# Patient Record
Sex: FEMALE | Race: WHITE | NOT HISPANIC OR LATINO | Employment: OTHER | URBAN - METROPOLITAN AREA
[De-identification: names, ages, dates, MRNs, and addresses within clinical notes are randomized per-mention and may not be internally consistent; named-entity substitution may affect disease eponyms.]

---

## 2017-07-24 ENCOUNTER — TELEPHONE (OUTPATIENT)
Dept: ORTHOPEDIC SURGERY | Facility: CLINIC | Age: 82
End: 2017-07-24

## 2018-04-03 ENCOUNTER — TELEPHONE (OUTPATIENT)
Dept: ORTHOPEDIC SURGERY | Facility: CLINIC | Age: 83
End: 2018-04-03

## 2018-04-13 ENCOUNTER — APPOINTMENT (OUTPATIENT)
Dept: CT IMAGING | Facility: HOSPITAL | Age: 83
End: 2018-04-13

## 2018-04-13 ENCOUNTER — HOSPITAL ENCOUNTER (EMERGENCY)
Facility: HOSPITAL | Age: 83
Discharge: HOME | End: 2018-04-13
Attending: EMERGENCY MEDICINE | Admitting: EMERGENCY MEDICINE

## 2018-04-13 ENCOUNTER — APPOINTMENT (OUTPATIENT)
Dept: GENERAL RADIOLOGY | Facility: HOSPITAL | Age: 83
End: 2018-04-13

## 2018-04-13 VITALS
OXYGEN SATURATION: 97 % | HEIGHT: 65 IN | BODY MASS INDEX: 31.65 KG/M2 | TEMPERATURE: 97.9 F | RESPIRATION RATE: 16 BRPM | DIASTOLIC BLOOD PRESSURE: 79 MMHG | SYSTOLIC BLOOD PRESSURE: 152 MMHG | HEART RATE: 73 BPM | WEIGHT: 190 LBS

## 2018-04-13 DIAGNOSIS — R11.0 NAUSEA: Primary | ICD-10-CM

## 2018-04-13 DIAGNOSIS — R42 LIGHTHEADEDNESS: ICD-10-CM

## 2018-04-13 LAB
ALBUMIN SERPL-MCNC: 3.9 G/DL (ref 3.5–5.2)
ALBUMIN/GLOB SERPL: 1.4 G/DL
ALP SERPL-CCNC: 31 U/L (ref 39–117)
ALT SERPL W P-5'-P-CCNC: 21 U/L (ref 1–33)
ANION GAP SERPL CALCULATED.3IONS-SCNC: 11 MMOL/L
AST SERPL-CCNC: 26 U/L (ref 1–32)
BACTERIA UR QL AUTO: ABNORMAL /HPF
BASOPHILS # BLD AUTO: 0.02 10*3/MM3 (ref 0–0.2)
BASOPHILS NFR BLD AUTO: 0.5 % (ref 0–1.5)
BILIRUB SERPL-MCNC: 0.4 MG/DL (ref 0.1–1.2)
BILIRUB UR QL STRIP: NEGATIVE
BUN BLD-MCNC: 14 MG/DL (ref 8–23)
BUN/CREAT SERPL: 17.7 (ref 7–25)
CALCIUM SPEC-SCNC: 8.9 MG/DL (ref 8.6–10.5)
CHLORIDE SERPL-SCNC: 102 MMOL/L (ref 98–107)
CLARITY UR: CLEAR
CO2 SERPL-SCNC: 28 MMOL/L (ref 22–29)
COLOR UR: YELLOW
CREAT BLD-MCNC: 0.79 MG/DL (ref 0.57–1)
DEPRECATED RDW RBC AUTO: 47.5 FL (ref 37–54)
EOSINOPHIL # BLD AUTO: 0.13 10*3/MM3 (ref 0–0.7)
EOSINOPHIL NFR BLD AUTO: 3.1 % (ref 0.3–6.2)
ERYTHROCYTE [DISTWIDTH] IN BLOOD BY AUTOMATED COUNT: 13.6 % (ref 11.7–13)
GFR SERPL CREATININE-BSD FRML MDRD: 70 ML/MIN/1.73
GLOBULIN UR ELPH-MCNC: 2.7 GM/DL
GLUCOSE BLD-MCNC: 103 MG/DL (ref 65–99)
GLUCOSE UR STRIP-MCNC: NEGATIVE MG/DL
HCT VFR BLD AUTO: 37.3 % (ref 35.6–45.5)
HGB BLD-MCNC: 11.9 G/DL (ref 11.9–15.5)
HGB UR QL STRIP.AUTO: NEGATIVE
HYALINE CASTS UR QL AUTO: ABNORMAL /LPF
IMM GRANULOCYTES # BLD: 0 10*3/MM3 (ref 0–0.03)
IMM GRANULOCYTES NFR BLD: 0 % (ref 0–0.5)
KETONES UR QL STRIP: NEGATIVE
LEUKOCYTE ESTERASE UR QL STRIP.AUTO: ABNORMAL
LYMPHOCYTES # BLD AUTO: 1.27 10*3/MM3 (ref 0.9–4.8)
LYMPHOCYTES NFR BLD AUTO: 30 % (ref 19.6–45.3)
MCH RBC QN AUTO: 30.2 PG (ref 26.9–32)
MCHC RBC AUTO-ENTMCNC: 31.9 G/DL (ref 32.4–36.3)
MCV RBC AUTO: 94.7 FL (ref 80.5–98.2)
MONOCYTES # BLD AUTO: 0.36 10*3/MM3 (ref 0.2–1.2)
MONOCYTES NFR BLD AUTO: 8.5 % (ref 5–12)
NEUTROPHILS # BLD AUTO: 2.46 10*3/MM3 (ref 1.9–8.1)
NEUTROPHILS NFR BLD AUTO: 57.9 % (ref 42.7–76)
NITRITE UR QL STRIP: NEGATIVE
NT-PROBNP SERPL-MCNC: 122 PG/ML (ref 0–1800)
PH UR STRIP.AUTO: 6.5 [PH] (ref 5–8)
PLATELET # BLD AUTO: 193 10*3/MM3 (ref 140–500)
PMV BLD AUTO: 10.2 FL (ref 6–12)
POTASSIUM BLD-SCNC: 4.2 MMOL/L (ref 3.5–5.2)
PROT SERPL-MCNC: 6.6 G/DL (ref 6–8.5)
PROT UR QL STRIP: NEGATIVE
RBC # BLD AUTO: 3.94 10*6/MM3 (ref 3.9–5.2)
RBC # UR: ABNORMAL /HPF
REF LAB TEST METHOD: ABNORMAL
SODIUM BLD-SCNC: 141 MMOL/L (ref 136–145)
SP GR UR STRIP: 1.01 (ref 1–1.03)
SQUAMOUS #/AREA URNS HPF: ABNORMAL /HPF
TROPONIN T SERPL-MCNC: <0.01 NG/ML (ref 0–0.03)
UROBILINOGEN UR QL STRIP: ABNORMAL
WBC NRBC COR # BLD: 4.24 10*3/MM3 (ref 4.5–10.7)
WBC UR QL AUTO: ABNORMAL /HPF

## 2018-04-13 PROCEDURE — 93005 ELECTROCARDIOGRAM TRACING: CPT | Performed by: EMERGENCY MEDICINE

## 2018-04-13 PROCEDURE — 85025 COMPLETE CBC W/AUTO DIFF WBC: CPT | Performed by: EMERGENCY MEDICINE

## 2018-04-13 PROCEDURE — 81001 URINALYSIS AUTO W/SCOPE: CPT | Performed by: EMERGENCY MEDICINE

## 2018-04-13 PROCEDURE — 73502 X-RAY EXAM HIP UNI 2-3 VIEWS: CPT

## 2018-04-13 PROCEDURE — 83880 ASSAY OF NATRIURETIC PEPTIDE: CPT | Performed by: EMERGENCY MEDICINE

## 2018-04-13 PROCEDURE — 70450 CT HEAD/BRAIN W/O DYE: CPT

## 2018-04-13 PROCEDURE — 99285 EMERGENCY DEPT VISIT HI MDM: CPT

## 2018-04-13 PROCEDURE — 84484 ASSAY OF TROPONIN QUANT: CPT | Performed by: EMERGENCY MEDICINE

## 2018-04-13 PROCEDURE — 93010 ELECTROCARDIOGRAM REPORT: CPT | Performed by: INTERNAL MEDICINE

## 2018-04-13 PROCEDURE — 80053 COMPREHEN METABOLIC PANEL: CPT | Performed by: EMERGENCY MEDICINE

## 2018-04-13 RX ORDER — SODIUM CHLORIDE 0.9 % (FLUSH) 0.9 %
10 SYRINGE (ML) INJECTION AS NEEDED
Status: DISCONTINUED | OUTPATIENT
Start: 2018-04-13 | End: 2018-04-13 | Stop reason: HOSPADM

## 2018-04-13 RX ORDER — CALCIUM CARBONATE 1250 MG/5ML
SUSPENSION ORAL
COMMUNITY
End: 2019-02-22 | Stop reason: DRUGHIGH

## 2018-04-13 RX ORDER — NICOTINE POLACRILEX 2 MG
GUM BUCCAL
COMMUNITY
End: 2021-10-19 | Stop reason: HOSPADM

## 2018-04-13 NOTE — ED NOTES
"PT felt  \"disconnected\". Laid down on the couch cause she felt this sensation. Pt denies being dizzy. Pt kept on feeling disconnected from body. Denies light headedness and nausea or the room spinning. Pt only has taken alendronate today of all meds.      Yimi Conway RN  04/13/18 3880       Yimi Conway RN  04/13/18 0455    "

## 2018-04-13 NOTE — DISCHARGE INSTRUCTIONS
You are advised to follow closely with Dr Qiu in 2-3 days for recheck, final results of lab work and imaging testing, and further testing/treatment as needed.    Drink plenty of fluids, eat regular meals with frequent snacks.      Please return to the emergency department immediately with chest pain different than usual for you, shortness of air, abdominal pain, persistent vomiting/fever, blood in emesis or stool, lightheadedness/fainting, problems with speech, one sided weakness/numbness, new incontinence, problems with vision, altered mental status or for worsening of symptoms or other concerns.

## 2018-04-13 NOTE — ED TRIAGE NOTES
"Pt states around 1230 felt a \"disconnect\" in her head sat down thought she was going to pass out per daughter was clammy/diaphoretic called 911, on arrival patient had improved mildly so decided to come private vehicle. Pt still feels \"disconnectted\" but has improved.   "

## 2018-04-13 NOTE — ED PROVIDER NOTES
" EMERGENCY DEPARTMENT ENCOUNTER    CHIEF COMPLAINT  Chief Complaint: AMS  History given by: Patient and family  History limited by: Nothing  Room Number: 03/03  PMD: SEAN Tarango      HPI:  Pt is a 82 y.o. female who presents complaining of abnormal sensation that she described as \"disconnected\" that has occurred intermittently since 1230 today. The pt states she woke up this morning and was feeling at her baseline. The pt states she began to notice she did not feel herself at 1230. The pt states she felt \"disconnected\" and was not having her normal thought process. The pt describes it as her body being in one area and her mind being in another. The pt states she laid down and improved. The pt states she then felt \"disconnected\" again. The pt states her symptoms have improved, and her episode lasted about 45 minutes. Pt reports right hip pain that has worsened over the past month and nausea. Pt denies dizziness, vomiting, diarrhea, fever, focal weakness, extremity swelling, visual disturbance, aphasia, and palpitations. The pt was diagnosed with influenza several weeks ago and took antibiotics for a secondary infection. The pt's family states the pt had repeatitive questioning, pallor, and \"fuzziness\". The pt has a hx of vertigo, and she states her symptoms today are not consistent with her previous episode of vertigo. The pt states her most recent falls were 6 and 4 weeks ago. The pt states she has been tripping due to trouble with balance for more than 1 year.    Duration: Since 1230 today  Onset: Gradual  Timing: Intermittent  Quality: The pt describes it as her body being in one area and her mind being in another.  Intensity/Severity: Moderate  Progression: Unchanged  Associated Symptoms: Pt reports right hip pain that has worsened over the past month, balance issues for over 1 year, and nausea. The pt's family states the pt had repetitive questioning, pallor, and \"fuzziness\".  Aggravating Factors: None " "stated  Alleviating Factors: None stated  Previous Episodes: The pt has a hx of vertigo, and she states her symptoms today are not consistent with her previous episode of vertigo.  Treatment before arrival: Nothing    PAST MEDICAL HISTORY  Active Ambulatory Problems     Diagnosis Date Noted   • No Active Ambulatory Problems     Resolved Ambulatory Problems     Diagnosis Date Noted   • No Resolved Ambulatory Problems     Past Medical History:   Diagnosis Date   • Fracture of hip    • Hypertension    • Osteopenia        PAST SURGICAL HISTORY  Past Surgical History:   Procedure Laterality Date   • APPENDECTOMY     • DEQUERVAIN RELEASE Bilateral    • HYSTERECTOMY     • KNEE SURGERY     • TONSILLECTOMY AND ADENOIDECTOMY     • WRIST SURGERY         FAMILY HISTORY  Family History   Problem Relation Age of Onset   • Diabetes Mother    • Hypertension Mother    • Stroke Mother    • Hypertension Father    • Stroke Father        SOCIAL HISTORY  Social History     Social History   • Marital status:      Spouse name: N/A   • Number of children: N/A   • Years of education: N/A     Occupational History   • Not on file.     Social History Main Topics   • Smoking status: Never Smoker   • Smokeless tobacco: Never Used   • Alcohol use No   • Drug use: No   • Sexual activity: Defer     Other Topics Concern   • Not on file     Social History Narrative   • No narrative on file       ALLERGIES  Percocet  [oxycodone-acetaminophen] and Iodinated diagnostic agents    REVIEW OF SYSTEMS  Review of Systems   Constitutional: Negative for chills and fever.        Pt reports a feeling of \"fuzziness\"   HENT: Negative for rhinorrhea and sore throat.    Eyes: Negative for visual disturbance.   Respiratory: Negative for cough and shortness of breath.    Cardiovascular: Negative for chest pain, palpitations and leg swelling.   Gastrointestinal: Positive for nausea. Negative for abdominal pain, diarrhea and vomiting.   Endocrine: Negative.  "   Genitourinary: Negative for decreased urine volume, dysuria and frequency.   Musculoskeletal: Negative for neck pain.        Right hip pain that has worsened over the past month   Skin: Positive for pallor. Negative for rash.   Neurological: Negative for syncope and headaches.        Balance issues   Psychiatric/Behavioral: Positive for confusion.        Repetitive questioning   All other systems reviewed and are negative.      PHYSICAL EXAM  ED Triage Vitals   Temp Heart Rate Resp BP SpO2   04/13/18 1325 04/13/18 1325 04/13/18 1325 04/13/18 1333 04/13/18 1325   98.7 °F (37.1 °C) 81 16 (!) 184/80 96 %      Temp src Heart Rate Source Patient Position BP Location FiO2 (%)   04/13/18 1325 04/13/18 1325 -- -- --   Tympanic Monitor          Physical Exam   Constitutional: She is oriented to person, place, and time.  Non-toxic appearance. She appears distressed (mild).   HENT:   Head: Normocephalic and atraumatic.   Eyes: EOM are normal. Right eye exhibits no nystagmus. Left eye exhibits no nystagmus.   Neck: Normal range of motion. Neck supple.   Cardiovascular: Normal rate, regular rhythm, normal heart sounds and intact distal pulses.    No murmur heard.  Pulses:       Posterior tibial pulses are 2+ on the right side, and 2+ on the left side.   Pulmonary/Chest: Effort normal and breath sounds normal. No respiratory distress.   Abdominal: Soft. Bowel sounds are normal. There is no tenderness. There is no rebound and no guarding.   Musculoskeletal: Normal range of motion. She exhibits no edema.   Minor discomfort wit ROM right hip   Neurological: She is alert and oriented to person, place, and time. She displays facial symmetry and normal speech.   Skin: Skin is warm and dry.   Psychiatric: Affect normal.   Nursing note and vitals reviewed.      LAB RESULTS  Lab Results (last 24 hours)     Procedure Component Value Units Date/Time    Comprehensive Metabolic Panel [[id number]]  (Abnormal) Collected:  04/13/18 1415     Specimen:  Blood Updated:  04/13/18 1451     Glucose 103 (H) mg/dL      BUN 14 mg/dL      Creatinine 0.79 mg/dL      Sodium 141 mmol/L      Potassium 4.2 mmol/L      Chloride 102 mmol/L      CO2 28.0 mmol/L      Calcium 8.9 mg/dL      Total Protein 6.6 g/dL      Albumin 3.90 g/dL      ALT (SGPT) 21 U/L      AST (SGOT) 26 U/L      Alkaline Phosphatase 31 (L) U/L      Total Bilirubin 0.4 mg/dL      eGFR Non African Amer 70 mL/min/1.73      Globulin 2.7 gm/dL      A/G Ratio 1.4 g/dL      BUN/Creatinine Ratio 17.7     Anion Gap 11.0 mmol/L     Narrative:       The MDRD GFR formula is only valid for adults with stable renal function between ages 18 and 70.    CBC & Differential [[id number]] Collected:  04/13/18 1415    Specimen:  Blood Updated:  04/13/18 1429    Narrative:       The following orders were created for panel order CBC & Differential.  Procedure                               Abnormality         Status                     ---------                               -----------         ------                     CBC Auto Differential[[id number]]         Abnormal            Final result                 Please view results for these tests on the individual orders.    Troponin [[id number]]  (Normal) Collected:  04/13/18 1415    Specimen:  Blood Updated:  04/13/18 1451     Troponin T <0.010 ng/mL     Narrative:       Troponin T Reference Ranges:  Less than 0.03 ng/mL:    Negative for AMI  0.03 to 0.09 ng/mL:      Indeterminant for AMI  Greater than 0.09 ng/mL: Positive for AMI    BNP [[id number]]  (Normal) Collected:  04/13/18 1415    Specimen:  Blood Updated:  04/13/18 1449     proBNP 122.0 pg/mL     Narrative:       Among patients with dyspnea, NT-proBNP is highly sensitive for the detection of acute congestive heart failure. In addition NT-proBNP of <300 pg/ml effectively rules out acute congestive heart failure with 99% negative predictive value.    CBC Auto Differential [[id number]]  (Abnormal) Collected:  04/13/18 1415     Specimen:  Blood Updated:  04/13/18 1429     WBC 4.24 (L) 10*3/mm3      RBC 3.94 10*6/mm3      Hemoglobin 11.9 g/dL      Hematocrit 37.3 %      MCV 94.7 fL      MCH 30.2 pg      MCHC 31.9 (L) g/dL      RDW 13.6 (H) %      RDW-SD 47.5 fl      MPV 10.2 fL      Platelets 193 10*3/mm3      Neutrophil % 57.9 %      Lymphocyte % 30.0 %      Monocyte % 8.5 %      Eosinophil % 3.1 %      Basophil % 0.5 %      Immature Grans % 0.0 %      Neutrophils, Absolute 2.46 10*3/mm3      Lymphocytes, Absolute 1.27 10*3/mm3      Monocytes, Absolute 0.36 10*3/mm3      Eosinophils, Absolute 0.13 10*3/mm3      Basophils, Absolute 0.02 10*3/mm3      Immature Grans, Absolute 0.00 10*3/mm3     Urinalysis With / Microscopic If Indicated - Urine, Clean Catch [[id number]]  (Abnormal) Collected:  04/13/18 1439    Specimen:  Urine from Urine, Clean Catch Updated:  04/13/18 1505     Color, UA Yellow     Appearance, UA Clear     pH, UA 6.5     Specific Gravity, UA 1.014     Glucose, UA Negative     Ketones, UA Negative     Bilirubin, UA Negative     Blood, UA Negative     Protein, UA Negative     Leuk Esterase, UA Trace (A)     Nitrite, UA Negative     Urobilinogen, UA 0.2 E.U./dL    Urinalysis, Microscopic Only - Urine, Clean Catch [[id number]]  (Abnormal) Collected:  04/13/18 1439    Specimen:  Urine from Urine, Clean Catch Updated:  04/13/18 1505     RBC, UA 3-5 (A) /HPF      WBC, UA 6-12 (A) /HPF      Bacteria, UA 1+ (A) /HPF      Squamous Epithelial Cells, UA 0-2 /HPF      Hyaline Casts, UA 3-6 /LPF      Methodology Automated Microscopy          I ordered the above labs and reviewed the results    RADIOLOGY  CT Head Without Contrast   Final Result           No acute intracranial hemorrhage or hydrocephalus. With persistent   clinical indication, MRI could be considered for further evaluation.       This report was finalized on 4/13/2018 2:52 PM by Dr. Edu Geller MD.          XR Hip With or Without Pelvis 2 - 3 View Right   Final Result        No acute fracture is identified. Follow-up as indications persist.               This report was finalized on 4/13/2018 2:41 PM by Dr. Edu Geller MD.               I ordered the above noted radiological studies. Interpreted by radiologist. Reviewed by me in PACS.       PROCEDURES  Procedures    Interval: baseline  1a. Level of Consciousness: 0-->Alert, keenly responsive  1b. LOC Questions: 0-->Answers both questions correctly  1c. LOC Commands: 0-->Performs both tasks correctly  2. Best Gaze: 0-->Normal  3. Visual: 0-->No visual loss  4. Facial Palsy: 0-->Normal symmetrical movements  5a. Motor Arm, Left: 0-->No drift, limb holds 90 (or 45) degrees for full 10 secs  5b. Motor Arm, Right: 0-->No drift, limb holds 90 (or 45) degrees for full 10 secs  6a. Motor Leg, Left: 0-->No drift, leg holds 30 degree position for full 5 secs  6b. Motor Leg, Right: 0-->No drift, leg holds 30 degree position for full 5 secs  7. Limb Ataxia: 0-->Absent  8. Sensory: 0-->Normal, no sensory loss  9. Best Language: 0-->No aphasia, normal  10. Dysarthria: 0-->Normal  11. Extinction and Inattention (formerly Neglect): 0-->No abnormality    Total (NIH Stroke Scale): 0    EKG           EKG time: 1434  Rhythm/Rate: 60's Normal Sinus  P waves and WA: Normal  QRS, axis: Normal   ST and T waves: Nonspecific ST and T waves inferiorly     Interpreted Contemporaneously by me, independently viewed  Minimal change compared to prior 11-26-14        PROGRESS AND CONSULTS  ED Course     1358  The pt is not a tPA candidate due to stroke scale of 0.    1408  EKG, CT Head, XR Right Hip, and labs ordered for further evaluation.    MEDICAL DECISION MAKING  Results were reviewed/discussed with the patient and they were also made aware of online access. Pt also made aware that some labs, such as cultures, will not be resulted during ER visit and follow up with PMD is necessary.     MDM  Number of Diagnoses or Management Options     Amount  and/or Complexity of Data Reviewed  Clinical lab tests: ordered and reviewed (Troponin - Negative  BNP - Negative)  Tests in the radiology section of CPT®: reviewed and ordered (XR Right Hip - Negative for fx  CT Head - Negative acute)  Tests in the medicine section of CPT®: ordered and reviewed (Refer to the procedure section of the note for EKG results  )    Patient Progress  Patient progress: stable         DIAGNOSIS  Final diagnoses:   Nausea   Lightheadedness       DISPOSITION  DISCHARGE    Patient discharged in stable condition.    Reviewed implications of results, diagnosis, meds, responsibility to follow up, warning signs and symptoms of possible worsening, potential complications and reasons to return to ER.    Patient/Family voiced understanding of above instructions.    Discussed plan for discharge, as there is no emergent indication for admission. Patient referred to primary care provider for BP management due to today's BP. Pt/family is agreeable and understands need for follow up and repeat testing.  Pt is aware that discharge does not mean that nothing is wrong but it indicates no emergency is present that requires admission and they must continue care with follow-up as given below or physician of their choice.     FOLLOW-UP  SEAN Kendrick  [address] [address]  [phone]    Schedule an appointment as soon as possible for a visit in 3 days  EVEN IF WELL         Medication List      Stop    hydrochlorothiazide 25 MG tablet  Commonly known as:  HYDRODIURIL              Latest Documented Vital Signs:  As of 3:11 PM  BP- 152/79 HR- 70 Temp- 98.7 °F (37.1 °C) (Tympanic) O2 sat- 95%    --  Documentation assistance provided by julia Porras for Dr. Myles.  Information recorded by the scrnoemi was done at my direction and has been verified and validated by me.       Taran Porras  04/13/18 1511       Yahaira Myles MD  04/14/18 124

## 2018-04-13 NOTE — ED TRIAGE NOTES
"Pt states \"i am not feeling as connected.\" daughter reports that pt is confused and that started around 1230.    Pt laid down due to near syncope.     EMS called but pt refused transport.     Pt also c/o increase hip pain x 1 month.   "

## 2018-04-17 ENCOUNTER — OFFICE VISIT (OUTPATIENT)
Dept: ORTHOPEDIC SURGERY | Facility: CLINIC | Age: 83
End: 2018-04-17

## 2018-04-17 VITALS — HEIGHT: 64 IN | WEIGHT: 192 LBS | BODY MASS INDEX: 32.78 KG/M2 | TEMPERATURE: 98.5 F

## 2018-04-17 DIAGNOSIS — M25.551 HIP PAIN, RIGHT: Primary | ICD-10-CM

## 2018-04-17 PROCEDURE — 73502 X-RAY EXAM HIP UNI 2-3 VIEWS: CPT | Performed by: ORTHOPAEDIC SURGERY

## 2018-04-17 PROCEDURE — 99213 OFFICE O/P EST LOW 20 MIN: CPT | Performed by: ORTHOPAEDIC SURGERY

## 2018-04-17 NOTE — PROGRESS NOTES
Patient: Haydee Dickey  YOB: 1935 82 y.o. female  Medical Record Number: [id number]    Chief Complaint:   Chief Complaint   Patient presents with   • Right Hip - Pain, Establish Care       History of Present Illness:Haydee Dickey is a 82 y.o. female who presents for follow-up of  Right hip.  She had a previous hip fracture treated with a trochanteric nail and cerclage cable.  She has done okay however she has recently had 2 falls 1 to the outside of the right hip.  She is complaining of an ache and Easily feelings of instability in the legs.  It has worsened over the last few months.    Allergies:   Allergies   Allergen Reactions   • Percocet  [Oxycodone-Acetaminophen] Itching     causes ithching   • Iodinated Diagnostic Agents Rash     Patient reports rash occurred 30 yrs ago with contrast       Medications:   Current Outpatient Prescriptions   Medication Sig Dispense Refill   • alendronate-cholecalciferol (FOSAMAX PLUS D)  MG-UNIT per tablet Take 1 tablet by mouth Every 7 (Seven) Days. Take in the morning with a full glass of water, on an empty stomach, and do not take anything else by mouth or lie down for the next 30 min.     • aspirin 81 MG EC tablet Take 81 mg by mouth.     • benazepril (LOTENSIN) 10 MG tablet Take 10 mg by mouth.     • Biotin 1 MG capsule Take  by mouth.     • Calcium Carbonate Antacid 1250 MG/5ML Take  by mouth.     • cholecalciferol (VITAMIN D3) 1000 UNITS tablet Take 4,000 Units by mouth.     • Cyanocobalamin (B-12) 1000 MCG capsule Take 1,000 mcg by mouth.     • FOLIC ACID Take 400 mcg by mouth.     • Lutein 6 MG tablet Take 20 mg by mouth.     • pyridoxine (VITAMIN B-6) 100 MG tablet tablet Take 100 mg by mouth.     • Turmeric 500 MG capsule Take 500 mg by mouth.     • vitamin C (ASCORBIC ACID) 500 MG tablet Take 1,000 mg by mouth daily.       No current facility-administered medications for this visit.          The following portions of the patient's history were  "reviewed and updated as appropriate: allergies, current medications, past family history, past medical history, past social history, past surgical history and problem list.    Review of Systems:   A 14 point review of systems was performed. All systems negative except pertinent positives/negative listed in HPI above    Physical Exam:   Vitals:    04/17/18 1131   Temp: 98.5 °F (36.9 °C)   Weight: 87.1 kg (192 lb)   Height: 162.6 cm (64\")       General: A and O x 3, ASA, NAD    SCLERA:    Normal    DENTITION:   Normal  Hip:  right    LEG ALIGNMENT:     Neutral   ,    equal leg lengths    GAIT:     Nonantalgic    SKIN:     No abnormality    RANGE OF MOTION:      Full without joint irritability    STRENGTH:     5 / 5    hip flexion and abduction    DISTAL PULSES:    Paplable    DISTAL SENSATION :   Intact    LYMPHATICS:     No   lymphadenopathy    OTHER:          - Negative Stinchfeld test      - Negative log roll      +Tenderness to palpation trochanteric bursa     Radiology:    Xrays 2views r hip (AP bilateral hips and lateral hip) were ordered and reviewed for evaluation of hip pain demonstrating a well positioned troched nail and cerclage cable, no complicating factors  Comparison views: todays xrays were compared to previous xrays and demonstrate no change    Assessment/Plan:  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  I'm going to set her up with physical therapy.  I'll see her back appeared basis.  She still having pain after therapy we can have her come back and see Conchita for a trochanteric bursa cortisone injection.      Edmund Zavaleta MD  4/17/2018  "

## 2018-04-18 ENCOUNTER — TELEPHONE (OUTPATIENT)
Dept: ORTHOPEDIC SURGERY | Facility: CLINIC | Age: 83
End: 2018-04-18

## 2018-04-18 NOTE — TELEPHONE ENCOUNTER
[facility] patient scheduled tomorrow for physical therapy. Patient could not find insurance card. Asking for us to fax a copy. Ok per NAZARIO. Faxed to [phone]  Confirmation receipt.

## 2019-01-21 ENCOUNTER — OFFICE VISIT (OUTPATIENT)
Dept: FAMILY MEDICINE CLINIC | Facility: CLINIC | Age: 84
End: 2019-01-21

## 2019-01-21 VITALS
HEIGHT: 64 IN | SYSTOLIC BLOOD PRESSURE: 190 MMHG | OXYGEN SATURATION: 97 % | DIASTOLIC BLOOD PRESSURE: 90 MMHG | HEART RATE: 78 BPM | RESPIRATION RATE: 16 BRPM | WEIGHT: 199.6 LBS | BODY MASS INDEX: 34.08 KG/M2

## 2019-01-21 DIAGNOSIS — I73.9 SMALL VESSEL DISEASE (HCC): ICD-10-CM

## 2019-01-21 DIAGNOSIS — R41.3 MEMORY CHANGES: Primary | ICD-10-CM

## 2019-01-21 DIAGNOSIS — Z13.1 SCREENING FOR DIABETES MELLITUS: ICD-10-CM

## 2019-01-21 DIAGNOSIS — E53.8 B12 DEFICIENCY: ICD-10-CM

## 2019-01-21 DIAGNOSIS — I10 ESSENTIAL HYPERTENSION: ICD-10-CM

## 2019-01-21 DIAGNOSIS — R60.0 EDEMA OF LOWER EXTREMITY: ICD-10-CM

## 2019-01-21 PROBLEM — M75.100 TORN ROTATOR CUFF: Status: ACTIVE | Noted: 2019-01-21

## 2019-01-21 PROBLEM — H81.10 BENIGN PAROXYSMAL POSITIONAL VERTIGO: Status: ACTIVE | Noted: 2019-01-21

## 2019-01-21 PROBLEM — R10.9 ACUTE LEFT FLANK PAIN: Status: ACTIVE | Noted: 2018-11-14

## 2019-01-21 PROBLEM — Z96.659 H/O TOTAL KNEE REPLACEMENT: Status: ACTIVE | Noted: 2019-01-21

## 2019-01-21 PROBLEM — Z90.710 H/O ABDOMINAL HYSTERECTOMY: Status: ACTIVE | Noted: 2019-01-21

## 2019-01-21 PROBLEM — M85.80 OSTEOPENIA: Status: ACTIVE | Noted: 2017-04-25

## 2019-01-21 LAB
ALBUMIN SERPL-MCNC: 4.3 G/DL (ref 3.5–5.2)
ALBUMIN/GLOB SERPL: 1.7 G/DL
ALP SERPL-CCNC: 25 U/L (ref 39–117)
ALT SERPL-CCNC: 21 U/L (ref 1–33)
AST SERPL-CCNC: 27 U/L (ref 1–32)
BILIRUB SERPL-MCNC: 0.3 MG/DL (ref 0.1–1.2)
BUN SERPL-MCNC: 19 MG/DL (ref 8–23)
BUN/CREAT SERPL: 20.4 (ref 7–25)
CALCIUM SERPL-MCNC: 10 MG/DL (ref 8.6–10.5)
CHLORIDE SERPL-SCNC: 104 MMOL/L (ref 98–107)
CO2 SERPL-SCNC: 28.9 MMOL/L (ref 22–29)
CREAT SERPL-MCNC: 0.93 MG/DL (ref 0.57–1)
ERYTHROCYTE [DISTWIDTH] IN BLOOD BY AUTOMATED COUNT: 13.1 % (ref 11.7–13)
GLOBULIN SER CALC-MCNC: 2.5 GM/DL
GLUCOSE SERPL-MCNC: 99 MG/DL (ref 65–99)
HCT VFR BLD AUTO: 38.1 % (ref 35.6–45.5)
HGB BLD-MCNC: 11.7 G/DL (ref 11.9–15.5)
MCH RBC QN AUTO: 30.2 PG (ref 26.9–32)
MCHC RBC AUTO-ENTMCNC: 30.7 G/DL (ref 32.4–36.3)
MCV RBC AUTO: 98.2 FL (ref 80.5–98.2)
PLATELET # BLD AUTO: 214 10*3/MM3 (ref 140–500)
POTASSIUM SERPL-SCNC: 5.3 MMOL/L (ref 3.5–5.2)
PROT SERPL-MCNC: 6.8 G/DL (ref 6–8.5)
RBC # BLD AUTO: 3.88 10*6/MM3 (ref 3.9–5.2)
SODIUM SERPL-SCNC: 144 MMOL/L (ref 136–145)
TSH SERPL DL<=0.005 MIU/L-ACNC: 1.33 MIU/ML (ref 0.27–4.2)
VIT B12 SERPL-MCNC: 795 PG/ML (ref 211–946)
WBC # BLD AUTO: 5.18 10*3/MM3 (ref 4.5–10.7)

## 2019-01-21 PROCEDURE — 99204 OFFICE O/P NEW MOD 45 MIN: CPT | Performed by: FAMILY MEDICINE

## 2019-01-21 NOTE — PROGRESS NOTES
Subjective   Haydee Dickey is a 83 y.o. female     Chief Complaint   Patient presents with   • Establish Care   • Memory Loss   • Hypertension       HPI     Transferring care from Dr. Keeley Qiu    Memory loss:  -her daughter first noticed mild memory changes 2 yr ago  -gradually progressive  -pt requests referral to [facility] for formal Neuropsychiatric testing   -hx of B12 deficiency, for which she is taking 2000 mcg of cyanocobalamin PO daily  -CT head dated 4/13/18 reviewed: & consistent with chronic small vessel ischemic changes  -STI testing pan-negative a few years ago after a needle stick exposure   -she last completed a course of PT for her balance in summer of 2018  -no anxiety or depression  -she lives at home with her adult daughter's family and provides  for a young grandchild     HTN:  -BP runs 110s-140s/60s-80s at home most of the time  -she forgot to take her AM medications this morning because she was distracted caring for her grand-daughter   -she takes benazepril 10 mg daily  -she last fell about 1 week ago, no head trauma, just a bruised knee  -she worries about falling  -balance could be better  -not lifting feet adequately    Review of Systems   Constitutional: Negative for fatigue and fever.   HENT: Negative for congestion and sore throat.    Respiratory: Negative for cough and shortness of breath.    Cardiovascular: Positive for leg swelling (chronic mild bilateral LE edema). Negative for chest pain and palpitations.   Gastrointestinal: Negative for abdominal pain and nausea.   Genitourinary: Negative for dysuria and urgency.   Musculoskeletal: Positive for arthralgias and gait problem.   Skin: Negative for rash and wound.   Neurological: Negative for dizziness, syncope, light-headedness and headaches.   Psychiatric/Behavioral: Positive for confusion (memory changes) and decreased concentration. Negative for agitation, behavioral problems, dysphoric mood, hallucinations, self-injury,  sleep disturbance and suicidal ideas. The patient is not nervous/anxious.        The following portions of the patient's history were reviewed and updated as appropriate: allergies, current medications, past family history, past medical history, past social history, past surgical history and problem list.    Past Medical History:   Diagnosis Date   • Anemia    • B12 deficiency    • Benign paroxysmal vertigo 2013   • Essential tremor    • Folliculitis 11/01/2017   • Fracture of hip (CMS/HCC)     right sided, repaired with metal pins   • Hyperkalemia    • Hypertension    • Lower extremity neuropathy     bilateral   • Osteopenia    • Osteoporosis    • UTI (urinary tract infection) 2018     Past Surgical History:   Procedure Laterality Date   • APPENDECTOMY     • CATARACT EXTRACTION, BILATERAL  07/01/2018    R 7/18 and L 9/18   • DEQUERVAIN RELEASE Bilateral    • HYSTERECTOMY     • HYSTERECTOMY  1975    Partial   • TONSILLECTOMY AND ADENOIDECTOMY     • TONSILLECTOMY AND ADENOIDECTOMY     • TOTAL KNEE ARTHROPLASTY Left 2001   • TOTAL KNEE ARTHROPLASTY Right 2014   • WRIST SURGERY       Family History   Problem Relation Age of Onset   • Diabetes Mother    • Hypertension Mother    • Stroke Mother    • Hypertension Father    • Stroke Father      Social History     Tobacco Use   • Smoking status: Never Smoker   • Smokeless tobacco: Never Used   Substance and Sexual Activity   • Alcohol use: No   • Drug use: No   • Sexual activity: Defer   Other Topics Concern   • Not on file   Social History Narrative    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  Lives at home with her daughter, son in law, grand-daughter, and 1 dog.          Allergies   Allergen Reactions   • Oxycodone-Acetaminophen Itching     causes ithching   • Percocet  [Oxycodone-Acetaminophen] Itching     causes ithching   • Iodinated Diagnostic Agents Rash     Patient reports rash occurred 30 yrs ago with contrast        Outpatient Medications  Prior to Visit   Medication Sig Dispense Refill   • alendronate-cholecalciferol (FOSAMAX PLUS D)  MG-UNIT per tablet Take 1 tablet by mouth Every 7 (Seven) Days. Take in the morning with a full glass of water, on an empty stomach, and do not take anything else by mouth or lie down for the next 30 min.     • aspirin 81 MG EC tablet Take 81 mg by mouth.     • benazepril (LOTENSIN) 10 MG tablet Take 10 mg by mouth.     • Biotin 1 MG capsule Take  by mouth.     • Calcium Carbonate Antacid 1250 MG/5ML Take  by mouth.     • cholecalciferol (VITAMIN D3) 1000 UNITS tablet Take 4,000 Units by mouth.     • Cyanocobalamin (B-12) 1000 MCG capsule Take 1,000 mcg by mouth.     • FOLIC ACID Take 400 mcg by mouth.     • Lutein 6 MG tablet Take 20 mg by mouth.     • pyridoxine (VITAMIN B-6) 100 MG tablet tablet Take 100 mg by mouth.     • Turmeric 500 MG capsule Take 500 mg by mouth.     • vitamin C (ASCORBIC ACID) 500 MG tablet Take 1,000 mg by mouth daily.       No facility-administered medications prior to visit.        Objective     Vitals:    01/21/19 1045   BP: (!) 190/90   Pulse: 78   Resp: 16   SpO2: 97%       Physical Exam   Constitutional: She is oriented to person, place, and time. She appears well-developed and well-nourished. No distress.   HENT:   Head: Normocephalic and atraumatic.   Eyes: Conjunctivae and EOM are normal. Pupils are equal, round, and reactive to light.   Neck: No thyromegaly present.   Cardiovascular: Normal rate, regular rhythm and normal heart sounds. Exam reveals no gallop and no friction rub.   No murmur heard.  Pulmonary/Chest: Effort normal and breath sounds normal. No respiratory distress. She has no wheezes. She has no rhonchi. She has no rales.   Abdominal: Soft. Bowel sounds are normal. She exhibits no distension. There is no tenderness.   Musculoskeletal: She exhibits edema (trace bilateral edema to ankles).   Lymphadenopathy:     She has no cervical adenopathy.   Neurological: She  "is alert and oriented to person, place, and time. She has normal strength. She displays tremor (bilateral hand). No cranial nerve deficit. She displays a negative Romberg sign. Gait normal.   Skin: Skin is warm and dry.       ASSESSMENT/PLAN       Problem List Items Addressed This Visit        Cardiovascular and Mediastinum    Small vessel disease    Relevant Orders    Comprehensive metabolic panel (Completed)    TSH (Completed)    CBC No Differential (Completed)    Vitamin B12 (Completed)    MRI Brain With & Without Contrast    Ambulatory Referral to Neuropsychology       Digestive    B12 deficiency    Relevant Orders    CBC No Differential (Completed)    Vitamin B12 (Completed)      Other Visit Diagnoses     Memory changes    -  Primary    Relevant Orders    Comprehensive metabolic panel (Completed)    TSH (Completed)    CBC No Differential (Completed)    Vitamin B12 (Completed)    MRI Brain With & Without Contrast    Ambulatory Referral to Neuropsychology    Screening for diabetes mellitus        Relevant Orders    Comprehensive metabolic panel (Completed)    Edema of lower extremities bilaterally  Pt given paper Rx for compression stockings    CMP    HTN  Pt advised to go home and take her benazepril immediately  Continue to monitor BP at home and bring BP log to next appt              Patient Instructions   Ask your pharmacist about the Shingrix, the new shingles vaccine.      DASH Eating Plan  DASH stands for \"Dietary Approaches to Stop Hypertension.\" The DASH eating plan is a healthy eating plan that has been shown to reduce high blood pressure (hypertension). It may also reduce your risk for type 2 diabetes, heart disease, and stroke. The DASH eating plan may also help with weight loss.  What are tips for following this plan?  General guidelines  · Avoid eating more than 2,300 mg (milligrams) of salt (sodium) a day. If you have hypertension, you may need to reduce your sodium intake to 1,500 mg a " "day.  · Limit alcohol intake to no more than 1 drink a day for nonpregnant women and 2 drinks a day for men. One drink equals 12 oz of beer, 5 oz of wine, or 1½ oz of hard liquor.  · Work with your health care provider to maintain a healthy body weight or to lose weight. Ask what an ideal weight is for you.  · Get at least 30 minutes of exercise that causes your heart to beat faster (aerobic exercise) most days of the week. Activities may include walking, swimming, or biking.  · Work with your health care provider or diet and nutrition specialist (dietitian) to adjust your eating plan to your individual calorie needs.  Reading food labels  · Check food labels for the amount of sodium per serving. Choose foods with less than 5 percent of the Daily Value of sodium. Generally, foods with less than 300 mg of sodium per serving fit into this eating plan.  · To find whole grains, look for the word \"whole\" as the first word in the ingredient list.  Shopping  · Buy products labeled as \"low-sodium\" or \"no salt added.\"  · Buy fresh foods. Avoid canned foods and premade or frozen meals.  Cooking  · Avoid adding salt when cooking. Use salt-free seasonings or herbs instead of table salt or sea salt. Check with your health care provider or pharmacist before using salt substitutes.  · Do not olvera foods. Cook foods using healthy methods such as baking, boiling, grilling, and broiling instead.  · Cook with heart-healthy oils, such as olive, canola, soybean, or sunflower oil.  Meal planning    · Eat a balanced diet that includes:  ? 5 or more servings of fruits and vegetables each day. At each meal, try to fill half of your plate with fruits and vegetables.  ? Up to 6-8 servings of whole grains each day.  ? Less than 6 oz of lean meat, poultry, or fish each day. A 3-oz serving of meat is about the same size as a deck of cards. One egg equals 1 oz.  ? 2 servings of low-fat dairy each day.  ? A serving of nuts, seeds, or beans 5 times " each week.  ? Heart-healthy fats. Healthy fats called Omega-3 fatty acids are found in foods such as flaxseeds and coldwater fish, like sardines, salmon, and mackerel.  · Limit how much you eat of the following:  ? Canned or prepackaged foods.  ? Food that is high in trans fat, such as fried foods.  ? Food that is high in saturated fat, such as fatty meat.  ? Sweets, desserts, sugary drinks, and other foods with added sugar.  ? Full-fat dairy products.  · Do not salt foods before eating.  · Try to eat at least 2 vegetarian meals each week.  · Eat more home-cooked food and less restaurant, buffet, and fast food.  · When eating at a restaurant, ask that your food be prepared with less salt or no salt, if possible.  What foods are recommended?  The items listed may not be a complete list. Talk with your dietitian about what dietary choices are best for you.  Grains  Whole-grain or whole-wheat bread. Whole-grain or whole-wheat pasta. Brown rice. Oatmeal. Quinoa. Bulgur. Whole-grain and low-sodium cereals. Donita bread. Low-fat, low-sodium crackers. Whole-wheat flour tortillas.  Vegetables  Fresh or frozen vegetables (raw, steamed, roasted, or grilled). Low-sodium or reduced-sodium tomato and vegetable juice. Low-sodium or reduced-sodium tomato sauce and tomato paste. Low-sodium or reduced-sodium canned vegetables.  Fruits  All fresh, dried, or frozen fruit. Canned fruit in natural juice (without added sugar).  Meat and other protein foods  Skinless chicken or turkey. Ground chicken or turkey. Pork with fat trimmed off. Fish and seafood. Egg whites. Dried beans, peas, or lentils. Unsalted nuts, nut butters, and seeds. Unsalted canned beans. Lean cuts of beef with fat trimmed off. Low-sodium, lean deli meat.  Dairy  Low-fat (1%) or fat-free (skim) milk. Fat-free, low-fat, or reduced-fat cheeses. Nonfat, low-sodium ricotta or cottage cheese. Low-fat or nonfat yogurt. Low-fat, low-sodium cheese.  Fats and oils  Soft margarine  without trans fats. Vegetable oil. Low-fat, reduced-fat, or light mayonnaise and salad dressings (reduced-sodium). Canola, safflower, olive, soybean, and sunflower oils. Avocado.  Seasoning and other foods  Herbs. Spices. Seasoning mixes without salt. Unsalted popcorn and pretzels. Fat-free sweets.  What foods are not recommended?  The items listed may not be a complete list. Talk with your dietitian about what dietary choices are best for you.  Grains  Baked goods made with fat, such as croissants, muffins, or some breads. Dry pasta or rice meal packs.  Vegetables  Creamed or fried vegetables. Vegetables in a cheese sauce. Regular canned vegetables (not low-sodium or reduced-sodium). Regular canned tomato sauce and paste (not low-sodium or reduced-sodium). Regular tomato and vegetable juice (not low-sodium or reduced-sodium). Pickles. Olives.  Fruits  Canned fruit in a light or heavy syrup. Fried fruit. Fruit in cream or butter sauce.  Meat and other protein foods  Fatty cuts of meat. Ribs. Fried meat. Trevino. Sausage. Bologna and other processed lunch meats. Salami. Fatback. Hotdogs. Bratwurst. Salted nuts and seeds. Canned beans with added salt. Canned or smoked fish. Whole eggs or egg yolks. Chicken or turkey with skin.  Dairy  Whole or 2% milk, cream, and half-and-half. Whole or full-fat cream cheese. Whole-fat or sweetened yogurt. Full-fat cheese. Nondairy creamers. Whipped toppings. Processed cheese and cheese spreads.  Fats and oils  Butter. Stick margarine. Lard. Shortening. Ghee. Trevino fat. Tropical oils, such as coconut, palm kernel, or palm oil.  Seasoning and other foods  Salted popcorn and pretzels. Onion salt, garlic salt, seasoned salt, table salt, and sea salt. [facility] sauce. Tartar sauce. Barbecue sauce. Teriyaki sauce. Soy sauce, including reduced-sodium. Steak sauce. Canned and packaged gravies. Fish sauce. Oyster sauce. Cocktail sauce. Horseradish that you find on the shelf. Ketchup.  Mustard. Meat flavorings and tenderizers. Bouillon cubes. Hot sauce and Tabasco sauce. Premade or packaged marinades. Premade or packaged taco seasonings. Relishes. Regular salad dressings.  Where to find more information:  · National Heart, Lung, and Blood [place]: www.nhlbi.nih.gov  · American Heart Association: www.heart.org  Summary  · The DASH eating plan is a healthy eating plan that has been shown to reduce high blood pressure (hypertension). It may also reduce your risk for type 2 diabetes, heart disease, and stroke.  · With the DASH eating plan, you should limit salt (sodium) intake to 2,300 mg a day. If you have hypertension, you may need to reduce your sodium intake to 1,500 mg a day.  · When on the DASH eating plan, aim to eat more fresh fruits and vegetables, whole grains, lean proteins, low-fat dairy, and heart-healthy fats.  · Work with your health care provider or diet and nutrition specialist (dietitian) to adjust your eating plan to your individual calorie needs.  This information is not intended to replace advice given to you by your health care provider. Make sure you discuss any questions you have with your health care provider.  Document Released: 12/06/2012 Document Revised: 12/11/2017 Document Reviewed: 12/11/2017  [employer] Interactive Patient Education © 2018 [employer]      Return in about 1 month (around 2/21/2019) for Recheck BP and tremor.      Frieda Saez MD  01/27/19

## 2019-01-21 NOTE — PATIENT INSTRUCTIONS
"Ask your pharmacist about the Shingrix, the new shingles vaccine.      DASH Eating Plan  DASH stands for \"Dietary Approaches to Stop Hypertension.\" The DASH eating plan is a healthy eating plan that has been shown to reduce high blood pressure (hypertension). It may also reduce your risk for type 2 diabetes, heart disease, and stroke. The DASH eating plan may also help with weight loss.  What are tips for following this plan?  General guidelines  · Avoid eating more than 2,300 mg (milligrams) of salt (sodium) a day. If you have hypertension, you may need to reduce your sodium intake to 1,500 mg a day.  · Limit alcohol intake to no more than 1 drink a day for nonpregnant women and 2 drinks a day for men. One drink equals 12 oz of beer, 5 oz of wine, or 1½ oz of hard liquor.  · Work with your health care provider to maintain a healthy body weight or to lose weight. Ask what an ideal weight is for you.  · Get at least 30 minutes of exercise that causes your heart to beat faster (aerobic exercise) most days of the week. Activities may include walking, swimming, or biking.  · Work with your health care provider or diet and nutrition specialist (dietitian) to adjust your eating plan to your individual calorie needs.  Reading food labels  · Check food labels for the amount of sodium per serving. Choose foods with less than 5 percent of the Daily Value of sodium. Generally, foods with less than 300 mg of sodium per serving fit into this eating plan.  · To find whole grains, look for the word \"whole\" as the first word in the ingredient list.  Shopping  · Buy products labeled as \"low-sodium\" or \"no salt added.\"  · Buy fresh foods. Avoid canned foods and premade or frozen meals.  Cooking  · Avoid adding salt when cooking. Use salt-free seasonings or herbs instead of table salt or sea salt. Check with your health care provider or pharmacist before using salt substitutes.  · Do not olvera foods. Cook foods using healthy methods " such as baking, boiling, grilling, and broiling instead.  · Cook with heart-healthy oils, such as olive, canola, soybean, or sunflower oil.  Meal planning    · Eat a balanced diet that includes:  ? 5 or more servings of fruits and vegetables each day. At each meal, try to fill half of your plate with fruits and vegetables.  ? Up to 6-8 servings of whole grains each day.  ? Less than 6 oz of lean meat, poultry, or fish each day. A 3-oz serving of meat is about the same size as a deck of cards. One egg equals 1 oz.  ? 2 servings of low-fat dairy each day.  ? A serving of nuts, seeds, or beans 5 times each week.  ? Heart-healthy fats. Healthy fats called Omega-3 fatty acids are found in foods such as flaxseeds and coldwater fish, like sardines, salmon, and mackerel.  · Limit how much you eat of the following:  ? Canned or prepackaged foods.  ? Food that is high in trans fat, such as fried foods.  ? Food that is high in saturated fat, such as fatty meat.  ? Sweets, desserts, sugary drinks, and other foods with added sugar.  ? Full-fat dairy products.  · Do not salt foods before eating.  · Try to eat at least 2 vegetarian meals each week.  · Eat more home-cooked food and less restaurant, buffet, and fast food.  · When eating at a restaurant, ask that your food be prepared with less salt or no salt, if possible.  What foods are recommended?  The items listed may not be a complete list. Talk with your dietitian about what dietary choices are best for you.  Grains  Whole-grain or whole-wheat bread. Whole-grain or whole-wheat pasta. Brown rice. Oatmeal. Quinoa. Bulgur. Whole-grain and low-sodium cereals. Donita bread. Low-fat, low-sodium crackers. Whole-wheat flour tortillas.  Vegetables  Fresh or frozen vegetables (raw, steamed, roasted, or grilled). Low-sodium or reduced-sodium tomato and vegetable juice. Low-sodium or reduced-sodium tomato sauce and tomato paste. Low-sodium or reduced-sodium canned vegetables.  Fruits  All  fresh, dried, or frozen fruit. Canned fruit in natural juice (without added sugar).  Meat and other protein foods  Skinless chicken or turkey. Ground chicken or turkey. Pork with fat trimmed off. Fish and seafood. Egg whites. Dried beans, peas, or lentils. Unsalted nuts, nut butters, and seeds. Unsalted canned beans. Lean cuts of beef with fat trimmed off. Low-sodium, lean deli meat.  Dairy  Low-fat (1%) or fat-free (skim) milk. Fat-free, low-fat, or reduced-fat cheeses. Nonfat, low-sodium ricotta or cottage cheese. Low-fat or nonfat yogurt. Low-fat, low-sodium cheese.  Fats and oils  Soft margarine without trans fats. Vegetable oil. Low-fat, reduced-fat, or light mayonnaise and salad dressings (reduced-sodium). Canola, safflower, olive, soybean, and sunflower oils. Avocado.  Seasoning and other foods  Herbs. Spices. Seasoning mixes without salt. Unsalted popcorn and pretzels. Fat-free sweets.  What foods are not recommended?  The items listed may not be a complete list. Talk with your dietitian about what dietary choices are best for you.  Grains  Baked goods made with fat, such as croissants, muffins, or some breads. Dry pasta or rice meal packs.  Vegetables  Creamed or fried vegetables. Vegetables in a cheese sauce. Regular canned vegetables (not low-sodium or reduced-sodium). Regular canned tomato sauce and paste (not low-sodium or reduced-sodium). Regular tomato and vegetable juice (not low-sodium or reduced-sodium). Pickles. Olives.  Fruits  Canned fruit in a light or heavy syrup. Fried fruit. Fruit in cream or butter sauce.  Meat and other protein foods  Fatty cuts of meat. Ribs. Fried meat. Trevino. Sausage. Bologna and other processed lunch meats. Salami. Fatback. Hotdogs. Bratwurst. Salted nuts and seeds. Canned beans with added salt. Canned or smoked fish. Whole eggs or egg yolks. Chicken or turkey with skin.  Dairy  Whole or 2% milk, cream, and half-and-half. Whole or full-fat cream cheese. Whole-fat or  sweetened yogurt. Full-fat cheese. Nondairy creamers. Whipped toppings. Processed cheese and cheese spreads.  Fats and oils  Butter. Stick margarine. Lard. Shortening. Ghee. Trevino fat. Tropical oils, such as coconut, palm kernel, or palm oil.  Seasoning and other foods  Salted popcorn and pretzels. Onion salt, garlic salt, seasoned salt, table salt, and sea salt. Worcestershire sauce. Tartar sauce. Barbecue sauce. Teriyaki sauce. Soy sauce, including reduced-sodium. Steak sauce. Canned and packaged gravies. Fish sauce. Oyster sauce. Cocktail sauce. Horseradish that you find on the shelf. Ketchup. Mustard. Meat flavorings and tenderizers. Bouillon cubes. Hot sauce and Tabasco sauce. Premade or packaged marinades. Premade or packaged taco seasonings. Relishes. Regular salad dressings.  Where to find more information:  · National Heart, Lung, and Blood [place]: www.nhlbi.nih.gov  · American Heart Association: www.heart.org  Summary  · The DASH eating plan is a healthy eating plan that has been shown to reduce high blood pressure (hypertension). It may also reduce your risk for type 2 diabetes, heart disease, and stroke.  · With the DASH eating plan, you should limit salt (sodium) intake to 2,300 mg a day. If you have hypertension, you may need to reduce your sodium intake to 1,500 mg a day.  · When on the DASH eating plan, aim to eat more fresh fruits and vegetables, whole grains, lean proteins, low-fat dairy, and heart-healthy fats.  · Work with your health care provider or diet and nutrition specialist (dietitian) to adjust your eating plan to your individual calorie needs.  This information is not intended to replace advice given to you by your health care provider. Make sure you discuss any questions you have with your health care provider.  Document Released: 12/06/2012 Document Revised: 12/11/2017 Document Reviewed: 12/11/2017  [employer] Interactive Patient Education © 2018 [employer]

## 2019-01-23 ENCOUNTER — TELEPHONE (OUTPATIENT)
Dept: FAMILY MEDICINE CLINIC | Facility: CLINIC | Age: 84
End: 2019-01-23

## 2019-01-23 NOTE — TELEPHONE ENCOUNTER
Left VM to let patient know lab results and ask that she return for repeat labs in 1-2 weeks.     ----- Message from Frieda Saez MD sent at 1/21/2019  8:58 PM EST -----  Please let pt know that she is mildly anemic and ask her to return for follow up (non-fasting) labs (CBC w/dif, folate, iron/TIBC/Ferritin).    Her potassium level was also slightly elevated so ask her to hold any potassium supplements or multivitamins, drink more water, and we'll plan to repeat a BMP with her follow up labs.

## 2019-01-27 PROBLEM — R60.0 EDEMA OF LOWER EXTREMITY: Status: ACTIVE | Noted: 2019-01-27

## 2019-01-27 PROBLEM — R41.3 MEMORY CHANGES: Status: ACTIVE | Noted: 2019-01-27

## 2019-01-31 ENCOUNTER — HOSPITAL ENCOUNTER (OUTPATIENT)
Dept: MRI IMAGING | Facility: HOSPITAL | Age: 84
Discharge: HOME | End: 2019-01-31
Admitting: FAMILY MEDICINE

## 2019-01-31 DIAGNOSIS — R41.3 MEMORY CHANGES: ICD-10-CM

## 2019-01-31 DIAGNOSIS — I73.9 SMALL VESSEL DISEASE (HCC): ICD-10-CM

## 2019-01-31 PROCEDURE — A9577 INJ MULTIHANCE: HCPCS | Performed by: FAMILY MEDICINE

## 2019-01-31 PROCEDURE — 70553 MRI BRAIN STEM W/O & W/DYE: CPT

## 2019-01-31 PROCEDURE — 0 GADOBENATE DIMEGLUMINE 529 MG/ML SOLUTION: Performed by: FAMILY MEDICINE

## 2019-01-31 RX ADMIN — GADOBENATE DIMEGLUMINE 18 ML: 529 INJECTION, SOLUTION INTRAVENOUS at 07:23

## 2019-02-01 ENCOUNTER — PATIENT MESSAGE (OUTPATIENT)
Dept: FAMILY MEDICINE CLINIC | Facility: CLINIC | Age: 84
End: 2019-02-01

## 2019-02-22 ENCOUNTER — OFFICE VISIT (OUTPATIENT)
Dept: FAMILY MEDICINE CLINIC | Facility: CLINIC | Age: 84
End: 2019-02-22

## 2019-02-22 VITALS
BODY MASS INDEX: 33.45 KG/M2 | HEIGHT: 64 IN | OXYGEN SATURATION: 98 % | HEART RATE: 84 BPM | DIASTOLIC BLOOD PRESSURE: 84 MMHG | SYSTOLIC BLOOD PRESSURE: 118 MMHG

## 2019-02-22 DIAGNOSIS — R73.01 IMPAIRED FASTING GLUCOSE: Primary | ICD-10-CM

## 2019-02-22 DIAGNOSIS — Z78.0 POSTMENOPAUSAL: ICD-10-CM

## 2019-02-22 DIAGNOSIS — R20.9 BILATERAL COLD FEET: ICD-10-CM

## 2019-02-22 DIAGNOSIS — D64.9 ANEMIA, UNSPECIFIED TYPE: ICD-10-CM

## 2019-02-22 DIAGNOSIS — R09.89 OTHER SPECIFIED SYMPTOMS AND SIGNS INVOLVING THE CIRCULATORY AND RESPIRATORY SYSTEMS: ICD-10-CM

## 2019-02-22 DIAGNOSIS — Z13.820 SCREENING FOR OSTEOPOROSIS: ICD-10-CM

## 2019-02-22 DIAGNOSIS — G62.9 NEUROPATHY: ICD-10-CM

## 2019-02-22 DIAGNOSIS — I10 ESSENTIAL HYPERTENSION: Primary | ICD-10-CM

## 2019-02-22 DIAGNOSIS — R73.01 IMPAIRED FASTING GLUCOSE: ICD-10-CM

## 2019-02-22 DIAGNOSIS — R41.3 MEMORY CHANGES: ICD-10-CM

## 2019-02-22 PROCEDURE — 99214 OFFICE O/P EST MOD 30 MIN: CPT | Performed by: FAMILY MEDICINE

## 2019-02-22 RX ORDER — BENAZEPRIL HYDROCHLORIDE 5 MG/1
10 TABLET, FILM COATED ORAL DAILY
Qty: 180 TABLET | Refills: 1 | Status: SHIPPED | OUTPATIENT
Start: 2019-02-22 | End: 2019-12-19 | Stop reason: SDUPTHER

## 2019-02-22 RX ORDER — PHENOL 1.4 %
600 AEROSOL, SPRAY (ML) MUCOUS MEMBRANE 2 TIMES DAILY WITH MEALS
COMMUNITY
End: 2020-11-04

## 2019-02-23 LAB
BASOPHILS # BLD AUTO: 0 X10E3/UL (ref 0–0.2)
BASOPHILS NFR BLD AUTO: 1 %
BUN SERPL-MCNC: 22 MG/DL (ref 8–27)
BUN/CREAT SERPL: 22 (ref 12–28)
CALCIUM SERPL-MCNC: 9.7 MG/DL (ref 8.7–10.3)
CHLORIDE SERPL-SCNC: 106 MMOL/L (ref 96–106)
CO2 SERPL-SCNC: 23 MMOL/L (ref 20–29)
CREAT SERPL-MCNC: 0.99 MG/DL (ref 0.57–1)
EOSINOPHIL # BLD AUTO: 0.1 X10E3/UL (ref 0–0.4)
EOSINOPHIL NFR BLD AUTO: 2 %
ERYTHROCYTE [DISTWIDTH] IN BLOOD BY AUTOMATED COUNT: 13.1 % (ref 12.3–15.4)
FERRITIN SERPL-MCNC: 62 NG/ML (ref 15–150)
FOLATE SERPL-MCNC: >20 NG/ML
GLUCOSE SERPL-MCNC: 94 MG/DL (ref 65–99)
HBA1C MFR BLD: 5.6 % (ref 4.8–5.6)
HCT VFR BLD AUTO: 33 % (ref 34–46.6)
HGB BLD-MCNC: 11.3 G/DL (ref 11.1–15.9)
IMM GRANULOCYTES # BLD AUTO: 0 X10E3/UL (ref 0–0.1)
IMM GRANULOCYTES NFR BLD AUTO: 0 %
IRON SATN MFR SERPL: 33 % (ref 15–55)
IRON SERPL-MCNC: 98 UG/DL (ref 27–139)
LYMPHOCYTES # BLD AUTO: 1.1 X10E3/UL (ref 0.7–3.1)
LYMPHOCYTES NFR BLD AUTO: 26 %
MCH RBC QN AUTO: 30.1 PG (ref 26.6–33)
MCHC RBC AUTO-ENTMCNC: 34.2 G/DL (ref 31.5–35.7)
MCV RBC AUTO: 88 FL (ref 79–97)
MONOCYTES # BLD AUTO: 0.5 X10E3/UL (ref 0.1–0.9)
MONOCYTES NFR BLD AUTO: 10 %
NEUTROPHILS # BLD AUTO: 2.7 X10E3/UL (ref 1.4–7)
NEUTROPHILS NFR BLD AUTO: 61 %
PLATELET # BLD AUTO: 200 X10E3/UL (ref 150–379)
POTASSIUM SERPL-SCNC: 4.4 MMOL/L (ref 3.5–5.2)
RBC # BLD AUTO: 3.75 X10E6/UL (ref 3.77–5.28)
SODIUM SERPL-SCNC: 143 MMOL/L (ref 134–144)
TIBC SERPL-MCNC: 294 UG/DL (ref 250–450)
UIBC SERPL-MCNC: 196 UG/DL (ref 118–369)
WBC # BLD AUTO: 4.4 X10E3/UL (ref 3.4–10.8)

## 2019-03-14 DIAGNOSIS — R09.89 OTHER SPECIFIED SYMPTOMS AND SIGNS INVOLVING THE CIRCULATORY AND RESPIRATORY SYSTEMS: ICD-10-CM

## 2019-03-14 DIAGNOSIS — R20.9 COLD FEET: ICD-10-CM

## 2019-03-14 DIAGNOSIS — Z78.0 POSTMENOPAUSAL: Primary | ICD-10-CM

## 2019-03-14 DIAGNOSIS — G62.9 NEUROPATHY: ICD-10-CM

## 2019-04-04 ENCOUNTER — HOSPITAL ENCOUNTER (OUTPATIENT)
Dept: CARDIOLOGY | Facility: HOSPITAL | Age: 84
Discharge: HOME | End: 2019-04-04
Admitting: FAMILY MEDICINE

## 2019-04-04 ENCOUNTER — HOSPITAL ENCOUNTER (OUTPATIENT)
Dept: BONE DENSITY | Facility: HOSPITAL | Age: 84
Discharge: HOME | End: 2019-04-04

## 2019-04-04 DIAGNOSIS — G62.9 NEUROPATHY: ICD-10-CM

## 2019-04-04 DIAGNOSIS — R09.89 OTHER SPECIFIED SYMPTOMS AND SIGNS INVOLVING THE CIRCULATORY AND RESPIRATORY SYSTEMS: ICD-10-CM

## 2019-04-04 DIAGNOSIS — R20.9 COLD FEET: ICD-10-CM

## 2019-04-04 DIAGNOSIS — Z78.0 POSTMENOPAUSAL: ICD-10-CM

## 2019-04-04 LAB
BH CV LOWER ARTERIAL LEFT ABI RATIO: 1.1
BH CV LOWER ARTERIAL LEFT DORSALIS PEDIS SYS MAX: 172 MMHG
BH CV LOWER ARTERIAL LEFT GREAT TOE SYS MAX: 140 MMHG
BH CV LOWER ARTERIAL LEFT POST TIBIAL SYS MAX: 168 MMHG
BH CV LOWER ARTERIAL LEFT TBI RATIO: 0.93
BH CV LOWER ARTERIAL RIGHT ABI RATIO: 1.2
BH CV LOWER ARTERIAL RIGHT DORSALIS PEDIS SYS MAX: 171 MMHG
BH CV LOWER ARTERIAL RIGHT GREAT TOE SYS MAX: 120 MMHG
BH CV LOWER ARTERIAL RIGHT POST TIBIAL SYS MAX: 174 MMHG
BH CV LOWER ARTERIAL RIGHT TBI RATIO: 0.8
UPPER ARTERIAL LEFT ARM BRACHIAL SYS MAX: 150 MMHG
UPPER ARTERIAL RIGHT ARM BRACHIAL SYS MAX: 140 MMHG

## 2019-04-04 PROCEDURE — 93922 UPR/L XTREMITY ART 2 LEVELS: CPT

## 2019-04-04 PROCEDURE — 77080 DXA BONE DENSITY AXIAL: CPT

## 2019-06-04 ENCOUNTER — OFFICE VISIT (OUTPATIENT)
Dept: FAMILY MEDICINE CLINIC | Facility: CLINIC | Age: 84
End: 2019-06-04

## 2019-06-04 VITALS
BODY MASS INDEX: 34.15 KG/M2 | DIASTOLIC BLOOD PRESSURE: 82 MMHG | HEART RATE: 82 BPM | WEIGHT: 200 LBS | HEIGHT: 64 IN | SYSTOLIC BLOOD PRESSURE: 128 MMHG | OXYGEN SATURATION: 99 %

## 2019-06-04 DIAGNOSIS — M81.0 OSTEOPOROSIS WITHOUT CURRENT PATHOLOGICAL FRACTURE, UNSPECIFIED OSTEOPOROSIS TYPE: Primary | ICD-10-CM

## 2019-06-04 DIAGNOSIS — R60.0 EDEMA OF LOWER EXTREMITY: ICD-10-CM

## 2019-06-04 DIAGNOSIS — I10 BENIGN ESSENTIAL HYPERTENSION: ICD-10-CM

## 2019-06-04 DIAGNOSIS — E53.8 B12 DEFICIENCY: ICD-10-CM

## 2019-06-04 PROBLEM — M85.80 OSTEOPENIA: Status: RESOLVED | Noted: 2017-04-25 | Resolved: 2019-06-04

## 2019-06-04 PROCEDURE — 99214 OFFICE O/P EST MOD 30 MIN: CPT | Performed by: FAMILY MEDICINE

## 2019-06-04 RX ORDER — ALENDRONATE SODIUM 70 MG/1
TABLET ORAL
COMMUNITY
Start: 2019-03-14 | End: 2020-03-11 | Stop reason: SDUPTHER

## 2019-06-04 NOTE — PROGRESS NOTES
Subjective   Haydee Dickey is a 83 y.o. female.     History of Present Illness   Haydee is a new patient to me today.  Haydee is here today to discuss the results of a neuropsych evaluation. She tested very well and no concerns about confusion, memory loss or dementia were noted.   Her daughter is with her today and is still convinced that her mother's memory is lapsing. After further discussion, I suspect that these concerns arrive out of interpersonal issues that are long standing but exacerbated by living together.  Haydee has a history of chronic hypertension and has been well controlled on current medications.She is tolerating medications without side effect. She reports no vision changes, headaches or lightheadedness. She is requesting refills of medications.  She has chronic numbness and tingling in her feet and is now on B12 2000 mg a day. She has been dxd with chronic  B12 deficiency.  She has a hx of osteoporosis and is on Fosamax. She is taking calcium and vitamin D. She is working on walking up steps to work on her balance. She is a member if [facility] but has not yet taken advantage of the facilities there.     The following portions of the patient's history were reviewed and updated as appropriate: allergies, current medications, past family history, past medical history, past social history, past surgical history and problem list.    Review of Systems   Constitutional: Negative.    Eyes: Negative.    Respiratory: Negative.    Cardiovascular: Negative.    Genitourinary: Negative.    Psychiatric/Behavioral: Negative.        Objective   Physical Exam      Assessment/Plan   Haydee was seen today for memory changes.    Diagnoses and all orders for this visit:    Osteoporosis without current pathological fracture, unspecified osteoporosis type  She is tolerating Fosamax and taking calcium and vit D. Will f/u with bone density in a year.    B12 deficiency  Doing well on current replacement.    Benign essential  hypertension  Well controlled on current medication, will refill medication today and as needed. She will RTO for repeat B/P check in 6 months.    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

## 2019-09-04 DIAGNOSIS — Z79.899 HIGH RISK MEDICATION USE: ICD-10-CM

## 2019-09-04 DIAGNOSIS — E53.8 VITAMIN B12 DEFICIENCY: Primary | ICD-10-CM

## 2019-09-05 LAB
ALBUMIN SERPL-MCNC: 4.2 G/DL (ref 3.5–5.2)
ALBUMIN/GLOB SERPL: 2 G/DL
ALP SERPL-CCNC: 28 U/L (ref 39–117)
ALT SERPL-CCNC: 17 U/L (ref 1–33)
AST SERPL-CCNC: 31 U/L (ref 1–32)
BILIRUB SERPL-MCNC: 0.4 MG/DL (ref 0.2–1.2)
BUN SERPL-MCNC: 13 MG/DL (ref 8–23)
BUN/CREAT SERPL: 14.8 (ref 7–25)
CALCIUM SERPL-MCNC: 9.1 MG/DL (ref 8.6–10.5)
CHLORIDE SERPL-SCNC: 107 MMOL/L (ref 98–107)
CO2 SERPL-SCNC: 27.5 MMOL/L (ref 22–29)
CREAT SERPL-MCNC: 0.88 MG/DL (ref 0.57–1)
ERYTHROCYTE [DISTWIDTH] IN BLOOD BY AUTOMATED COUNT: 12.9 % (ref 12.3–15.4)
FOLATE SERPL-MCNC: >20 NG/ML (ref 4.78–24.2)
GLOBULIN SER CALC-MCNC: 2.1 GM/DL
GLUCOSE SERPL-MCNC: 103 MG/DL (ref 65–99)
HCT VFR BLD AUTO: 37.3 % (ref 34–46.6)
HGB BLD-MCNC: 11.5 G/DL (ref 12–15.9)
MCH RBC QN AUTO: 29.9 PG (ref 26.6–33)
MCHC RBC AUTO-ENTMCNC: 30.8 G/DL (ref 31.5–35.7)
MCV RBC AUTO: 96.9 FL (ref 79–97)
PLATELET # BLD AUTO: 189 10*3/MM3 (ref 140–450)
POTASSIUM SERPL-SCNC: 4.7 MMOL/L (ref 3.5–5.2)
PROT SERPL-MCNC: 6.3 G/DL (ref 6–8.5)
RBC # BLD AUTO: 3.85 10*6/MM3 (ref 3.77–5.28)
SODIUM SERPL-SCNC: 145 MMOL/L (ref 136–145)
VIT B12 SERPL-MCNC: 900 PG/ML (ref 211–946)
WBC # BLD AUTO: 3.82 10*3/MM3 (ref 3.4–10.8)

## 2019-09-11 ENCOUNTER — OFFICE VISIT (OUTPATIENT)
Dept: FAMILY MEDICINE CLINIC | Facility: CLINIC | Age: 84
End: 2019-09-11

## 2019-09-11 VITALS
OXYGEN SATURATION: 97 % | BODY MASS INDEX: 34.49 KG/M2 | HEIGHT: 64 IN | DIASTOLIC BLOOD PRESSURE: 66 MMHG | RESPIRATION RATE: 16 BRPM | SYSTOLIC BLOOD PRESSURE: 128 MMHG | WEIGHT: 202 LBS | HEART RATE: 80 BPM

## 2019-09-11 DIAGNOSIS — Z23 NEED FOR IMMUNIZATION AGAINST INFLUENZA: ICD-10-CM

## 2019-09-11 DIAGNOSIS — I10 BENIGN ESSENTIAL HYPERTENSION: ICD-10-CM

## 2019-09-11 DIAGNOSIS — M81.0 OSTEOPOROSIS WITHOUT CURRENT PATHOLOGICAL FRACTURE, UNSPECIFIED OSTEOPOROSIS TYPE: ICD-10-CM

## 2019-09-11 DIAGNOSIS — E53.8 B12 DEFICIENCY: ICD-10-CM

## 2019-09-11 DIAGNOSIS — Z00.00 MEDICARE ANNUAL WELLNESS VISIT, SUBSEQUENT: Primary | ICD-10-CM

## 2019-09-11 DIAGNOSIS — G25.0 ESSENTIAL TREMOR: ICD-10-CM

## 2019-09-11 PROCEDURE — 90653 IIV ADJUVANT VACCINE IM: CPT | Performed by: FAMILY MEDICINE

## 2019-09-11 PROCEDURE — G0008 ADMIN INFLUENZA VIRUS VAC: HCPCS | Performed by: FAMILY MEDICINE

## 2019-09-11 PROCEDURE — G0439 PPPS, SUBSEQ VISIT: HCPCS | Performed by: FAMILY MEDICINE

## 2019-09-11 NOTE — PROGRESS NOTES
Medicare Subsequent Wellness Visit  Subjective   History of Present Illness    Haydee Dickey is a 83 y.o. female who presents for an Medicare Wellness Visit. In addition, we addressed the following health issues:  Haydee has chronic hypertension and has been well controlled on current medications.She is tolerating medications without side effect. She reports no vision changes, headaches or lightheadedness. She is requesting refills of medications.    B12 deficiency - this is a chronic problem that has been well controlled on currnent dose of oral B12 Labs are on the chart.    Essential tremor - this I a chronic problem that is worse in her hands. She is not taking medication and does not wish to at this time.    Osteoporisis - this is a chronic problem and DEXA is current. She is taking Fosamax as prescribed.       PMH, PSH, SocHx, FamHx, Allergies, and Medications: Reviewed and updated in the history section of chart.  Family History   Problem Relation Age of Onset   • Diabetes Mother    • Hypertension Mother    • Stroke Mother    • Hearing loss Mother         AIDES   • Heart disease Mother         CHF, PACER   • Thyroid disease Mother         THYROIDECTOMY   • Hypertension Father    • Stroke Father    • Alcohol abuse Father         DAYS OFF    • Heart disease Father    • Hyperlipidemia Father    • Kidney disease Father         KIDNEY STONE REMOVED   • Asthma Daughter         ADULT ONSET   • Cancer Sister         LUNG   • COPD Sister    • Cancer Sister         KIDNEY   • Cancer Brother         LUNG   • Diabetes Maternal Aunt         NIDDM   • Thyroid disease Maternal Aunt         THYROIDECTOMY   • Diabetes Maternal Aunt         NIDDM   • Other Maternal Aunt         ESSENTIAL TREMOR   • Diabetes Sister         INSULIN   • Diabetes Maternal Grandmother         NIDDM   • Early death Brother         4 MO, ? 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  Lives at home with her daughter, son in law, grand-daughter, and 1 dog.         Allergies   Allergen Reactions   • Oxycodone-Acetaminophen Itching     causes ithching   • Percocet  [Oxycodone-Acetaminophen] Itching     causes ithching   • Iodinated Diagnostic Agents Rash     Patient reports rash occurred 30 yrs ago with contrast       Outpatient Medications Prior to Visit   Medication Sig Dispense Refill   • alendronate (FOSAMAX) 70 MG tablet      • aspirin 81 MG EC tablet Take 81 mg by mouth.     • benazepril (LOTENSIN) 5 MG tablet Take 2 tablets by mouth Daily. 180 tablet 1   • Biotin 1 MG capsule Take  by mouth.     • calcium carbonate (OS-FRED) 600 MG tablet Take 600 mg by mouth 2 (Two) Times a Day With Meals.     • cholecalciferol (VITAMIN D3) 1000 UNITS tablet Take 4,000 Units by mouth.     • Cyanocobalamin (B-12) 1000 MCG capsule Take 2,000 mcg by mouth.     • FOLIC ACID Take 400 mcg by mouth.     • pyridoxine (VITAMIN B-6) 100 MG tablet tablet Take 100 mg by mouth.     • Turmeric 500 MG capsule Take 500 mg by mouth.     • Lutein 6 MG tablet Take 20 mg by mouth.       No facility-administered medications prior to visit.         Patient Active Problem List   Diagnosis   • Acute left flank pain   • B12 deficiency   • Benign essential hypertension   • Benign paroxysmal positional vertigo   • H/O abdominal hysterectomy   • H/O total knee replacement   • Hip fracture (CMS/HCC)   • Torn rotator cuff   • Osteoporosis   • Essential tremor   • Small vessel disease   • Gait instability   • Edema of lower extremity         Patient Care Team:  Tawanda Maya MD as PCP - General (Family Medicine)  Edmund Zavaleta MD as Surgeon (Orthopedic Surgery)  Debbie Batista MD as Surgeon (Hand  Surgery)  Lemuel Mosqueda MD (Ophthalmology)  Health Habits:  Current Diet: Well balanced diet  Dental Exam. UTD  Eye Exam. UTD  Exercise:advised  Current exercise activities include: she plans to start TaiChi    Recent Hospitalizations:  none    Age-appropriate Screening Schedule:  Refer to the list below for future screening recommendations based on patient's age. Orders for these recommended tests are listed in the plan section. The patient has been provided with a written plan.    Health Maintenance   Topic Date Due   • ZOSTER VACCINE (2 of 3) 10/27/2015   • INFLUENZA VACCINE  08/01/2019   • MEDICARE ANNUAL WELLNESS  09/11/2020   • DXA SCAN  04/08/2021   • TDAP/TD VACCINES (2 - Td) 10/15/2024   • PNEUMOCOCCAL VACCINES (65+ LOW/MEDIUM RISK)  Completed       Depression Screen:   PHQ-2/PHQ-9 Depression Screening 9/11/2019   Little interest or pleasure in doing things 0   Feeling down, depressed, or hopeless 0   Total Score 0       Functional and Cognitive Screening:  Functional & Cognitive Status 9/11/2019   Do you have difficulty preparing food and eating? No   Do you have difficulty bathing yourself, getting dressed or grooming yourself? No   Do you have difficulty using the toilet? No   Do you have difficulty moving around from place to place? No   Do you have trouble with steps or getting out of a bed or a chair? No   Current Diet Well Balanced Diet   Dental Exam Up to date   Eye Exam Up to date   Exercise (times per week) 0 times per week   Current Exercise Activities Include None   Do you need help using the phone?  No   Are you deaf or do you have serious difficulty hearing?  No   Do you need help with transportation? No   Do you need help shopping? No   Do you need help preparing meals?  No   Do you need help with housework?  No   Do you need help with laundry? No   Do you need help taking your medications? No   Do you need help managing money? No   Do you ever drive or ride in a car without wearing a  "seat belt? No   Have you felt unusual stress, anger or loneliness in the last month? No   Who do you live with? Other   If you need help, do you have trouble finding someone available to you? No   Have you been bothered in the last four weeks by sexual problems? No   Do you have difficulty concentrating, remembering or making decisions? No     Does the patient have evidence of cognitive impairment? none      Review of Systems   Constitutional: Negative.    HENT: Negative.    Eyes: Negative.    Respiratory: Negative.    Cardiovascular: Negative.    Gastrointestinal: Negative.    Endocrine: Negative.    Genitourinary: Negative.    Musculoskeletal: Positive for arthralgias and gait problem.   Skin: Negative.    Allergic/Immunologic: Negative.    Neurological: Positive for tremors.   Hematological: Negative.    Psychiatric/Behavioral: Negative.        Objective     Vitals:    09/11/19 1356   BP: 128/66   Pulse: 80   Resp: 16   SpO2: 97%   Weight: 91.6 kg (202 lb)   Height: 162.6 cm (64\")   PainSc:   6   PainLoc: Hip       Body mass index is 34.67 kg/m².    PHYSICAL EXAM  Vitals reviewed and on chart.  HEENT: PERRLA, EOMI. Oral mucosa moist,   No LAD.  CV: RRR, no murmurs, rubs, clicks or gallops  LUNGS: CTA bilaterally  EXT: No edema, FROM in bilateral upper and lower ext  NEURO: CN II - XII grossly intact  PSYCH: good mood, positive affect, alert and engaged. No thoughts of self harm expressed.    ASSESSMENT AND PLAN      Problem List Items Addressed This Visit        Cardiovascular and Mediastinum    Benign essential hypertension  Well controlled on current medication, will refill medication today and as needed. She will RTO for repeat B/P check in 6 months.       Digestive    B12 deficiency  This is a chronic problem that has been well managed on current medications. Will refill as needed.   Labs reviewed and on chart       Nervous and Auditory    Essential tremor  This is a chronic problem and she will monitor for " now.       Musculoskeletal and Integument    Osteoporosis  She is tolerating Fosamax well.      Other Visit Diagnoses     Medicare annual wellness visit, subsequent    -  Primary    Need for immunization against influenza          Labs reviewed and on chart.    Orders:  Orders Placed This Encounter   Procedures   • Fluad Quad >65 years       Follow Up:  Return in about 6 months (around 3/11/2020).     ADVANCED DIRECTIVES: advised    An After Visit Summary and PPPS with all of these plans were given to the patient.    Patient Instructions       Medicare Wellness  Personal Prevention Plan of Service     Date of Office Visit:  2019  Encounter Provider:  Tawanda Maya MD  Place of Service:  [facility]  Patient Name: Haydee Dickey  :  1935    As part of the Medicare Wellness portion of your visit today, we are providing you with this personalized preventive plan of services (PPPS). This plan is based upon recommendations of the [place] Preventive Services Task Force (USPSTF) and the Advisory Committee on Immunization Practices (ACIP).    This lists the preventive care services that should be considered, and provides dates of when you are due. Items listed as completed are up-to-date and do not require any further intervention.    Health Maintenance   Topic Date Due   • ZOSTER VACCINE (2 of 3) 10/27/2015   • INFLUENZA VACCINE  2019   • MEDICARE ANNUAL WELLNESS  2020   • DXA SCAN  2021   • TDAP/TD VACCINES (2 - Td) 10/15/2024   • PNEUMOCOCCAL VACCINES (65+ LOW/MEDIUM RISK)  Completed       Orders Placed This Encounter   Procedures   • Fluad Quad >65 years       Return in about 6 months (around 3/11/2020).

## 2019-09-11 NOTE — PATIENT INSTRUCTIONS
Medicare Wellness  Personal Prevention Plan of Service     Date of Office Visit:  2019  Encounter Provider:  Tawanda Maya MD  Place of Service:  [facility]  Patient Name: Haydee Dickey  :  1935    As part of the Medicare Wellness portion of your visit today, we are providing you with this personalized preventive plan of services (PPPS). This plan is based upon recommendations of the [place] Preventive Services Task Force (USPSTF) and the Advisory Committee on Immunization Practices (ACIP).    This lists the preventive care services that should be considered, and provides dates of when you are due. Items listed as completed are up-to-date and do not require any further intervention.    Health Maintenance   Topic Date Due   • ZOSTER VACCINE (2 of 3) 10/27/2015   • INFLUENZA VACCINE  2019   • MEDICARE ANNUAL WELLNESS  2020   • DXA SCAN  2021   • TDAP/TD VACCINES (2 - Td) 10/15/2024   • PNEUMOCOCCAL VACCINES (65+ LOW/MEDIUM RISK)  Completed       Orders Placed This Encounter   Procedures   • Fluad Quad >65 years       Return in about 6 months (around 3/11/2020).

## 2019-12-19 DIAGNOSIS — I10 ESSENTIAL HYPERTENSION: ICD-10-CM

## 2019-12-19 RX ORDER — BENAZEPRIL HYDROCHLORIDE 5 MG/1
10 TABLET, FILM COATED ORAL DAILY
Qty: 180 TABLET | Refills: 1 | Status: SHIPPED | OUTPATIENT
Start: 2019-12-19 | End: 2020-07-16 | Stop reason: SDUPTHER

## 2020-03-02 ENCOUNTER — APPOINTMENT (OUTPATIENT)
Dept: GENERAL RADIOLOGY | Facility: HOSPITAL | Age: 85
End: 2020-03-02

## 2020-03-02 ENCOUNTER — EPISODE CHANGES (OUTPATIENT)
Dept: CASE MANAGEMENT | Facility: OTHER | Age: 85
End: 2020-03-02

## 2020-03-02 ENCOUNTER — TELEPHONE (OUTPATIENT)
Dept: ORTHOPEDIC SURGERY | Facility: CLINIC | Age: 85
End: 2020-03-02

## 2020-03-02 ENCOUNTER — HOSPITAL ENCOUNTER (EMERGENCY)
Facility: HOSPITAL | Age: 85
Discharge: HOME | End: 2020-03-02
Attending: EMERGENCY MEDICINE | Admitting: EMERGENCY MEDICINE

## 2020-03-02 VITALS
OXYGEN SATURATION: 96 % | SYSTOLIC BLOOD PRESSURE: 104 MMHG | DIASTOLIC BLOOD PRESSURE: 77 MMHG | WEIGHT: 199 LBS | RESPIRATION RATE: 16 BRPM | TEMPERATURE: 97.5 F | HEART RATE: 90 BPM | HEIGHT: 64 IN | BODY MASS INDEX: 33.97 KG/M2

## 2020-03-02 DIAGNOSIS — M23.92 ACUTE INTERNAL DERANGEMENT OF LEFT KNEE: Primary | ICD-10-CM

## 2020-03-02 PROCEDURE — 99283 EMERGENCY DEPT VISIT LOW MDM: CPT

## 2020-03-02 PROCEDURE — 73560 X-RAY EXAM OF KNEE 1 OR 2: CPT

## 2020-03-02 NOTE — ED NOTES
"Pt reports tripping on pajama bottoms last night, falling with contact to L knee; currently reports pain behind popliteal fossa when weight bearing, none at rest.  States: \"If I stand up, I'm afraid I'll just buckle.\"  Pt denies hx blood thinner use; denies contact to head, LOC with fall.  On assessment, pt aox4, NAD; no swelling, redness or deformity noted LLE; DP pulses equal, 2+.  No crepitus noted on PROM.  Pt positioned for comfort, call light within reach.  Ice pack applied behind knee.     Geoff Esquivel RN  03/02/20 1017       Geoff Esquivel RN  03/02/20 1021    "

## 2020-03-02 NOTE — DISCHARGE INSTRUCTIONS
You are advised to follow closely with Dr Zavaleta in 1days for recheck, final results of  imaging testing, and further testing/treatment as needed.    Ice/elevate 3 times daily  Knee immobilizer while upright - please remove when sitting or at rest  Do ankle and foot exercises to improved circulation and decrease chance of blood clots  Use walker while ambulating at all times until cleared    Please return to the emergency department immediately with chest pain different than usual for you, shortness of air, abdominal pain, persistent vomiting/fever, blood in emesis or stool, lightheadedness/fainting, problems with speech, one sided weakness/numbness, new incontinence, problems with vision, increased swelling/redness/pain, inability to ambulate with walker or for worsening of symptoms or other concerns.

## 2020-03-02 NOTE — TELEPHONE ENCOUNTER
Patient fell yesterday (3/1) and injured her left knee. She was thinking about going to [facility] today. I reserved an appt [facility] for [facility], but checking with [facility] also. Patient even said she may go to UC today. Please advise.

## 2020-03-02 NOTE — ED PROVIDER NOTES
EMERGENCY DEPARTMENT ENCOUNTER    CHIEF COMPLAINT  Chief Complaint: L knee pain  History given by: patient  History limited by: none  Room Number: 36/36  PMD: Tawanda Maya MD      HPI:  Pt is a 84 y.o. female who presents complaining of L knee pain after a mechanical fall yesterday. Pt states that she tripped over her PJ pants while carrying laundry and landed on the R side. Pt states that when she fell she twisted her L knee. Pt states that she feels as if her L knee occasionally gives out when she tries to ambulate. Pt states that she contacted her orthopedist, Dr. Waqar LORENZ who scheduled her for an appointment tomorrow. Hx of bilateral knee replacements. Pt is on 81mg ASA. Pt lives with daughter.  Pt states she will be able to ambulate with a walker at home pending outpatient ortho evaluation.      Duration:  One day  Onset: sudden  Timing: constant  Location: L knee  Radiation: none  Quality: pain  Intensity/Severity: moderate  Progression: unchanged  Associated Symptoms: none  Previous Episodes: none  Treatment before arrival: none    PAST MEDICAL HISTORY  Active Ambulatory Problems     Diagnosis Date Noted   • Acute left flank pain 11/14/2018   • B12 deficiency 07/28/2014   • Benign essential hypertension 07/28/2014   • Benign paroxysmal positional vertigo 01/21/2019   • H/O abdominal hysterectomy 01/21/2019   • H/O total knee replacement 01/21/2019   • Hip fracture (CMS/HCC) 07/28/2014   • Torn rotator cuff 01/21/2019   • Osteoporosis    • Essential tremor    • Small vessel disease (CMS/[facility])    • Gait instability    • Edema of lower extremity 01/27/2019     Resolved Ambulatory Problems     Diagnosis Date Noted   • Osteopenia 04/25/2017     Past Medical History:   Diagnosis Date   • Allergic Percocet, iv iodine   • Anemia    • Cataract 2018   • Colon polyp 2009   • Deep vein thrombosis (CMS/HCC) 1970   • Folliculitis 11/01/2017   • GERD (gastroesophageal reflux disease) 1990   • HL (hearing loss) 2015    • Hypertension 2001   • Lower extremity neuropathy    • Tremor 2005   • UTI (urinary tract infection) 2018       PAST SURGICAL HISTORY  Past Surgical History:   Procedure Laterality Date   • ADENOIDECTOMY  1953   • APPENDECTOMY     • CATARACT EXTRACTION, BILATERAL  07/01/2018    R 7/18 and L 9/18   • DEQUERVAIN RELEASE Bilateral    • EYE SURGERY  2018    Bilateral repairs   • FRACTURE SURGERY  2014    R hip, R  wrist   • HYSTERECTOMY     • HYSTERECTOMY  1975    Partial   • JOINT REPLACEMENT  2001, 2014    knees   • TONSILLECTOMY AND ADENOIDECTOMY     • TONSILLECTOMY AND ADENOIDECTOMY     • TOTAL KNEE ARTHROPLASTY Left 2001   • TOTAL KNEE ARTHROPLASTY Right 2014   • WRIST SURGERY         FAMILY HISTORY  Family History   Problem Relation Age of Onset   • Diabetes Mother    • Hypertension Mother    • Stroke Mother    • Hearing loss Mother         AIDES   • Heart disease Mother         CHF, PACER   • Thyroid disease Mother         THYROIDECTOMY   • Hypertension Father    • Stroke Father    • Alcohol abuse Father         DAYS OFF    • Heart disease Father    • Hyperlipidemia Father    • Kidney disease Father         KIDNEY STONE REMOVED   • Asthma Daughter         ADULT ONSET   • Cancer Sister         LUNG   • COPD Sister    • Cancer Sister         KIDNEY   • Cancer Brother         LUNG   • Diabetes Maternal Aunt         NIDDM   • Thyroid disease Maternal Aunt         THYROIDECTOMY   • Diabetes Maternal Aunt         NIDDM   • Other Maternal Aunt         ESSENTIAL TREMOR   • Diabetes Sister         INSULIN   • Diabetes Maternal Grandmother         NIDDM   • Early death Brother         4 MO, ? WHOOPING COUGH   • Heart disease Sister    • Mental illness Sister         HOSPITALIZED   • Hyperlipidemia Sister    • Mental illness Brother         Schizophrenia/hosp   • Other Maternal Uncle         PARKINSON       SOCIAL HISTORY  Social History     Socioeconomic History   • Marital status:      Spouse name: Not  on file   • Number of children: Not on file   • Years of education: Not on file   • Highest education level: Not on file   Tobacco Use   • Smoking status: Never Smoker   • Smokeless tobacco: Never Used   Substance and Sexual Activity   • Alcohol use: No   • Drug use: No   • Sexual activity: Defer   Social History Narrative    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  Lives at home with her daughter, son in law, grand-daughter, and 1 dog.         ALLERGIES  Oxycodone-acetaminophen; Percocet  [oxycodone-acetaminophen]; Iodinated diagnostic agents; and Iodine    REVIEW OF SYSTEMS  Review of Systems   Constitutional: Negative for chills and fever.   Respiratory: Negative for shortness of breath.    Cardiovascular: Negative for chest pain.   Endocrine: Negative.    Genitourinary: Negative.    Musculoskeletal: Positive for arthralgias (L knee pain). Negative for neck pain.   Skin: Negative for rash.   Neurological: Negative.  Negative for syncope and headaches.   Psychiatric/Behavioral: Negative.    All other systems reviewed and are negative.      PHYSICAL EXAM  ED Triage Vitals [03/02/20 0957]   Temp Heart Rate Resp BP SpO2   97.5 °F (36.4 °C) 74 16 -- 96 %      Temp src Heart Rate Source Patient Position BP Location FiO2 (%)   -- -- -- -- --       Physical Exam   Constitutional: She is oriented to person, place, and time.  Non-toxic appearance. She appears distressed (mild).   HENT:   Head: Normocephalic and atraumatic.   Eyes: EOM are normal.   Neck: Normal range of motion. Neck supple.   Cardiovascular: Normal rate, regular rhythm and intact distal pulses.   Pulses:       Posterior tibial pulses are 2+ on the right side, and 2+ on the left side.   Pulmonary/Chest: Effort normal and breath sounds normal. No respiratory distress.   Abdominal: Soft. Bowel sounds are normal. There is no tenderness.   Musculoskeletal:   L knee:  Mild tenderness to palpation inferior joint line with mild effusion, no  appreciate laxity, uncomfortable with anterior drawer, varus valgus unremarkable, no warmth or erythema  Distal ROM, pulse, sens intact LLExt   Neurological: She is alert and oriented to person, place, and time.   Strength and sensation intact to BLE   Skin: Skin is warm and dry.   Psychiatric: Affect normal.   Nursing note and vitals reviewed.        RADIOLOGY  XR Knee 1 or 2 View Left   Final Result   FINDINGS AND IMPRESSION:   Post surgical changes from left total knee arthroplasty are present.   Hardware is intact. No periprosthetic fracture is visualized. There is a   small joint effusion.       This report was finalized on 3/2/2020 1:02 PM by Dr. Serafin Correa M.D.               I ordered the above noted radiological studies. Interpreted by radiologist. Reviewed by me in PACS.       PROCEDURES  Procedures      PROGRESS AND CONSULTS     1214-Discussed with pt the plan to review XR L knee but that her hx and exam is concerning for ligamentous injury. Pt declined pain medication at this time. Ice is on pt knee at this time. The patient indicates understanding of these issues and agrees with the plan.    1319-XR L knee is unremarkable except for a small effusion. Will order knee immobilizer and walker to have pt ambulated for evaluation. The patient indicates understanding of these issues and agrees with the plan.    Pt is able to ambulate safely with walker in the ED. Will discharge pt home with walker and knee immobilizer. Pt instructed to f/u with ortho tomorrow as scheduled. The patient indicates understanding of these issues and agrees with the plan.  Pt reports she does not want medications today.        MEDICAL DECISION MAKING  Results were reviewed/discussed with the patient and they were also made aware of online access. Pt also made aware that some labs, such as cultures, will not be resulted during ER visit and follow up with PMD is necessary.     MDM  Number of Diagnoses or Management Options      Amount and/or Complexity of Data Reviewed  Tests in the radiology section of CPT®: reviewed and ordered (XR left knee-small effusion)  Decide to obtain previous medical records or to obtain history from someone other than the patient: yes  Independent visualization of images, tracings, or specimens: yes           DIAGNOSIS  Final diagnoses:   Acute internal derangement of left knee       DISPOSITION  DISCHARGE    Patient discharged in stable condition.    Reviewed implications of results, diagnosis, meds, responsibility to follow up, warning signs and symptoms of possible worsening, potential complications and reasons to return to ER.    Patient/Family voiced understanding of above instructions.    Discussed plan for discharge, as there is no emergent indication for admission. Patient referred to primary care provider for BP management due to today's BP. Pt/family is agreeable and understands need for follow up and repeat testing.  Pt is aware that discharge does not mean that nothing is wrong but it indicates no emergency is present that requires admission and they must continue care with follow-up as given below or physician of their choice.     FOLLOW-UP  Edmund Zavaleta MD  [address] [address]  [phone]    Schedule an appointment as soon as possible for a visit in 3 days  EVEN IF WELL         Medication List      No changes were made to your prescriptions during this visit.           Latest Documented Vital Signs:  As of 1:48 PM  BP- 155/87 HR- 74 Temp- 97.5 °F (36.4 °C) O2 sat- 96%    --  Documentation assistance provided by julia Gamboa for MD David.  Information recorded by the scribe was done at my direction and has been verified and validated by me.          Bhumi Gamboa  03/02/20 [id number]       Yahaira Myles MD  03/06/20 [id number]

## 2020-03-03 ENCOUNTER — OFFICE VISIT (OUTPATIENT)
Dept: ORTHOPEDIC SURGERY | Facility: CLINIC | Age: 85
End: 2020-03-03

## 2020-03-03 VITALS — TEMPERATURE: 97.1 F | WEIGHT: 197 LBS | HEIGHT: 64 IN | BODY MASS INDEX: 33.63 KG/M2

## 2020-03-03 DIAGNOSIS — M25.362 INSTABILITY OF LEFT KNEE JOINT: Primary | ICD-10-CM

## 2020-03-03 DIAGNOSIS — Z96.653 HISTORY OF TOTAL BILATERAL KNEE REPLACEMENT: ICD-10-CM

## 2020-03-03 PROCEDURE — 99214 OFFICE O/P EST MOD 30 MIN: CPT | Performed by: FAMILY MEDICINE

## 2020-03-03 NOTE — PROGRESS NOTES
"Haydee is a 84 y.o. year old female evaluation of a problem that is new to this examiner.    Chief Complaint   Patient presents with   • Left Knee - Establish Care, Pain       History of Present Illness  HPI   Tripped and fell 2 days ago caught end of pants, landed on R hip and twisted L knee. Immediate onset moderately severe sharp pain there and mild soreness R wrist as well (prior fx with plate).  This led her to the emergency room yesterday where she was evaluated and had negative x-rays of the left knee.  She is here today primarily secondary to concern about some sensation of instability in the left knee.  Her pain has essentially resolved in the knee.  States her hip pain is improving and seems benign.  Wrist pain seems to be improving as well, simply aggravated by using a walker for stability.    I have reviewed the patient's medical, family, and social history in detail and updated the computerized patient record.    Review of Systems   Constitutional: Negative for fever.   Endocrine: Positive for cold intolerance.   Musculoskeletal:        Per HPI   Skin: Negative for wound.   Neurological: Negative for numbness.   All other systems reviewed and are negative.      Temp 97.1 °F (36.2 °C) (Temporal)   Ht 162.6 cm (64\")   Wt 89.4 kg (197 lb)   BMI 33.81 kg/m²      Physical Exam    Vital signs reviewed.   General: No acute distress.  Eyes: conjunctiva clear; pupils equally round and reactive  ENT: external ears and nose atraumatic; oropharynx clear  CV: no peripheral edema, 2+ distal pulses  Resp: normal respiratory effort, no use of accessory muscles  Skin: no rashes or wounds; normal turgor  Psych: mood and affect appropriate; recent and remote memory intact  Neuro: sensation to light touch intact    MSK Exam:  Ortho Exam  Left knee: Normal appearance with well-healed surgical scar.  No effusion.  There is no tenderness to palpation.  Normal range of motion.  There is appropriate mobility of the joint " with stress testing.    I reviewed images of AP and lateral left knee x-rays performed in the emergency room yesterday.  They are normal.  Hardware appears appropriate without periprosthetic fracture.    Diagnoses and all orders for this visit:    Instability of left knee joint  -     Ambulatory Referral to Physical Therapy Evaluate and treat    History of total bilateral knee replacement  -     Ambulatory Referral to Physical Therapy Evaluate and treat      This appears to be relatively benign.  We will work on stability strengthening with physical therapy as well as a hinged knee brace.  Okay to advance weightbearing as tolerated.  Follow-up to recheck in 4 to 6 weeks, sooner if not improving as expected.    EMR Dragon/Transcription disclaimer:    Much of this encounter note is an electronic transcription/translation of spoken language to printed text.  The electronic translation of spoken language may permit erroneous, or at times, nonsensical words or phrases to be inadvertently transcribed.  Although I have reviewed the note for such errors some may still exist.

## 2020-03-04 ENCOUNTER — EPISODE CHANGES (OUTPATIENT)
Dept: CASE MANAGEMENT | Facility: OTHER | Age: 85
End: 2020-03-04

## 2020-03-04 ENCOUNTER — HOSPITAL ENCOUNTER (OUTPATIENT)
Dept: PHYSICAL THERAPY | Facility: HOSPITAL | Age: 85
Setting detail: THERAPIES SERIES
Discharge: HOME | End: 2020-03-04

## 2020-03-04 DIAGNOSIS — R29.898 BILATERAL LEG WEAKNESS: Primary | ICD-10-CM

## 2020-03-04 DIAGNOSIS — M25.562 ACUTE PAIN OF LEFT KNEE: ICD-10-CM

## 2020-03-04 DIAGNOSIS — Z74.09 IMPAIRED FUNCTIONAL MOBILITY, BALANCE, GAIT, AND ENDURANCE: ICD-10-CM

## 2020-03-04 PROCEDURE — 97110 THERAPEUTIC EXERCISES: CPT

## 2020-03-04 PROCEDURE — 97162 PT EVAL MOD COMPLEX 30 MIN: CPT

## 2020-03-04 NOTE — THERAPY EVALUATION
Outpatient Physical Therapy Ortho Initial Evaluation  [facility]     Patient Name: Haydee Dickey  : 1935  MRN: [id number]  Today's Date: 3/4/2020      Visit Date: 2020    Patient Active Problem List   Diagnosis   • Acute left flank pain   • B12 deficiency   • Benign essential hypertension   • Benign paroxysmal positional vertigo   • H/O abdominal hysterectomy   • H/O total knee replacement   • Hip fracture (CMS/HCC)   • Torn rotator cuff   • Osteoporosis   • Essential tremor   • Small vessel disease (CMS/HCC)   • Gait instability   • Edema of lower extremity        Past Medical History:   Diagnosis Date   • Allergic Percocet, iv iodine   • Anemia    • B12 deficiency    • Cataract    • Colon polyp    • Deep vein thrombosis (CMS/HCC) 1970   • Essential tremor    • Folliculitis 2017   • Gait instability    • GERD (gastroesophageal reflux disease)    • HL (hearing loss)    • Hypertension 2001    on Rx   • Lower extremity neuropathy     bilateral   • Osteopenia    • Osteoporosis    • Small vessel disease (CMS/HCC)    • Tremor     BET   • UTI (urinary tract infection)         Past Surgical History:   Procedure Laterality Date   • ADENOIDECTOMY     • APPENDECTOMY     • CATARACT EXTRACTION, BILATERAL  2018    R  and L    • DEQUERVAIN RELEASE Bilateral    • EYE SURGERY  2018    Bilateral repairs   • FRACTURE SURGERY      R hip, R  wrist   • HYSTERECTOMY     • HYSTERECTOMY  1975    Partial   • JOINT REPLACEMENT  ,     knees   • TONSILLECTOMY AND ADENOIDECTOMY     • TONSILLECTOMY AND ADENOIDECTOMY     • TOTAL KNEE ARTHROPLASTY Left    • TOTAL KNEE ARTHROPLASTY Right    • WRIST SURGERY         Visit Dx:     ICD-10-CM ICD-9-CM   1. Bilateral leg weakness R29.898 729.89   2. Impaired functional mobility, balance, gait, and endurance Z74.09 V49.89   3. Acute pain of left knee M25.562 719.46         Patient History     Row Name 20  1200             History    Chief Complaint  Balance Problems;Difficulty Walking;Falls/history of falls;Fatigue/poor endurance;Muscle weakness;Difficulty with daily activities;Muscle tenderness  -      Date Current Problem(s) Began  03/01/20  -ANGIE (r) KEHINDE (t) CJ (c)      Brief Description of Current Complaint  Pt. presents with complaints of L knee instability that started after a fall. She states she got a new pair of pajamas and they are longer than normal and she was carrying something and tripped over the pant leg. She landed on her Rt. hip and twisted her L knee. She iced her knee that night and went to the ER the next day. The X-Rays were negative and she was given a walker and long leg knee brace. The next day she saw an orthopedic who felt it was just instability.   -ANGIE (r) KEHINDE (t) CJ (c)      Patient/Caregiver Goals  Return to prior level of function;Improve mobility;Improve strength  -ANGIE (r) KEHINDE (t) CJ (c)      Current Tobacco Use  none  -CJ      Patient's Rating of General Health  Fair  -      Occupation/sports/leisure activities  Has 5 year old grandchild she helps care for; volunteer work  -ANGIE (r) MH (t) CJ (c)      How has patient tried to help current problem?  Ice; knee brace; walker  -ANGIE (r) MH (t) CJ (c)      What clinical tests have you had for this problem?  X-ray  -ANGIE (r) KEHINDE (t) CJ (c)      Results of Clinical Tests  negative for fracture  -ANGIE (r) MH (t) CJ (c)         Pain     Pain Location  Knee  -CJ (r) MH (t) CJ (c)      Pain at Present  0  -CJ (r) MH (t) CJ (c)      Pain at Best  0  -CJ (r) MH (t) CJ (c)      Pain at Worst  1  -CJ (r) MH (t) CJ (c)      Pain Description  -- No pain - feels like it is going to give way  -CJ (r) MH (t) CJ (c)      What Performance Factors Make the Current Problem(s) WORSE?  walking  -ANGIE (r) MH (t) CJ (c)      Is your sleep disturbed?  No  -CJ (r) MH (t) CJ (c)      What position do you sleep in?  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  y es  -CJ (r) MH (t) CJ (c)      Difficulties with recreational activities?  yes  -CJ (r) MH (t) CJ (c)         Fall Risk Assessment    Any falls in the past year:  Yes  -CJ (r) MH (t) CJ (c)      Number of falls reported in the last 12 months  3  -CJ (r) MH (t) CJ (c)      Factors that contributed to the fall:  Tripped  -CJ (r) MH (t) CJ (c)         Services    Prior Rehab/Home Health Experiences  Yes  -CJ (r) MH (t) CJ (c)      When was the prior experience with Rehab/Home Health  2014  -CJ (r) MH (t) CJ (c)      Are you currently receiving Home Health services  No  -CJ (r) MH (t) CJ (c)      Do you plan to receive Home Health services in the near future  No  -CJ (r) MH (t) CJ (c)         Daily Activities    Primary Language  English  -CJ (r) MH (t) CJ (c)      Are you able to read  Yes  -CJ (r) MH (t) CJ (c)      Are you able to write  Yes  -CJ (r) MH (t) CJ (c)      How does patient learn best?  Listening;Demonstration  -CJ (r) MH (t) CJ (c)      Patient is concerned about/has problems with  Performing home management (household chores, shopping, care of dependents);Performing sports, recreation, and play activities;Walking  -CJ (r) MH (t) CJ (c)      Does patient have problems with the following?  None  -CJ (r) MH (t) CJ (c)      Barriers to learning  Hearing  -CJ (r) MH (t) CJ (c)      Pt Participated in POC and Goals  Yes  -CJ (r) MH (t) CJ (c)         Safety    Are you being hurt, hit, or frightened by anyone at home or in your life?  No  -CJ (r) MH (t) CJ (c)      Are you being neglected by a caregiver  No  -CJ (r) MH (t) CJ (c)        User Key  (r) = Recorded By, (t) = Taken By, (c) = Cosigned By    Initials Name Provider Type    CJ Nguyen, Yohan S, PT Physical Therapist    Conchita Castrejon, PT Student PT Student          PT Ortho     Row Name 03/04/20 1200       Subjective Pain    Able to rate subjective pain?  yes  -CJ (r)  MH (t) CJ (c)    Pre-Treatment Pain Level  0  -CJ (r) MH (t) CJ (c)       Posture/Observations    Posture/Observations Comments  shifts to L hip when sitting  -CJ (r) MH (t) CJ (c)       Quarter Clearing    Quarter Clearing  Lower Quarter Clearing  -CJ (r) MH (t) CJ (c)       Myotomal Screen- Lower Quarter Clearing    Hip flexion (L2)  Bilateral:;3+ (Fair +)  -CJ (r) MH (t) CJ (c)    Knee extension (L3)  Bilateral:;4- (Good -)  -CJ (r) MH (t) CJ (c)    Ankle DF (L4)  Bilateral:;4- (Good -)  -CJ (r) MH (t) CJ (c)    Ankle PF (S1)  Bilateral:;4- (Good -)  -CJ (r) MH (t) CJ (c)    Knee flexion (S2)  Bilateral:;3+ (Fair +)  -CJ (r) MH (t) CJ (c)       Special Tests/Palpation    Special Tests/Palpation  Knee  -CJ (r) MH (t) CJ (c)       Knee Palpation    Knee Palpation?  Yes  -CJ (r) MH (t) CJ (c)    Patella  Bilateral:;Guarded/taut  -CJ (r) MH (t) CJ (c)    Medial Joint Line  Bilateral: no tenderness  -CJ (r) MH (t) CJ (c)    Lateral Joint Line  Bilateral: no tenderness  -CJ (r) MH (t) CJ (c)    Posterior Joint Line  Bilateral: no tenderness  -CJ (r) MH (t) CJ (c)    Lateral Condyle  Bilateral: no tenderness  -CJ (r) MH (t) CJ (c)    Medial Condyle  Bilateral: no tenderness  -CJ (r) MH (t) CJ (c)    Biceps Femoris  Left:;Guarded/taut  -CJ (r) MH (t) CJ (c)    Semimembranosis  Left:;Guarded/taut  -CJ (r) MH (t) CJ (c)    Semitendinosis  Left:;Guarded/taut  -CJ (r) MH (t) CJ (c)    Medial Gastroc Head  Bilateral: no tenderness  -CJ (r) MH (t) CJ (c)    Lateral Gastroc Head  Bilateral: no tenderness  -CJ (r) MH (t) CJ (c)       Right Lower Ext    Rt Knee Extension/Flexion AROM  0-110  -CJ (r) MH (t) CJ (c)       Left Lower Ext    Lt Knee Extension/Flexion AROM  0-105  -CJ (r)  (t) CJ (c)       MMT Right Lower Ext    Rt Hip ABduction MMT, Gross Movement  (3+/5) fair plus seated  -CJ (r)  (t) CJ (c)    Rt Hip ADduction MMT, Gross Movement  (4-/5) good minus seated  -CJ (r)  (t) CJ (c)       MMT Left Lower Ext    Lt Hip  ABduction MMT, Gross Movement  (3+/5) fair plus seated  -CJ (r) MH (t) CJ (c)    Lt Hip ADduction MMT, Gross Movement  (4-/5) good minus seated  -CJ (r) MH (t) CJ (c)       Sensation    Additional Comments  Reports peripheral neuropathy in B feet  -CJ (r) MH (t) CJ (c)       Lower Extremity Flexibility    Hamstrings  Left:;Moderately limited;Right:;Mildly limited  -CJ (r) MH (t) CJ (c)       Balance Skills Training    Gait Balance-Level of Assistance  Contact guard  -CJ (r) MH (t) CJ (c)    SLS  1-2 seconds; requires assistance  -CJ (r) MH (t) CJ (c)    Rhomberg  1-2 seconds requires assistance  -CJ (r) MH (t) CJ (c)    Sharpened Rhomberg  3-5 sec with assist required  -CJ       Gait/Stairs Assessment/Training    [place] Level (Gait)  contact guard without walker  -CJ (r) MH (t) CJ (c)    Assistive Device (Gait)  walker, front-wheeled  -CJ (r) MH (t) CJ (c)    Pattern (Gait)  swing-through  -CJ (r) MH (t) CJ (c)    Deviations/Abnormal Patterns (Gait)  bilateral deviations;antalgic;gait speed decreased;stride length decreased  -CJ (r) MH (t) CJ (c)    Bilateral Gait Deviations  foot drop/toe drag;Trendelenburg sign  -CJ (r) MH (t) CJ (c)    Ascending Technique (Stairs)  step-to-step  -CJ    Descending Technique (Stairs)  step-to-step  -CJ      User Key  (r) = Recorded By, (t) = Taken By, (c) = Cosigned By    Initials Name Provider Type    CJ Yohan Nguyen, PT Physical Therapist     Conchita Hall, PT Student PT Student                      Therapy Education  Education Details: Discussed patient condition and POC. Pt. educated to continue wearing leg brace and use walker for now until stability improves.  Given: HEP  Program: New  How Provided: Verbal, Demonstration, Written  Provided to: Patient  Level of Understanding: Teach back education performed, Verbalized, Demonstrated     PT OP Goals     Row Name 03/04/20 1300          PT Short Term Goals    STG Date to Achieve  03/18/20  -CJ (r) MH (t) CJ (c)      STG 1  Pt. will be independent with initial HEP to improve strength and management of condition.  -CJ (r) MH (t) CJ (c)     STG 2  Pt. will be able to ambulate without knee brace with no episodes of knee giving way.  -CJ (r) MH (t) CJ (c)        Long Term Goals    LTG Date to Achieve  04/01/20  -CJ (r) MH (t) CJ (c)     LTG 1  Pt. will be independent with advanced HEP to improve long-term management of condition.  -CJ (r) MH (t) CJ (c)     LTG 2  Pt. will report improvement in KOS score from 62.5% to 72.5% (where 100% is no disability) to improve patient perception on ease of ADLs.  -CJ (r) MH (t) CJ (c)     LTG 3  Pt. will improve B LE strength to 4+/5 to improve stability with standing and walking.  -CJ (r) MH (t) CJ (c)     LTG 4  Pt. will be able to ambulate without brace or walker with no instances of kne giving way to return to PLOF.  -     LTG 5  Patient will report no falls during course of therapy.  -     LTG 6  Patient will demonstrate improved balance by ability to perform rhomberg stance for 10+ seconds without outside assist  -       User Key  (r) = Recorded By, (t) = Taken By, (c) = Cosigned By    Initials Name Provider Type    Yohan West, PT Physical Therapist    Conchita Castrejon, PT Student PT Student          PT Assessment/Plan     Row Name 03/04/20 5370          PT Assessment    Functional Limitations  Limitations in functional capacity and performance;Impaired gait;Performance in leisure activities;Performance in self-care ADL;Limitations in community activities;Limitation in home management  -CJ (r) MH (t) CJ (c)     Impairments  Balance;Gait;Impaired flexibility;Muscle strength;Sensation;Endurance;Impaired muscle power;Motor function;Impaired muscle length;Locomotion;Posture;Poor body mechanics;Peripheral nerve integrity;Impaired postural alignment  -CJ (r) MH (t) CJ (c)     Assessment Comments  Haydee Dickey is a 84 y.o. year-old female referred to physical therapy for instability  of L knee that began after fall. She presents with a evolving clinical presentation.  She has comorbidities and personal factors of peripheral neuropathy in bilateral feet, osteoporosis, history of falls, and bilateral TKA that may affect her progress in the plan of care. Self scored disability measure of KOS was a 62.5% (where 100% is no disability). She demonstrated decreased strength of bilateral LE (especially proximal hips), impaired balance requiring assistance during SLS, rhomberg, and tandem, impaired gait with slight trendelenburg and foot drop bilaterally. Pt. currently ambulating with knee brace and front wheeled walker since fall but typically does not use an assistive device.  Signs and symptoms are consistent with physical therapy diagnosis of LE weakness and balance impairments. She is appropriate for skilled therapy services at this time to address deficits and improve ease with functional activities and improve stability in standing and walking.  -ANGIE     Please refer to paper survey for additional self-reported information  Yes  -ANGIE (r) KEHINDE (marco) ANGIE (c)     Rehab Potential  Good  -ANGIE (r) KEHINDE (marco) ANGIE (c)     Patient/caregiver participated in establishment of treatment plan and goals  Yes  -ANGIE (r) KEHINDE (marco) ANGIE (c)     Patient would benefit from skilled therapy intervention  Yes  -ANGIE (r) KEHINDE (marco) ANGIE (c)        PT Plan    PT Frequency  2x/week  -ANGIE (r) KEHINDE (t) ANGIE (c)     Predicted Duration of Therapy Intervention (Therapy Eval)  12 weeks  -ANGIE     Planned CPT's?  PT EVAL MOD COMPLELITY: 95756;PT THER PROC EA 15 MIN: 01171;PT NEUROMUSC RE-EDUCATION EA 15 MIN: 87963;PT ELECTRICAL STIM UNATTEND: ;PT MANUAL THERAPY EA 15 MIN: 10846;PT HOT OR COLD PACK TREAT MCARE;PT ULTRASOUND EA 15 MIN: 35261;PT THER ACT EA 15 MIN: 65361;PT GAIT TRAINING EA 15 MIN: 82466;PT SELF CARE/MGMT/TRAIN 15 MIN: 17529  -ANGIE (r) KEHINDE (t) ANGIE (c)     PT Plan Comments  Assess response to HEP; progress strengthening exercises (consider bridges,  TKE, SAQ/LAQ); nustep warm up  -CJ       User Key  (r) = Recorded By, (t) = Taken By, (c) = Cosigned By    Initials Name Provider Type    CJ Yohan Nguyen, PT Physical Therapist    MH Conchita Hall, PT Student PT Student            OP Exercises     Row Name 03/04/20 1200             Subjective Pain    Able to rate subjective pain?  yes  -CJ (r) MH (t) CJ (c)      Pre-Treatment Pain Level  0  -CJ (r) MH (t) CJ (c)         Total Minutes    34513 - PT Therapeutic Exercise Minutes  15  -CJ (r) MH (t) CJ (c)         Exercise 1    Exercise Name 1  quad set  -CJ (r) MH (t) CJ (c)      Cueing 1  Verbal;Tactile  -CJ (r) MH (t) CJ (c)      Reps 1  10e  -CJ (r) MH (t) CJ (c)         Exercise 2    Exercise Name 2  glute set  -CJ (r) MH (t) CJ (c)      Cueing 2  Verbal;Tactile  -CJ (r) MH (t) CJ (c)      Reps 2  10e  -CJ (r) MH (t) CJ (c)         Exercise 3    Exercise Name 3  hip adduction  -CJ (r) MH (t) CJ (c)      Cueing 3  Verbal;Tactile  -CJ (r) MH (t) CJ (c)      Reps 3  10  -CJ (r) MH (t) CJ (c)      Time 3  5  -CJ (r) MH (t) CJ (c)      Additional Comments  H/L  -CJ (r) MH (t) CJ (c)         Exercise 4    Exercise Name 4  hip abduction  -CJ (r) MH (t) CJ (c)      Cueing 4  Verbal;Tactile  -CJ (r) MH (t) CJ (c)      Reps 4  10  -CJ (r) MH (t) CJ (c)      Additional Comments  RTB  -CJ (r) MH (t) CJ (c)         Exercise 5    Exercise Name 5  PPT  -CJ (r) MH (t) CJ (c)      Cueing 5  Verbal;Tactile  -CJ (r) MH (t) CJ (c)      Reps 5  10  -CJ (r) MH (t) CJ (c)      Time 5  5  -CJ (r) MH (t) CJ (c)         Exercise 6    Exercise Name 6  Seated HS curl  -CJ (r) MH (t) CJ (c)      Cueing 6  Verbal;Tactile  -CJ (r)  (t) CJ (c)      Reps 6  10  -CJ (r)  (t) ANGIE (c)      Additional Comments  RTB  -CJ (r)  (t) ANGIE (c)        User Key  (r) = Recorded By, (t) = Taken By, (c) = Cosigned By    Initials Name Provider Type    Yohan West, PT Physical Therapist    Conchita Castrejon, PT Student PT Student                         Outcome Measure Options: Knee Outcome Score- ADL  Knee Outcome Score  Knee Outcome Score Comments: 62.5%      Time Calculation:     Start Time: 1122  Stop Time: 1202  Time Calculation (min): 40 min  Total Timed Code Minutes- PT: 15 minute(s)     Therapy Charges for Today     Code Description Service Date Service Provider Modifiers Qty    69397294806  PT THER PROC EA 15 MIN 3/4/2020 Conchita Hall, PT Student GP 1    [id number]  PT EVAL MOD COMPLEXITY 2 3/4/2020 Conchita Hall, PT Student GP 1          PT G-Codes  Outcome Measure Options: Knee Outcome Score- ADL         Conchita Hall, PT Student  3/4/2020

## 2020-03-06 ENCOUNTER — HOSPITAL ENCOUNTER (OUTPATIENT)
Dept: PHYSICAL THERAPY | Facility: HOSPITAL | Age: 85
Setting detail: THERAPIES SERIES
Discharge: HOME | End: 2020-03-06

## 2020-03-06 DIAGNOSIS — M25.562 ACUTE PAIN OF LEFT KNEE: ICD-10-CM

## 2020-03-06 DIAGNOSIS — Z74.09 IMPAIRED FUNCTIONAL MOBILITY, BALANCE, GAIT, AND ENDURANCE: ICD-10-CM

## 2020-03-06 DIAGNOSIS — R29.898 BILATERAL LEG WEAKNESS: Primary | ICD-10-CM

## 2020-03-06 PROCEDURE — 97116 GAIT TRAINING THERAPY: CPT

## 2020-03-06 PROCEDURE — 97110 THERAPEUTIC EXERCISES: CPT

## 2020-03-06 NOTE — THERAPY TREATMENT NOTE
Outpatient Physical Therapy Ortho Treatment Note  [facility]     Patient Name: Haydee Dickey  : 1935  MRN: [id number]  Today's Date: 3/6/2020      Visit Date: 2020    Visit Dx:    ICD-10-CM ICD-9-CM   1. Bilateral leg weakness R29.898 729.89   2. Impaired functional mobility, balance, gait, and endurance Z74.09 V49.89   3. Acute pain of left knee M25.562 719.46       Patient Active Problem List   Diagnosis   • Acute left flank pain   • B12 deficiency   • Benign essential hypertension   • Benign paroxysmal positional vertigo   • H/O abdominal hysterectomy   • H/O total knee replacement   • Hip fracture (CMS/HCC)   • Torn rotator cuff   • Osteoporosis   • Essential tremor   • Small vessel disease (CMS/HCC)   • Gait instability   • Edema of lower extremity        Past Medical History:   Diagnosis Date   • Allergic Percocet, iv iodine   • Anemia    • B12 deficiency    • Cataract    • Colon polyp    • Deep vein thrombosis (CMS/HCC) 1970   • Essential tremor    • Folliculitis 2017   • Gait instability    • GERD (gastroesophageal reflux disease)    • HL (hearing loss)    • Hypertension 2001    on Rx   • Lower extremity neuropathy     bilateral   • Osteopenia    • Osteoporosis    • Small vessel disease (CMS/HCC)    • Tremor     BET   • UTI (urinary tract infection)         Past Surgical History:   Procedure Laterality Date   • ADENOIDECTOMY     • APPENDECTOMY     • CATARACT EXTRACTION, BILATERAL  2018    R  and L    • DEQUERVAIN RELEASE Bilateral    • EYE SURGERY  2018    Bilateral repairs   • FRACTURE SURGERY      R hip, R  wrist   • HYSTERECTOMY     • HYSTERECTOMY  1975    Partial   • JOINT REPLACEMENT  ,     knees   • TONSILLECTOMY AND ADENOIDECTOMY     • TONSILLECTOMY AND ADENOIDECTOMY     • TOTAL KNEE ARTHROPLASTY Left    • TOTAL KNEE ARTHROPLASTY Right    • WRIST SURGERY         PT Ortho     Row Name 20 1500        Subjective Comments    Subjective Comments  intermittent chronic right lateral hip  pain since the hip fx years ago.  Pt states [place] and [place].  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  -SI       Subjective Pain    Able to rate subjective pain?  yes  -SI    Pre-Treatment Pain Level  0  -SI    Subjective Pain Comment  No left knee pain  -SI       Posture/Observations    Observations  Edema pitted bilaat lower legs and ankles  -SI    Posture/Observations Comments  -- altered  sensation both feet and mild.  -SI       Gait/Stairs Assessment/Training    50309 - Gait Training Minutes   10  -SI    Gait/Stairs Assessment/Training  gait/ambulation assistive device  -SI    [place] Level (Gait)  supervision;verbal cues  -SI    Assistive Device (Gait)  cane, straight  -SI    Pattern (Gait)  step-through  -SI    Comment (Gait/Stairs)  Pt wants to Dc RWX but considering transition too cane  -SI      User Key  (r) = Recorded By, (t) = Taken By, (c) = Cosigned By    Initials Name Provider Type    Mary Ann Katz PTA Physical Therapy Assistant                      PT Assessment/Plan     Row Name 03/06/20 1622          PT Assessment    Assessment Comments  Pt getting oob ind with RWX and driving.  No knee pain.  Wants to rid self of RWX and knee brace.  Advised her to wean gradually.  So ok to walk without RWX some at home and some at home without brace.  RWX and brace when oob in community.  -SI       User Key  (r) = Recorded By, (t) = Taken By, (c) = Cosigned By    Initials Name Provider Type    Mary Ann Katz PTA Physical Therapy Assistant            OP Exercises     Row Name 03/06/20 1500             Subjective Comments    Subjective Comments  intermittent chronic right lateral hip  pain since the hip fx years ago.  Pt states she has been [place] [employer] today [place] and [place].  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  -SI         Subjective Pain    Able to rate subjective pain?   yes  -SI      Pre-Treatment Pain Level  0  -SI      Subjective Pain Comment  No left knee pain  -SI         Total Minutes    35431 - Gait Training Minutes   10  -SI      78666 - PT Therapeutic Exercise Minutes  35  -SI         Exercise 1    Exercise Name 1  quad set  -SI      Cueing 1  Verbal;Tactile  -SI      Reps 1  10e  -SI         Exercise 2    Exercise Name 2  glute set  -SI      Cueing 2  Verbal;Tactile  -SI      Reps 2  10e  -SI         Exercise 3    Exercise Name 3  hip adduction  -SI      Cueing 3  Verbal;Tactile  -SI      Reps 3  10  -SI      Time 3  5  -SI         Exercise 4    Exercise Name 4  hip abduction  -SI      Cueing 4  Verbal;Tactile  -SI      Reps 4  10  -SI      Additional Comments  RTB  -SI         Exercise 5    Exercise Name 5  PPT  -SI      Cueing 5  Verbal;Tactile  -SI      Reps 5  10  -SI      Time 5  5  -SI         Exercise 6    Exercise Name 6  Seated HS curl  -SI      Cueing 6  Verbal;Tactile  -SI      Reps 6  10  -SI      Additional Comments  RTB  -SI         Exercise 7    Exercise Name 7  SLR left  -SI      Sets 7  1  -SI      Reps 7  10  -SI         Exercise 8    Exercise Name 8  LAQ and SAQ 2 lbs  -SI      Sets 8  2  -SI      Reps 8  10  -SI         Exercise 9    Exercise Name 9  Nu-step 5 min  -SI        User Key  (r) = Recorded By, (t) = Taken By, (c) = Cosigned By    Initials Name Provider Type    Mary Ann Katz PTA Physical Therapy Assistant                                          Time Calculation:   Start Time: 1530  Stop Time: 1623  Time Calculation (min): 53 min  Total Timed Code Minutes- PT: 45 minute(s)  Therapy Charges for Today     Code Description Service Date Service Provider Modifiers Qty    21058221382 HC PT THER PROC EA 15 MIN 3/6/2020 Mary Ann Jarquin PTA GP 2    [id number]  GAIT TRAINING EA 15 MIN 3/6/2020 Mary Ann Jarquin PTA GP 1                    Mary Ann Jarquin PTA  3/6/2020

## 2020-03-11 RX ORDER — ALENDRONATE SODIUM 70 MG/1
70 TABLET ORAL
Qty: 12 TABLET | Refills: 1 | Status: SHIPPED | OUTPATIENT
Start: 2020-03-11 | End: 2020-05-05 | Stop reason: SDUPTHER

## 2020-03-19 ENCOUNTER — APPOINTMENT (OUTPATIENT)
Dept: PHYSICAL THERAPY | Facility: HOSPITAL | Age: 85
End: 2020-03-19

## 2020-03-24 ENCOUNTER — APPOINTMENT (OUTPATIENT)
Dept: PHYSICAL THERAPY | Facility: HOSPITAL | Age: 85
End: 2020-03-24

## 2020-03-26 ENCOUNTER — APPOINTMENT (OUTPATIENT)
Dept: PHYSICAL THERAPY | Facility: HOSPITAL | Age: 85
End: 2020-03-26

## 2020-03-31 ENCOUNTER — APPOINTMENT (OUTPATIENT)
Dept: PHYSICAL THERAPY | Facility: HOSPITAL | Age: 85
End: 2020-03-31

## 2020-04-02 ENCOUNTER — APPOINTMENT (OUTPATIENT)
Dept: PHYSICAL THERAPY | Facility: HOSPITAL | Age: 85
End: 2020-04-02

## 2020-04-03 ENCOUNTER — TELEMEDICINE (OUTPATIENT)
Dept: FAMILY MEDICINE CLINIC | Facility: CLINIC | Age: 85
End: 2020-04-03

## 2020-04-03 VITALS — SYSTOLIC BLOOD PRESSURE: 134 MMHG | DIASTOLIC BLOOD PRESSURE: 70 MMHG

## 2020-04-03 DIAGNOSIS — M79.89 SWOLLEN FINGER: ICD-10-CM

## 2020-04-03 DIAGNOSIS — I10 ESSENTIAL HYPERTENSION: Primary | ICD-10-CM

## 2020-04-03 PROCEDURE — 99214 OFFICE O/P EST MOD 30 MIN: CPT | Performed by: FAMILY MEDICINE

## 2020-04-03 NOTE — PROGRESS NOTES
Subjective   Haydee Dickey is a 84 y.o. female.     History of Present Illness   Haydee is doing a video visit today for bp follow up  Haydee has chronic hypertension and has been well controlled on current medications.She  is monitored by me every 6 months and is on video today for follow up. She is tolerating medications without side effect. She reports no vision changes, headaches or lightheadedness. She is requesting refills of medications.  She is taking her bp at home and is wnl.  Patient is also reporting a new problem c/o numbness/tingling and discoloration to her fingers sporadically.She notes that about 2 weeks ago, her fifth finger on her right hand sporadically turned white and was numb. This lasted for an hour or so and then resolved. She was in a cool room when this happened. No pain. She noted that yesterday she saw that her right index finger on the marrufo side was bruised looking but had no pain. She notes that she had been gripping a metal hand rail to walk down stairs before this happened. She is taking aspirin daily.    The following portions of the patient's history were reviewed and updated as appropriate: allergies, current medications, past family history, past medical history, past social history, past surgical history and problem list.    Review of Systems   Constitutional: Negative.  Negative for fever.   HENT: Negative.    Eyes: Negative.  Negative for visual disturbance.   Respiratory: Negative.  Negative for cough and shortness of breath.    Cardiovascular: Negative.    Genitourinary: Negative.    Musculoskeletal:        Bruised ist finger right hand   Skin: Negative.    Neurological: Negative for dizziness and headache.   Hematological: Bruises/bleeds easily.   Psychiatric/Behavioral: Negative.        Objective   Physical Exam   Constitutional: She is oriented to person, place, and time. She appears well-developed and well-nourished. No distress.   HENT:   Head: Normocephalic and  atraumatic.   Eyes: Pupils are equal, round, and reactive to light. Conjunctivae and EOM are normal.   Neck: Normal range of motion.   Pulmonary/Chest: Effort normal.   Musculoskeletal: Normal range of motion.   Right first finger has mild bruising at DIP   Neurological: She is alert and oriented to person, place, and time.   Psychiatric: She has a normal mood and affect. Her behavior is normal. Judgment and thought content normal.   Vitals reviewed.        Assessment/Plan   Problem List Items Addressed This Visit     None      Visit Diagnoses     Essential hypertension    -  Primary  Well controlled on current medication, will refill medication today and as needed. She will RTO for repeat B/P check in 6 months.    Swollen finger      I have advised that aspirin use is most likely the cause of the spontaneous bruising and advised her to alert me if she becomes more prone to these events.                 Return in about 5 months (around 9/3/2020) for Annual physical.

## 2020-04-07 ENCOUNTER — APPOINTMENT (OUTPATIENT)
Dept: PHYSICAL THERAPY | Facility: HOSPITAL | Age: 85
End: 2020-04-07

## 2020-04-09 ENCOUNTER — APPOINTMENT (OUTPATIENT)
Dept: PHYSICAL THERAPY | Facility: HOSPITAL | Age: 85
End: 2020-04-09

## 2020-04-10 ENCOUNTER — APPOINTMENT (OUTPATIENT)
Dept: PHYSICAL THERAPY | Facility: HOSPITAL | Age: 85
End: 2020-04-10

## 2020-04-14 ENCOUNTER — APPOINTMENT (OUTPATIENT)
Dept: PHYSICAL THERAPY | Facility: HOSPITAL | Age: 85
End: 2020-04-14

## 2020-04-17 ENCOUNTER — APPOINTMENT (OUTPATIENT)
Dept: PHYSICAL THERAPY | Facility: HOSPITAL | Age: 85
End: 2020-04-17

## 2020-05-05 RX ORDER — ALENDRONATE SODIUM 70 MG/1
70 TABLET ORAL
Qty: 12 TABLET | Refills: 3 | Status: SHIPPED | OUTPATIENT
Start: 2020-05-05 | End: 2020-07-24

## 2020-07-16 DIAGNOSIS — I10 ESSENTIAL HYPERTENSION: ICD-10-CM

## 2020-07-16 RX ORDER — BENAZEPRIL HYDROCHLORIDE 5 MG/1
10 TABLET, FILM COATED ORAL DAILY
Qty: 180 TABLET | Refills: 0 | Status: SHIPPED | OUTPATIENT
Start: 2020-07-16 | End: 2020-07-17 | Stop reason: SDUPTHER

## 2020-07-17 DIAGNOSIS — I10 ESSENTIAL HYPERTENSION: ICD-10-CM

## 2020-07-17 RX ORDER — BENAZEPRIL HYDROCHLORIDE 5 MG/1
10 TABLET, FILM COATED ORAL DAILY
Qty: 180 TABLET | Refills: 1 | Status: SHIPPED | OUTPATIENT
Start: 2020-07-17 | End: 2020-12-31

## 2020-07-24 RX ORDER — ALENDRONATE SODIUM 70 MG/1
TABLET ORAL
Qty: 12 TABLET | Refills: 0 | Status: SHIPPED | OUTPATIENT
Start: 2020-07-24 | End: 2020-08-05 | Stop reason: SDUPTHER

## 2020-08-05 RX ORDER — ALENDRONATE SODIUM 70 MG/1
70 TABLET ORAL
Qty: 12 TABLET | Refills: 0 | Status: SHIPPED | OUTPATIENT
Start: 2020-08-05 | End: 2020-10-12 | Stop reason: SDUPTHER

## 2020-10-12 RX ORDER — ALENDRONATE SODIUM 70 MG/1
TABLET ORAL
Qty: 12 TABLET | Refills: 0 | Status: SHIPPED | OUTPATIENT
Start: 2020-10-12 | End: 2020-12-28

## 2020-11-04 ENCOUNTER — OFFICE VISIT (OUTPATIENT)
Dept: FAMILY MEDICINE CLINIC | Facility: CLINIC | Age: 85
End: 2020-11-04

## 2020-11-04 VITALS
SYSTOLIC BLOOD PRESSURE: 126 MMHG | DIASTOLIC BLOOD PRESSURE: 78 MMHG | OXYGEN SATURATION: 95 % | WEIGHT: 193 LBS | HEART RATE: 68 BPM | RESPIRATION RATE: 15 BRPM | BODY MASS INDEX: 32.95 KG/M2 | TEMPERATURE: 97.3 F | HEIGHT: 64 IN

## 2020-11-04 DIAGNOSIS — Z00.00 MEDICARE ANNUAL WELLNESS VISIT, SUBSEQUENT: ICD-10-CM

## 2020-11-04 DIAGNOSIS — M81.0 OSTEOPOROSIS WITHOUT CURRENT PATHOLOGICAL FRACTURE, UNSPECIFIED OSTEOPOROSIS TYPE: ICD-10-CM

## 2020-11-04 DIAGNOSIS — E53.8 B12 DEFICIENCY: ICD-10-CM

## 2020-11-04 DIAGNOSIS — Z23 NEED FOR IMMUNIZATION AGAINST INFLUENZA: Primary | ICD-10-CM

## 2020-11-04 DIAGNOSIS — G25.0 ESSENTIAL TREMOR: ICD-10-CM

## 2020-11-04 DIAGNOSIS — I10 BENIGN ESSENTIAL HYPERTENSION: ICD-10-CM

## 2020-11-04 DIAGNOSIS — Z12.31 ENCOUNTER FOR SCREENING MAMMOGRAM FOR MALIGNANT NEOPLASM OF BREAST: ICD-10-CM

## 2020-11-04 DIAGNOSIS — Z91.81 AT MODERATE RISK FOR FALL: ICD-10-CM

## 2020-11-04 PROCEDURE — G0439 PPPS, SUBSEQ VISIT: HCPCS | Performed by: FAMILY MEDICINE

## 2020-11-04 PROCEDURE — 90694 VACC AIIV4 NO PRSRV 0.5ML IM: CPT | Performed by: FAMILY MEDICINE

## 2020-11-04 PROCEDURE — 99214 OFFICE O/P EST MOD 30 MIN: CPT | Performed by: FAMILY MEDICINE

## 2020-11-04 PROCEDURE — G0008 ADMIN INFLUENZA VIRUS VAC: HCPCS | Performed by: FAMILY MEDICINE

## 2020-11-04 RX ORDER — ACETAMINOPHEN 500 MG
TABLET ORAL
COMMUNITY
End: 2021-10-19 | Stop reason: HOSPADM

## 2020-11-04 NOTE — PATIENT INSTRUCTIONS
Medicare Wellness  Personal Prevention Plan of Service     Date of Office Visit:  2020  Encounter Provider:  Tawanda Maya MD  Place of Service:  [facility]  Patient Name: Haydee Dickey  :  1935    As part of the Medicare Wellness portion of your visit today, we are providing you with this personalized preventive plan of services (PPPS). This plan is based upon recommendations of the [place] Preventive Services Task Force (USPSTF) and the Advisory Committee on Immunization Practices (ACIP).    This lists the preventive care services that should be considered, and provides dates of when you are due. Items listed as completed are up-to-date and do not require any further intervention.    Health Maintenance   Topic Date Due   • INFLUENZA VACCINE  2020   • DXA SCAN  2021   • ANNUAL WELLNESS VISIT  2021   • TDAP/TD VACCINES (2 - Td) 10/15/2024   • Pneumococcal Vaccine 65+  Completed   • ZOSTER VACCINE  Completed       Orders Placed This Encounter   Procedures   • Mammo Screening Bilateral With CAD     Standing Status:   Future   • Fluad Quad 65+ yrs (8930-3668)       Return in about 6 months (around 2021).          Fall Prevention in the Home, Adult  Falls can cause injuries and can affect people from all age groups. There are many simple things that you can do to make your home safe and to help prevent falls. Ask for help when making these changes, if needed.  What actions can I take to prevent falls?  General instructions  · Use good lighting in all rooms. Replace any light bulbs that burn out.  · Turn on lights if it is dark. Use night-lights.  · Place frequently used items in easy-to-reach places. Lower the shelves around your home if necessary.  · Set up furniture so that there are clear paths around it. Avoid moving your furniture around.  · Remove throw rugs and other tripping hazards from the floor.  · Avoid walking on wet  floors.  · Fix any uneven floor surfaces.  · Add color or contrast paint or tape to grab bars and handrails in your home. Place contrasting color strips on the first and last steps of stairways.  · When you use a stepladder, make sure that it is completely opened and that the sides are firmly locked. Have someone hold the ladder while you are using it. Do not climb a closed stepladder.  · Be aware of any and all pets.  What can I do in the bathroom?         · Keep the floor dry. Immediately clean up any water that spills onto the floor.  · Remove soap buildup in the tub or shower on a regular basis.  · Use non-skid mats or decals on the floor of the tub or shower.  · Attach bath mats securely with double-sided, non-slip rug tape.  · If you need to sit down while you are in the shower, use a plastic, non-slip stool.  · Install grab bars by the toilet and in the tub and shower. Do not use towel bars as grab bars.  What can I do in the bedroom?  · Make sure that a bedside light is easy to reach.  · Do not use oversized bedding that drapes onto the floor.  · Have a firm chair that has side arms to use for getting dressed.  What can I do in the kitchen?  · Clean up any spills right away.  · If you need to reach for something above you, use a sturdy step stool that has a grab bar.  · Keep electrical cables out of the way.  · Do not use floor polish or wax that makes floors slippery. If you must use wax, make sure that it is non-skid floor wax.  What can I do in the stairways?  · Do not leave any items on the stairs.  · Make sure that you have a light switch at the top of the stairs and the bottom of the stairs. Have them installed if you do not have them.  · Make sure that there are handrails on both sides of the stairs. Fix handrails that are broken or loose. Make sure that handrails are as long as the stairways.  · Install non-slip stair treads on all stairs in your home.  · Avoid having throw rugs at the top or bottom  of stairways, or secure the rugs with carpet tape to prevent them from moving.  · Choose a carpet design that does not hide the edge of steps on the stairway.  · Check any carpeting to make sure that it is firmly attached to the stairs. Fix any carpet that is loose or worn.  What can I do on the outside of my home?  · Use bright outdoor lighting.  · Regularly repair the edges of walkways and driveways and fix any cracks.  · Remove high doorway thresholds.  · Trim any shrubbery on the main path into your home.  · Regularly check that handrails are securely fastened and in good repair. Both sides of any steps should have handrails.  · Install guardrails along the edges of any raised decks or porches.  · Clear walkways of debris and clutter, including tools and rocks.  · Have leaves, snow, and ice cleared regularly.  · Use sand or salt on walkways during winter months.  · In the garage, clean up any spills right away, including grease or oil spills.  What other actions can I take?  · Wear closed-toe shoes that fit well and support your feet. Wear shoes that have rubber soles or low heels.  · Use mobility aids as needed, such as canes, walkers, scooters, and crutches.  · Review your medicines with your health care provider. Some medicines can cause dizziness or changes in blood pressure, which increase your risk of falling.  Talk with your health care provider about other ways that you can decrease your risk of falls. This may include working with a physical therapist or  to improve your strength, balance, and endurance.  Where to find more information  · Centers for Disease Control and Prevention, [place]: https://www.cdc.gov  · National [place] on Aging: https://ul1khmb.maria [place]ugenia.nih.gov  Contact a health care provider if:  · You are afraid of falling at home.  · You feel weak, drowsy, or dizzy at home.  · You fall at home.  Summary  · There are many simple things that you can do to make your home safe and to help  prevent falls.  · Ways to make your home safe include removing tripping hazards and installing grab bars in the bathroom.  · Ask for help when making these changes in your home.  This information is not intended to replace advice given to you by your health care provider. Make sure you discuss any questions you have with your health care provider.  Document Released: 12/08/2003 Document Revised: 11/30/2018 Document Reviewed: 08/02/2018  [employer] Patient Education © 2020 [employer]      Sit-to-Stand Exercise    The sit-to-stand exercise (also known as the chair stand or chair rise exercise) strengthens your lower body and helps you maintain or improve your mobility and independence. The goal is to do the sit-to-stand exercise without using your hands. This will be easier as you become stronger. You should always talk with your health care provider before starting any exercise program, especially if you have had recent surgery.  Do the exercise exactly as told by your health care provider and adjust it as directed. It is normal to feel mild stretching, pulling, tightness, or discomfort as you do this exercise, but you should stop right away if you feel sudden pain or your pain gets worse. Do not begin doing this exercise until told by your health care provider.  What the sit-to-stand exercise does  The sit-to-stand exercise helps to strengthen the muscles in your thighs and the muscles in the center of your body that give you stability (core muscles). This exercise is especially helpful if:  · You have had knee or hip surgery.  · You have trouble getting up from a chair, out of a car, or off the toilet.  How to do the sit-to-stand exercise  1. Sit toward the front edge of a sturdy chair without armrests. Your knees should be bent and your feet should be flat on the floor and shoulder-width apart.  2. Place your hands lightly on each side of the seat. Keep your back and neck as straight as possible, with your chest  slightly forward.  3. Breathe in slowly. Lean forward and slightly shift your weight to the front of your feet.  4. Breathe out as you slowly stand up. Use your hands as little as possible.  5. Stand and pause for a full breath in and out.  6. Breathe in as you sit down slowly. Tighten your core and abdominal muscles to control your lowering as much as possible.  7. Breathe out slowly.  8. Do this exercise 10-15 times. If needed, do it fewer times until you build up strength.  9. Rest for 1 minute, then do another set of 10-15 repetitions.  To change the difficulty of the sit-to-stand exercise  · If the exercise is too difficult, use a chair with sturdy armrests, and push off the armrests to help you come to the standing position. You can also use the armrests to help slowly lower yourself back to sitting. As this gets easier, try to use your arms less. You can also place a firm cushion or pillow on the chair to make the surface higher.  · If this exercise is too easy, do not use your arms to help raise or lower yourself. You can also wear a weighted vest, use hand weights, increase your repetitions, or try a lower chair.  General tips  · You may feel tired when starting an exercise routine. This is normal.  · You may have muscle soreness that lasts a few days. This is normal. As you get stronger, you may not feel muscle soreness.  · Use smooth, steady movements.  · Do not  hold your breath during strength exercises. This can cause unsafe changes in your blood pressure.  · Breathe in slowly through your nose, and breathe out slowly through your mouth.  Summary  · Strengthening your lower body is an important step to help you move safely and independently.  · The sit-to-stand exercise helps strengthen the muscles in your thighs and core.  · You should always talk with your health care provider before starting any exercise program, especially if you have had recent surgery.  This information is not intended to replace  advice given to you by your health care provider. Make sure you discuss any questions you have with your health care provider.  Document Released: 02/08/2018 Document Revised: 10/16/2019 Document Reviewed: 02/08/2018  [employer] Patient Education © 2020 [employer]

## 2020-11-04 NOTE — PROGRESS NOTES
Medicare Subsequent Wellness Visit  Subjective   History of Present Illness    Haydee Dickey is a 85 y.o. female who presents for an Medicare Wellness Visit. In addition, we addressed the following health issues:  Haydee has chronic hypertension and has been well controlled on current medications.She  is monitored by me every 6 months and is here today for follow up. She is tolerating medications without side effect. She reports no vision changes, headaches or lightheadedness. She is requesting refills of medications.  She has chronic benign tremor and she feels like it is getting worse.   She has chronic B12 def and has been taking B12  2000 a day. She has been dong well on current medication.       PMH, PSH, SocHx, FamHx, Allergies, and Medications: Reviewed and updated in the history section of chart.  Family History   Problem Relation Age of Onset   • Diabetes Mother    • Hypertension Mother    • Stroke Mother    • Hearing loss Mother         AIDES   • Heart disease Mother         CHF, PACER   • Thyroid disease Mother         THYROIDECTOMY   • Hypertension Father    • Stroke Father    • Alcohol abuse Father         DAYS OFF    • Heart disease Father    • Hyperlipidemia Father    • Kidney disease Father         KIDNEY STONE REMOVED   • Asthma Daughter         ADULT ONSET   • Cancer Sister         LUNG   • COPD Sister    • Cancer Sister         KIDNEY   • Cancer Brother         LUNG   • Diabetes Maternal Aunt         NIDDM   • Thyroid disease Maternal Aunt         THYROIDECTOMY   • Diabetes Maternal Aunt         NIDDM   • Other Maternal Aunt         ESSENTIAL TREMOR   • Diabetes Sister         INSULIN   • Diabetes Maternal Grandmother         NIDDM   • Early death Brother         4 MO, ? 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  Lives at home with her daughter, son in law, grand-daughter, and 1 dog.         Allergies   Allergen Reactions   • Iodine Itching   • Oxycodone-Acetaminophen Itching     causes ithching   • Percocet  [Oxycodone-Acetaminophen] Itching     causes ithching   • Iodinated Diagnostic Agents Rash     Patient reports rash occurred 30 yrs ago with contrast       Outpatient Medications Prior to Visit   Medication Sig Dispense Refill   • alendronate (FOSAMAX) 70 MG tablet TAKE 1 TABLET BY MOUTH EVERY 7 DAYS  12 tablet 0   • aspirin 81 MG EC tablet Take 81 mg by mouth.     • benazepril (LOTENSIN) 5 MG tablet Take 2 tablets by mouth Daily. 180 tablet 1   • Biotin 1 MG capsule Take  by mouth.     • Calcium Carbonate-Vit D-Min (Calcium 1200) 3727-5663 MG-UNIT chewable tablet Chew.     • cholecalciferol (VITAMIN D3) 1000 UNITS tablet Take 1,000 Units by mouth.     • Cyanocobalamin (B-12) 1000 MCG capsule Take 2,000 mcg by mouth.     • FOLIC ACID Take 400 mcg by mouth.     • pyridoxine (VITAMIN B-6) 100 MG tablet tablet Take 100 mg by mouth.     • Turmeric 500 MG capsule Take 500 mg by mouth.     • calcium carbonate (OS-FRED) 600 MG tablet Take 600 mg by mouth 2 (Two) Times a Day With Meals.       No facility-administered medications prior to visit.         Patient Active Problem List   Diagnosis   • Acute left flank pain   • B12 deficiency   • Benign essential hypertension   • Benign paroxysmal positional vertigo   • H/O abdominal hysterectomy   • H/O total knee replacement   • Hip fracture (CMS/HCC)   • Torn rotator cuff   • Osteoporosis   • Essential tremor   • Small vessel disease (CMS/HCC)   • Gait instability   • Edema of lower extremity         Patient Care Team:  Tawanda Maya MD as PCP - General (Family Medicine)  Edmund Zavaleta MD as Surgeon (Orthopedic Surgery)  Debbie Batista MD as Surgeon (Hand  Surgery)  Lemuel Mosqueda MD (Ophthalmology)  Health Habits:  Current Diet: Well balanced diet  Tobacco use.non smoker  Dental Exam. advised  Eye Exam. advised  Exercise:advised      Recent Hospitalizations:  none    Age-appropriate Screening Schedule:  Refer to the list below for future screening recommendations based on patient's age. Orders for these recommended tests are listed in the plan section. The patient has been provided with a written plan.      Health Maintenance   Topic Date Due   • INFLUENZA VACCINE  08/01/2020   • DXA SCAN  04/08/2021   • ANNUAL WELLNESS VISIT  11/04/2021   • TDAP/TD VACCINES (2 - Td) 10/15/2024   • Pneumococcal Vaccine 65+  Completed   • ZOSTER VACCINE  Completed       Depression Screen:   PHQ-2/PHQ-9 Depression Screening 11/4/2020   Little interest or pleasure in doing things 0   Feeling down, depressed, or hopeless 0   Total Score 0       Functional and Cognitive Screening:  Functional & Cognitive Status 11/4/2020   Do you have difficulty preparing food and eating? No   Do you have difficulty bathing yourself, getting dressed or grooming yourself? No   Do you have difficulty using the toilet? No   Do you have difficulty moving around from place to place? No   Do you have trouble with steps or getting out of a bed or a chair? No   Current Diet Well Balanced Diet   Dental Exam Not up to date   Eye Exam Not up to date   Exercise (times per week) 0 times per week   Current Exercise Activities Include (No Data)   Do you need help using the phone?  No   Are you deaf or do you have serious difficulty hearing?  Yes   Do you need help with transportation? No   Do you need help shopping? No   Do you need help preparing meals?  Yes   Do you need help with housework?  Yes   Do you need help with laundry? Yes   Do you need help taking your medications? No   Do you need help managing money? No   Do you ever drive or ride in a car without wearing a seat belt? No   Have you felt unusual  "stress, anger or loneliness in the last month? No   Who do you live with? Child   If you need help, do you have trouble finding someone available to you? No   Have you been bothered in the last four weeks by sexual problems? No   Do you have difficulty concentrating, remembering or making decisions? No     Does the patient have evidence of cognitive impairment? none    Compared to one year ago, the patient feels their physical health and mental health are the same.       Review of Systems   Constitutional: Negative.    HENT: Negative.    Eyes: Negative.    Respiratory: Negative.    Cardiovascular: Negative.    Gastrointestinal: Negative.    Endocrine: Negative.    Genitourinary: Negative.    Musculoskeletal: Negative.    Skin: Negative.    Allergic/Immunologic: Negative.    Neurological: Positive for tremors.   Hematological: Negative.    Psychiatric/Behavioral: Negative.        Objective     Vitals:    11/04/20 0836   BP: 126/78   Pulse: 68   Resp: 15   Temp: 97.3 °F (36.3 °C)   SpO2: 95%   Weight: 87.5 kg (193 lb)   Height: 162.6 cm (64\")   PainSc: 0-No pain       Body mass index is 33.13 kg/m².    PHYSICAL EXAM  Vitals reviewed and on chart.  HEENT: PERRLA, EOMI. Oral mucosa moist,   No LAD.  CV: RRR, no murmurs, rubs, clicks or gallops  LUNGS: CTA bilaterally  EXT: No edema, FROM in bilateral upper and lower ext  NEURO: CN II - XII grossly intact  PSYCH: good mood, positive affect, alert and engaged. No thoughts of self harm  expressed.    ASSESSMENT AND PLAN  Mammogram:ordered  Colon cancer screening :2003 per patient  Lung cancer screening :nonsmoker      Problem List Items Addressed This Visit        Cardiovascular and Mediastinum    Benign essential hypertension  Well controlled on current medication, will refill medication today and as needed. She will RTO for repeat B/P check in 6 months.       Digestive    B12 deficiency  Will check labs today.       Nervous and Auditory    Essential tremor  Stable at " this time.       Musculoskeletal and Integument    Osteoporosis  Bone density UTD she is tolerating Fosamax well.       Other Visit Diagnoses     Need for immunization against influenza    -  Primary    Relevant Orders    Fluad Quad 65+ yrs (1110-7024) (Completed)    Medicare annual wellness visit, subsequent        Relevant Orders    CBC (No Diff)    Comprehensive Metabolic Panel    Lipid Panel With / Chol / HDL Ratio    At moderate risk for fall      Handout given to her with advice and exercises to help.     Encounter for screening mammogram for malignant neoplasm of breast        Relevant Orders    Mammo Screening Bilateral With CAD          Orders:  Orders Placed This Encounter   Procedures   • Mammo Screening Bilateral With CAD   • Fluad Quad 65+ yrs (7507-5577)   • CBC (No Diff)   • Comprehensive Metabolic Panel   • Lipid Panel With / Chol / HDL Ratio       Follow Up:  Return in about 6 months (around 5/4/2021).     ADVANCED DIRECTIVES:   ACP discussion was held with the patient during this visit. Patient has an advance directive (not in EMR), copy requested.    An After Visit Summary and PPPS with all of these plans were given to the patient.

## 2020-11-05 LAB
ALBUMIN SERPL-MCNC: 4.1 G/DL (ref 3.5–5.2)
ALBUMIN/GLOB SERPL: 2.2 G/DL
ALP SERPL-CCNC: 28 U/L (ref 39–117)
ALT SERPL-CCNC: 19 U/L (ref 1–33)
AST SERPL-CCNC: 25 U/L (ref 1–32)
BILIRUB SERPL-MCNC: 0.3 MG/DL (ref 0–1.2)
BUN SERPL-MCNC: 19 MG/DL (ref 8–23)
BUN/CREAT SERPL: 19.4 (ref 7–25)
CALCIUM SERPL-MCNC: 9.5 MG/DL (ref 8.6–10.5)
CHLORIDE SERPL-SCNC: 104 MMOL/L (ref 98–107)
CHOLEST SERPL-MCNC: 166 MG/DL (ref 0–200)
CHOLEST/HDLC SERPL: 3.13 {RATIO}
CO2 SERPL-SCNC: 28.3 MMOL/L (ref 22–29)
CREAT SERPL-MCNC: 0.98 MG/DL (ref 0.57–1)
ERYTHROCYTE [DISTWIDTH] IN BLOOD BY AUTOMATED COUNT: 12.3 % (ref 12.3–15.4)
GLOBULIN SER CALC-MCNC: 1.9 GM/DL
GLUCOSE SERPL-MCNC: 98 MG/DL (ref 65–99)
HCT VFR BLD AUTO: 32.9 % (ref 34–46.6)
HDLC SERPL-MCNC: 53 MG/DL (ref 40–60)
HGB BLD-MCNC: 11.1 G/DL (ref 12–15.9)
LDLC SERPL CALC-MCNC: 101 MG/DL (ref 0–100)
MCH RBC QN AUTO: 30.7 PG (ref 26.6–33)
MCHC RBC AUTO-ENTMCNC: 33.7 G/DL (ref 31.5–35.7)
MCV RBC AUTO: 91.1 FL (ref 79–97)
PLATELET # BLD AUTO: 197 10*3/MM3 (ref 140–450)
POTASSIUM SERPL-SCNC: 5 MMOL/L (ref 3.5–5.2)
PROT SERPL-MCNC: 6 G/DL (ref 6–8.5)
RBC # BLD AUTO: 3.61 10*6/MM3 (ref 3.77–5.28)
SODIUM SERPL-SCNC: 139 MMOL/L (ref 136–145)
TRIGL SERPL-MCNC: 63 MG/DL (ref 0–150)
VLDLC SERPL CALC-MCNC: 12 MG/DL (ref 5–40)
WBC # BLD AUTO: 3.87 10*3/MM3 (ref 3.4–10.8)

## 2020-12-28 RX ORDER — ALENDRONATE SODIUM 70 MG/1
TABLET ORAL
Qty: 12 TABLET | Refills: 0 | Status: SHIPPED | OUTPATIENT
Start: 2020-12-28 | End: 2021-03-15

## 2020-12-30 DIAGNOSIS — I10 ESSENTIAL HYPERTENSION: ICD-10-CM

## 2020-12-31 RX ORDER — BENAZEPRIL HYDROCHLORIDE 5 MG/1
TABLET, FILM COATED ORAL
Qty: 180 TABLET | Refills: 0 | Status: SHIPPED | OUTPATIENT
Start: 2020-12-31 | End: 2021-09-27

## 2021-02-12 DIAGNOSIS — Z12.31 ENCOUNTER FOR SCREENING MAMMOGRAM FOR MALIGNANT NEOPLASM OF BREAST: Primary | ICD-10-CM

## 2021-02-17 ENCOUNTER — TRANSCRIBE ORDERS (OUTPATIENT)
Dept: ADMINISTRATIVE | Facility: HOSPITAL | Age: 86
End: 2021-02-17

## 2021-02-17 DIAGNOSIS — Z12.31 BREAST CANCER SCREENING BY MAMMOGRAM: Primary | ICD-10-CM

## 2021-02-24 ENCOUNTER — DOCUMENTATION (OUTPATIENT)
Dept: PHYSICAL THERAPY | Facility: HOSPITAL | Age: 86
End: 2021-02-24

## 2021-02-24 DIAGNOSIS — Z74.09 IMPAIRED FUNCTIONAL MOBILITY, BALANCE, GAIT, AND ENDURANCE: ICD-10-CM

## 2021-02-24 DIAGNOSIS — M25.562 ACUTE PAIN OF LEFT KNEE: ICD-10-CM

## 2021-02-24 DIAGNOSIS — R29.898 BILATERAL LEG WEAKNESS: Primary | ICD-10-CM

## 2021-02-24 NOTE — THERAPY DISCHARGE NOTE
Outpatient Physical Therapy Discharge Summary         Patient Name: Haydee Dickey  : 1935  MRN: [id number]    Today's Date: 2021    Visit Dx:    ICD-10-CM ICD-9-CM   1. Bilateral leg weakness  R29.898 729.89   2. Impaired functional mobility, balance, gait, and endurance  Z74.09 V49.89   3. Acute pain of left knee  M25.562 719.46       PT OP Goals     Row Name 21 1300          PT Short Term Goals    STG Date to Achieve  20  -     STG 1  Pt. will be independent with initial HEP to improve strength and management of condition.  -     STG 1 Progress  Not Met  -     STG 2  Pt. will be able to ambulate without knee brace with no episodes of knee giving way.  -     STG 2 Progress  Not Met  -        Long Term Goals    LTG Date to Achieve  20  -     LTG 1  Pt. will be independent with advanced HEP to improve long-term management of condition.  -     LTG 1 Progress  Not Met  -     LTG 2  Pt. will report improvement in KOS score from 62.5% to 72.5% (where 100% is no disability) to improve patient perception on ease of ADLs.  -     LTG 2 Progress  Not Met  -     LTG 3  Pt. will improve B LE strength to 4+/5 to improve stability with standing and walking.  -     LTG 3 Progress  Not Met  -     LTG 4  Pt. will be able to ambulate without brace or walker with no instances of kne giving way to return to PLOF.  -     LTG 4 Progress  Not Met  -     LTG 5  Patient will report no falls during course of therapy.  -     LTG 5 Progress  Not Met  -     LTG 6  Patient will demonstrate improved balance by ability to perform rhomberg stance for 10+ seconds without outside assist  -     LTG 6 Progress  Not Met  -       User Key  (r) = Recorded By, (t) = Taken By, (c) = Cosigned By    Initials Name Provider Type    Conchita Castrejon, PT Physical Therapist          OP PT Discharge Summary  Date of Discharge: 21  Reason for Discharge: other (comment)(Did not return  following last visit due to COVID19 pandemic)  Outcomes Achieved: Unable to make functional progress toward goals at this time  Discharge Destination: Unknown      Time Calculation:                    Conchita Hall, PT  2/24/2021

## 2021-03-02 DIAGNOSIS — Z23 IMMUNIZATION DUE: ICD-10-CM

## 2021-03-15 RX ORDER — ALENDRONATE SODIUM 70 MG/1
TABLET ORAL
Qty: 12 TABLET | Refills: 0 | Status: SHIPPED | OUTPATIENT
Start: 2021-03-15 | End: 2021-07-09

## 2021-05-14 ENCOUNTER — OFFICE VISIT (OUTPATIENT)
Dept: FAMILY MEDICINE CLINIC | Facility: CLINIC | Age: 86
End: 2021-05-14

## 2021-05-14 VITALS
HEART RATE: 85 BPM | TEMPERATURE: 97.3 F | WEIGHT: 197 LBS | HEIGHT: 64 IN | DIASTOLIC BLOOD PRESSURE: 66 MMHG | RESPIRATION RATE: 16 BRPM | SYSTOLIC BLOOD PRESSURE: 144 MMHG | BODY MASS INDEX: 33.63 KG/M2 | OXYGEN SATURATION: 99 %

## 2021-05-14 DIAGNOSIS — R25.1 TREMOR: Primary | ICD-10-CM

## 2021-05-14 PROCEDURE — 99213 OFFICE O/P EST LOW 20 MIN: CPT | Performed by: FAMILY MEDICINE

## 2021-06-01 ENCOUNTER — APPOINTMENT (OUTPATIENT)
Dept: MAMMOGRAPHY | Facility: HOSPITAL | Age: 86
End: 2021-06-01

## 2021-07-09 RX ORDER — ALENDRONATE SODIUM 70 MG/1
TABLET ORAL
Qty: 12 TABLET | Refills: 1 | Status: SHIPPED | OUTPATIENT
Start: 2021-07-09 | End: 2021-12-29

## 2021-08-24 ENCOUNTER — HOSPITAL ENCOUNTER (OUTPATIENT)
Dept: MAMMOGRAPHY | Facility: HOSPITAL | Age: 86
Discharge: HOME | End: 2021-08-24
Admitting: FAMILY MEDICINE

## 2021-08-24 DIAGNOSIS — Z12.31 BREAST CANCER SCREENING BY MAMMOGRAM: ICD-10-CM

## 2021-08-24 PROCEDURE — 77067 SCR MAMMO BI INCL CAD: CPT

## 2021-08-24 PROCEDURE — 77063 BREAST TOMOSYNTHESIS BI: CPT

## 2021-08-25 DIAGNOSIS — R92.8 ABNORMAL MAMMOGRAM: Primary | ICD-10-CM

## 2021-09-13 ENCOUNTER — HOSPITAL ENCOUNTER (OUTPATIENT)
Dept: MAMMOGRAPHY | Facility: HOSPITAL | Age: 86
Discharge: HOME | End: 2021-09-13

## 2021-09-13 ENCOUNTER — HOSPITAL ENCOUNTER (OUTPATIENT)
Dept: ULTRASOUND IMAGING | Facility: HOSPITAL | Age: 86
Discharge: HOME | End: 2021-09-13

## 2021-09-13 DIAGNOSIS — R92.8 ABNORMAL MAMMOGRAM: ICD-10-CM

## 2021-09-13 PROCEDURE — 76642 ULTRASOUND BREAST LIMITED: CPT

## 2021-09-13 PROCEDURE — G0279 TOMOSYNTHESIS, MAMMO: HCPCS

## 2021-09-13 PROCEDURE — 77065 DX MAMMO INCL CAD UNI: CPT

## 2021-09-15 DIAGNOSIS — N64.9 BREAST LESION: Primary | ICD-10-CM

## 2021-09-16 ENCOUNTER — TELEPHONE (OUTPATIENT)
Dept: SURGERY | Facility: CLINIC | Age: 86
End: 2021-09-16

## 2021-09-16 ENCOUNTER — PREP FOR SURGERY (OUTPATIENT)
Dept: OTHER | Facility: HOSPITAL | Age: 86
End: 2021-09-16

## 2021-09-16 ENCOUNTER — TRANSCRIBE ORDERS (OUTPATIENT)
Dept: SURGERY | Facility: CLINIC | Age: 86
End: 2021-09-16

## 2021-09-16 DIAGNOSIS — R92.8 ABNORMAL FINDING ON BREAST IMAGING: Primary | ICD-10-CM

## 2021-09-16 DIAGNOSIS — Z98.890 STATUS POST BIOPSY: Primary | ICD-10-CM

## 2021-09-16 NOTE — TELEPHONE ENCOUNTER
Scheduled Left Breast US Guided Breast Biopsy  At [facility] on 9/29/21  Arrive 1:30pm    Scheduled with Pastora in Mammo.    Spoke to pt she v.u.    Pt will see Dipika, unless abnormal.    Caprice

## 2021-09-27 DIAGNOSIS — I10 ESSENTIAL HYPERTENSION: ICD-10-CM

## 2021-09-27 RX ORDER — BENAZEPRIL HYDROCHLORIDE 5 MG/1
TABLET, FILM COATED ORAL
Qty: 60 TABLET | Refills: 0 | Status: SHIPPED | OUTPATIENT
Start: 2021-09-27 | End: 2021-11-05 | Stop reason: SDUPTHER

## 2021-09-29 ENCOUNTER — HOSPITAL ENCOUNTER (OUTPATIENT)
Dept: MAMMOGRAPHY | Facility: HOSPITAL | Age: 86
Discharge: HOME | End: 2021-09-29

## 2021-09-29 ENCOUNTER — HOSPITAL ENCOUNTER (OUTPATIENT)
Dept: ULTRASOUND IMAGING | Facility: HOSPITAL | Age: 86
Discharge: HOME | End: 2021-09-29

## 2021-09-29 VITALS
RESPIRATION RATE: 16 BRPM | OXYGEN SATURATION: 97 % | HEART RATE: 71 BPM | BODY MASS INDEX: 32.27 KG/M2 | WEIGHT: 189 LBS | SYSTOLIC BLOOD PRESSURE: 148 MMHG | TEMPERATURE: 97.3 F | HEIGHT: 64 IN | DIASTOLIC BLOOD PRESSURE: 78 MMHG

## 2021-09-29 DIAGNOSIS — R92.8 ABNORMAL FINDING ON BREAST IMAGING: ICD-10-CM

## 2021-09-29 DIAGNOSIS — Z98.890 STATUS POST BREAST BIOPSY: ICD-10-CM

## 2021-09-29 PROCEDURE — 88342 IMHCHEM/IMCYTCHM 1ST ANTB: CPT | Performed by: SURGERY

## 2021-09-29 PROCEDURE — 88341 IMHCHEM/IMCYTCHM EA ADD ANTB: CPT | Performed by: SURGERY

## 2021-09-29 PROCEDURE — 25010000003 LIDOCAINE 1 % SOLUTION: Performed by: RADIOLOGY

## 2021-09-29 PROCEDURE — 77065 DX MAMMO INCL CAD UNI: CPT

## 2021-09-29 PROCEDURE — A4648 IMPLANTABLE TISSUE MARKER: HCPCS

## 2021-09-29 PROCEDURE — 88360 TUMOR IMMUNOHISTOCHEM/MANUAL: CPT | Performed by: SURGERY

## 2021-09-29 PROCEDURE — 88305 TISSUE EXAM BY PATHOLOGIST: CPT | Performed by: SURGERY

## 2021-09-29 RX ORDER — LIDOCAINE HYDROCHLORIDE 10 MG/ML
10 INJECTION, SOLUTION INFILTRATION; PERINEURAL ONCE
Status: COMPLETED | OUTPATIENT
Start: 2021-09-29 | End: 2021-09-29

## 2021-09-29 RX ORDER — DIAZEPAM 5 MG/1
5 TABLET ORAL ONCE AS NEEDED
Status: DISCONTINUED | OUTPATIENT
Start: 2021-09-29 | End: 2021-09-30 | Stop reason: HOSPADM

## 2021-09-29 RX ORDER — LIDOCAINE HYDROCHLORIDE AND EPINEPHRINE 10; 10 MG/ML; UG/ML
10 INJECTION, SOLUTION INFILTRATION; PERINEURAL ONCE
Status: COMPLETED | OUTPATIENT
Start: 2021-09-29 | End: 2021-09-29

## 2021-09-29 RX ADMIN — LIDOCAINE HYDROCHLORIDE 10 ML: 10 INJECTION, SOLUTION INFILTRATION; PERINEURAL at 15:00

## 2021-09-29 RX ADMIN — LIDOCAINE HYDROCHLORIDE,EPINEPHRINE BITARTRATE 10 ML: 10; .01 INJECTION, SOLUTION INFILTRATION; PERINEURAL at 15:00

## 2021-10-01 LAB
LAB AP CASE REPORT: NORMAL
LAB AP DIAGNOSIS COMMENT: NORMAL
LAB AP SPECIAL STAINS: NORMAL
LAB AP SYNOPTIC CHECKLIST: NORMAL
PATH REPORT.FINAL DX SPEC: NORMAL
PATH REPORT.GROSS SPEC: NORMAL

## 2021-10-04 ENCOUNTER — TELEPHONE (OUTPATIENT)
Dept: SURGERY | Facility: CLINIC | Age: 86
End: 2021-10-04

## 2021-10-04 NOTE — TELEPHONE ENCOUNTER
New patient appointment with Dr. Andres is scheduled on 10/21/2021 @ 11:00am.    Called patient, vm was full, I will try and reach her again.    Sent patient a reminder letter in the mail.

## 2021-10-07 ENCOUNTER — TRANSCRIBE ORDERS (OUTPATIENT)
Dept: PREADMISSION TESTING | Facility: HOSPITAL | Age: 86
End: 2021-10-07

## 2021-10-07 DIAGNOSIS — Z01.818 OTHER SPECIFIED PRE-OPERATIVE EXAMINATION: Primary | ICD-10-CM

## 2021-10-14 ENCOUNTER — PRE-ADMISSION TESTING (OUTPATIENT)
Dept: PREADMISSION TESTING | Facility: HOSPITAL | Age: 86
End: 2021-10-14

## 2021-10-14 VITALS
TEMPERATURE: 97.2 F | BODY MASS INDEX: 32.78 KG/M2 | WEIGHT: 192 LBS | OXYGEN SATURATION: 98 % | SYSTOLIC BLOOD PRESSURE: 158 MMHG | RESPIRATION RATE: 18 BRPM | HEIGHT: 64 IN | DIASTOLIC BLOOD PRESSURE: 74 MMHG | HEART RATE: 65 BPM

## 2021-10-14 LAB
ANION GAP SERPL CALCULATED.3IONS-SCNC: 10.5 MMOL/L (ref 5–15)
BUN SERPL-MCNC: 15 MG/DL (ref 8–23)
BUN/CREAT SERPL: 15.2 (ref 7–25)
CALCIUM SPEC-SCNC: 9.8 MG/DL (ref 8.6–10.5)
CHLORIDE SERPL-SCNC: 104 MMOL/L (ref 98–107)
CO2 SERPL-SCNC: 25.5 MMOL/L (ref 22–29)
CREAT SERPL-MCNC: 0.99 MG/DL (ref 0.57–1)
DEPRECATED RDW RBC AUTO: 39.5 FL (ref 37–54)
ERYTHROCYTE [DISTWIDTH] IN BLOOD BY AUTOMATED COUNT: 12.1 % (ref 12.3–15.4)
GFR SERPL CREATININE-BSD FRML MDRD: 53 ML/MIN/1.73
GLUCOSE SERPL-MCNC: 106 MG/DL (ref 65–99)
HCT VFR BLD AUTO: 32.9 % (ref 34–46.6)
HGB BLD-MCNC: 11.1 G/DL (ref 12–15.9)
MCH RBC QN AUTO: 30.5 PG (ref 26.6–33)
MCHC RBC AUTO-ENTMCNC: 33.7 G/DL (ref 31.5–35.7)
MCV RBC AUTO: 90.4 FL (ref 79–97)
PLATELET # BLD AUTO: 172 10*3/MM3 (ref 140–450)
PMV BLD AUTO: 9.8 FL (ref 6–12)
POTASSIUM SERPL-SCNC: 4.7 MMOL/L (ref 3.5–5.2)
QT INTERVAL: 427 MS
RBC # BLD AUTO: 3.64 10*6/MM3 (ref 3.77–5.28)
SODIUM SERPL-SCNC: 140 MMOL/L (ref 136–145)
WBC # BLD AUTO: 4.16 10*3/MM3 (ref 3.4–10.8)

## 2021-10-14 PROCEDURE — 80048 BASIC METABOLIC PNL TOTAL CA: CPT

## 2021-10-14 PROCEDURE — 93005 ELECTROCARDIOGRAM TRACING: CPT

## 2021-10-14 PROCEDURE — 93010 ELECTROCARDIOGRAM REPORT: CPT | Performed by: INTERNAL MEDICINE

## 2021-10-14 PROCEDURE — 85027 COMPLETE CBC AUTOMATED: CPT

## 2021-10-14 PROCEDURE — 36415 COLL VENOUS BLD VENIPUNCTURE: CPT

## 2021-10-14 RX ORDER — PROPRANOLOL HYDROCHLORIDE 20 MG/1
20 TABLET ORAL 2 TIMES DAILY
COMMUNITY

## 2021-10-14 NOTE — DISCHARGE INSTRUCTIONS
ARRIVE DAY OF SURGERY AT  5:30 AM        Take the following medications the morning of surgery: PROPRANOLOL      If you are on prescription narcotic pain medication to control your pain you may also take that medication the morning of surgery.    General Instructions:  • Do not eat solid food after midnight the night before surgery.  • You may drink clear liquids day of surgery but must stop at least one hour before your hospital arrival time.  • It is beneficial for you to have a clear drink that contains carbohydrates the day of surgery.  We suggest a 12 to 20 ounce bottle of Gatorade or Powerade for non-diabetic patients or a 12 to 20 ounce bottle of G2 or Powerade Zero for diabetic patients. (Pediatric patients, are not advised to drink a 12 to 20 ounce carbohydrate drink)    Clear liquids are liquids you can see through.  Nothing red in color.     Plain water                               Sports drinks  Sodas                                   Gelatin (Jell-O)  Fruit juices without pulp such as white grape juice and apple juice  Popsicles that contain no fruit or yogurt  Tea or coffee (no cream or milk added)  Gatorade / Powerade  G2 / Powerade Zero    • Infants may have breast milk up to four hours before surgery.  • Infants drinking formula may drink formula up to six hours before surgery.   • Patients who avoid smoking, chewing tobacco and alcohol for 4 weeks prior to surgery have a reduced risk of post-operative complications.  Quit smoking as many days before surgery as you can.  • Do not smoke, use chewing tobacco or drink alcohol the day of surgery.   • If applicable bring your C-PAP/ BI-PAP machine.  • Bring any papers given to you in the doctor’s office.  • Wear clean comfortable clothes.  • Do not wear contact lenses, false eyelashes or make-up.  Bring a case for your glasses.   • Bring crutches or walker if applicable.  • Remove all piercings.  Leave jewelry and any other valuables at home.  • Hair  extensions with metal clips must be removed prior to surgery.  • The Pre-Admission Testing nurse will instruct you to bring medications if unable to obtain an accurate list in Pre-Admission Testing.            Preventing a Surgical Site Infection:  • For 2 to 3 days before surgery, avoid shaving with a razor because the razor can irritate skin and make it easier to develop an infection.    • Any areas of open skin can increase the risk of a post-operative wound infection by allowing bacteria to enter and travel throughout the body.  Notify your surgeon if you have any skin wounds / rashes even if it is not near the expected surgical site.  The area will need assessed to determine if surgery should be delayed until it is healed.  • The night prior to surgery shower using a fresh bar of anti-bacterial soap (such as Dial) and clean washcloth.  Sleep in a clean bed with clean clothing.  Do not allow pets to sleep with you.  • Shower on the morning of surgery using a fresh bar of anti-bacterial soap (such as Dial) and clean washcloth.  Dry with a clean towel and dress in clean clothing.  • Ask your surgeon if you will be receiving antibiotics prior to surgery.  • Make sure you, your family, and all healthcare providers clean their hands with soap and water or an alcohol based hand  before caring for you or your wound.    Day of surgery:  Your arrival time is approximately two hours before your scheduled surgery time.  Upon arrival, a Pre-op nurse and Anesthesiologist will review your health history, obtain vital signs, and answer questions you may have.  The only belongings needed at this time will be a list of your home medications and if applicable your C-PAP/BI-PAP machine.  A Pre-op nurse will start an IV and you may receive medication in preparation for surgery, including something to help you relax.     Please be aware that surgery does come with discomfort.  We want to make every effort to control your  discomfort so please discuss any uncontrolled symptoms with your nurse.   Your doctor will most likely have prescribed pain medications.      If you are going home after surgery you will receive individualized written care instructions before being discharged.  A responsible adult must drive you to and from the hospital on the day of your surgery and stay with you for 24 hours.  Discharge prescriptions can be filled by the hospital pharmacy during regular pharmacy hours.  If you are having surgery late in the day/evening your prescription may be e-prescribed to your pharmacy.  Please verify your pharmacy hours or chose a 24 hour pharmacy to avoid not having access to your prescription because your pharmacy has closed for the day.    If you are staying overnight following surgery, you will be transported to your hospital room following the recovery period.  [facility] has all private rooms.    If you have any questions please call Pre-Admission Testing at [phone].  Deductibles and co-payments are collected on the day of service. Please be prepared to pay the required co-pay, deductible or deposit on the day of service as defined by your plan.    Patient Education for Self-Quarantine Process    • Following your COVID testing, we strongly recommend that you wear a mask when you are with other people and practice social distancing.   • Limit your activities to only required outings.  • Wash your hands with soap and water frequently for at least 20 seconds.   • Avoid touching your eyes, nose and mouth with unwashed hands.  • Do not share anything - utensils, drinking glasses, food from the same bowl.   • Sanitize household surfaces daily. Include all high touch areas (door handles, light switches, phones, countertops, etc.)    Call your surgeon immediately if you experience any of the following symptoms:  • Sore Throat  • Shortness of Breath or difficulty breathing  • Cough  • Chills  • Body soreness  or muscle pain  • Headache  • Fever  • New loss of taste or smell  • Do not arrive for your surgery ill.  Your procedure will need to be rescheduled to another time.  You will need to call your physician before the day of surgery to avoid any unnecessary exposure to hospital staff as well as other patients.

## 2021-10-16 ENCOUNTER — LAB (OUTPATIENT)
Dept: LAB | Facility: HOSPITAL | Age: 86
End: 2021-10-16

## 2021-10-16 DIAGNOSIS — Z01.818 OTHER SPECIFIED PRE-OPERATIVE EXAMINATION: ICD-10-CM

## 2021-10-16 PROCEDURE — U0004 COV-19 TEST NON-CDC HGH THRU: HCPCS

## 2021-10-16 PROCEDURE — C9803 HOPD COVID-19 SPEC COLLECT: HCPCS

## 2021-10-17 LAB — SARS-COV-2 ORF1AB RESP QL NAA+PROBE: NOT DETECTED

## 2021-10-19 ENCOUNTER — HOSPITAL ENCOUNTER (OUTPATIENT)
Facility: HOSPITAL | Age: 86
Setting detail: HOSPITAL OUTPATIENT SURGERY
Discharge: HOME | End: 2021-10-19
Attending: OPHTHALMOLOGY | Admitting: OPHTHALMOLOGY

## 2021-10-19 ENCOUNTER — ANESTHESIA EVENT (OUTPATIENT)
Dept: PERIOP | Facility: HOSPITAL | Age: 86
End: 2021-10-19

## 2021-10-19 ENCOUNTER — ANESTHESIA (OUTPATIENT)
Dept: PERIOP | Facility: HOSPITAL | Age: 86
End: 2021-10-19

## 2021-10-19 VITALS
SYSTOLIC BLOOD PRESSURE: 137 MMHG | DIASTOLIC BLOOD PRESSURE: 64 MMHG | TEMPERATURE: 97.6 F | OXYGEN SATURATION: 96 % | HEART RATE: 49 BPM | RESPIRATION RATE: 16 BRPM

## 2021-10-19 PROCEDURE — 25010000002 PROPOFOL 10 MG/ML EMULSION: Performed by: NURSE ANESTHETIST, CERTIFIED REGISTERED

## 2021-10-19 PROCEDURE — 25010000002 ONDANSETRON PER 1 MG: Performed by: NURSE ANESTHETIST, CERTIFIED REGISTERED

## 2021-10-19 PROCEDURE — 25010000002 DEXAMETHASONE PER 1 MG: Performed by: NURSE ANESTHETIST, CERTIFIED REGISTERED

## 2021-10-19 PROCEDURE — 25010000002 PHENYLEPHRINE 10 MG/ML SOLUTION: Performed by: NURSE ANESTHETIST, CERTIFIED REGISTERED

## 2021-10-19 PROCEDURE — 25010000002 NEOSTIGMINE 5 MG/10ML SOLUTION: Performed by: NURSE ANESTHETIST, CERTIFIED REGISTERED

## 2021-10-19 PROCEDURE — 25010000002 FENTANYL CITRATE (PF) 50 MCG/ML SOLUTION: Performed by: NURSE ANESTHETIST, CERTIFIED REGISTERED

## 2021-10-19 RX ORDER — DIPHENHYDRAMINE HCL 25 MG
25 CAPSULE ORAL
Status: DISCONTINUED | OUTPATIENT
Start: 2021-10-19 | End: 2021-10-19 | Stop reason: HOSPADM

## 2021-10-19 RX ORDER — ERYTHROMYCIN 5 MG/G
OINTMENT OPHTHALMIC 2 TIMES DAILY
Qty: 3.5 G | Refills: 1 | Status: SHIPPED | OUTPATIENT
Start: 2021-10-19 | End: 2021-11-05

## 2021-10-19 RX ORDER — GLYCOPYRROLATE 0.2 MG/ML
INJECTION INTRAMUSCULAR; INTRAVENOUS AS NEEDED
Status: DISCONTINUED | OUTPATIENT
Start: 2021-10-19 | End: 2021-10-19 | Stop reason: SURG

## 2021-10-19 RX ORDER — SODIUM CHLORIDE 0.9 % (FLUSH) 0.9 %
3-10 SYRINGE (ML) INJECTION AS NEEDED
Status: DISCONTINUED | OUTPATIENT
Start: 2021-10-19 | End: 2021-10-19 | Stop reason: HOSPADM

## 2021-10-19 RX ORDER — PHENYLEPHRINE HYDROCHLORIDE 10 MG/ML
INJECTION INTRAVENOUS AS NEEDED
Status: DISCONTINUED | OUTPATIENT
Start: 2021-10-19 | End: 2021-10-19 | Stop reason: SURG

## 2021-10-19 RX ORDER — MAGNESIUM HYDROXIDE 1200 MG/15ML
LIQUID ORAL AS NEEDED
Status: DISCONTINUED | OUTPATIENT
Start: 2021-10-19 | End: 2021-10-19 | Stop reason: HOSPADM

## 2021-10-19 RX ORDER — LABETALOL HYDROCHLORIDE 5 MG/ML
5 INJECTION, SOLUTION INTRAVENOUS
Status: DISCONTINUED | OUTPATIENT
Start: 2021-10-19 | End: 2021-10-19 | Stop reason: HOSPADM

## 2021-10-19 RX ORDER — DEXAMETHASONE SODIUM PHOSPHATE 10 MG/ML
INJECTION INTRAMUSCULAR; INTRAVENOUS AS NEEDED
Status: DISCONTINUED | OUTPATIENT
Start: 2021-10-19 | End: 2021-10-19 | Stop reason: SURG

## 2021-10-19 RX ORDER — EPHEDRINE SULFATE 50 MG/ML
5 INJECTION, SOLUTION INTRAVENOUS ONCE AS NEEDED
Status: DISCONTINUED | OUTPATIENT
Start: 2021-10-19 | End: 2021-10-19 | Stop reason: HOSPADM

## 2021-10-19 RX ORDER — FAMOTIDINE 10 MG/ML
20 INJECTION, SOLUTION INTRAVENOUS ONCE
Status: COMPLETED | OUTPATIENT
Start: 2021-10-19 | End: 2021-10-19

## 2021-10-19 RX ORDER — FENTANYL CITRATE 50 UG/ML
50 INJECTION, SOLUTION INTRAMUSCULAR; INTRAVENOUS
Status: DISCONTINUED | OUTPATIENT
Start: 2021-10-19 | End: 2021-10-19 | Stop reason: HOSPADM

## 2021-10-19 RX ORDER — ROCURONIUM BROMIDE 10 MG/ML
INJECTION, SOLUTION INTRAVENOUS AS NEEDED
Status: DISCONTINUED | OUTPATIENT
Start: 2021-10-19 | End: 2021-10-19 | Stop reason: SURG

## 2021-10-19 RX ORDER — SODIUM CHLORIDE, SODIUM LACTATE, POTASSIUM CHLORIDE, CALCIUM CHLORIDE 600; 310; 30; 20 MG/100ML; MG/100ML; MG/100ML; MG/100ML
9 INJECTION, SOLUTION INTRAVENOUS CONTINUOUS
Status: DISCONTINUED | OUTPATIENT
Start: 2021-10-19 | End: 2021-10-19 | Stop reason: HOSPADM

## 2021-10-19 RX ORDER — NALOXONE HCL 0.4 MG/ML
0.2 VIAL (ML) INJECTION AS NEEDED
Status: DISCONTINUED | OUTPATIENT
Start: 2021-10-19 | End: 2021-10-19 | Stop reason: HOSPADM

## 2021-10-19 RX ORDER — NEOSTIGMINE METHYLSULFATE 0.5 MG/ML
INJECTION, SOLUTION INTRAVENOUS AS NEEDED
Status: DISCONTINUED | OUTPATIENT
Start: 2021-10-19 | End: 2021-10-19 | Stop reason: SURG

## 2021-10-19 RX ORDER — LIDOCAINE HYDROCHLORIDE 20 MG/ML
INJECTION, SOLUTION INFILTRATION; PERINEURAL AS NEEDED
Status: DISCONTINUED | OUTPATIENT
Start: 2021-10-19 | End: 2021-10-19 | Stop reason: SURG

## 2021-10-19 RX ORDER — DIPHENHYDRAMINE HYDROCHLORIDE 50 MG/ML
12.5 INJECTION INTRAMUSCULAR; INTRAVENOUS
Status: DISCONTINUED | OUTPATIENT
Start: 2021-10-19 | End: 2021-10-19 | Stop reason: HOSPADM

## 2021-10-19 RX ORDER — FLUMAZENIL 0.1 MG/ML
0.2 INJECTION INTRAVENOUS AS NEEDED
Status: DISCONTINUED | OUTPATIENT
Start: 2021-10-19 | End: 2021-10-19 | Stop reason: HOSPADM

## 2021-10-19 RX ORDER — ACETAMINOPHEN 500 MG
500 TABLET ORAL ONCE
Status: COMPLETED | OUTPATIENT
Start: 2021-10-19 | End: 2021-10-19

## 2021-10-19 RX ORDER — HYDRALAZINE HYDROCHLORIDE 20 MG/ML
5 INJECTION INTRAMUSCULAR; INTRAVENOUS
Status: DISCONTINUED | OUTPATIENT
Start: 2021-10-19 | End: 2021-10-19 | Stop reason: HOSPADM

## 2021-10-19 RX ORDER — SODIUM CHLORIDE 0.9 % (FLUSH) 0.9 %
3 SYRINGE (ML) INJECTION EVERY 12 HOURS SCHEDULED
Status: DISCONTINUED | OUTPATIENT
Start: 2021-10-19 | End: 2021-10-19 | Stop reason: HOSPADM

## 2021-10-19 RX ORDER — HYDROCODONE BITARTRATE AND ACETAMINOPHEN 7.5; 325 MG/1; MG/1
1 TABLET ORAL ONCE AS NEEDED
Status: COMPLETED | OUTPATIENT
Start: 2021-10-19 | End: 2021-10-19

## 2021-10-19 RX ORDER — HYDROCODONE BITARTRATE AND ACETAMINOPHEN 5; 325 MG/1; MG/1
1 TABLET ORAL EVERY 6 HOURS PRN
Qty: 15 TABLET | Refills: 0 | Status: SHIPPED | OUTPATIENT
Start: 2021-10-19 | End: 2021-11-05

## 2021-10-19 RX ORDER — ERYTHROMYCIN 5 MG/G
OINTMENT OPHTHALMIC AS NEEDED
Status: DISCONTINUED | OUTPATIENT
Start: 2021-10-19 | End: 2021-10-19 | Stop reason: HOSPADM

## 2021-10-19 RX ORDER — PROPOFOL 10 MG/ML
VIAL (ML) INTRAVENOUS AS NEEDED
Status: DISCONTINUED | OUTPATIENT
Start: 2021-10-19 | End: 2021-10-19 | Stop reason: SURG

## 2021-10-19 RX ORDER — EPHEDRINE SULFATE 50 MG/ML
INJECTION, SOLUTION INTRAVENOUS AS NEEDED
Status: DISCONTINUED | OUTPATIENT
Start: 2021-10-19 | End: 2021-10-19 | Stop reason: SURG

## 2021-10-19 RX ORDER — ONDANSETRON 2 MG/ML
INJECTION INTRAMUSCULAR; INTRAVENOUS AS NEEDED
Status: DISCONTINUED | OUTPATIENT
Start: 2021-10-19 | End: 2021-10-19 | Stop reason: SURG

## 2021-10-19 RX ORDER — ONDANSETRON 2 MG/ML
4 INJECTION INTRAMUSCULAR; INTRAVENOUS ONCE AS NEEDED
Status: DISCONTINUED | OUTPATIENT
Start: 2021-10-19 | End: 2021-10-19 | Stop reason: HOSPADM

## 2021-10-19 RX ADMIN — PROPOFOL 150 MG: 10 INJECTION, EMULSION INTRAVENOUS at 07:05

## 2021-10-19 RX ADMIN — FENTANYL CITRATE 50 MCG: 50 INJECTION INTRAMUSCULAR; INTRAVENOUS at 09:20

## 2021-10-19 RX ADMIN — PHENYLEPHRINE HYDROCHLORIDE 100 MCG: 10 INJECTION, SOLUTION INTRAVENOUS at 08:00

## 2021-10-19 RX ADMIN — GLYCOPYRROLATE 0.4 MG: 0.2 INJECTION INTRAMUSCULAR; INTRAVENOUS at 09:03

## 2021-10-19 RX ADMIN — PROPOFOL 50 MG: 10 INJECTION, EMULSION INTRAVENOUS at 07:52

## 2021-10-19 RX ADMIN — LIDOCAINE HYDROCHLORIDE 60 MG: 20 INJECTION, SOLUTION INFILTRATION; PERINEURAL at 07:05

## 2021-10-19 RX ADMIN — EPHEDRINE SULFATE 10 MG: 50 INJECTION INTRAVENOUS at 08:06

## 2021-10-19 RX ADMIN — PHENYLEPHRINE HYDROCHLORIDE 100 MCG: 10 INJECTION, SOLUTION INTRAVENOUS at 07:45

## 2021-10-19 RX ADMIN — ACETAMINOPHEN 500 MG: 500 TABLET, FILM COATED ORAL at 06:28

## 2021-10-19 RX ADMIN — ONDANSETRON 4 MG: 2 INJECTION INTRAMUSCULAR; INTRAVENOUS at 08:16

## 2021-10-19 RX ADMIN — HYDROCODONE BITARTRATE AND ACETAMINOPHEN 1 TABLET: 7.5; 325 TABLET ORAL at 09:20

## 2021-10-19 RX ADMIN — ROCURONIUM BROMIDE 30 MG: 50 INJECTION INTRAVENOUS at 07:05

## 2021-10-19 RX ADMIN — SODIUM CHLORIDE, POTASSIUM CHLORIDE, SODIUM LACTATE AND CALCIUM CHLORIDE 9 ML/HR: 600; 310; 30; 20 INJECTION, SOLUTION INTRAVENOUS at 06:23

## 2021-10-19 RX ADMIN — DEXAMETHASONE SODIUM PHOSPHATE 8 MG: 10 INJECTION INTRAMUSCULAR; INTRAVENOUS at 07:18

## 2021-10-19 RX ADMIN — SODIUM CHLORIDE, POTASSIUM CHLORIDE, SODIUM LACTATE AND CALCIUM CHLORIDE: 600; 310; 30; 20 INJECTION, SOLUTION INTRAVENOUS at 08:42

## 2021-10-19 RX ADMIN — PHENYLEPHRINE HYDROCHLORIDE 100 MCG: 10 INJECTION, SOLUTION INTRAVENOUS at 07:32

## 2021-10-19 RX ADMIN — NEOSTIGMINE METHYLSULFATE 3 MG: 0.5 INJECTION INTRAVENOUS at 09:03

## 2021-10-19 RX ADMIN — FAMOTIDINE 20 MG: 10 INJECTION INTRAVENOUS at 06:28

## 2021-10-19 NOTE — OP NOTE
OPERATIVE NOTE    Patient Identification:  Name: Haydee Dickey  Age: 86 y.o.  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 2% lidocaine with epinephrine and 0.5% marcaine in a 1:1 fashion was injected over the surgical site, and the patient was prepped and draped in the usual manner for orbitofacial surgery.     Corneal protectors were placed in both eyes.    A 15 Bard-Reji blade incision was made above the right eyebrow line, across the entire horizontal extent of the brow. A second incision line was drawn 8 mm above the eyebrow line, and the intervening tissue was excised with a Bard Reji blade and straight iris scissors. Sharp dissection was carried down to the frontalis muscle. The eyebrow was undermined inferiorly and was mobilized superiorly. The incision was then closed with 5-0 Vicryl sutures subcutaneously and 6-0 Prolene sutures superficially.     The exact same procedure was then performed on the contralateral brow.     Next attention was turned to the bilateral upper lid blepharoplasty.     A 15 Bard-Reji blade incision was made 8 mm from the eyelash margin, across the entire horizontal extent of the right upper eyelid. A second incision was made 10mm inferior to the junction of the brow and eyelid, and a pinch test was used to  ensure the amount of skin excision was appropriate. The intervening tissue was excised with a 15 Bard-Reji blade and Carmencita scissors. Excessive orbicularis tissue was removed. The orbital septum was opened horizontally, and excessive fatty tissue was also removed. Bleeding was controlled with electrocauterization. The skin was then closed with 5-0 fast absorbing suture in an interrupted and running fashion. The lid position and contour were examined and found to be satisfactory.     The exact same procedure was then performed over the contralateral upper lid.     The corneal protectors were removed and antibiotic ophthalmic ointment was placed over the surgical site.     The patient was then awakened and taken from the operating room in good condition, having tolerated the procedure well. There were no complications, and the estimated blood loss was less than 50 cc.

## 2021-10-19 NOTE — ANESTHESIA POSTPROCEDURE EVALUATION
Patient: Haydee Dickey    Procedure Summary     Date: 10/19/21 Room / Location:  [facility]  [facility]    Anesthesia Start: 0658 Anesthesia Stop: 0915    Procedures:       BILATERAL UPPER LID BLEPHAROPLASTY (Bilateral Eye)      BILATERAL TEMPORAL DIRECT BROWLIFT (Bilateral Face) Diagnosis:     Surgeons: Ruddy Moses MD Provider: Guillermo Mcbride MD    Anesthesia Type: general ASA Status: 3          Anesthesia Type: general    Vitals  Vitals Value Taken Time   /67 10/19/21 0931   Temp 36.4 °C (97.6 °F) 10/19/21 0930   Pulse 52 10/19/21 0938   Resp 17 10/19/21 0930   SpO2 86 % 10/19/21 0938   Vitals shown include unvalidated device data.        Post Anesthesia Care and Evaluation    Patient location during evaluation: bedside  Patient participation: complete - patient participated  Level of consciousness: awake and alert  Pain management: adequate  Airway patency: patent  Anesthetic complications: No anesthetic complications    Cardiovascular status: acceptable  Respiratory status: acceptable  Hydration status: acceptable    Comments: /67 (BP Location: Right arm, Patient Position: Lying)   Pulse 50   Temp 36.4 °C (97.6 °F) (Temporal)   Resp 17   SpO2 96%

## 2021-10-19 NOTE — H&P
History & Physical       Patient: Haydee Dickey    Date of Admission: 10/19/2021  5:36 AM    YOB: 1935    Medical Record Number: [id number]      Chief Complaints: deCreased visual field      History of Present Illness: 86 y.o. female presents with as above. No new meds/health problems since office visit      Allergies:   Allergies   Allergen Reactions   • Iodine Itching   • Percocet [Oxycodone-Acetaminophen] Itching     causes ithching   • Iodinated Diagnostic Agents Rash     Patient reports rash occurred 30 yrs ago with contrast       10 point review of systems negative, except pertaining to the HPI    Medications:   Home Medications:  No current facility-administered medications on file prior to encounter.     Current Outpatient Medications on File Prior to Encounter   Medication Sig   • benazepril (LOTENSIN) 5 MG tablet TAKE TWO TABLETS BY MOUTH DAILY    • alendronate (FOSAMAX) 70 MG tablet TAKE 1 TABLET BY MOUTH EVERY 7 DAYS   • Biotin 1 MG capsule Take  by mouth.   • Calcium Carbonate-Vit D-Min (Calcium 1200) 4006-7727 MG-UNIT chewable tablet Chew.   • cholecalciferol (VITAMIN D3) 1000 UNITS tablet Take 2,000 Units by mouth.   • Cyanocobalamin (B-12) 1000 MCG capsule Take 2,000 mcg by mouth.   • FOLIC ACID Take 400 mcg by mouth.   • pyridoxine (VITAMIN B-6) 100 MG tablet tablet Take 100 mg by mouth.   • Turmeric 500 MG capsule Take 500 mg by mouth.   • [DISCONTINUED] alendronate (FOSAMAX) 70 MG tablet Take 1 tablet by mouth Every 7 (Seven) Days.     Current Medications:  Scheduled Meds:sodium chloride, 3 mL, Intravenous, Q12H      Continuous Infusions:lactated ringers, 9 mL/hr, Last Rate: 9 mL/hr (10/19/21 0623)      PRN Meds:.sodium chloride    Past Medical History:   Diagnosis Date   • Allergic Percocet, iv iodine   • Anemia    • B12 deficiency    • Breast cancer (HCC)    • Colon polyp 2009   • Deep vein thrombosis (HCC) 1970   • Essential tremor    • Folliculitis 11/01/2017   • Gait  instability    • GERD (gastroesophageal reflux disease) 1990   • HL (hearing loss) 2015   • Hypertension 2001    on Rx   • Lower extremity neuropathy     bilateral   • Osteopenia 2019   • Osteoporosis    • Small vessel disease (HCC)    • Tremor 2005    BET        Past Surgical History:   Procedure Laterality Date   • ADENOIDECTOMY  1953   • APPENDECTOMY     • CATARACT EXTRACTION, BILATERAL  07/01/2018    R 7/18 and L 9/18   • DEQUERVAIN RELEASE Bilateral    • EYE SURGERY  2018    Bilateral repairs   • FRACTURE SURGERY  2014    R hip, R  wrist   • HYSTERECTOMY     • HYSTERECTOMY  1975    Partial   • JOINT REPLACEMENT  2001, 2014    knees   • TONSILLECTOMY AND ADENOIDECTOMY     • TONSILLECTOMY AND ADENOIDECTOMY     • TOTAL KNEE ARTHROPLASTY Left 2001   • TOTAL KNEE ARTHROPLASTY Right 2014   • WRIST SURGERY          Social History     Occupational History   • Not on file   Tobacco Use   • Smoking status: Never Smoker   • Smokeless tobacco: Never Used   Vaping Use   • Vaping Use: Never used   Substance and Sexual Activity   • Alcohol use: No   • Drug use: No   • Sexual activity: Defer      Social History     Social History Narrative    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  Lives at home with her daughter, son in law, grand-daughter, and 1 dog.          Family History   Problem Relation Age of Onset   • Diabetes Mother    • Hypertension Mother    • Stroke Mother    • Hearing loss Mother         AIDES   • Heart disease Mother         CHF, PACER   • Thyroid disease Mother         THYROIDECTOMY   • Hypertension Father    • Stroke Father    • Alcohol abuse Father         DAYS OFF    • Heart disease Father    • Hyperlipidemia Father    • Kidney disease Father         KIDNEY STONE REMOVED   • Asthma Daughter         ADULT ONSET   • Cancer Sister         LUNG   • COPD Sister    • Lung cancer Sister    • Cancer Sister         KIDNEY   • Kidney cancer Sister    • Cancer Brother         LUNG   •  Lung cancer Brother    • Diabetes Maternal Aunt         NIDDM   • Thyroid disease Maternal Aunt         THYROIDECTOMY   • Diabetes Maternal Aunt         NIDDM   • Other Maternal Aunt         ESSENTIAL TREMOR   • Diabetes Sister         INSULIN   • Diabetes Maternal Grandmother         NIDDM   • Early death Brother         4 MO, ? WHOOPING COUGH   • Heart disease Sister    • Mental illness Sister         HOSPITALIZED   • Hyperlipidemia Sister    • Mental illness Brother         Schizophrenia/hosp   • Other Maternal Uncle         PARKINSON   • Malig Hyperthermia Neg Hx            Physical Exam   Constitutional: Alert, cooperative, in no acute distress    Head: Normocephalic.   Eyes:    Bilateral brow ptosis, bilateral dermatochalasis  Neck: Normal range of motion.   Cardiovascular: Normal rate.    Pulmonary/Chest: Effort normal.   Neurological: Alert.   Skin: Skin is warm.   Psychiatric: Normal mood and affect.       Assessment/Plan:  The patient voiced understanding of the risks, benefits, and alternative forms of treatment that were discussed and the patient consents to proceed with BILATERAL UPPER LID BLEPHARoplasty and bilateral temporal direct brow lift.      Breanna Kerr MD

## 2021-10-19 NOTE — ANESTHESIA PROCEDURE NOTES
Airway  Urgency: elective    Date/Time: 10/19/2021 7:08 AM  Airway not difficult    General Information and Staff    Patient location during procedure: OR  Anesthesiologist: Guillermo Mcbride MD  CRNA: Anjali Clark CRNA    Indications and Patient Condition  Indications for airway management: airway protection    Preoxygenated: yes  MILS maintained throughout  Mask difficulty assessment: 1 - vent by mask    Final Airway Details  Final airway type: endotracheal airway      Successful airway: ETT  Cuffed: yes   Successful intubation technique: direct laryngoscopy  Facilitating devices/methods: intubating stylet  Endotracheal tube insertion site: oral  Blade: [place]  Blade size: 2  ETT size (mm): 7.0  Cormack-Lehane Classification: grade I - full view of glottis  Placement verified by: chest auscultation and capnometry   Cuff volume (mL): 8  Measured from: lips  ETT/EBT  to lips (cm): 21  Number of attempts at approach: 1  Assessment: lips, teeth, and gum same as pre-op and atraumatic intubation    Additional Comments  Pt preoxygenated, SIVI, bag mask vent, ATETI, dentition as before

## 2021-10-21 ENCOUNTER — PREP FOR SURGERY (OUTPATIENT)
Dept: OTHER | Facility: HOSPITAL | Age: 86
End: 2021-10-21

## 2021-10-21 ENCOUNTER — OFFICE VISIT (OUTPATIENT)
Dept: SURGERY | Facility: CLINIC | Age: 86
End: 2021-10-21

## 2021-10-21 VITALS
OXYGEN SATURATION: 98 % | RESPIRATION RATE: 17 BRPM | HEIGHT: 64 IN | DIASTOLIC BLOOD PRESSURE: 80 MMHG | HEART RATE: 73 BPM | BODY MASS INDEX: 32.78 KG/M2 | WEIGHT: 192 LBS | SYSTOLIC BLOOD PRESSURE: 146 MMHG

## 2021-10-21 DIAGNOSIS — C50.212 MALIGNANT NEOPLASM OF UPPER-INNER QUADRANT OF LEFT BREAST IN FEMALE, ESTROGEN RECEPTOR POSITIVE (HCC): Primary | ICD-10-CM

## 2021-10-21 DIAGNOSIS — Z17.0 MALIGNANT NEOPLASM OF UPPER-INNER QUADRANT OF LEFT BREAST IN FEMALE, ESTROGEN RECEPTOR POSITIVE (HCC): Primary | ICD-10-CM

## 2021-10-21 PROCEDURE — 99205 OFFICE O/P NEW HI 60 MIN: CPT | Performed by: SURGERY

## 2021-10-21 RX ORDER — ASPIRIN 81 MG/1
81 TABLET ORAL DAILY
COMMUNITY
End: 2022-03-24 | Stop reason: HOSPADM

## 2021-10-21 RX ORDER — NICOTINE POLACRILEX 2 MG
1 GUM BUCCAL DAILY
COMMUNITY
End: 2022-03-24 | Stop reason: HOSPADM

## 2021-10-21 RX ORDER — ACETAMINOPHEN 500 MG
1 TABLET ORAL DAILY
COMMUNITY
End: 2022-03-24 | Stop reason: HOSPADM

## 2021-10-21 RX ORDER — VIT C/B6/B5/MAGNESIUM/HERB 173 50-5-6-5MG
1 CAPSULE ORAL DAILY
COMMUNITY
End: 2022-03-24 | Stop reason: HOSPADM

## 2021-10-21 RX ORDER — LANOLIN ALCOHOL/MO/W.PET/CERES
1 CREAM (GRAM) TOPICAL DAILY
COMMUNITY
End: 2022-03-24 | Stop reason: HOSPADM

## 2021-10-21 RX ORDER — MELATONIN
2000 DAILY
COMMUNITY
End: 2022-03-24 | Stop reason: HOSPADM

## 2021-10-21 RX ORDER — CEFAZOLIN SODIUM 2 G/100ML
2 INJECTION, SOLUTION INTRAVENOUS ONCE
Status: CANCELLED | OUTPATIENT
Start: 2021-11-15 | End: 2021-10-21

## 2021-10-21 RX ORDER — PYRIDOXINE HCL (VITAMIN B6) 100 MG
100 TABLET ORAL DAILY
COMMUNITY
End: 2022-03-24 | Stop reason: HOSPADM

## 2021-10-21 NOTE — PROGRESS NOTES
[facility]     Referring Provider: Tawanda Maya MD     Chief complaint: Newly diagnosed breast cancer     HPI: Ms. Haydee Dickey is a 85 yo woman, seen at the request of Dr. Tawanda Maya, for a new diagnosis of left breast cancer. This was initially detected as an imaging abnormality on routine screening. Her work-up is detailed in the oncologic history below. Prior to the biopsy, she denies any breast lumps, pain, skin changes, or nipple discharge. She denies any prior history of abnormal mammograms or breast biopsies. She denies any family history of breast or ovarian cancer. She was by herself in clinic today, but her daughter participated in the conversation over the phone.       Oncology/Hematology History   Malignant neoplasm of upper-inner quadrant of left breast in female, estrogen receptor positive (HCC)   8/23/2021 Initial Diagnosis    Malignant neoplasm of upper-inner quadrant of left breast in female, estrogen receptor positive (HCC)     8/24/2021 Imaging    [facility]  Scattered areas of fibroglandular density. There is a 0.5 cm focal asymmetry with suspected architectural distortion in the inner central posterior left breast. There are benign-appearing calcifications. There are no suspicious masses, calcifications, or areas of architectural distortion in the right breast.  BI-RADS 0: Incomplete.     9/13/2021 Imaging    Left Diagnostic MMG with Dorian & Left Breast [facility]  MMG:  With additional imaging, there is persistence of the area of focal asymmetry in the posterior one-third medial aspect of the left breast. The mammographic appearance suggests the presence of a 0.4 cm lesion in the medial aspect of the left breast.   US:  At the 10 o'clock position on the order of 6 cm from the nipple there is a 0.4 cm ill-defined hypoechoic lesion that is a probable sonographic correlate for the mammographically visualized lesion.  BI-RADS 4: Suspicious.     9/29/2021  Biopsy    Left Breast[facility]    1. Left Breast at 10 O'clock, 6 cm From Nipple (Not For Calcifications), Core Biopsy:                A. Invasive ductal carcinoma:                            1. Overall [place] Grade II (tubular score = 3, nuclear score = 2, mitotic score = 1).                            2. Invasive carcinoma measures at least 4 mm in greatest dimension.                            3. No lymphovascular space invasion identified.               B. 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 Microcalcification present in foci of DCIS.    ER+ (99%, strong)  WV+ (%, moderate)  HER2 negative (IHC 1+)  Ki-67 16%         Review of Systems   Constitutional: Negative for appetite change, chills, diaphoresis, fatigue, fever and unexpected weight change.   HENT:   Positive for hearing loss and voice change. Negative for lump/mass, mouth sores, nosebleeds, sore throat, tinnitus and trouble swallowing.    Eyes: Positive for eye problems. Negative for icterus.   Respiratory: Negative for chest tightness, cough, hemoptysis, shortness of breath and wheezing.    Cardiovascular: Negative for chest pain, leg swelling and palpitations.   Gastrointestinal: Negative for abdominal distention, abdominal pain, blood in stool, constipation, diarrhea, nausea, rectal pain and vomiting.   Endocrine: Negative for hot flashes.   Genitourinary: Positive for bladder incontinence. Negative for difficulty urinating, dyspareunia, dysuria, frequency, hematuria, menstrual problem, nocturia, pelvic pain, vaginal bleeding and vaginal discharge.    Musculoskeletal: Positive for gait problem. Negative for arthralgias, back pain, flank pain, myalgias, neck pain and neck stiffness.   Skin: Negative for itching, rash and wound.   Neurological: Positive for gait problem. Negative for dizziness, extremity weakness, headaches, light-headedness, numbness, seizures and  speech difficulty.   Hematological: Negative for adenopathy. Does not bruise/bleed easily.   Psychiatric/Behavioral: Negative for confusion, decreased concentration, depression, sleep disturbance and suicidal ideas. The patient is not nervous/anxious.    I personally reviewed and updated the ROS.      Medications:    Current Outpatient Medications:   •  alendronate (FOSAMAX) 70 MG tablet, TAKE 1 TABLET BY MOUTH EVERY 7 DAYS, Disp: 12 tablet, Rfl: 1  •  aspirin (aspirin) 81 MG EC tablet, Take 81 mg by mouth Daily., Disp: , Rfl:   •  benazepril (LOTENSIN) 5 MG tablet, TAKE TWO TABLETS BY MOUTH DAILY , Disp: 60 tablet, Rfl: 0  •  Biotin 1 MG capsule, Take 1 capsule by mouth Daily., Disp: , Rfl:   •  Calcium Carbonate-Vit D-Min (Calcium 1200) 5215-8618 MG-UNIT chewable tablet, Chew 1 tablet Daily., Disp: , Rfl:   •  cholecalciferol (Cholecalciferol) 25 MCG (1000 UT) tablet, Take 2,000 Units by mouth Daily., Disp: , Rfl:   •  cyanocobalamin (VITAMIN B-12) 1000 MCG tablet, Take 2,000 mcg by mouth Daily., Disp: , Rfl:   •  erythromycin (ROMYCIN) 5 MG/GM ophthalmic ointment, Apply  to eye(s) as directed by provider 2 (two) times a day. Apply to surgical site 2x/day. May use in eye for discomfort., Disp: 3.5 g, Rfl: 1  •  folic acid (FOLVITE) 400 MCG tablet, Take 1 tablet by mouth Daily., Disp: , Rfl:   •  HYDROcodone-acetaminophen (NORCO) 5-325 MG per tablet, Take 1 tablet by mouth Every 6 (Six) Hours As Needed for Moderate Pain  (pain)., Disp: 15 tablet, Rfl: 0  •  propranolol (INDERAL) 20 MG tablet, Take 20 mg by mouth 2 (Two) Times a Day., Disp: , Rfl:   •  pyridoxine (VITAMIN B-6) 100 MG tablet, Take 100 mg by mouth Daily., Disp: , Rfl:   •  Turmeric 500 MG capsule, Take 1 capsule by mouth Daily., Disp: , Rfl:       Allergies   Allergen Reactions   • Iodine Itching   • Percocet [Oxycodone-Acetaminophen] Itching     causes ithching   • Iodinated Diagnostic Agents Rash     Patient reports rash occurred 30 yrs ago with  contrast       Past Medical History:   Diagnosis Date   • Allergic Percocet, iv iodine   • Anemia    • B12 deficiency    • Breast cancer (HCC)    • Colon polyp 2009   • Deep vein thrombosis (HCC) 1970   • Essential tremor    • Folliculitis 11/01/2017   • Gait instability    • GERD (gastroesophageal reflux disease) 1990   • HL (hearing loss) 2015   • Hypertension 2001    on Rx   • Lower extremity neuropathy     bilateral   • Osteopenia 2019   • Osteoporosis    • Small vessel disease (HCC)    • Tremor 2005    BET       Past Surgical History:   Procedure Laterality Date   • APPENDECTOMY     • BLEPHAROPLASTY Bilateral 10/19/2021    Procedure: BILATERAL UPPER LID BLEPHAROPLASTY;  Surgeon: Ruddy Moses MD;  [place] [facility] [place] [facility] [place]  Service: Ophthalmology;  Laterality: Bilateral;   • BROW LIFT Bilateral 10/19/2021    Procedure: BILATERAL TEMPORAL DIRECT BROWLIFT;  Surgeon: Ruddy Moses MD;  [place] [facility] [place] [facility] [place]  Service: Ophthalmology;  Laterality: Bilateral;   • CATARACT EXTRACTION, BILATERAL  07/01/2018    R 7/18 and L 9/18   • DEQUERVAIN RELEASE Bilateral    • EYE SURGERY  2018    Bilateral repairs   • FRACTURE SURGERY  2014    R hip, R  wrist   • HYSTERECTOMY  1975    Partial   • JOINT REPLACEMENT  2001, 2014    knees   • TONSILLECTOMY AND ADENOIDECTOMY     • TOTAL KNEE ARTHROPLASTY Left 2001   • TOTAL KNEE ARTHROPLASTY Right 2014   • WRIST SURGERY         Family History   Problem Relation Age of Onset   • Diabetes Mother    • Hypertension Mother    • Stroke Mother    • Hearing loss Mother         AIDES   • Heart disease Mother         CHF, PACER   • Thyroid disease Mother         THYROIDECTOMY   • Hypertension Father    • Stroke Father    • Alcohol abuse Father         DAYS OFF    • Heart disease Father    • Hyperlipidemia Father    • Kidney disease Father         KIDNEY STONE REMOVED   • Asthma Daughter         ADULT ONSET   • Cancer Sister         LUNG   • COPD Sister    • Lung  cancer Sister    • Cancer Sister         KIDNEY   • Kidney cancer Sister    • Cancer Brother         LUNG   • Lung cancer Brother    • Diabetes Maternal Aunt         NIDDM   • Thyroid disease Maternal Aunt         THYROIDECTOMY   • Diabetes Maternal Aunt         NIDDM   • Other Maternal Aunt         ESSENTIAL TREMOR   • Diabetes Sister         INSULIN   • Diabetes Maternal Grandmother         NIDDM   • Early death Brother         4 MO, ? WHOOPING COUGH   • Heart disease Sister    • Mental illness Sister         HOSPITALIZED   • Hyperlipidemia Sister    • Mental illness Brother         Schizophrenia/hosp   • Other Maternal Uncle         PARKINSON   • Malig Hyperthermia Neg Hx        Social History     Socioeconomic History   • Marital status:    • Number of children: 1   Tobacco Use   • Smoking status: Never Smoker   • Smokeless tobacco: Never Used   Vaping Use   • Vaping Use: Never used   Substance and Sexual Activity   • Alcohol use: No   • Drug use: No   • Sexual activity: Defer     Patient drinks 16oz servings of caffeine per day.     GYNECOLOGIC HISTORY:   . P: 1. AB: 0.  Last menstrual period: , sp hysterectomy, still has both ovaries  Age at menarche: 12-13  Age at first childbirth: 35  Lactation/How long: 3 days  Age at menopause: Unsure  Total years of oral contraceptive use: 2  Total years of hormone replacement therapy: 1        PHYSICAL EXAMINATION:   Vitals:    10/21/21 1059   BP: 146/80   Pulse: 73   Resp: 17   SpO2: 98%     ECOG 0 - Asymptomatic  General: NAD, well appearing  Psych: a&o x 3, normal mood and affect  Eyes: EOMI, no scleral icterus  ENMT: neck supple without masses or thyromegaly, mucus membranes moist  Resp: normal effort, CTAB  CV: RRR, no murmurs, no edema  GI: soft, NT, ND  MSK: normal gait, normal ROM in bilateral shoulders  Lymph nodes: no cervical, supraclavicular or axillary lymphadenopathy  Breast: symmetric, moderate size, grade 3 ptosis  Right: No visible  abnormalities on inspection while seated, with arms raised or hands on hips. No masses, skin changes, or nipple abnormalities.  Left: No visible abnormalities on inspection while seated, with arms raised or hands on hips. No masses, skin changes, or nipple abnormalities.    Left breast, in-office ultrasound: At 10:30, 6 cm FN, there is a biopsy clip located about 15 mm deep to the skin.      I have independently reviewed her imaging and here are my findings:   In the left upper inner breast, there initially was a 4 mm mass.      Assessment:  86 y.o. F with a new diagnosis of left breast cancer: Intermediate grade, invasive ductal carcinoma, ER/MN+, Her2 negative; clinical T1aN0, anatomic stage IA, prognostic stage IA.      Discussion:  I had an extensive discussion with the patient and her family about the nature of her breast cancer diagnosis. We reviewed the components of breast tissue including ducts and lobules. We reviewed her pathology report in detail. We reviewed breast cancer histology, including stage, grade, ER/MN receptors, HER2 receptors and how this applies to her diagnosis. We reviewed the basics of systemic and local/regional management of breast cancer.      We discussed that in her case, systemic treatment would likely involve endocrine therapy. She will be referred to medical oncology postoperatively to discuss this further. We reviewed potential surgical treatments to include partial mastectomy and mastectomy and discussed the rationale associated with each approach. Regarding radiation therapy, we discussed that radiation is indicated in most cases of breast conservation and in only limited circumstances following mastectomy. We discussed that the primary goal of adjuvant radiation is to decrease the likelihood of local recurrence. She will be referred to radiation oncology postoperatively to discuss the risks and benefits of treatment and whether it is indicated.     In her case, she is a good  candidate for lumpectomy and she would like to proceed with breast conservation. We discussed that lumpectomy would require pre-operative localization. We also discussed the risk of positive margins and that she must have negative margins for lumpectomy to be an appropriate oncologic procedure. I will make every effort to obtain negative margins at her initial operation, but there is a 10-15% chance that she will require a second operation for re-excision, or possibly a total mastectomy. We will not know the margin status until after her final pathology has returned.      We discussed the contralateral breast and the role of bilateral mastectomy, which is to reduce the risk of a second primary breast cancer. I explained that most breast cancer is not hereditary, that I do not believe this plays a role in her case, and that she does not meet NCCN criteria for genetic testing. I would not recommend a bilateral mastectomy, since her risk of a second primary cancer is relatively low and endocrine therapy will further reduce her risk.    We discussed axillary staging. I reviewed with her the data (CALGB 9343) that suggests in women over 70 with small ER+ tumors who will receive adjuvant endocrine therapy, the risks of axillary staging may outweigh the benefits, given that there will be no change in adjuvant therapy or outcome regardless of axillary status. This was explained in detail to the patient and she declined axillary staging.      I described additional risks and potential complications associated with surgery, including, but not limited to, bleeding, infection, deformity/poor cosmetic result, chronic pain, numbness, seroma, hematoma, deep venous thrombosis, skin flap necrosis, disease recurrence and the possibility of requiring additional surgery. We also discussed other treatment options including the option of not undergoing any surgical treatment and the risks associated with this including disease  progression. She expressed an understanding of these factors and wished to proceed.    Plan:  A multidisciplinary plan has been formulated for the patient:      (1) Breast Surgical Oncology:  -Left JEAN-PAUL-guided partial mastectomy.    (2) Medical Oncology:  -Will refer postoperatively.    (3) Radiation Oncology:  -Will refer postoperatively.    Maliha Andres MD    I spent 65 minutes caring for Haydee on this date of service. This time includes time spent by me in the following activities: preparing for the visit, performing a medically appropriate examination and/or evaluation , counseling and educating the patient/family/caregiver, documenting information in the medical record and independently interpreting results and communicating that information with the patient/family/caregiver.      CC:  Tawanda Maya MD

## 2021-10-22 ENCOUNTER — TRANSCRIBE ORDERS (OUTPATIENT)
Dept: SURGERY | Facility: CLINIC | Age: 86
End: 2021-10-22

## 2021-10-22 DIAGNOSIS — Z17.0 MALIGNANT NEOPLASM OF UPPER-INNER QUADRANT OF LEFT BREAST IN FEMALE, ESTROGEN RECEPTOR POSITIVE (HCC): Primary | ICD-10-CM

## 2021-10-22 DIAGNOSIS — C50.212 MALIGNANT NEOPLASM OF UPPER-INNER QUADRANT OF LEFT BREAST IN FEMALE, ESTROGEN RECEPTOR POSITIVE (HCC): Primary | ICD-10-CM

## 2021-10-25 ENCOUNTER — PATIENT ROUNDING (BHMG ONLY) (OUTPATIENT)
Dept: SURGERY | Facility: CLINIC | Age: 86
End: 2021-10-25

## 2021-10-25 ENCOUNTER — TELEPHONE (OUTPATIENT)
Dept: SURGERY | Facility: CLINIC | Age: 86
End: 2021-10-25

## 2021-10-25 NOTE — PROGRESS NOTES
October 25, 2021    Hello, may I speak with Haydee Dickey?    My name is More.      I am  with [facility] BREAST [facility]  [address] [facility] [address] [phone].    Before we get started may I verify your date of birth? 1935    I am calling to officially welcome you to our practice and ask about your recent visit. Is this a good time to talk? Yes.    Tell me about your visit with us. What things went well?  We were on time. Patient appreciated being in the same room beginning to end of her office visit.     We're always looking for ways to make our patients' experiences even better. Do you have recommendations on ways we may improve?  No.    Overall were you satisfied with your first visit to our practice? Yes.       I appreciate you taking the time to speak with me today. Is there anything else I can do for you? No.          Thank you, and have a great day.

## 2021-10-25 NOTE — TELEPHONE ENCOUNTER
Surgery is scheduled on 11/15/2021 @ 10:30am, patient arrival 8:30am.    JEAN-PAUL placement is scheduled on 11/2/2021 @ 10:30am, patient arrival 9:30am.    PAT is scheduled on 11/9/2021 @ 8:30am.    Post-op appointment with Dr. Andres is scheduled on 12/3/2021 @ 12:00pm.    Called and spoke to patient, patient expressed v/u of appointment times.    Sent patient a reminder letter.

## 2021-10-27 ENCOUNTER — NURSE NAVIGATOR (OUTPATIENT)
Dept: OTHER | Facility: HOSPITAL | Age: 86
End: 2021-10-27

## 2021-10-27 NOTE — PROGRESS NOTES
Referral received from Dr. Andres's office. I called Ms. Dickey and introduced myself and navigational services. She states the plan is to have a lumpectomy on Nov 15th. She has a good understanding of her pathology and treatment options and  is comfortable with her plan of care.     She stated she lives with family and has a good support system. She has no financial or transportation needs at this time.    We discussed integrative therapies and other services at the [facility]. She verbalized interest in receiving a navigation folder outlining services. I verified her mailing address and will send out a navigation folder with the following information:     [facility],  [facility] Breast Cancer Support Group, Cancer and Restorative Exercise (CARE), [employer] Exercise program, Guide for the Newly Diagnosed, [employer], [facility], Massage Therapy, Reiki Therapy, [employer] [place], Cancer Nutrition, Cleaning for a Reason, and Survivorship Clinic.    She verbalized appreciation for navigational services and she has my contact information and will call with any questions that arise.

## 2021-11-02 ENCOUNTER — HOSPITAL ENCOUNTER (OUTPATIENT)
Dept: MAMMOGRAPHY | Facility: HOSPITAL | Age: 86
Discharge: HOME | End: 2021-11-02

## 2021-11-02 ENCOUNTER — HOSPITAL ENCOUNTER (OUTPATIENT)
Dept: ULTRASOUND IMAGING | Facility: HOSPITAL | Age: 86
Discharge: HOME | End: 2021-11-02

## 2021-11-02 VITALS
RESPIRATION RATE: 16 BRPM | HEART RATE: 64 BPM | DIASTOLIC BLOOD PRESSURE: 73 MMHG | HEIGHT: 64 IN | WEIGHT: 192 LBS | SYSTOLIC BLOOD PRESSURE: 139 MMHG | BODY MASS INDEX: 32.78 KG/M2 | OXYGEN SATURATION: 99 % | TEMPERATURE: 97.3 F

## 2021-11-02 DIAGNOSIS — Z98.890 STATUS POST BIOPSY: ICD-10-CM

## 2021-11-02 DIAGNOSIS — Z17.0 MALIGNANT NEOPLASM OF UPPER-INNER QUADRANT OF LEFT BREAST IN FEMALE, ESTROGEN RECEPTOR POSITIVE (HCC): ICD-10-CM

## 2021-11-02 DIAGNOSIS — C50.212 MALIGNANT NEOPLASM OF UPPER-INNER QUADRANT OF LEFT BREAST IN FEMALE, ESTROGEN RECEPTOR POSITIVE (HCC): ICD-10-CM

## 2021-11-02 PROCEDURE — 0 LIDOCAINE 1 % SOLUTION: Performed by: RADIOLOGY

## 2021-11-02 PROCEDURE — 77065 DX MAMMO INCL CAD UNI: CPT

## 2021-11-02 PROCEDURE — A4648 IMPLANTABLE TISSUE MARKER: HCPCS

## 2021-11-02 RX ORDER — DIAZEPAM 5 MG/1
5 TABLET ORAL ONCE AS NEEDED
Status: DISCONTINUED | OUTPATIENT
Start: 2021-11-02 | End: 2021-11-03 | Stop reason: HOSPADM

## 2021-11-02 RX ORDER — LIDOCAINE HYDROCHLORIDE 10 MG/ML
10 INJECTION, SOLUTION INFILTRATION; PERINEURAL ONCE
Status: COMPLETED | OUTPATIENT
Start: 2021-11-02 | End: 2021-11-02

## 2021-11-02 RX ADMIN — LIDOCAINE HYDROCHLORIDE 8 ML: 10 INJECTION, SOLUTION INFILTRATION; PERINEURAL at 11:40

## 2021-11-02 NOTE — NURSING NOTE
Patient returned from JEAN-PAUL insertion procedure to left breast. Bandaid dry and intact to insertion site. Bandaid also dry and intact over the radiologist's JEAN-PAUL location marking. No firmness of swelling noted. Patient aware ubaldo must remain on skin until surgery. Discharge instructions reviewed with patient who voiced understanding of same. Discharged home via private vehicle.

## 2021-11-02 NOTE — H&P
Name: Haydee Dickey ADMIT: 2021   : 1935  PCP: Tawanda Maya MD    MRN: [id number] LOS: 0 days   AGE/SEX: 86 y.o. female  ROOM: Room/bed info not found       Chief complaint left breast carcinoma    Present Illness or Internal History:  Patient is a 86 y.o. female presents with left breast carcinoma.     Past Surgical History:  Past Surgical History:   Procedure Laterality Date   • APPENDECTOMY     • BLEPHAROPLASTY Bilateral 10/19/2021    Procedure: BILATERAL UPPER LID BLEPHAROPLASTY;  Surgeon: Ruddy Moses MD;  [place] [facility] [place] [facility] [place]  Service: Ophthalmology;  Laterality: Bilateral;   • BREAST BIOPSY     • BROW LIFT Bilateral 10/19/2021    Procedure: BILATERAL TEMPORAL DIRECT BROWLIFT;  Surgeon: Ruddy Moses MD;  [place]: [facility] [place] [facility] [place];  Service: Ophthalmology;  Laterality: Bilateral;   • CATARACT EXTRACTION, BILATERAL  2018    R  and L    • DEQUERVAIN RELEASE Bilateral    • EYE SURGERY  2018    Bilateral repairs   • FRACTURE SURGERY      R hip, R  wrist   • HYSTERECTOMY  1975    Partial   • JOINT REPLACEMENT  ,     knees   • TONSILLECTOMY AND ADENOIDECTOMY     • TOTAL KNEE ARTHROPLASTY Left    • TOTAL KNEE ARTHROPLASTY Right    • WRIST SURGERY         Past Medical History:  Past Medical History:   Diagnosis Date   • Allergic Percocet, iv iodine   • Anemia    • B12 deficiency    • Breast cancer (HCC)    • Colon polyp    • Deep vein thrombosis (HCC) 1970   • Essential tremor    • Folliculitis 2017   • Gait instability    • GERD (gastroesophageal reflux disease)    • HL (hearing loss)    • Hypertension 2001    on Rx   • Lower extremity neuropathy     bilateral   • Osteopenia    • Osteoporosis    • Small vessel disease (HCC)    • Tremor     BET       Home Medications:  (Not in a hospital admission)      Allergies:  Iodine, Percocet [oxycodone-acetaminophen], and Iodinated diagnostic agents    Family  History:  Family History   Problem Relation Age of Onset   • Diabetes Mother    • Hypertension Mother    • Stroke Mother    • Hearing loss Mother         AIDES   • Heart disease Mother         CHF, PACER   • Thyroid disease Mother         THYROIDECTOMY   • Hypertension Father    • Stroke Father    • Alcohol abuse Father         DAYS OFF    • Heart disease Father    • Hyperlipidemia Father    • Kidney disease Father         KIDNEY STONE REMOVED   • Asthma Daughter         ADULT ONSET   • Cancer Sister         LUNG   • COPD Sister    • Lung cancer Sister    • Cancer Sister         KIDNEY   • Kidney cancer Sister    • Cancer Brother         LUNG   • Lung cancer Brother    • Diabetes Maternal Aunt         NIDDM   • Thyroid disease Maternal Aunt         THYROIDECTOMY   • Diabetes Maternal Aunt         NIDDM   • Other Maternal Aunt         ESSENTIAL TREMOR   • Diabetes Sister         INSULIN   • Diabetes Maternal Grandmother         NIDDM   • Early death Brother         4 MO, ? WHOOPING COUGH   • Heart disease Sister    • Mental illness Sister         HOSPITALIZED   • Hyperlipidemia Sister    • Mental illness Brother         Schizophrenia/hosp   • Other Maternal Uncle         PARKINSON   • Malig Hyperthermia Neg Hx        Social History:  Social History     Tobacco Use   • Smoking status: Never Smoker   • Smokeless tobacco: Never Used   Vaping Use   • Vaping Use: Never used   Substance Use Topics   • Alcohol use: No   • Drug use: No        Objective     Physical Exam:    No exam performed today,    Vital Signs  Temp:  [97.3 °F (36.3 °C)] 97.3 °F (36.3 °C)  Heart Rate:  [66] 66  Resp:  [18] 18  BP: (112)/(78) 112/78    Anticipated Surgical Procedure:  Ultrasound or mammographic guided left breast JEAN-PAUL placement    The risks, benefits and alternatives of this procedure have been discussed with the patient or responsible party: Yes        Nahum Iverson Jr., MD  11/02/21  10:54 EDT

## 2021-11-05 ENCOUNTER — OFFICE VISIT (OUTPATIENT)
Dept: FAMILY MEDICINE CLINIC | Facility: CLINIC | Age: 86
End: 2021-11-05

## 2021-11-05 VITALS
OXYGEN SATURATION: 99 % | RESPIRATION RATE: 14 BRPM | HEIGHT: 64 IN | DIASTOLIC BLOOD PRESSURE: 66 MMHG | WEIGHT: 190 LBS | HEART RATE: 54 BPM | SYSTOLIC BLOOD PRESSURE: 144 MMHG | BODY MASS INDEX: 32.44 KG/M2

## 2021-11-05 DIAGNOSIS — M81.0 OSTEOPOROSIS WITHOUT CURRENT PATHOLOGICAL FRACTURE, UNSPECIFIED OSTEOPOROSIS TYPE: ICD-10-CM

## 2021-11-05 DIAGNOSIS — I10 ESSENTIAL HYPERTENSION: ICD-10-CM

## 2021-11-05 DIAGNOSIS — Z00.00 MEDICARE ANNUAL WELLNESS VISIT, SUBSEQUENT: Primary | ICD-10-CM

## 2021-11-05 DIAGNOSIS — I10 BENIGN ESSENTIAL HYPERTENSION: ICD-10-CM

## 2021-11-05 DIAGNOSIS — E53.8 B12 DEFICIENCY: ICD-10-CM

## 2021-11-05 PROBLEM — M67.919 DISORDER OF ROTATOR CUFF: Status: ACTIVE | Noted: 2019-01-21

## 2021-11-05 PROCEDURE — 99213 OFFICE O/P EST LOW 20 MIN: CPT | Performed by: FAMILY MEDICINE

## 2021-11-05 PROCEDURE — 1170F FXNL STATUS ASSESSED: CPT | Performed by: FAMILY MEDICINE

## 2021-11-05 PROCEDURE — 1159F MED LIST DOCD IN RCRD: CPT | Performed by: FAMILY MEDICINE

## 2021-11-05 PROCEDURE — G0439 PPPS, SUBSEQ VISIT: HCPCS | Performed by: FAMILY MEDICINE

## 2021-11-05 RX ORDER — BENAZEPRIL HYDROCHLORIDE 5 MG/1
10 TABLET, FILM COATED ORAL DAILY
Qty: 180 TABLET | Refills: 2 | Status: SHIPPED | OUTPATIENT
Start: 2021-11-05 | End: 2022-09-08

## 2021-11-05 NOTE — PATIENT INSTRUCTIONS
Fall Prevention in the Home, Adult  Falls can cause injuries and can affect people from all age groups. There are many simple things that you can do to make your home safe and to help prevent falls. Ask for help when making these changes, if needed.  What actions can I take to prevent falls?  General instructions  · Use good lighting in all rooms. Replace any light bulbs that burn out.  · Turn on lights if it is dark. Use night-lights.  · Place frequently used items in easy-to-reach places. Lower the shelves around your home if necessary.  · Set up furniture so that there are clear paths around it. Avoid moving your furniture around.  · Remove throw rugs and other tripping hazards from the floor.  · Avoid walking on wet floors.  · Fix any uneven floor surfaces.  · Add color or contrast paint or tape to grab bars and handrails in your home. Place contrasting color strips on the first and last steps of stairways.  · When you use a stepladder, make sure that it is completely opened and that the sides are firmly locked. Have someone hold the ladder while you are using it. Do not climb a closed stepladder.  · Be aware of any and all pets.  What can I do in the bathroom?         · Keep the floor dry. Immediately clean up any water that spills onto the floor.  · Remove soap buildup in the tub or shower on a regular basis.  · Use non-skid mats or decals on the floor of the tub or shower.  · Attach bath mats securely with double-sided, non-slip rug tape.  · If you need to sit down while you are in the shower, use a plastic, non-slip stool.  · Install grab bars by the toilet and in the tub and shower. Do not use towel bars as grab bars.  What can I do in the bedroom?  · Make sure that a bedside light is easy to reach.  · Do not use oversized bedding that drapes onto the floor.  · Have a firm chair that has side arms to use for getting dressed.  What can I do in the kitchen?  · Clean up any spills right away.  · If you  need to reach for something above you, use a sturdy step stool that has a grab bar.  · Keep electrical cables out of the way.  · Do not use floor polish or wax that makes floors slippery. If you must use wax, make sure that it is non-skid floor wax.  What can I do in the stairways?  · Do not leave any items on the stairs.  · Make sure that you have a light switch at the top of the stairs and the bottom of the stairs. Have them installed if you do not have them.  · Make sure that there are handrails on both sides of the stairs. Fix handrails that are broken or loose. Make sure that handrails are as long as the stairways.  · Install non-slip stair treads on all stairs in your home.  · Avoid having throw rugs at the top or bottom of stairways, or secure the rugs with carpet tape to prevent them from moving.  · Choose a carpet design that does not hide the edge of steps on the stairway.  · Check any carpeting to make sure that it is firmly attached to the stairs. Fix any carpet that is loose or worn.  What can I do on the outside of my home?  · Use bright outdoor lighting.  · Regularly repair the edges of walkways and driveways and fix any cracks.  · Remove high doorway thresholds.  · Trim any shrubbery on the main path into your home.  · Regularly check that handrails are securely fastened and in good repair. Both sides of any steps should have handrails.  · Install guardrails along the edges of any raised decks or porches.  · Clear walkways of debris and clutter, including tools and rocks.  · Have leaves, snow, and ice cleared regularly.  · Use sand or salt on walkways during winter months.  · In the garage, clean up any spills right away, including grease or oil spills.  What other actions can I take?  · Wear closed-toe shoes that fit well and support your feet. Wear shoes that have rubber soles or low heels.  · Use mobility aids as needed, such as canes, walkers, scooters, and crutches.  · Review your medicines with  your health care provider. Some medicines can cause dizziness or changes in blood pressure, which increase your risk of falling.  Talk with your health care provider about other ways that you can decrease your risk of falls. This may include working with a physical therapist or  to improve your strength, balance, and endurance.  Where to find more information  · Centers for Disease Control and Prevention, [place]: https://www.cdc.gov  · [place]: https://xs1ryue.maria eugenia.nih.gov  Contact a health care provider if:  · You are afraid of falling at home.  · You feel weak, drowsy, or dizzy at home.  · You fall at home.  Summary  · There are many simple things that you can do to make your home safe and to help prevent falls.  · Ways to make your home safe include removing tripping hazards and installing grab bars in the bathroom.  · Ask for help when making these changes in your home.  This information is not intended to replace advice given to you by your health care provider. Make sure you discuss any questions you have with your health care provider.  Document Revised: 2018 Document Reviewed: 2018  [employer] Patient Education ©  [employer]    Medicare Wellness  Personal Prevention Plan of Service     Date of Office Visit:  2021  Encounter Provider:  Tawanda Maya MD  Place of Service:  [facility]  Patient Name: Haydee Dickey  :  1935    As part of the Medicare Wellness portion of your visit today, we are providing you with this personalized preventive plan of services (PPPS). This plan is based upon recommendations of the [place] Preventive Services Task Force (USPSTF) and the Advisory Committee on Immunization Practices (ACIP).    This lists the preventive care services that should be considered, and provides dates of when you are due. Items listed as completed are up-to-date and do not require any further  intervention.    Health Maintenance   Topic Date Due   • DXA SCAN  04/08/2021   • MAMMOGRAM  11/02/2022   • ANNUAL WELLNESS VISIT  11/05/2022   • TDAP/TD VACCINES (2 - Td or Tdap) 10/15/2024   • COVID-19 Vaccine  Completed   • INFLUENZA VACCINE  Completed   • Pneumococcal Vaccine 65+  Completed   • ZOSTER VACCINE  Completed       Orders Placed This Encounter   Procedures   • DEXA Bone Density Axial     Standing Status:   Future     Standing Expiration Date:   11/6/2022   • Vitamin B12     Standing Status:   Future     Number of Occurrences:   1     Standing Expiration Date:   11/5/2022     Order Specific Question:   Release to patient     Answer:   Immediate       Return in about 6 months (around 5/5/2022) for Recheck.

## 2021-11-05 NOTE — PROGRESS NOTES
Medicare Subsequent Wellness Visit  Subjective   History of Present Illness    Haydee Dickey is a 86 y.o. female who presents for an Medicare Wellness Visit. In addition, we addressed the following health issues:  She has a new diagnosis of a left upper inner quadrant breast cancer.  She is followed by Dr. Andres and will have a surgical biopsy soon.    Haydee has chronic hypertension and has been well controlled on current medications.She  is monitored by me every 6 months and is here today for follow up. She is tolerating medications without side effect. She reports no vision changes, headaches or lightheadedness. She is requesting refills of medications.    She has chronic benign tremor and she feels like it is getting worse.  She is taking propranolol and that seems to help.    She has chronic B12 def and has been taking B12  2000 a day. She has been doing well on current medication.        PMH, PSH, SocHx, FamHx, Allergies, and Medications: Reviewed and updated in the history section of chart.  Family History   Problem Relation Age of Onset   • Diabetes Mother    • Hypertension Mother    • Stroke Mother    • Hearing loss Mother         AIDES   • Heart disease Mother         CHF, PACER   • Thyroid disease Mother         THYROIDECTOMY   • Hypertension Father    • Stroke Father    • Alcohol abuse Father         DAYS OFF    • Heart disease Father    • Hyperlipidemia Father    • Kidney disease Father         KIDNEY STONE REMOVED   • Asthma Daughter         ADULT ONSET   • Cancer Sister         LUNG   • COPD Sister    • Lung cancer Sister    • Cancer Sister         KIDNEY   • Kidney cancer Sister    • Cancer Brother         LUNG   • Lung cancer Brother    • Diabetes Maternal Aunt         NIDDM   • Thyroid disease Maternal Aunt         THYROIDECTOMY   • Diabetes Maternal Aunt         NIDDM   • Other Maternal Aunt         ESSENTIAL TREMOR   • Diabetes Sister         INSULIN   • Diabetes Maternal Grandmother          NIDDM   • Early death Brother         4 MO, ? 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  Lives at home with her daughter, son in law, grand-daughter, and 1 dog.         Allergies   Allergen Reactions   • Iodine Itching   • Percocet [Oxycodone-Acetaminophen] Itching     causes ithching   • Iodinated Diagnostic Agents Rash     Patient reports rash occurred 30 yrs ago with contrast       Outpatient Medications Prior to Visit   Medication Sig Dispense Refill   • alendronate (FOSAMAX) 70 MG tablet TAKE 1 TABLET BY MOUTH EVERY 7 DAYS 12 tablet 1   • aspirin (aspirin) 81 MG EC tablet Take 81 mg by mouth Daily.     • Biotin 1 MG capsule Take 1 capsule by mouth Daily.     • Calcium Carbonate-Vit D-Min (Calcium 1200) 1094-1272 MG-UNIT chewable tablet Chew 1 tablet Daily.     • cholecalciferol (Cholecalciferol) 25 MCG (1000 UT) tablet Take 2,000 Units by mouth Daily.     • cyanocobalamin (VITAMIN B-12) 1000 MCG tablet Take 2,000 mcg by mouth Daily.     • folic acid (FOLVITE) 400 MCG tablet Take 1 tablet by mouth Daily.     • propranolol (INDERAL) 20 MG tablet Take 20 mg by mouth 2 (Two) Times a Day.     • pyridoxine (VITAMIN B-6) 100 MG tablet Take 100 mg by mouth Daily.     • Turmeric 500 MG capsule Take 1 capsule by mouth Daily.     • benazepril (LOTENSIN) 5 MG tablet TAKE TWO TABLETS BY MOUTH DAILY  60 tablet 0   • HYDROcodone-acetaminophen (NORCO) 5-325 MG per tablet Take 1 tablet by mouth Every 6 (Six) Hours As Needed for Moderate Pain  (pain). 15 tablet 0   • erythromycin (ROMYCIN) 5 MG/GM ophthalmic ointment Apply  to eye(s) as directed by provider 2 (two) times a day. Apply to surgical site 2x/day. May  use in eye for discomfort. 3.5 g 1     No facility-administered medications prior to visit.        Patient Active Problem List   Diagnosis   • Acute left flank pain   • B12 deficiency   • Benign essential hypertension   • Benign paroxysmal positional vertigo   • H/O abdominal hysterectomy   • H/O total knee replacement   • Hip fracture (HCC)   • Torn rotator cuff   • Osteoporosis   • Essential tremor   • Small vessel disease (HCC)   • Gait instability   • Edema of lower extremity   • Malignant neoplasm of upper-inner quadrant of left breast in female, estrogen receptor positive (HCC)   • Disorder of rotator cuff         Patient Care Team:  Tawanda Maya MD as PCP - General (Family Medicine)  Edmund Zavaleta MD as Surgeon (Orthopedic Surgery)  Debbie Batista MD as Surgeon (Hand Surgery)  Lemuel Mosqueda MD (Ophthalmology)  Health Habits:  Current Diet: Well balanced diet  Tobacco use. Non smoker  Dental Exam. utd  Eye Exam. utd  Exercise:none    Recent Hospitalizations:  none    Age-appropriate Screening Schedule:  Refer to the list below for future screening recommendations based on patient's age. Orders for these recommended tests are listed in the plan section. The patient has been provided with a written plan.      Health Maintenance   Topic Date Due   • DXA SCAN  04/08/2021   • MAMMOGRAM  11/02/2022   • ANNUAL WELLNESS VISIT  11/05/2022   • TDAP/TD VACCINES (2 - Td or Tdap) 10/15/2024   • COVID-19 Vaccine  Completed   • INFLUENZA VACCINE  Completed   • Pneumococcal Vaccine 65+  Completed   • ZOSTER VACCINE  Completed       Depression Screen:   PHQ-2/PHQ-9 Depression Screening 11/5/2021   Little interest or pleasure in doing things 0   Feeling down, depressed, or hopeless 0   Total Score 0       Functional and Cognitive Screening:  Functional & Cognitive Status 11/5/2021   Do you have difficulty preparing food and eating? No   Do you have difficulty bathing yourself, getting dressed or grooming yourself?  "No   Do you have difficulty using the toilet? No   Do you have difficulty moving around from place to place? No   Do you have trouble with steps or getting out of a bed or a chair? Yes   Current Diet Well Balanced Diet   Dental Exam Up to date   Eye Exam Up to date   Exercise (times per week) 0 times per week   Current Exercises Include Walking        Exercise Comment stays within home   Current Exercise Activities Include -   Do you need help using the phone?  No   Are you deaf or do you have serious difficulty hearing?  No   Do you need help with transportation? No   Do you need help shopping? No   Do you need help preparing meals?  No   Do you need help with housework?  No   Do you need help with laundry? No   Do you need help taking your medications? No   Do you need help managing money? No   Do you ever drive or ride in a car without wearing a seat belt? No   Have you felt unusual stress, anger or loneliness in the last month? Yes   Who do you live with? Child   If you need help, do you have trouble finding someone available to you? No   Have you been bothered in the last four weeks by sexual problems? No   Do you have difficulty concentrating, remembering or making decisions? No     Does the patient have evidence of cognitive impairment? none    Compared to one year ago, the patient feels their physical health and mental health are the same.       Review of Systems   Constitutional: Negative.    HENT: Negative.    Eyes: Negative.    Respiratory: Negative.    Cardiovascular: Negative.    Gastrointestinal: Negative.    Endocrine: Negative.    Genitourinary: Negative.    Musculoskeletal: Negative.    Skin: Negative.    Allergic/Immunologic: Negative.    Neurological: Positive for tremors.   Hematological: Negative.    Psychiatric/Behavioral: Negative.        Objective     Vitals:    11/05/21 0834   BP: 144/66   Pulse: 54   Resp: 14   SpO2: 99%   Weight: 86.2 kg (190 lb)   Height: 162.6 cm (64\")       Body mass " index is 32.61 kg/m².    PHYSICAL EXAM  Vitals reviewed and on chart.  HEENT: PERRLA, EOMI. Oral mucosa moist,   No LAD.  CV: RRR, no murmurs, rubs, clicks or gallops  LUNGS: CTA bilaterally  EXT: No edema, FROM in bilateral upper and lower ext  NEURO: CN II - XII grossly intact  PSYCH: good mood, positive affect, alert and engaged. No thoughts of self harm  expressed.    ASSESSMENT AND PLAN    Mammogram:utd  Colon cancer screening : Not indicated  Bone Density :ordered      Problem List Items Addressed This Visit        Cardiac and Vasculature    Benign essential hypertension    Overview     Well controlled on current medication, will refill medication today and as needed. She will RTO for repeat B/P check in 6 months.Benazepril 5 mg a day.          Relevant Medications    benazepril (LOTENSIN) 5 MG tablet       Endocrine and Metabolic    B12 deficiency    Overview     Managing well on 2000 mcg every other day.  She feels that she has a decent energy level for her age.         Relevant Orders    Vitamin B12       Musculoskeletal and Injuries    Osteoporosis    Overview     She states she has been on Fosamax for many years and so ordering a bone density today to determine whether or not she continues to have osteoporosis.  If that is the case then I will refer her to a specialist that can help determine whether or not she needs to be on something other than Fosamax.         Relevant Orders    DEXA Bone Density Axial      Other Visit Diagnoses     Medicare annual wellness visit, subsequent    -  Primary    Essential hypertension        Relevant Medications    benazepril (LOTENSIN) 5 MG tablet          Orders:  Orders Placed This Encounter   Procedures   • DEXA Bone Density Axial   • Vitamin B12       Follow Up:  Return in about 6 months (around 5/5/2022) for Recheck.     ADVANCED DIRECTIVES:   ACP discussion was held with the patient during this visit. Patient has an advance directive (not in EMR), copy  requested.    An After Visit Summary and PPPS with all of these plans were given to the patient.

## 2021-11-09 ENCOUNTER — PRE-ADMISSION TESTING (OUTPATIENT)
Dept: PREADMISSION TESTING | Facility: HOSPITAL | Age: 86
End: 2021-11-09

## 2021-11-09 VITALS
TEMPERATURE: 98.2 F | OXYGEN SATURATION: 96 % | DIASTOLIC BLOOD PRESSURE: 78 MMHG | HEIGHT: 64 IN | SYSTOLIC BLOOD PRESSURE: 143 MMHG | HEART RATE: 66 BPM | RESPIRATION RATE: 18 BRPM | BODY MASS INDEX: 32.95 KG/M2 | WEIGHT: 193 LBS

## 2021-11-09 LAB
ANION GAP SERPL CALCULATED.3IONS-SCNC: 7.3 MMOL/L (ref 5–15)
BUN SERPL-MCNC: 20 MG/DL (ref 8–23)
BUN/CREAT SERPL: 17.2 (ref 7–25)
CALCIUM SPEC-SCNC: 9.5 MG/DL (ref 8.6–10.5)
CHLORIDE SERPL-SCNC: 106 MMOL/L (ref 98–107)
CO2 SERPL-SCNC: 27.7 MMOL/L (ref 22–29)
CREAT SERPL-MCNC: 1.16 MG/DL (ref 0.57–1)
DEPRECATED RDW RBC AUTO: 44.1 FL (ref 37–54)
ERYTHROCYTE [DISTWIDTH] IN BLOOD BY AUTOMATED COUNT: 12.5 % (ref 12.3–15.4)
GFR SERPL CREATININE-BSD FRML MDRD: 45 ML/MIN/1.73
GLUCOSE SERPL-MCNC: 107 MG/DL (ref 65–99)
HCT VFR BLD AUTO: 36.3 % (ref 34–46.6)
HGB BLD-MCNC: 11.4 G/DL (ref 12–15.9)
MCH RBC QN AUTO: 30 PG (ref 26.6–33)
MCHC RBC AUTO-ENTMCNC: 31.4 G/DL (ref 31.5–35.7)
MCV RBC AUTO: 95.5 FL (ref 79–97)
PLATELET # BLD AUTO: 167 10*3/MM3 (ref 140–450)
PMV BLD AUTO: 10.2 FL (ref 6–12)
POTASSIUM SERPL-SCNC: 5.4 MMOL/L (ref 3.5–5.2)
RBC # BLD AUTO: 3.8 10*6/MM3 (ref 3.77–5.28)
SODIUM SERPL-SCNC: 141 MMOL/L (ref 136–145)
WBC # BLD AUTO: 4.07 10*3/MM3 (ref 3.4–10.8)

## 2021-11-09 PROCEDURE — 85027 COMPLETE CBC AUTOMATED: CPT

## 2021-11-09 PROCEDURE — 80048 BASIC METABOLIC PNL TOTAL CA: CPT

## 2021-11-09 PROCEDURE — 82607 VITAMIN B-12: CPT | Performed by: FAMILY MEDICINE

## 2021-11-09 RX ORDER — FERROUS SULFATE 325(65) MG
325 TABLET ORAL
COMMUNITY

## 2021-11-09 NOTE — DISCHARGE INSTRUCTIONS
Take the following medications the morning of surgery:  PROPRANOLOL      Arrive to hospital on your day of surgery at 8:30 AM.      If you are on prescription narcotic pain medication to control your pain you may also take that medication the morning of surgery.    General Instructions:  • Do not eat solid food after midnight the night before surgery.  • You may drink clear liquids day of surgery but must stop at least one hour before your hospital arrival time.  • It is beneficial for you to have a clear drink that contains carbohydrates the day of surgery.  We suggest a 12 to 20 ounce bottle of Gatorade or Powerade for non-diabetic patients or a 12 to 20 ounce bottle of G2 or Powerade Zero for diabetic patients. (Pediatric patients, are not advised to drink a 12 to 20 ounce carbohydrate drink)    Clear liquids are liquids you can see through.  Nothing red in color.     Plain water                               Sports drinks  Sodas                                   Gelatin (Jell-O)  Fruit juices without pulp such as white grape juice and apple juice  Popsicles that contain no fruit or yogurt  Tea or coffee (no cream or milk added)  Gatorade / Powerade  G2 / Powerade Zero    • Infants may have breast milk up to four hours before surgery.  • Infants drinking formula may drink formula up to six hours before surgery.   • Patients who avoid smoking, chewing tobacco and alcohol for 4 weeks prior to surgery have a reduced risk of post-operative complications.  Quit smoking as many days before surgery as you can.  • Do not smoke, use chewing tobacco or drink alcohol the day of surgery.   • If applicable bring your C-PAP/ BI-PAP machine.  • Bring any papers given to you in the doctor’s office.  • Wear clean comfortable clothes.  • Do not wear contact lenses, false eyelashes or make-up.  Bring a case for your glasses.   • Bring crutches or walker if applicable.  • Remove all piercings.  Leave jewelry and any other valuables  at home.  • Hair extensions with metal clips must be removed prior to surgery.  • The Pre-Admission Testing nurse will instruct you to bring medications if unable to obtain an accurate list in Pre-Admission Testing.        If you were given a blood bank ID arm band remember to bring it with you the day of surgery.    Preventing a Surgical Site Infection:  • For 2 to 3 days before surgery, avoid shaving with a razor because the razor can irritate skin and make it easier to develop an infection.    • Any areas of open skin can increase the risk of a post-operative wound infection by allowing bacteria to enter and travel throughout the body.  Notify your surgeon if you have any skin wounds / rashes even if it is not near the expected surgical site.  The area will need assessed to determine if surgery should be delayed until it is healed.  • The night prior to surgery shower using a fresh bar of anti-bacterial soap (such as Dial) and clean washcloth.  Sleep in a clean bed with clean clothing.  Do not allow pets to sleep with you.  • Shower on the morning of surgery using a fresh bar of anti-bacterial soap (such as Dial) and clean washcloth.  Dry with a clean towel and dress in clean clothing.  • Ask your surgeon if you will be receiving antibiotics prior to surgery.  • Make sure you, your family, and all healthcare providers clean their hands with soap and water or an alcohol based hand  before caring for you or your wound.    Day of surgery:  Your arrival time is approximately two hours before your scheduled surgery time.  Upon arrival, a Pre-op nurse and Anesthesiologist will review your health history, obtain vital signs, and answer questions you may have.  The only belongings needed at this time will be a list of your home medications and if applicable your C-PAP/BI-PAP machine.  A Pre-op nurse will start an IV and you may receive medication in preparation for surgery, including something to help you relax.      Please be aware that surgery does come with discomfort.  We want to make every effort to control your discomfort so please discuss any uncontrolled symptoms with your nurse.   Your doctor will most likely have prescribed pain medications.      If you are going home after surgery you will receive individualized written care instructions before being discharged.  A responsible adult must drive you to and from the hospital on the day of your surgery and stay with you for 24 hours.  Discharge prescriptions can be filled by the hospital pharmacy during regular pharmacy hours.  If you are having surgery late in the day/evening your prescription may be e-prescribed to your pharmacy.  Please verify your pharmacy hours or chose a 24 hour pharmacy to avoid not having access to your prescription because your pharmacy has closed for the day.    If you are staying overnight following surgery, you will be transported to your hospital room following the recovery period.  [facility] has all private rooms.    If you have any questions please call Pre-Admission Testing at [phone].  Deductibles and co-payments are collected on the day of service. Please be prepared to pay the required co-pay, deductible or deposit on the day of service as defined by your plan.    Patient Education for Self-Quarantine Process    • Following your COVID testing, we strongly recommend that you wear a mask when you are with other people and practice social distancing.   • Limit your activities to only required outings.  • Wash your hands with soap and water frequently for at least 20 seconds.   • Avoid touching your eyes, nose and mouth with unwashed hands.  • Do not share anything - utensils, drinking glasses, food from the same bowl.   • Sanitize household surfaces daily. Include all high touch areas (door handles, light switches, phones, countertops, etc.)    Call your surgeon immediately if you experience any of the following  symptoms:  • Sore Throat  • Shortness of Breath or difficulty breathing  • Cough  • Chills  • Body soreness or muscle pain  • Headache  • Fever  • New loss of taste or smell  • Do not arrive for your surgery ill.  Your procedure will need to be rescheduled to another time.  You will need to call your physician before the day of surgery to avoid any unnecessary exposure to hospital staff as well as other patients.

## 2021-11-12 ENCOUNTER — LAB (OUTPATIENT)
Dept: LAB | Facility: HOSPITAL | Age: 86
End: 2021-11-12

## 2021-11-12 DIAGNOSIS — Z17.0 MALIGNANT NEOPLASM OF UPPER-INNER QUADRANT OF LEFT BREAST IN FEMALE, ESTROGEN RECEPTOR POSITIVE (HCC): ICD-10-CM

## 2021-11-12 DIAGNOSIS — C50.212 MALIGNANT NEOPLASM OF UPPER-INNER QUADRANT OF LEFT BREAST IN FEMALE, ESTROGEN RECEPTOR POSITIVE (HCC): ICD-10-CM

## 2021-11-12 LAB — SARS-COV-2 ORF1AB RESP QL NAA+PROBE: NOT DETECTED

## 2021-11-12 PROCEDURE — U0004 COV-19 TEST NON-CDC HGH THRU: HCPCS

## 2021-11-12 PROCEDURE — C9803 HOPD COVID-19 SPEC COLLECT: HCPCS

## 2021-11-15 ENCOUNTER — HOSPITAL ENCOUNTER (OUTPATIENT)
Facility: HOSPITAL | Age: 86
Setting detail: HOSPITAL OUTPATIENT SURGERY
Discharge: HOME | End: 2021-11-15
Attending: SURGERY | Admitting: SURGERY

## 2021-11-15 ENCOUNTER — APPOINTMENT (OUTPATIENT)
Dept: GENERAL RADIOLOGY | Facility: HOSPITAL | Age: 86
End: 2021-11-15

## 2021-11-15 ENCOUNTER — ANESTHESIA EVENT (OUTPATIENT)
Dept: PERIOP | Facility: HOSPITAL | Age: 86
End: 2021-11-15

## 2021-11-15 ENCOUNTER — ANESTHESIA (OUTPATIENT)
Dept: PERIOP | Facility: HOSPITAL | Age: 86
End: 2021-11-15

## 2021-11-15 VITALS
RESPIRATION RATE: 16 BRPM | HEIGHT: 64 IN | SYSTOLIC BLOOD PRESSURE: 134 MMHG | HEART RATE: 51 BPM | TEMPERATURE: 97.9 F | DIASTOLIC BLOOD PRESSURE: 64 MMHG | WEIGHT: 191.7 LBS | BODY MASS INDEX: 32.73 KG/M2 | OXYGEN SATURATION: 96 %

## 2021-11-15 DIAGNOSIS — Z17.0 MALIGNANT NEOPLASM OF UPPER-INNER QUADRANT OF LEFT BREAST IN FEMALE, ESTROGEN RECEPTOR POSITIVE (HCC): ICD-10-CM

## 2021-11-15 DIAGNOSIS — C50.212 MALIGNANT NEOPLASM OF UPPER-INNER QUADRANT OF LEFT BREAST IN FEMALE, ESTROGEN RECEPTOR POSITIVE (HCC): ICD-10-CM

## 2021-11-15 PROCEDURE — 25010000002 PROPOFOL 10 MG/ML EMULSION: Performed by: NURSE ANESTHETIST, CERTIFIED REGISTERED

## 2021-11-15 PROCEDURE — 25010000002 DEXAMETHASONE PER 1 MG: Performed by: NURSE ANESTHETIST, CERTIFIED REGISTERED

## 2021-11-15 PROCEDURE — 19301 PARTIAL MASTECTOMY: CPT | Performed by: SURGERY

## 2021-11-15 PROCEDURE — 88307 TISSUE EXAM BY PATHOLOGIST: CPT | Performed by: SURGERY

## 2021-11-15 PROCEDURE — 76098 X-RAY EXAM SURGICAL SPECIMEN: CPT

## 2021-11-15 PROCEDURE — 25010000002 FENTANYL CITRATE (PF) 50 MCG/ML SOLUTION: Performed by: NURSE ANESTHETIST, CERTIFIED REGISTERED

## 2021-11-15 PROCEDURE — 14001 TIS TRNFR TRUNK 10.1-30SQCM: CPT | Performed by: SURGERY

## 2021-11-15 PROCEDURE — 0 CEFAZOLIN IN DEXTROSE 2-4 GM/100ML-% SOLUTION: Performed by: SURGERY

## 2021-11-15 PROCEDURE — 25010000002 ONDANSETRON PER 1 MG: Performed by: NURSE ANESTHETIST, CERTIFIED REGISTERED

## 2021-11-15 PROCEDURE — 25010000002 NEOSTIGMINE 5 MG/10ML SOLUTION: Performed by: NURSE ANESTHETIST, CERTIFIED REGISTERED

## 2021-11-15 RX ORDER — MIDAZOLAM HYDROCHLORIDE 1 MG/ML
0.5 INJECTION INTRAMUSCULAR; INTRAVENOUS
Status: DISCONTINUED | OUTPATIENT
Start: 2021-11-15 | End: 2021-11-15 | Stop reason: HOSPADM

## 2021-11-15 RX ORDER — ONDANSETRON 2 MG/ML
INJECTION INTRAMUSCULAR; INTRAVENOUS AS NEEDED
Status: DISCONTINUED | OUTPATIENT
Start: 2021-11-15 | End: 2021-11-15 | Stop reason: SURG

## 2021-11-15 RX ORDER — ROCURONIUM BROMIDE 10 MG/ML
INJECTION, SOLUTION INTRAVENOUS AS NEEDED
Status: DISCONTINUED | OUTPATIENT
Start: 2021-11-15 | End: 2021-11-15 | Stop reason: SURG

## 2021-11-15 RX ORDER — NALOXONE HCL 0.4 MG/ML
0.2 VIAL (ML) INJECTION AS NEEDED
Status: DISCONTINUED | OUTPATIENT
Start: 2021-11-15 | End: 2021-11-15 | Stop reason: HOSPADM

## 2021-11-15 RX ORDER — GLYCOPYRROLATE 0.2 MG/ML
INJECTION INTRAMUSCULAR; INTRAVENOUS AS NEEDED
Status: DISCONTINUED | OUTPATIENT
Start: 2021-11-15 | End: 2021-11-15 | Stop reason: SURG

## 2021-11-15 RX ORDER — CEFAZOLIN SODIUM 2 G/100ML
2 INJECTION, SOLUTION INTRAVENOUS ONCE
Status: COMPLETED | OUTPATIENT
Start: 2021-11-15 | End: 2021-11-15

## 2021-11-15 RX ORDER — HYDRALAZINE HYDROCHLORIDE 20 MG/ML
5 INJECTION INTRAMUSCULAR; INTRAVENOUS
Status: DISCONTINUED | OUTPATIENT
Start: 2021-11-15 | End: 2021-11-15 | Stop reason: HOSPADM

## 2021-11-15 RX ORDER — EPHEDRINE SULFATE 50 MG/ML
5 INJECTION, SOLUTION INTRAVENOUS ONCE AS NEEDED
Status: DISCONTINUED | OUTPATIENT
Start: 2021-11-15 | End: 2021-11-15 | Stop reason: HOSPADM

## 2021-11-15 RX ORDER — LIDOCAINE HYDROCHLORIDE 20 MG/ML
INJECTION, SOLUTION INFILTRATION; PERINEURAL AS NEEDED
Status: DISCONTINUED | OUTPATIENT
Start: 2021-11-15 | End: 2021-11-15 | Stop reason: SURG

## 2021-11-15 RX ORDER — SODIUM CHLORIDE, SODIUM LACTATE, POTASSIUM CHLORIDE, CALCIUM CHLORIDE 600; 310; 30; 20 MG/100ML; MG/100ML; MG/100ML; MG/100ML
9 INJECTION, SOLUTION INTRAVENOUS CONTINUOUS
Status: DISCONTINUED | OUTPATIENT
Start: 2021-11-15 | End: 2021-11-15 | Stop reason: HOSPADM

## 2021-11-15 RX ORDER — FENTANYL CITRATE 50 UG/ML
50 INJECTION, SOLUTION INTRAMUSCULAR; INTRAVENOUS
Status: DISCONTINUED | OUTPATIENT
Start: 2021-11-15 | End: 2021-11-15 | Stop reason: HOSPADM

## 2021-11-15 RX ORDER — DIPHENHYDRAMINE HYDROCHLORIDE 50 MG/ML
12.5 INJECTION INTRAMUSCULAR; INTRAVENOUS
Status: DISCONTINUED | OUTPATIENT
Start: 2021-11-15 | End: 2021-11-15 | Stop reason: HOSPADM

## 2021-11-15 RX ORDER — DIPHENHYDRAMINE HCL 25 MG
25 CAPSULE ORAL
Status: DISCONTINUED | OUTPATIENT
Start: 2021-11-15 | End: 2021-11-15 | Stop reason: HOSPADM

## 2021-11-15 RX ORDER — ONDANSETRON 2 MG/ML
4 INJECTION INTRAMUSCULAR; INTRAVENOUS ONCE AS NEEDED
Status: DISCONTINUED | OUTPATIENT
Start: 2021-11-15 | End: 2021-11-15 | Stop reason: HOSPADM

## 2021-11-15 RX ORDER — HYDROMORPHONE HYDROCHLORIDE 1 MG/ML
0.5 INJECTION, SOLUTION INTRAMUSCULAR; INTRAVENOUS; SUBCUTANEOUS
Status: DISCONTINUED | OUTPATIENT
Start: 2021-11-15 | End: 2021-11-15 | Stop reason: HOSPADM

## 2021-11-15 RX ORDER — LIDOCAINE HYDROCHLORIDE 10 MG/ML
0.5 INJECTION, SOLUTION EPIDURAL; INFILTRATION; INTRACAUDAL; PERINEURAL ONCE AS NEEDED
Status: DISCONTINUED | OUTPATIENT
Start: 2021-11-15 | End: 2021-11-15 | Stop reason: HOSPADM

## 2021-11-15 RX ORDER — FENTANYL CITRATE 50 UG/ML
INJECTION, SOLUTION INTRAMUSCULAR; INTRAVENOUS AS NEEDED
Status: DISCONTINUED | OUTPATIENT
Start: 2021-11-15 | End: 2021-11-15 | Stop reason: SURG

## 2021-11-15 RX ORDER — NEOSTIGMINE METHYLSULFATE 0.5 MG/ML
INJECTION, SOLUTION INTRAVENOUS AS NEEDED
Status: DISCONTINUED | OUTPATIENT
Start: 2021-11-15 | End: 2021-11-15 | Stop reason: SURG

## 2021-11-15 RX ORDER — SODIUM CHLORIDE 0.9 % (FLUSH) 0.9 %
3 SYRINGE (ML) INJECTION EVERY 12 HOURS SCHEDULED
Status: DISCONTINUED | OUTPATIENT
Start: 2021-11-15 | End: 2021-11-15 | Stop reason: HOSPADM

## 2021-11-15 RX ORDER — PROPOFOL 10 MG/ML
VIAL (ML) INTRAVENOUS AS NEEDED
Status: DISCONTINUED | OUTPATIENT
Start: 2021-11-15 | End: 2021-11-15 | Stop reason: SURG

## 2021-11-15 RX ORDER — HYDROCODONE BITARTRATE AND ACETAMINOPHEN 7.5; 325 MG/1; MG/1
1 TABLET ORAL ONCE AS NEEDED
Status: DISCONTINUED | OUTPATIENT
Start: 2021-11-15 | End: 2021-11-15 | Stop reason: HOSPADM

## 2021-11-15 RX ORDER — DEXAMETHASONE SODIUM PHOSPHATE 10 MG/ML
INJECTION INTRAMUSCULAR; INTRAVENOUS AS NEEDED
Status: DISCONTINUED | OUTPATIENT
Start: 2021-11-15 | End: 2021-11-15 | Stop reason: SURG

## 2021-11-15 RX ORDER — MAGNESIUM HYDROXIDE 1200 MG/15ML
LIQUID ORAL AS NEEDED
Status: DISCONTINUED | OUTPATIENT
Start: 2021-11-15 | End: 2021-11-15 | Stop reason: HOSPADM

## 2021-11-15 RX ORDER — FAMOTIDINE 10 MG/ML
20 INJECTION, SOLUTION INTRAVENOUS ONCE
Status: COMPLETED | OUTPATIENT
Start: 2021-11-15 | End: 2021-11-15

## 2021-11-15 RX ORDER — SODIUM CHLORIDE 0.9 % (FLUSH) 0.9 %
3-10 SYRINGE (ML) INJECTION AS NEEDED
Status: DISCONTINUED | OUTPATIENT
Start: 2021-11-15 | End: 2021-11-15 | Stop reason: HOSPADM

## 2021-11-15 RX ORDER — OXYCODONE AND ACETAMINOPHEN 10; 325 MG/1; MG/1
1 TABLET ORAL EVERY 4 HOURS PRN
Status: DISCONTINUED | OUTPATIENT
Start: 2021-11-15 | End: 2021-11-15 | Stop reason: HOSPADM

## 2021-11-15 RX ORDER — LABETALOL HYDROCHLORIDE 5 MG/ML
5 INJECTION, SOLUTION INTRAVENOUS
Status: DISCONTINUED | OUTPATIENT
Start: 2021-11-15 | End: 2021-11-15 | Stop reason: HOSPADM

## 2021-11-15 RX ORDER — EPHEDRINE SULFATE 50 MG/ML
INJECTION, SOLUTION INTRAVENOUS AS NEEDED
Status: DISCONTINUED | OUTPATIENT
Start: 2021-11-15 | End: 2021-11-15 | Stop reason: SURG

## 2021-11-15 RX ADMIN — LIDOCAINE HYDROCHLORIDE 80 MG: 20 INJECTION, SOLUTION INFILTRATION; PERINEURAL at 10:33

## 2021-11-15 RX ADMIN — ONDANSETRON 4 MG: 2 INJECTION INTRAMUSCULAR; INTRAVENOUS at 11:33

## 2021-11-15 RX ADMIN — CEFAZOLIN SODIUM 2 G: 2 INJECTION, SOLUTION INTRAVENOUS at 10:21

## 2021-11-15 RX ADMIN — FENTANYL CITRATE 50 MCG: 0.05 INJECTION, SOLUTION INTRAMUSCULAR; INTRAVENOUS at 10:33

## 2021-11-15 RX ADMIN — EPHEDRINE SULFATE 10 MG: 50 INJECTION INTRAVENOUS at 11:07

## 2021-11-15 RX ADMIN — FENTANYL CITRATE 50 MCG: 0.05 INJECTION, SOLUTION INTRAMUSCULAR; INTRAVENOUS at 11:41

## 2021-11-15 RX ADMIN — GLYCOPYRROLATE 0.6 MG: 0.2 INJECTION INTRAMUSCULAR; INTRAVENOUS at 11:33

## 2021-11-15 RX ADMIN — FAMOTIDINE 20 MG: 10 INJECTION INTRAVENOUS at 09:59

## 2021-11-15 RX ADMIN — EPHEDRINE SULFATE 10 MG: 50 INJECTION INTRAVENOUS at 10:38

## 2021-11-15 RX ADMIN — DEXAMETHASONE SODIUM PHOSPHATE 6 MG: 10 INJECTION INTRAMUSCULAR; INTRAVENOUS at 10:52

## 2021-11-15 RX ADMIN — SODIUM CHLORIDE, POTASSIUM CHLORIDE, SODIUM LACTATE AND CALCIUM CHLORIDE 9 ML/HR: 600; 310; 30; 20 INJECTION, SOLUTION INTRAVENOUS at 09:59

## 2021-11-15 RX ADMIN — PROPOFOL 25 MCG/KG/MIN: 10 INJECTION, EMULSION INTRAVENOUS at 10:40

## 2021-11-15 RX ADMIN — ROCURONIUM BROMIDE 40 MG: 50 INJECTION INTRAVENOUS at 10:33

## 2021-11-15 RX ADMIN — PROPOFOL 150 MG: 10 INJECTION, EMULSION INTRAVENOUS at 10:33

## 2021-11-15 RX ADMIN — NEOSTIGMINE METHYLSULFATE 3 MG: 0.5 INJECTION INTRAVENOUS at 11:33

## 2021-11-15 NOTE — ANESTHESIA POSTPROCEDURE EVALUATION
Patient: Haydee Dickey    Procedure Summary     Date: 11/15/21 Room / Location: [facility] OR  / [facility] MAIN OR    Anesthesia Start: 1025 Anesthesia Stop: 1147    Procedure: Left JEAN-PAUL-guided partial mastectomy (Left Breast) Diagnosis:       Malignant neoplasm of upper-inner quadrant of left breast in female, estrogen receptor positive (HCC)      (Malignant neoplasm of upper-inner quadrant of left breast in female, estrogen receptor positive (HCC) [C50.212, Z17.0])    Surgeons: Maliha Andres MD Provider: Ruddy Dill MD    Anesthesia Type: general ASA Status: 3          Anesthesia Type: general    Vitals  Vitals Value Taken Time   /63 11/15/21 1156   Temp 36.6 °C (97.9 °F) 11/15/21 1146   Pulse 54 11/15/21 1159   Resp 16 11/15/21 1150   SpO2 100 % 11/15/21 1159   Vitals shown include unvalidated device data.        Post Anesthesia Care and Evaluation    Patient location during evaluation: PACU  Patient participation: complete - patient participated  Level of consciousness: awake and alert  Pain management: adequate  Airway patency: patent  Anesthetic complications: No anesthetic complications    Cardiovascular status: acceptable  Respiratory status: acceptable  Hydration status: acceptable    Comments: --------------------            11/15/21               1150     --------------------   BP:       139/65     Pulse:      61       Resp:       16       Temp:                SpO2:      100%     --------------------

## 2021-11-15 NOTE — OP NOTE
Operative Report    Patient Name:  Haydee Dickey  YOB: 1935    Date of Surgery:  11/15/2021    Pre-op Diagnosis:   Malignant neoplasm of upper-inner quadrant of left breast in female, estrogen receptor positive (HCC) [C50.212, Z17.0]       Post-Op Diagnosis:  Malignant neoplasm of upper-inner quadrant of left breast in female, estrogen receptor positive (HCC) [C50.212, Z17.0]    Procedures:  1. Left JEAN-PAUL-guided partial mastectomy  2. Left breast tissue rearrangement with local tissue flaps      Staff:  Surgeon(s):  Maliha Andres MD      Anesthesia: General    Estimated Blood Loss: 10 mL    Implants:    Nothing was implanted during the procedure    Specimen:          Specimens     ID Source Type Tests Collected By Collected At Frozen?    A Breast, Left Tissue · TISSUE PATHOLOGY EXAM   Maliha Andres MD 11/15/21 1101 No    Description: left breast partial mastectomy, ink marks margins    B Breast, Left Tissue · TISSUE PATHOLOGY EXAM   Maliha Andres MD 11/15/21 1113 No    Description: left breast partial mastectomy, additional medial inferior and posterior margins, ink marks margins          Findings: Clip and JEAN-PAUL marker in good position in specimen radiograph.    Complications: None     Indications: The patient is a 86 y.o. F with a new diagnosis of left breast cancer: Intermediate grade, invasive ductal carcinoma, ER/AK+, Her2 negative; clinical T1aN0, anatomic stage IA, prognostic stage IA. She is a good candidate for lumpectomy and she desires breast conservation. This required preoperative localization. Preoperatively, we reviewed the data that suggests in women over 70 with small ER+ tumors who will receive adjuvant endocrine therapy, the risks of axillary staging may outweigh the benefits, given that there will be no change in adjuvant therapy or outcome regardless of axillary status. This was explained in detail to the patient and she declined axillary staging. The risks and  benefits of surgery were discussed preoperatively and she understood and agreed to proceed.      Description of Procedure:  Several days preoperatively, the patient was taken to the radiology department and the lesion was localized with a JEAN-PAUL radiofrequency device. I reviewed the post-localization mammogram to determine the placement of the JEAN-PAUL device in relationship to the clip. The patient was identified in the preoperative area and brought to the operating room and placed supine on the table. Patient verification was performed and general anesthesia was induced. The patient was prepped and draped in sterile fashion. A time out was performed and all were in agreement. I confirmed that the patient had received preoperative antibiotics.      The JEAN-PAUL  probe was used to identify the area of strongest signal of the JEAN-PAUL device. This location was marked, as well as the intended incision and area of resection. The skin was infiltrated with local anesthesia. An upper inner curvilinear incision was made with a scalpel and carried down through the dermis with sharp dissection. This included a thin ellipse of skin overlying the lesion. Flaps were raised according to the planned dissection. An anterior flap was raised first. Dissection was then carried out superiorly, inferiorly, medially, and laterally according to the planned area of resection. Throughout the dissection, I used the JEAN-PAUL probe to verify that the dissection was proceeding in the correct planes. The posterior dissection was completed and the specimen removed. The JEAN-PAUL probe was used to verify intact signal within the specimen. The specimen was oriented with paint (red - superior, green - anterior, blue - inferior, yellow - medial, orange - lateral, black - posterior). Specimen radiograph showed the JEAN-PAUL marker located near the  inferomedial side of the specimen. An additional inferior, medial, and posterior margin was taken as a single specimen. The  posterior margin was taken to the pectoralis muscle. This was inked with the corresponding colors and ink marked the true margins. The wound was irrigated and hemostasis achieved. The remaining local anesthesia was infiltrated into the breast cavity. Marking clips were placed in the breast cavity.      Attention was then turned to reconstructing the breast. The breast deformity measured approximately 20 sq cm. The deep aspect of the breast parenchyma was dissected off the pectoralis muscle fascia for a distance of 1 cm in all directions in order to mobilize the tissue into the defect. The parenchyma was reapproximated with interrupted 3-0 vicryl stitches. After reconstruction, there was a normal breast contour without any skin dimpling. The deep dermis was closed with interrupted 3-0 vicryl suture. The skin was closed with 4-0 subcuticular running monocryl and covered with dermabond. Counts were correct at the end of the case. The patient was awakened from anesthesia and taken to the PACU in stable condition.    Maliha Andres MD     Date: 11/15/2021  Time: 12:02 EST

## 2021-11-15 NOTE — ANESTHESIA PREPROCEDURE EVALUATION
Anesthesia Evaluation     Patient summary reviewed and Nursing notes reviewed                Airway   Mallampati: II  TM distance: >3 FB  Neck ROM: limited  Dental      Pulmonary - negative pulmonary ROS   Cardiovascular     ECG reviewed  Rhythm: regular  Rate: normal    (+) hypertension, DVT,       Neuro/Psych  (+) tremors, numbness,     GI/Hepatic/Renal/Endo    (+) obesity,  GERD,      Musculoskeletal     Abdominal    Substance History - negative use     OB/GYN negative ob/gyn ROS         Other   arthritis,    history of cancer                    Anesthesia Plan    ASA 3     general   (I have reviewed the patient's history with the patient and the chart, including all pertinent laboratory results and imaging. I have explained the risks of anesthesia including but not limited to dental damage, corneal abrasion, nerve injury, MI, stroke, and death. Questions asked and answered. Anesthetic plan discussed with patient and team as indicated. Patient expressed understanding of the above.  )  intravenous induction     Anesthetic plan, all risks, benefits, and alternatives have been provided, discussed and informed consent has been obtained with: patient.

## 2021-11-15 NOTE — ANESTHESIA PROCEDURE NOTES
Airway  Urgency: elective    Date/Time: 11/15/2021 10:35 AM  Airway not difficult    General Information and Staff    Patient location during procedure: OR  CRNA: Gianna Michaud CRNA    Indications and Patient Condition  Indications for airway management: airway protection    Preoxygenated: yes  Mask difficulty assessment: 1 - vent by mask    Final Airway Details  Final airway type: endotracheal airway      Successful airway: ETT  Cuffed: yes   Successful intubation technique: direct laryngoscopy  Endotracheal tube insertion site: oral  Blade: Wanda  Blade size: 3  ETT size (mm): 7.0  Cormack-Lehane Classification: grade I - full view of glottis  Placement verified by: chest auscultation and capnometry   Measured from: lips  ETT/EBT  to lips (cm): 21  Number of attempts at approach: 1  Assessment: lips, teeth, and gum same as pre-op and atraumatic intubation    Additional Comments   ett cuff up at MOP

## 2021-11-16 ENCOUNTER — TELEPHONE (OUTPATIENT)
Dept: SURGERY | Facility: CLINIC | Age: 86
End: 2021-11-16

## 2021-11-16 DIAGNOSIS — C50.212 MALIGNANT NEOPLASM OF UPPER-INNER QUADRANT OF LEFT BREAST IN FEMALE, ESTROGEN RECEPTOR POSITIVE (HCC): Primary | ICD-10-CM

## 2021-11-16 DIAGNOSIS — Z17.0 MALIGNANT NEOPLASM OF UPPER-INNER QUADRANT OF LEFT BREAST IN FEMALE, ESTROGEN RECEPTOR POSITIVE (HCC): Primary | ICD-10-CM

## 2021-11-16 LAB
LAB AP CASE REPORT: NORMAL
LAB AP DIAGNOSIS COMMENT: NORMAL
LAB AP SYNOPTIC CHECKLIST: NORMAL
PATH REPORT.FINAL DX SPEC: NORMAL
PATH REPORT.GROSS SPEC: NORMAL

## 2021-11-16 NOTE — TELEPHONE ENCOUNTER
I called Ms. Dickey to discuss her pathology report. She is recovering well from surgery. I will place referrals for med/onc and rad/onc.     Maliha Andres MD

## 2021-12-02 ENCOUNTER — TELEPHONE (OUTPATIENT)
Dept: FAMILY MEDICINE CLINIC | Facility: CLINIC | Age: 86
End: 2021-12-02

## 2021-12-02 NOTE — TELEPHONE ENCOUNTER
Caller: NobleHaydee gilmore    Relationship: Self    Best call back number: [phone]    Caller requesting test results: PATIENT    What test was performed: MARVIN    When was the test performed: 11-25    Where was the test performed: [facility]    Additional notes: PATIENT IS GOING TO A NEW DOCTOR ON TUESDAY 12-3-21 AND WANTS TO BRING THE RESULTS WITH HER. PLEASE CALL AND LET HER KNOW WHEN SHE CAN SWING BY AND  THE RESULTS.

## 2021-12-03 ENCOUNTER — OFFICE VISIT (OUTPATIENT)
Dept: SURGERY | Facility: CLINIC | Age: 86
End: 2021-12-03

## 2021-12-03 VITALS
HEIGHT: 64 IN | OXYGEN SATURATION: 99 % | HEART RATE: 99 BPM | BODY MASS INDEX: 31.58 KG/M2 | WEIGHT: 185 LBS | DIASTOLIC BLOOD PRESSURE: 89 MMHG | SYSTOLIC BLOOD PRESSURE: 147 MMHG

## 2021-12-03 DIAGNOSIS — Z17.0 MALIGNANT NEOPLASM OF UPPER-INNER QUADRANT OF LEFT BREAST IN FEMALE, ESTROGEN RECEPTOR POSITIVE (HCC): ICD-10-CM

## 2021-12-03 DIAGNOSIS — C50.212 MALIGNANT NEOPLASM OF UPPER-INNER QUADRANT OF LEFT BREAST IN FEMALE, ESTROGEN RECEPTOR POSITIVE (HCC): ICD-10-CM

## 2021-12-03 DIAGNOSIS — Z48.89 POSTOPERATIVE VISIT: Primary | ICD-10-CM

## 2021-12-03 PROCEDURE — 99024 POSTOP FOLLOW-UP VISIT: CPT | Performed by: SURGERY

## 2021-12-03 NOTE — PROGRESS NOTES
[facility]     Referring Provider: Tawanda Maya MD     Chief complaint: Postoperative visit     HPI:   10/21/21:   Ms. Haydee Dickey is a 85 yo woman, seen at the request of Dr. Tawanda Maya, for a new diagnosis of left breast cancer. This was initially detected as an imaging abnormality on routine screening. Her work-up is detailed in the oncologic history below. Prior to the biopsy, she denies any breast lumps, pain, skin changes, or nipple discharge. She denies any prior history of abnormal mammograms or breast biopsies. She denies any family history of breast or ovarian cancer.     12/03/2021, Interval History:  She underwent left partial mastectomy on 11/15/21. See surgery & pathology details below in oncologic history. She has been recovering well and has no complaints. She was by herself in clinic today, but her daughter participated in the conversation over the phone.       Oncology/Hematology History   Malignant neoplasm of upper-inner quadrant of left breast in female, estrogen receptor positive (HCC)   8/23/2021 Initial Diagnosis    Malignant neoplasm of upper-inner quadrant of left breast in female, estrogen receptor positive (HCC)     8/24/2021 Imaging    Screening [facility]  Scattered areas of fibroglandular density. There is a 0.5 cm focal asymmetry with suspected architectural distortion in the inner central posterior left breast. There are benign-appearing calcifications. There are no suspicious masses, calcifications, or areas of architectural distortion in the right breast.  BI-RADS 0: Incomplete.     9/13/2021 Imaging    Left Diagnostic MMG with Dorian & Left Breast US ( [facility]):  MMG:  With additional imaging, there is persistence of the area of focal asymmetry in the posterior one-third medial aspect of the left breast. The mammographic appearance suggests the presence of a 0.4 cm lesion in the medial aspect of the left breast.   US:  At the 10 o'clock position on the  order of 6 cm from the nipple there is a 0.4 cm ill-defined hypoechoic lesion that is a probable sonographic correlate for the mammographically visualized lesion.  BI-RADS 4: Suspicious.     9/29/2021 Biopsy    Left Breast, US-Guided Biopsy ([facility]):    1. Left Breast at 10 O'clock, 6 cm From Nipple (Not For Calcifications), Core Biopsy:                A. Invasive ductal carcinoma:                            1. Overall Alton Grade II (tubular score = 3, nuclear score = 2, mitotic score = 1).                            2. Invasive carcinoma measures at least 4 mm in greatest dimension.                            3. No lymphovascular space invasion identified.               B. 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 Microcalcification present in foci of DCIS.    ER+ (99%, strong)  CO+ (%, moderate)  HER2 negative ([facility] 1+)  Ki-67 16%     11/15/2021 Surgery    Left TIGIST-guided partial mastectomy    1. Left Breast Partial Mastectomy, Additional Medial, Inferior and Posterior Margins:               A. No in situ nor infiltrating carcinoma identified.               B. New margins are negative for malignancy by additional 10 mm.     2. Left Breast Tigist Guided Partial Mastectomy:                A. Invasive ductal carcinoma:                            1. Invasive carcinoma measures 6.0 mm x 4.0 mm x 4.0 mm.                            2. Overall Alton grade II (tubular score = 3, nuclear score = 2,                                mitotic score = 1).                            3. No lymphovascular invasion identified.               B. Associated ductal carcinoma in situ (DCIS) with rare foci of ADH:                            1. DCIS spans an area estimated at 12 mm x 6 mm x 1 mm.                            2. Intermediate grade cribriform and micropapillary DCIS.                            3. Microcalcification present in DCIS.               C. All margins are  negative for invasive carcinoma.                    Invasive carcinoma measures 1.5 mm from the closest (Inferior) margin of excision.                     All other margins measure at least 2.2 mm from invasive carcinoma including:                            Anterior margin =  22 mm                            Posterior margin = 2.2 mm                            Superior margin = 14 mm                            Inferior margin = 1.5 mm                             Lateral margin = 36 mm                            Medial margin = 6 mm                  D. All margins are negative for DCIS.                    DCIS measures 4 mm from the closest (Inferior) margin of excision.                    All other margins  measure at least 10 mm from DCIS including:                            Anterior margin = 22 mm                            Posterior margin = 10 mm                            Superior margin = 12 mm                            Inferior margin =  4 mm                             Lateral margin = 24 mm                            Medial margin =  12 mm                  E. Focal biopsy site changes are identified, and two metallic clips are retrieved.               F. No Pagetoid involvement of skin by malignancy identified.               G. No lobular neoplasia (LCIS, ALH) identified.               H. Non-neoplastic breast tissue with fibrocystic change, adenosis, focal sclerosing adenosis and                      cautery artifact.  Rare microcalcification present in benign breast tissue.               I.  Previous Biomarkers: Estrogen receptors: Positive (99%), Progesterone receptors: Positive (%),                    HER/2-urbano: Negative (Score 1+), Ki-67 = 16% (see [id number]).         Review of Systems:   See interval history.       Medications:    Current Outpatient Medications:   •  alendronate (FOSAMAX) 70 MG tablet, TAKE 1 TABLET BY MOUTH EVERY 7 DAYS (Patient taking differently: Take 70 mg by mouth Every  7 (Seven) Days.), Disp: 12 tablet, Rfl: 1  •  aspirin (aspirin) 81 MG EC tablet, Take 81 mg by mouth Daily. PT HOLDING FOR SURGERY, Disp: , Rfl:   •  benazepril (LOTENSIN) 5 MG tablet, Take 2 tablets by mouth Daily., Disp: 180 tablet, Rfl: 2  •  Biotin 1 MG capsule, Take 1 capsule by mouth Daily. PT HOLDING FOR SURGERY, Disp: , Rfl:   •  Calcium Carbonate-Vit D-Min (Calcium 1200) 5203-3795 MG-UNIT chewable tablet, Chew 1 tablet Daily. PT HOLDING FOR SURGERY, Disp: , Rfl:   •  cholecalciferol (Cholecalciferol) 25 MCG (1000 UT) tablet, Take 2,000 Units by mouth Daily. PT HOLDING FOR SURGERY, Disp: , Rfl:   •  cyanocobalamin (VITAMIN B-12) 1000 MCG tablet, Take 2,000 mcg by mouth Daily. PT HOLDING FOR SURGERY, Disp: , Rfl:   •  ferrous sulfate 325 (65 FE) MG tablet, Take 325 mg by mouth Daily With Breakfast., Disp: , Rfl:   •  folic acid (FOLVITE) 400 MCG tablet, Take 1 tablet by mouth Daily. PT HOLDING FOR SURGERY, Disp: , Rfl:   •  propranolol (INDERAL) 20 MG tablet, Take 20 mg by mouth 2 (Two) Times a Day., Disp: , Rfl:   •  pyridoxine (VITAMIN B-6) 100 MG tablet, Take 100 mg by mouth Daily. PT HOLDING FOR SURGERY, Disp: , Rfl:   •  Turmeric 500 MG capsule, Take 1 capsule by mouth Daily. PT HOLDING FOR SURGERY, Disp: , Rfl:       Allergies   Allergen Reactions   • Iodine Itching     IV ONLY   • Percocet [Oxycodone-Acetaminophen] Itching     causes ithching   • Iodinated Diagnostic Agents Rash     Patient reports rash occurred 30 yrs ago with contrast       Family History   Problem Relation Age of Onset   • Diabetes Mother    • Hypertension Mother    • Stroke Mother    • Hearing loss Mother         AIDES   • Heart disease Mother         CHF, PACER   • Thyroid disease Mother         THYROIDECTOMY   • Hypertension Father    • Stroke Father    • Alcohol abuse Father         DAYS OFF    • Heart disease Father    • Hyperlipidemia Father    • Kidney disease Father         KIDNEY STONE REMOVED   • Asthma Daughter          ADULT ONSET   • Cancer Sister         LUNG   • COPD Sister    • Lung cancer Sister    • Cancer Sister         KIDNEY   • Kidney cancer Sister    • Cancer Brother         LUNG   • Lung cancer Brother    • Diabetes Maternal Aunt         NIDDM   • Thyroid disease Maternal Aunt         THYROIDECTOMY   • Diabetes Maternal Aunt         NIDDM   • Other Maternal Aunt         ESSENTIAL TREMOR   • Diabetes Sister         INSULIN   • Diabetes Maternal Grandmother         NIDDM   • Early death Brother         4 MO, ? WHOOPING COUGH   • Heart disease Sister    • Mental illness Sister         HOSPITALIZED   • Hyperlipidemia Sister    • Mental illness Brother         Schizophrenia/hosp   • Other Maternal Uncle         PARKINSON   • Malig Hyperthermia Neg Hx        PHYSICAL EXAMINATION:   Vitals:    12/03/21 1215   BP: 147/89   Pulse: 99   SpO2: 99%     ECOG 0 - Asymptomatic  General: NAD, well appearing  Psych: a&o x 3, normal mood and affect  Eyes: EOMI, no scleral icterus  ENMT: neck supple without masses or thyromegaly, mucus membranes moist  MSK: normal gait, normal ROM in bilateral shoulders  Lymph nodes: no cervical, supraclavicular or axillary lymphadenopathy  Breast: symmetric, moderate size, grade 3 ptosis  Right: deferred.  Left: Well-healing upper inner curvilinear incision with Dermabond intact. There is mild fullness at the surgical site.      Assessment:  86 y.o. F with a diagnosis of left breast cancer: Intermediate grade, invasive ductal carcinoma, ER/NE+, Her2 negative. She underwent left partial mastectomy on 11/15/21, pT1bNx.    Plan:  -Medical oncology referral. Appointment scheduled with Dr. Pinzon on 12/7/21.  -Radiation oncology referral. Appointment scheduled with Dr. García on 12/13/21.  -F/u in 3 months for clinical exam.  -She was instructed to call sooner with any questions, concerns or changes on BSE.    Maliha Andres MD      CC:  Tawanda Maya MD

## 2021-12-07 ENCOUNTER — LAB (OUTPATIENT)
Dept: LAB | Facility: HOSPITAL | Age: 86
End: 2021-12-07

## 2021-12-07 ENCOUNTER — CONSULT (OUTPATIENT)
Dept: ONCOLOGY | Facility: CLINIC | Age: 86
End: 2021-12-07

## 2021-12-07 VITALS
TEMPERATURE: 97.3 F | HEART RATE: 66 BPM | WEIGHT: 192.6 LBS | OXYGEN SATURATION: 98 % | HEIGHT: 64 IN | SYSTOLIC BLOOD PRESSURE: 181 MMHG | RESPIRATION RATE: 16 BRPM | DIASTOLIC BLOOD PRESSURE: 70 MMHG | BODY MASS INDEX: 32.88 KG/M2

## 2021-12-07 DIAGNOSIS — Z17.0 MALIGNANT NEOPLASM OF UPPER-INNER QUADRANT OF LEFT BREAST IN FEMALE, ESTROGEN RECEPTOR POSITIVE (HCC): Primary | ICD-10-CM

## 2021-12-07 DIAGNOSIS — C50.212 MALIGNANT NEOPLASM OF UPPER-INNER QUADRANT OF LEFT BREAST IN FEMALE, ESTROGEN RECEPTOR POSITIVE (HCC): Primary | ICD-10-CM

## 2021-12-07 DIAGNOSIS — C50.212 MALIGNANT NEOPLASM OF UPPER-INNER QUADRANT OF LEFT BREAST IN FEMALE, ESTROGEN RECEPTOR POSITIVE (HCC): ICD-10-CM

## 2021-12-07 DIAGNOSIS — Z17.0 MALIGNANT NEOPLASM OF UPPER-INNER QUADRANT OF LEFT BREAST IN FEMALE, ESTROGEN RECEPTOR POSITIVE (HCC): ICD-10-CM

## 2021-12-07 DIAGNOSIS — E53.8 B12 DEFICIENCY: Primary | ICD-10-CM

## 2021-12-07 LAB
BASOPHILS # BLD AUTO: 0.03 10*3/MM3 (ref 0–0.2)
BASOPHILS NFR BLD AUTO: 0.5 % (ref 0–1.5)
DEPRECATED RDW RBC AUTO: 41.4 FL (ref 37–54)
EOSINOPHIL # BLD AUTO: 0.18 10*3/MM3 (ref 0–0.4)
EOSINOPHIL NFR BLD AUTO: 3 % (ref 0.3–6.2)
ERYTHROCYTE [DISTWIDTH] IN BLOOD BY AUTOMATED COUNT: 12.3 % (ref 12.3–15.4)
HCT VFR BLD AUTO: 36 % (ref 34–46.6)
HGB BLD-MCNC: 11.9 G/DL (ref 12–15.9)
IMM GRANULOCYTES # BLD AUTO: 0.02 10*3/MM3 (ref 0–0.05)
IMM GRANULOCYTES NFR BLD AUTO: 0.3 % (ref 0–0.5)
LYMPHOCYTES # BLD AUTO: 1.68 10*3/MM3 (ref 0.7–3.1)
LYMPHOCYTES NFR BLD AUTO: 28.2 % (ref 19.6–45.3)
MCH RBC QN AUTO: 30.4 PG (ref 26.6–33)
MCHC RBC AUTO-ENTMCNC: 33.1 G/DL (ref 31.5–35.7)
MCV RBC AUTO: 92.1 FL (ref 79–97)
MONOCYTES # BLD AUTO: 0.7 10*3/MM3 (ref 0.1–0.9)
MONOCYTES NFR BLD AUTO: 11.8 % (ref 5–12)
NEUTROPHILS NFR BLD AUTO: 3.34 10*3/MM3 (ref 1.7–7)
NEUTROPHILS NFR BLD AUTO: 56.2 % (ref 42.7–76)
NRBC BLD AUTO-RTO: 0 /100 WBC (ref 0–0.2)
PLATELET # BLD AUTO: 165 10*3/MM3 (ref 140–450)
PMV BLD AUTO: 10.3 FL (ref 6–12)
RBC # BLD AUTO: 3.91 10*6/MM3 (ref 3.77–5.28)
WBC NRBC COR # BLD: 5.95 10*3/MM3 (ref 3.4–10.8)

## 2021-12-07 PROCEDURE — 36415 COLL VENOUS BLD VENIPUNCTURE: CPT

## 2021-12-07 PROCEDURE — 99204 OFFICE O/P NEW MOD 45 MIN: CPT | Performed by: INTERNAL MEDICINE

## 2021-12-07 PROCEDURE — 85025 COMPLETE CBC W/AUTO DIFF WBC: CPT

## 2021-12-07 RX ORDER — ANASTROZOLE 1 MG/1
1 TABLET ORAL DAILY
Qty: 90 TABLET | Refills: 3 | Status: SHIPPED | OUTPATIENT
Start: 2021-12-07 | End: 2022-01-06

## 2021-12-07 NOTE — PROGRESS NOTES
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    HISTORY OF PRESENT ILLNESS:  The patient is a 86 y.o. year old female who is here for an opinion about the above issue.    History of Present Illness patient is an 86-year-old female in excellent health who was noted on routine screening mammogram this year to have an abnormality in the left breast that was not seen 2 years ago at her last mammogram.  This led to additional imaging and a biopsy Of the left breast on 2021 that revealed 4 mm grade 2 invasive ductal carcinoma with associated intermediate grade DCIS ER 99% TN 91% HER-2 negative with a Ki-67 of 16%.  Patient was referred to Dr. Andres and underwent lumpectomy on 11/15/2021 the finding of a residual 6 mm grade 2 invasive ductal carcinoma With associated DCIS measuring 12 mm clear margins and no sentinel nodes were removed.    Patient has done well postoperatively.  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    She is  1 para 1 menarche was at age 14 menopause in her mid 50s she had a hysterectomy at age 52.  First childbirth was age 34 she did not breast-feed.  She took hormone replacement for about a year or less after menopause.    Family history is negative for breast cancer she has a brother with lung cancer and a sister with lung cancer both of whom are stroke smokers in 1 sister who  of metastatic cancer unknown primary.    She has never had a DVT or stroke or heart attack but has had issues  with osteoporosis in her forearm although her spine is normal and her hips show mild to moderate osteopenia.  She has been on Fosamax for 3 years.  Her last bone density was in November of this year and shows normal spine lumbar spine and at left femoral neck and hip with osteopenia.      She is not a smoker or drinker    Past Medical History:   Diagnosis Date   • Allergic Percocet, iv iodine   • Anemia    • Arthritis    • B12 deficiency    • BPPV (benign paroxysmal positional vertigo)    • Breast cancer (HCC)     LEFT   • Colon polyp 2009   • Deep vein thrombosis (HCC) 1970    HX, LEG   • Essential tremor    • Folliculitis 11/01/2017   • Gait instability    • GERD (gastroesophageal reflux disease) 1990   • History of blood transfusion 2014   • HL (hearing loss) 2015   • Hypertension 2001    on Rx   • Lower extremity neuropathy     bilateral   • Neuropathy    • Osteopenia 2019   • Osteoporosis    • Small vessel disease (HCC)    • Torn rotator cuff 2013    right arm   • Tremor 2005    BET   • Wears hearing aid     BILATERAL   • Wrist fracture, right 2015        Past Surgical History:   Procedure Laterality Date   • APPENDECTOMY  1961   • BLEPHAROPLASTY Bilateral 10/19/2021    Procedure: BILATERAL UPPER LID BLEPHAROPLASTY;  Surgeon: Ruddy Moses MD;  Location: [facility];  Service: Ophthalmology;  Laterality: Bilateral;   • BREAST BIOPSY     • BREAST LUMPECTOMY Left 11/15/2021    Procedure: Left JEAN-PAUL-guided partial mastectomy;  Surgeon: Maliha Andres MD;  Location: [facility] OR;  Service: General;  Laterality: Left;   • BROW LIFT Bilateral 10/19/2021    Procedure: BILATERAL TEMPORAL DIRECT BROWLIFT;  Surgeon: Ruddy Moses MD;  [place] [facility] [place] [facility] [place]  Service: Ophthalmology;  Laterality: Bilateral;   • CARPAL TUNNEL RELEASE  1992    right wrist   • CATARACT EXTRACTION, BILATERAL  07/01/2018    R 7/18 and L 9/18 WITH LENS IMPLANTS   • DEQUERVAIN RELEASE Bilateral 2015   • DILATATION  AND CURETTAGE  1960s   • EYE SURGERY  2018    Bilateral repairs   • FRACTURE SURGERY  2014    R hip, R  wrist   • HIP FRACTURE SURGERY Right    • HYSTERECTOMY  1975    Partial   • JOINT REPLACEMENT  2001, 2014    knees   • TONSILLECTOMY AND ADENOIDECTOMY  1953   • TOTAL KNEE ARTHROPLASTY Left 2001   • TOTAL KNEE ARTHROPLASTY Right 2014   • WRIST SURGERY  2015    FRACTURE RIGHT        Current Outpatient Medications on File Prior to Visit   Medication Sig Dispense Refill   • alendronate (FOSAMAX) 70 MG tablet TAKE 1 TABLET BY MOUTH EVERY 7 DAYS (Patient taking differently: Take 70 mg by mouth Every 7 (Seven) Days.) 12 tablet 1   • aspirin (aspirin) 81 MG EC tablet Take 81 mg by mouth Daily. PT HOLDING FOR SURGERY     • benazepril (LOTENSIN) 5 MG tablet Take 2 tablets by mouth Daily. 180 tablet 2   • Biotin 1 MG capsule Take 1 capsule by mouth Daily. PT HOLDING FOR SURGERY     • Calcium Carbonate-Vit D-Min (Calcium 1200) 8944-3972 MG-UNIT chewable tablet Chew 1 tablet Daily. PT HOLDING FOR SURGERY     • cholecalciferol (Cholecalciferol) 25 MCG (1000 UT) tablet Take 2,000 Units by mouth Daily. PT HOLDING FOR SURGERY     • cyanocobalamin (VITAMIN B-12) 1000 MCG tablet      • ferrous sulfate 325 (65 FE) MG tablet Take 325 mg by mouth Daily With Breakfast.     • folic acid (FOLVITE) 400 MCG tablet Take 1 tablet by mouth Daily. PT HOLDING FOR SURGERY     • propranolol (INDERAL) 20 MG tablet Take 20 mg by mouth 2 (Two) Times a Day.     • pyridoxine (VITAMIN B-6) 100 MG tablet Take 100 mg by mouth Daily. PT HOLDING FOR SURGERY     • Turmeric 500 MG capsule Take 1 capsule by mouth Daily. PT HOLDING FOR SURGERY       No current facility-administered medications on file prior to visit.        ALLERGIES:    Allergies   Allergen Reactions   • Iodine Itching     IV ONLY   • Percocet [Oxycodone-Acetaminophen] Itching     causes ithching   • Iodinated Diagnostic Agents Rash     Patient reports rash occurred 30 yrs ago with contrast         Social History     Socioeconomic History   • Marital status:    • Number of children: 1   Tobacco Use   • Smoking status: Never Smoker   • Smokeless tobacco: Never Used   Vaping Use   • Vaping Use: Never used   Substance and Sexual Activity   • Alcohol use: No   • Drug use: No   • Sexual activity: Defer        Family History   Problem Relation Age of Onset   • Diabetes Mother    • Hypertension Mother    • Stroke Mother    • Hearing loss Mother         AIDES   • Heart disease Mother         CHF, PACER   • Thyroid disease Mother         THYROIDECTOMY   • Hypertension Father    • Stroke Father    • Alcohol abuse Father         DAYS OFF    • Heart disease Father    • Hyperlipidemia Father    • Kidney disease Father         KIDNEY STONE REMOVED   • Asthma Daughter         ADULT ONSET   • Cancer Sister         LUNG   • COPD Sister    • Lung cancer Sister    • Cancer Sister         KIDNEY   • Kidney cancer Sister    • Cancer Brother         LUNG   • Lung cancer Brother    • Diabetes Maternal Aunt         NIDDM   • Thyroid disease Maternal Aunt         THYROIDECTOMY   • Diabetes Maternal Aunt         NIDDM   • Other Maternal Aunt         ESSENTIAL TREMOR   • Diabetes Sister         INSULIN   • Diabetes Maternal Grandmother         NIDDM   • Early death Brother         4 MO, ? WHOOPING COUGH   • Heart disease Sister    • Mental illness Sister         HOSPITALIZED   • Hyperlipidemia Sister    • Mental illness Brother         Schizophrenia/hosp   • Other Maternal Uncle         PARKINSON   • Malig Hyperthermia Neg Hx         Review of Systems   Constitutional: Negative.    HENT: Negative.    Respiratory: Negative.    Cardiovascular: Negative.    Gastrointestinal: Negative.    Genitourinary: Negative.    Musculoskeletal: Negative.    Neurological: Negative.    Hematological: Negative.    Psychiatric/Behavioral: Negative.    All other systems reviewed and are negative.       Objective     Vitals:     "12/07/21 1256   BP: (!) 181/70  Comment: tends to run high.  on BP meds   Pulse: 66   Resp: 16   Temp: 97.3 °F (36.3 °C)   TempSrc: Temporal   SpO2: 98%   Weight: 87.4 kg (192 lb 9.6 oz)   Height: 162.6 cm (64.02\")   PainSc: 0-No pain     No flowsheet data found.    Physical Exam  This patient's ACP documentation is up to date, and there's nothing further left to document.      CONSTITUTIONAL:  Vital signs reviewed.  No distress, looks comfortable.  EYES:  Conjunctivae and lids unremarkable.  PERRLA  EARS,NOSE,MOUTH,THROAT:  Ears and nose appear unremarkable.  Lips, teeth, gums appear unremarkable.  RESPIRATORY:  Normal respiratory effort.  Lungs clear to auscultation bilaterally.  BREASTS: Right breast is benign left breast shows well-healed lumpectomy in the upper inner quadrant of left breast  CARDIOVASCULAR:  Normal S1, S2.  No murmurs rubs or gallops.  No significant lower extremity edema.  GASTROINTESTINAL: Abdomen appears unremarkable.  Nontender.  No hepatomegaly.  No splenomegaly.  LYMPHATIC:  No cervical, supraclavicular, axillary lymphadenopathy.  SKIN:  Warm.  No rashes.  PSYCHIATRIC:  Normal judgment and insight.  Normal mood and affect.      RECENT LABS:  Hematology WBC   Date Value Ref Range Status   12/07/2021 5.95 3.40 - 10.80 10*3/mm3 Final   11/04/2020 3.87 3.40 - 10.80 10*3/mm3 Final     RBC   Date Value Ref Range Status   12/07/2021 3.91 3.77 - 5.28 10*6/mm3 Final   11/04/2020 3.61 (L) 3.77 - 5.28 10*6/mm3 Final     Hemoglobin   Date Value Ref Range Status   12/07/2021 11.9 (L) 12.0 - 15.9 g/dL Final     Hematocrit   Date Value Ref Range Status   12/07/2021 36.0 34.0 - 46.6 % Final     Platelets   Date Value Ref Range Status   12/07/2021 165 140 - 450 10*3/mm3 Final        Final Diagnosis   1. Left Breast at 10 O'clock, 6 cm From Nipple (Not For Calcifications), Core Biopsy:                A. Invasive ductal carcinoma:                            1. Overall [place] Grade II (tubular score = 3, " nuclear score = 2, mitotic score = 1).                            2. Invasive carcinoma measures at least 4 mm in greatest dimension.                            3. No lymphovascular space invasion identified.               B. 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 Microcalcification present in foci of DCIS.     jat/jse    Electronically signed by Jeromy White MD on 10/1/2021 at 1421   Synoptic Checklist     Breast Biomarker Reporting Template  Protocol posted: 2/26/2020  BREAST: BIOMARKER REPORTING TEMPLATE - All Specimens  Test(s) Performed     Estrogen Receptor (ER) Status  Positive (greater than 10% of cells demonstrate nuclear positivity)    Percentage of Cells with Nuclear Positivity  99 %   Average Intensity of Staining  Strong    Test Type  Food and Drug Administration (FDA) cleared (test / vendor): [place]    Primary Antibody  SP1    Scoring System  Jessica    Proportion Score  5    Intensity Score  3    Total Jessica Score  8    Test(s) Performed     Progesterone Receptor (PgR) Status  Positive    Percentage of Cells with Nuclear Positivity  %    Average Intensity of Staining  Moderate    Test Type  Food and Drug Administration (FDA) cleared (test / vendor): [facility]    Primary Antibody  1E2    Scoring System  Jessica    Proportion Score  5    Intensity Score  2    Total Jessica Score  7    Test(s) Performed     HER2 by Immunohistochemistry  Negative (Score 1+)    Test Type  Food and Drug Administration (FDA) cleared (test / vendor): [facility]    Primary Antibody  4B5    Test(s) Performed  Ki-67    Percentage of Cells with Nuclear Positivity  16 %   Primary Antibody  30-9        Synoptic Checklist     INVASIVE CARCINOMA OF THE BREAST: Resection  8th Edition - Protocol posted: 6/30/2021  INVASIVE CARCINOMA OF THE BREAST: COMPLETE EXCISION - All Specimens  SPECIMEN   Procedure  Excision (less than total mastectomy)    Specimen Laterality  Left    TUMOR    Tumor Site  Upper inner quadrant      Clock position      10 o'clock    Tumor Site  Distance from nipple (Centimeters): 6 cm   Histologic Type  Invasive carcinoma of no special type (ductal)    Histologic Grade ([place] Histologic Score)     Glandular (Acinar) / Tubular Differentiation  Score 3    Nuclear Pleomorphism  Score 2    Mitotic Rate  Score 1    Overall Grade  Grade 2 (scores of 6 or 7)    Tumor Size  Greatest dimension of largest invasive focus (Millimeters): 6 mm   Additional Dimension (Millimeters)  4 mm     4 mm   Tumor Focality  Single focus of invasive carcinoma    Ductal Carcinoma In Situ (DCIS)  Present      Negative for extensive intraductal component (EIC)    Size (Extent) of DCIS  Estimated size (extent) of DCIS is at least (Millimeters): 12 mm   Additional Dimension (Millimeters)  6 mm     1 mm   Architectural Patterns  Cribriform      Micropapillary    Nuclear Grade  Grade II (intermediate)    Necrosis  Present, focal (small foci or single cell necrosis)    Lobular Carcinoma In Situ (LCIS)  Not identified    Tumor Extent     Lymphovascular Invasion  Not identified    Dermal Lymphovascular Invasion  Not identified    Microcalcifications  Present in DCIS      Present in non-neoplastic tissue    Treatment Effect in the Breast  No known presurgical therapy    MARGINS   Margin Status for Invasive Carcinoma  All margins negative for invasive carcinoma    Distance from Invasive Carcinoma to Closest Margin  11.5 mm   Closest Margin(s) to Invasive Carcinoma  Inferior    Distance from Invasive Carcinoma to Anterior Margin  22 mm   Distance from Invasive Carcinoma to Posterior Margin  12.2 mm   Distance from Invasive Carcinoma to Superior Margin  14 mm   Distance from Invasive Carcinoma to Inferior Margin  11.5 mm   Distance from Invasive Carcinoma to Medial Margin  16 mm   Distance from Invasive Carcinoma to Lateral Margin  36 mm   Margin Status for DCIS  All margins negative for DCIS    Distance  from DCIS to Closest Margin  12 mm   Closest Margin(s) to DCIS  Superior    REGIONAL LYMPH NODES   Regional Lymph Node Status  Not applicable (no regional lymph nodes submitted or found)    PATHOLOGIC STAGE CLASSIFICATION (pTNM, AJCC 8th Edition)   Reporting of pT, pN, and (when applicable) pM categories is based on information available to the pathologist at the time the report is issued. As per the AJCC (Chapter 1, 8th Ed.) it is the managing physician’s responsibility to establish the final pathologic stage based upon all pertinent information, including but potentially not limited to this pathology report.   pT Category  pT1b    pN Category  pN not assigned (no nodes submitted or found)             Assessment/Plan   1.pT1bN0 grade 2 infiltrating ductal carcinoma left breast strongly ER/MN positive HER-2 negative post lumpectomy    2.  Moderate osteopenia hips normal spine on Fosamax for 3 years    3.  Hypertension on treatment    4.  Essential tremor    5.  Negative family history of breast or ovarian cancer-family history positive for lung cancer in 2 siblings who are smokers      Plan  1.  Discussed the benefits of Arimidex in the adjuvant setting and the risk benefit ratio which favors its use even at her age.The side effects and toxicities of the Aromatase inhibitors was discussed with the patient including, hot flashes, mood swings and hair thinning.Significant arthralgias and worsening bone density were also discussed. Baseline bone density evaluation was reviewed    I have prescribed Arimidex No. 90 to her mail order pharmacy and she will start this and call me for any side effects    Obviously if her bone density worsens we will switch to Prolia and consider tamoxifen    I spent 50 total minutes, face-to-face, caring for Haydee today.  Greater than 50% of this time involved counseling and/or coordination of care as documented within this note.

## 2021-12-10 ENCOUNTER — PATIENT ROUNDING (BHMG ONLY) (OUTPATIENT)
Dept: ONCOLOGY | Facility: CLINIC | Age: 86
End: 2021-12-10

## 2021-12-10 ENCOUNTER — TELEPHONE (OUTPATIENT)
Dept: ONCOLOGY | Facility: CLINIC | Age: 86
End: 2021-12-10

## 2021-12-10 NOTE — TELEPHONE ENCOUNTER
CALLER: ROBERT HAYES      RELATIONSHIP:SELF      CALL REGARDING: MISSED TWO CALLS FROM THE OFFICE FIRST TIME ANSWERED AND SAID NAME THEN WHOEVER CALLED HUNG UP AND THEN GOT A CALL RIGHT BACK BUT MISSED THE CALL NO VM LEFT  WASN'T SURE WHO CALLED      I CALLED AND SPOKE WITH SUSSY AT THE  SHE OFFERED TO FURTHER ASSIST TO SEE WHO CALLED      WHEN GOING BACK TO TRANSFER PT TO [facility] TO FURTHER ASSIST. SHE ADVISED IF IS IMPORTANT SHE IS SURE THEY WILL CALL BACK AND NO LONGER WANTED FURTHER ASSISTANCE.      ADDITIONAL NOTES: DID SEE A NOTE FROM JANNY HOOKER ON CHART TODAY BUT DOES NOT INDICATE CLEARLY IF SHE IS THE ONE THAT HAD CALLED PT.

## 2021-12-10 NOTE — PROGRESS NOTES
December 10, 2021    Hello, may I speak with Haydee Dickey?    My name is tequila (Tried to call NA)    I am  with [facility]  [address] [id number] [phone].    Before we get started may I verify your date of birth? 1935    I am calling to officially welcome you to our practice and ask about your recent visit. Is this a good time to talk?    Tell me about your visit with us. What things went well?        We're always looking for ways to make our patients' experiences even better. Do you have recommendations on ways we may improve?    Overall were you satisfied with your first visit to our practice?      I appreciate you taking the time to speak with me today. Is there anything else I can do for you?       Thank you, and have a great day.

## 2021-12-14 ENCOUNTER — APPOINTMENT (OUTPATIENT)
Dept: RADIATION ONCOLOGY | Facility: HOSPITAL | Age: 86
End: 2021-12-14

## 2021-12-14 ENCOUNTER — CONSULT (OUTPATIENT)
Dept: RADIATION ONCOLOGY | Facility: HOSPITAL | Age: 86
End: 2021-12-14

## 2021-12-14 VITALS — HEART RATE: 82 BPM | OXYGEN SATURATION: 98 % | SYSTOLIC BLOOD PRESSURE: 162 MMHG | DIASTOLIC BLOOD PRESSURE: 82 MMHG

## 2021-12-14 DIAGNOSIS — C50.212 MALIGNANT NEOPLASM OF UPPER-INNER QUADRANT OF LEFT BREAST IN FEMALE, ESTROGEN RECEPTOR POSITIVE (HCC): Primary | ICD-10-CM

## 2021-12-14 DIAGNOSIS — Z17.0 MALIGNANT NEOPLASM OF UPPER-INNER QUADRANT OF LEFT BREAST IN FEMALE, ESTROGEN RECEPTOR POSITIVE (HCC): Primary | ICD-10-CM

## 2021-12-14 PROCEDURE — G0463 HOSPITAL OUTPT CLINIC VISIT: HCPCS | Performed by: RADIOLOGY

## 2021-12-14 PROCEDURE — 99204 OFFICE O/P NEW MOD 45 MIN: CPT | Performed by: RADIOLOGY

## 2021-12-14 NOTE — PROGRESS NOTES
Subjective     Maliha Andres MD    Cancer Staging  No matching staging information was found for the patient.    Chief Complaint   Patient presents with   • Consult     Breast CA     CC: left breast cancer  pT1bNx ER AL Pos Her 2 neg with 6mm DCIS, widely negative margins.                            Dear Maliha Andres MD    I had the pleasure of seeing Haydee Dickey  [facility].   The patient is a 86 y.o. female with recently diagnosed left breast cancer.  She had her screening mammogram on 21 which showed a 5mm focal asymmetry in inner central posterior left breast birads 0.  She had a diagnostic left mammogram on 21 which confirmed a persistant area of architectural distortion in posterior one third medial left breast.  Ultrasound the same day showed a 4mm hypoechoic lesion at 10 o'clock 6cm from the nipple.  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      She underwent a left breast lumpectomy with Dr. Maliha Andres on 11/15/21 with pathology revealing a 6mm x 4mm x 4mm invasive ductal carcinoma, grade II, with no lvsi with associated DCIS spanning 12mm x 6mm x 1mm, intermediate grade cribriform and micopapillary type.  All margins were negative with the invasive component 1.5mm from the closest inferior margin and DCIS located 4mm from the closest inferior margin.  Additional medial, inferior and posterior margins were negative for malignancy by an additional 10mm.  Her pathologic stage was pT1bNx.      She is  With menarche age 14, first childbirth age 34, and did not breast feed. She went through menopause in her 50s and had a hysterectomy age 52.  She took hormone replacement therapy for one year.   She has no family hx of breast or ovarian cancer.     She met with   Jeromy with medical oncology on 12/7/21 and she has recommended arimidex.  She is recovering well from her surgery and referred today for evaluation for adjuvant radiation.       Review of Systems   Constitutional: Negative.    HENT: Positive for hearing loss and voice change.    Eyes: Negative.    Respiratory: Negative.    Cardiovascular: Negative.    Gastrointestinal: Negative.    Genitourinary: Positive for enuresis. Negative for difficulty urinating.   Musculoskeletal: Negative.    Skin: Negative.    Neurological: Negative.    Hematological: Negative.    Psychiatric/Behavioral: Negative.          Past Medical History:   Diagnosis Date   • Allergic Percocet, iv iodine   • Anemia    • Arthritis    • B12 deficiency    • BPPV (benign paroxysmal positional vertigo)    • Breast cancer (HCC)     LEFT   • Colon polyp 2009   • Deep vein thrombosis (HCC) 1970    HX, LEG   • Essential tremor    • Folliculitis 11/01/2017   • Gait instability    • GERD (gastroesophageal reflux disease) 1990   • History of blood transfusion 2014   • HL (hearing loss) 2015   • Hypertension 2001    on Rx   • Lower extremity neuropathy     bilateral   • Neuropathy    • Osteopenia 2019   • Osteoporosis    • Small vessel disease (HCC)    • Torn rotator cuff 2013    right arm   • Tremor 2005    BET   • Wears hearing aid     BILATERAL   • Wrist fracture, right 2015         Past Surgical History:   Procedure Laterality Date   • APPENDECTOMY  1961   • BLEPHAROPLASTY Bilateral 10/19/2021    Procedure: BILATERAL UPPER LID BLEPHAROPLASTY;  Surgeon: Ruddy Moses MD;  Location: [facility];  Service: Ophthalmology;  Laterality: Bilateral;   • BREAST BIOPSY     • BREAST LUMPECTOMY Left 11/15/2021    Procedure: Left JEAN-PAUL-guided partial mastectomy;  Surgeon: Maliha Andres MD;  Location: [facility];  Service: General;  Laterality: Left;   • BROW LIFT Bilateral 10/19/2021    Procedure: BILATERAL TEMPORAL DIRECT BROWLIFT;  Surgeon:  Ruddy Moses MD;  [place] [facility] [place] [facility] [place]  Service: Ophthalmology;  Laterality: Bilateral;   • CARPAL TUNNEL RELEASE  1992    right wrist   • CATARACT EXTRACTION, BILATERAL  07/01/2018    R 7/18 and L 9/18 WITH LENS IMPLANTS   • DEQUERVAIN RELEASE Bilateral 2015   • DILATATION AND CURETTAGE  1960s   • EYE SURGERY  2018    Bilateral repairs   • FRACTURE SURGERY  2014    R hip, R  wrist   • HIP FRACTURE SURGERY Right    • HYSTERECTOMY  1975    Partial   • JOINT REPLACEMENT  2001, 2014    knees   • TONSILLECTOMY AND ADENOIDECTOMY  1953   • TOTAL KNEE ARTHROPLASTY Left 2001   • TOTAL KNEE ARTHROPLASTY Right 2014   • WRIST SURGERY  2015    FRACTURE RIGHT         Social History     Socioeconomic History   • Marital status:    • Number of children: 1   Tobacco Use   • Smoking status: Never Smoker   • Smokeless tobacco: Never Used   Vaping Use   • Vaping Use: Never used   Substance and Sexual Activity   • Alcohol use: No   • Drug use: No   • Sexual activity: Defer         Family History   Problem Relation Age of Onset   • Diabetes Mother    • Hypertension Mother    • Stroke Mother    • Hearing loss Mother         AIDES   • Heart disease Mother         CHF, PACER   • Thyroid disease Mother         THYROIDECTOMY   • Hypertension Father    • Stroke Father    • Alcohol abuse Father         DAYS OFF    • Heart disease Father    • Hyperlipidemia Father    • Kidney disease Father         KIDNEY STONE REMOVED   • Heart attack Father    • Asthma Daughter         ADULT ONSET   • Cancer Sister         LUNG   • COPD Sister    • Lung cancer Sister    • Cancer Sister         KIDNEY   • Kidney cancer Sister    • Cancer Brother         LUNG   • Lung cancer Brother    • Diabetes Maternal Aunt         NIDDM   • Thyroid disease Maternal Aunt         THYROIDECTOMY   • Diabetes Maternal Aunt         NIDDM   • Other Maternal Aunt         ESSENTIAL TREMOR   • Diabetes Sister         INSULIN   • Diabetes Maternal  Grandmother         NIDDM   • Early death Brother         4 MO, ? WHOOPING COUGH   • Heart disease Sister    • Mental illness Sister         HOSPITALIZED   • Hyperlipidemia Sister    • Mental illness Brother         Schizophrenia/hosp   • Other Maternal Uncle         PARKINSON   • Malig Hyperthermia Neg Hx           Objective    Physical Exam  Constitutional:       Appearance: Normal appearance.   HENT:      Head: Atraumatic.   Pulmonary:      Effort: Pulmonary effort is normal.   Chest:          Comments: Well healed incision uiq left breast, no palpable masses in either breast or axilla  Abdominal:      Palpations: Abdomen is soft.   Musculoskeletal:         General: Normal range of motion.   Neurological:      General: No focal deficit present.      Mental Status: She is alert and oriented to person, place, and time.   Psychiatric:         Mood and Affect: Mood normal.         Thought Content: Thought content normal.           Current Outpatient Medications on File Prior to Visit   Medication Sig Dispense Refill   • alendronate (FOSAMAX) 70 MG tablet TAKE 1 TABLET BY MOUTH EVERY 7 DAYS (Patient taking differently: Take 70 mg by mouth Every 7 (Seven) Days.) 12 tablet 1   • anastrozole (Arimidex) 1 MG tablet Take 1 tablet by mouth Daily for 30 days. 90 tablet 3   • aspirin (aspirin) 81 MG EC tablet Take 81 mg by mouth Daily. PT HOLDING FOR SURGERY     • benazepril (LOTENSIN) 5 MG tablet Take 2 tablets by mouth Daily. 180 tablet 2   • Biotin 1 MG capsule Take 1 capsule by mouth Daily. PT HOLDING FOR SURGERY     • Calcium Carbonate-Vit D-Min (Calcium 1200) 8284-2916 MG-UNIT chewable tablet Chew 1 tablet Daily. PT HOLDING FOR SURGERY     • cholecalciferol (Cholecalciferol) 25 MCG (1000 UT) tablet Take 2,000 Units by mouth Daily. PT HOLDING FOR SURGERY     • cyanocobalamin (VITAMIN B-12) 1000 MCG tablet      • ferrous sulfate 325 (65 FE) MG tablet Take 325 mg by mouth Daily With Breakfast.     • folic acid (FOLVITE)  400 MCG tablet Take 1 tablet by mouth Daily. PT HOLDING FOR SURGERY     • propranolol (INDERAL) 20 MG tablet Take 20 mg by mouth 2 (Two) Times a Day.     • pyridoxine (VITAMIN B-6) 100 MG tablet Take 100 mg by mouth Daily. PT HOLDING FOR SURGERY     • Turmeric 500 MG capsule Take 1 capsule by mouth Daily. PT HOLDING FOR SURGERY       No current facility-administered medications on file prior to visit.       ALLERGIES:    Allergies   Allergen Reactions   • Iodine Itching     IV ONLY   • Percocet [Oxycodone-Acetaminophen] Itching     causes ithching   • Iodinated Diagnostic Agents Rash     Patient reports rash occurred 30 yrs ago with contrast       There were no vitals taken for this visit.     No flowsheet data found.      Assessment/Plan     86 year old female with left breast cancer  pT1bNx ER VT Pos Her 2 neg with 6mm DCIS and widely negative margins.   Given her age over 70, small tumor size, widely negative margins and strong estrogen receptor positivity, I do not recommend adjuvant radiation.  I believe her risk of local recurrence is extremely low and does not warrant adjuvant radiation.     She voiced understanding and was given an opportunity to ask questions which I believe were answered to her satisfaction.  She will have regular follow up with Dr. Pinzon, her medical oncologist, and Dr. Andres Her surgeon. I have not scheduled a follow up with me but I asked her to call with any questions or concerns in the future.    I personally spent greater than 45 minutes today assessing, managing, discussing and documenting my visit with the patient. That time includes review of records, imaging and pathology reports, obtaining my own history, performing a medically appropriate evaluation, counseling and educating the patient, discussing goals, logistics, alternatives and risks of my recommendations, surveillance options and potential outcomes. It also includes the time documenting the clinical information in the  EMR and communicating my recommendations to the other involved physicians.           Thank you very much for allowing me to participate in the care of this very pleasant patient.    Sincerely,      Judy García MD

## 2021-12-29 RX ORDER — ALENDRONATE SODIUM 70 MG/1
TABLET ORAL
Qty: 12 TABLET | Refills: 0 | Status: SHIPPED | OUTPATIENT
Start: 2021-12-29 | End: 2022-01-18

## 2022-01-18 RX ORDER — ALENDRONATE SODIUM 70 MG/1
TABLET ORAL
Qty: 12 TABLET | Refills: 1 | Status: SHIPPED | OUTPATIENT
Start: 2022-01-18 | End: 2022-09-23

## 2022-03-04 ENCOUNTER — TELEPHONE (OUTPATIENT)
Dept: SURGERY | Facility: CLINIC | Age: 87
End: 2022-03-04

## 2022-03-04 NOTE — TELEPHONE ENCOUNTER
Due to scheduling conflict, appointment with Dr. Andres on 3/11/2022 has been moved to 3/24/2022 @ 9:00am.    Called and spoke to patient, patient expressed v/u of appointment times.    Sent patient a reminder letter in the mail.

## 2022-03-09 ENCOUNTER — OFFICE VISIT (OUTPATIENT)
Dept: ONCOLOGY | Facility: CLINIC | Age: 87
End: 2022-03-09

## 2022-03-09 ENCOUNTER — LAB (OUTPATIENT)
Dept: LAB | Facility: HOSPITAL | Age: 87
End: 2022-03-09

## 2022-03-09 VITALS
RESPIRATION RATE: 16 BRPM | HEIGHT: 64 IN | BODY MASS INDEX: 33.12 KG/M2 | TEMPERATURE: 97.2 F | SYSTOLIC BLOOD PRESSURE: 135 MMHG | OXYGEN SATURATION: 99 % | DIASTOLIC BLOOD PRESSURE: 68 MMHG | HEART RATE: 66 BPM | WEIGHT: 194 LBS

## 2022-03-09 DIAGNOSIS — Z17.0 MALIGNANT NEOPLASM OF UPPER-INNER QUADRANT OF LEFT BREAST IN FEMALE, ESTROGEN RECEPTOR POSITIVE: Primary | ICD-10-CM

## 2022-03-09 DIAGNOSIS — C50.212 MALIGNANT NEOPLASM OF UPPER-INNER QUADRANT OF LEFT BREAST IN FEMALE, ESTROGEN RECEPTOR POSITIVE: Primary | ICD-10-CM

## 2022-03-09 DIAGNOSIS — Z79.811 AROMATASE INHIBITOR USE: ICD-10-CM

## 2022-03-09 DIAGNOSIS — E53.8 B12 DEFICIENCY: ICD-10-CM

## 2022-03-09 LAB
ALBUMIN SERPL-MCNC: 4.1 G/DL (ref 3.5–5.2)
ALBUMIN/GLOB SERPL: 1.6 G/DL (ref 1.1–2.4)
ALP SERPL-CCNC: 31 U/L (ref 38–116)
ALT SERPL W P-5'-P-CCNC: 22 U/L (ref 0–33)
ANION GAP SERPL CALCULATED.3IONS-SCNC: 9.1 MMOL/L (ref 5–15)
AST SERPL-CCNC: 29 U/L (ref 0–32)
BASOPHILS # BLD AUTO: 0.02 10*3/MM3 (ref 0–0.2)
BASOPHILS NFR BLD AUTO: 0.5 % (ref 0–1.5)
BILIRUB SERPL-MCNC: 0.2 MG/DL (ref 0.2–1.2)
BUN SERPL-MCNC: 19 MG/DL (ref 6–20)
BUN/CREAT SERPL: 16.4 (ref 7.3–30)
CALCIUM SPEC-SCNC: 9.9 MG/DL (ref 8.5–10.2)
CHLORIDE SERPL-SCNC: 108 MMOL/L (ref 98–107)
CO2 SERPL-SCNC: 25.9 MMOL/L (ref 22–29)
CREAT SERPL-MCNC: 1.16 MG/DL (ref 0.6–1.1)
DEPRECATED RDW RBC AUTO: 42 FL (ref 37–54)
EGFRCR SERPLBLD CKD-EPI 2021: 46.6 ML/MIN/1.73
EOSINOPHIL # BLD AUTO: 0.12 10*3/MM3 (ref 0–0.4)
EOSINOPHIL NFR BLD AUTO: 3.1 % (ref 0.3–6.2)
ERYTHROCYTE [DISTWIDTH] IN BLOOD BY AUTOMATED COUNT: 12.1 % (ref 12.3–15.4)
FERRITIN SERPL-MCNC: 112.7 NG/ML (ref 13–150)
GLOBULIN UR ELPH-MCNC: 2.5 GM/DL (ref 1.8–3.5)
GLUCOSE SERPL-MCNC: 126 MG/DL (ref 74–124)
HCT VFR BLD AUTO: 34.7 % (ref 34–46.6)
HGB BLD-MCNC: 11.5 G/DL (ref 12–15.9)
IMM GRANULOCYTES # BLD AUTO: 0.01 10*3/MM3 (ref 0–0.05)
IMM GRANULOCYTES NFR BLD AUTO: 0.3 % (ref 0–0.5)
IRON 24H UR-MRATE: 62 MCG/DL (ref 37–145)
IRON SATN MFR SERPL: 20 % (ref 14–48)
LYMPHOCYTES # BLD AUTO: 1.05 10*3/MM3 (ref 0.7–3.1)
LYMPHOCYTES NFR BLD AUTO: 27.3 % (ref 19.6–45.3)
MCH RBC QN AUTO: 31.1 PG (ref 26.6–33)
MCHC RBC AUTO-ENTMCNC: 33.1 G/DL (ref 31.5–35.7)
MCV RBC AUTO: 93.8 FL (ref 79–97)
MONOCYTES # BLD AUTO: 0.43 10*3/MM3 (ref 0.1–0.9)
MONOCYTES NFR BLD AUTO: 11.2 % (ref 5–12)
NEUTROPHILS NFR BLD AUTO: 2.21 10*3/MM3 (ref 1.7–7)
NEUTROPHILS NFR BLD AUTO: 57.6 % (ref 42.7–76)
NRBC BLD AUTO-RTO: 0 /100 WBC (ref 0–0.2)
PLATELET # BLD AUTO: 152 10*3/MM3 (ref 140–450)
PMV BLD AUTO: 9.5 FL (ref 6–12)
POTASSIUM SERPL-SCNC: 5 MMOL/L (ref 3.5–4.7)
PROT SERPL-MCNC: 6.6 G/DL (ref 6.3–8)
RBC # BLD AUTO: 3.7 10*6/MM3 (ref 3.77–5.28)
SODIUM SERPL-SCNC: 143 MMOL/L (ref 134–145)
TIBC SERPL-MCNC: 318 MCG/DL (ref 249–505)
TRANSFERRIN SERPL-MCNC: 227 MG/DL (ref 200–360)
WBC NRBC COR # BLD: 3.84 10*3/MM3 (ref 3.4–10.8)

## 2022-03-09 PROCEDURE — 80053 COMPREHEN METABOLIC PANEL: CPT

## 2022-03-09 PROCEDURE — 99214 OFFICE O/P EST MOD 30 MIN: CPT | Performed by: INTERNAL MEDICINE

## 2022-03-09 PROCEDURE — 84466 ASSAY OF TRANSFERRIN: CPT | Performed by: INTERNAL MEDICINE

## 2022-03-09 PROCEDURE — 82728 ASSAY OF FERRITIN: CPT | Performed by: INTERNAL MEDICINE

## 2022-03-09 PROCEDURE — 85025 COMPLETE CBC W/AUTO DIFF WBC: CPT

## 2022-03-09 PROCEDURE — 36415 COLL VENOUS BLD VENIPUNCTURE: CPT

## 2022-03-09 PROCEDURE — 83540 ASSAY OF IRON: CPT | Performed by: INTERNAL MEDICINE

## 2022-03-09 RX ORDER — ANASTROZOLE 1 MG/1
TABLET ORAL
COMMUNITY
Start: 2022-02-25 | End: 2023-01-03

## 2022-03-09 NOTE — PROGRESS NOTES
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 post lumpectomy-Arimidex started/12/21                               REQUESTING PHYSICIAN: MD Tawanda Bañuelos MD    History of Present Illness patient is an 86-year-old lady with a recently diagnosed breast cancer who has been on Arimidex now for the last 3 months    She has noticed some thinning of her hair but no other significant side effects and she is very happy with this    She is up-to-date with screening but has not had a colonoscopy and at her age I do not think this is reasonable and I have recommended a Cologuard      Past Medical History:   Diagnosis Date   • Allergic Percocet, iv iodine   • Anemia    • Arthritis    • B12 deficiency    • BPPV (benign paroxysmal positional vertigo)    • Breast cancer (HCC)     LEFT   • Colon polyp 2009   • Deep vein thrombosis (HCC) 1970    HX, LEG   • Essential tremor    • Folliculitis 11/01/2017   • Gait instability    • GERD (gastroesophageal reflux disease) 1990   • History of blood transfusion 2014   • HL (hearing loss) 2015   • Hypertension 2001    on Rx   • Lower extremity neuropathy     bilateral   • Neuropathy    • Osteopenia 2019   • Osteoporosis    • Small vessel disease (HCC)    • Torn rotator cuff 2013    right arm   • Tremor 2005    BET   • Wears hearing aid     BILATERAL   • Wrist fracture, right 2015        Past Surgical History:   Procedure Laterality Date   • APPENDECTOMY  1961   • BLEPHAROPLASTY Bilateral 10/19/2021    Procedure: BILATERAL UPPER LID BLEPHAROPLASTY;  Surgeon: Ruddy Moses MD;  Location: [facility];  Service: Ophthalmology;  Laterality: Bilateral;   • BREAST BIOPSY     • BREAST LUMPECTOMY Left 11/15/2021    Procedure: Left JEAN-PAUL-guided partial mastectomy;  Surgeon: Maliha Andres MD;  Location: [facility] OR;  Service: General;   Laterality: Left;   • BROW LIFT Bilateral 10/19/2021    Procedure: BILATERAL TEMPORAL DIRECT BROWLIFT;  Surgeon: Ruddy Moses MD;  Location: [facility] OR [facility];  Service: Ophthalmology;  Laterality: Bilateral;   • CARPAL TUNNEL RELEASE      right wrist   • CATARACT EXTRACTION, BILATERAL  2018    R  and L  WITH LENS IMPLANTS   • DEQUERVAIN RELEASE Bilateral    • DILATATION AND CURETTAGE  1960s   • EYE SURGERY  2018    Bilateral repairs   • FRACTURE SURGERY      R hip, R  wrist   • HIP FRACTURE SURGERY Right    • HYSTERECTOMY      Partial   • JOINT REPLACEMENT  ,     knees   • TONSILLECTOMY AND ADENOIDECTOMY     • TOTAL KNEE ARTHROPLASTY Left    • TOTAL KNEE ARTHROPLASTY Right 2014   • WRIST SURGERY  2015    FRACTURE RIGHT   Oncologic history  patient is an 86-year-old female in excellent health who was noted on routine screening mammogram this year to have an abnormality in the left breast that was not seen 2 years ago at her last mammogram.  This led to additional imaging and a biopsy Of the left breast on 2021 that revealed 4 mm grade 2 invasive ductal carcinoma with associated intermediate grade DCIS ER 99% SC 91% HER-2 negative with a Ki-67 of 16%.  Patient was referred to Dr. Andres and underwent lumpectomy on 11/15/2021 the finding of a residual 6 mm grade 2 invasive ductal carcinoma With associated DCIS measuring 12 mm clear margins and no sentinel nodes were removed.    Patient has done well postoperatively.  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    She is  1 para 1 menarche was at age 14 menopause in her mid 50s she had a hysterectomy at age 52.  First childbirth was age 34 she did not breast-feed.  She took hormone replacement for about a year or less after menopause.    Family history is negative for breast cancer she has a brother with lung cancer and a sister with lung  cancer both of whom are stroke smokers in 1 sister who  of metastatic cancer unknown primary.    She has never had a DVT or stroke or heart attack but has had issues with osteoporosis in her forearm although her spine is normal and her hips show mild to moderate osteopenia.  She has been on Fosamax for 3 years.  Her last bone density was in November of this year and shows normal spine lumbar spine and at left femoral neck and hip with osteopenia.      She is not a smoker or drinker     Discussed the benefits of Arimidex in the adjuvant setting and the risk benefit ratio which favors its use even at her age.The side effects and toxicities of the Aromatase inhibitors was discussed with the patient including, hot flashes, mood swings and hair thinning.Significant arthralgias and worsening bone density were also discussed. Baseline bone density evaluation was reviewed    I have prescribed Arimidex No. 90 to her mail order pharmacy and she will start this and call me for any side effects    Obviously if her bone density worsens we will switch to Prolia and consider tamoxifen      Current Outpatient Medications on File Prior to Visit   Medication Sig Dispense Refill   • alendronate (FOSAMAX) 70 MG tablet take 1 tablet by mouth every 7 days 12 tablet 1   • anastrozole (ARIMIDEX) 1 MG tablet      • aspirin 81 MG EC tablet Take 81 mg by mouth Daily. PT HOLDING FOR SURGERY     • benazepril (LOTENSIN) 5 MG tablet Take 2 tablets by mouth Daily. 180 tablet 2   • Biotin 1 MG capsule Take 1 capsule by mouth Daily. PT HOLDING FOR SURGERY     • Calcium Carbonate-Vit D-Min (Calcium 1200) 5397-4793 MG-UNIT chewable tablet Chew 1 tablet Daily. PT HOLDING FOR SURGERY     • cholecalciferol (VITAMIN D3) 25 MCG (1000 UT) tablet Take 2,000 Units by mouth Daily. PT HOLDING FOR SURGERY     • cyanocobalamin (VITAMIN B-12) 1000 MCG tablet      • ferrous sulfate 325 (65 FE) MG tablet Take 325 mg by mouth Daily With Breakfast.     • folic  acid (FOLVITE) 400 MCG tablet Take 1 tablet by mouth Daily. PT HOLDING FOR SURGERY     • propranolol (INDERAL) 20 MG tablet Take 20 mg by mouth 2 (Two) Times a Day.     • pyridoxine (VITAMIN B-6) 100 MG tablet Take 100 mg by mouth Daily. PT HOLDING FOR SURGERY     • Turmeric 500 MG capsule Take 1 capsule by mouth Daily. PT HOLDING FOR SURGERY       No current facility-administered medications on file prior to visit.        ALLERGIES:    Allergies   Allergen Reactions   • Iodine Itching     IV ONLY   • Percocet [Oxycodone-Acetaminophen] Itching     causes ithching   • Iodinated Diagnostic Agents Rash     Patient reports rash occurred 30 yrs ago with contrast        Social History     Socioeconomic History   • Marital status:    • Number of children: 1   Tobacco Use   • Smoking status: Never Smoker   • Smokeless tobacco: Never Used   Vaping Use   • Vaping Use: Never used   Substance and Sexual Activity   • Alcohol use: No   • Drug use: No   • Sexual activity: Defer        Family History   Problem Relation Age of Onset   • Diabetes Mother    • Hypertension Mother    • Stroke Mother    • Hearing loss Mother         AIDES   • Heart disease Mother         CHF, PACER   • Thyroid disease Mother         THYROIDECTOMY   • Hypertension Father    • Stroke Father    • Alcohol abuse Father         DAYS OFF    • Heart disease Father    • Hyperlipidemia Father    • Kidney disease Father         KIDNEY STONE REMOVED   • Heart attack Father    • Asthma Daughter         ADULT ONSET   • Cancer Sister         LUNG   • COPD Sister    • Lung cancer Sister    • Cancer Sister         KIDNEY   • Kidney cancer Sister    • Cancer Brother         LUNG   • Lung cancer Brother    • Diabetes Maternal Aunt         NIDDM   • Thyroid disease Maternal Aunt         THYROIDECTOMY   • Diabetes Maternal Aunt         NIDDM   • Other Maternal Aunt         ESSENTIAL TREMOR   • Diabetes Sister         INSULIN   • Diabetes Maternal Grandmother   "       NIDDM   • Early death Brother         4 MO, ? WHOOPING COUGH   • Heart disease Sister    • Mental illness Sister         HOSPITALIZED   • Hyperlipidemia Sister    • Mental illness Brother         Schizophrenia/hosp   • Other Maternal Uncle         PARKINSON   • Malig Hyperthermia Neg Hx         Review of Systems   Constitutional: Negative.    HENT: Negative.    Respiratory: Negative.    Cardiovascular: Negative.    Gastrointestinal: Negative.    Genitourinary: Negative.    Musculoskeletal: Negative.    Neurological: Negative.    Hematological: Negative.    Psychiatric/Behavioral: Negative.    All other systems reviewed and are negative.       Objective     Vitals:    03/09/22 0959   BP: 135/68   Pulse: 66   Resp: 16   Temp: 97.2 °F (36.2 °C)   TempSrc: Temporal   SpO2: 99%   Weight: 88 kg (194 lb)   Height: 162 cm (63.78\")   PainSc: 0-No pain     Current Status 3/9/2022   ECOG score 0       Physical Exam  This patient's ACP documentation is up to date, and there's nothing further left to document.      CONSTITUTIONAL:  Vital signs reviewed.  No distress, looks comfortable.  EYES:  Conjunctivae and lids unremarkable.  PERRLA  EARS,NOSE,MOUTH,THROAT:  Ears and nose appear unremarkable.  Lips, teeth, gums appear unremarkable.  RESPIRATORY:  Normal respiratory effort.  Lungs clear to auscultation bilaterally.  BREASTS: Right breast is benign left breast shows well-healed lumpectomy in the upper inner quadrant of left breast  CARDIOVASCULAR:  Normal S1, S2.  No murmurs rubs or gallops.  No significant lower extremity edema.  GASTROINTESTINAL: Abdomen appears unremarkable.  Nontender.  No hepatomegaly.  No splenomegaly.  LYMPHATIC:  No cervical, supraclavicular, axillary lymphadenopathy.  SKIN:  Warm.  No rashes.  PSYCHIATRIC:  Normal judgment and insight.  Normal mood and affect.  I have reexamined the patient and the results are consistent with the previously documented exam. Coy Pinzon MD       RECENT " LABS:  Hematology WBC   Date Value Ref Range Status   03/09/2022 3.84 3.40 - 10.80 10*3/mm3 Final   11/04/2020 3.87 3.40 - 10.80 10*3/mm3 Final     RBC   Date Value Ref Range Status   03/09/2022 3.70 (L) 3.77 - 5.28 10*6/mm3 Final   11/04/2020 3.61 (L) 3.77 - 5.28 10*6/mm3 Final     Hemoglobin   Date Value Ref Range Status   03/09/2022 11.5 (L) 12.0 - 15.9 g/dL Final     Hematocrit   Date Value Ref Range Status   03/09/2022 34.7 34.0 - 46.6 % Final     Platelets   Date Value Ref Range Status   03/09/2022 152 140 - 450 10*3/mm3 Final        Final Diagnosis   1. Left Breast at 10 O'clock, 6 cm From Nipple (Not For Calcifications), Core Biopsy:                A. Invasive ductal carcinoma:                            1. Overall [place] Grade II (tubular score = 3, nuclear score = 2, mitotic score = 1).                            2. Invasive carcinoma measures at least 4 mm in greatest dimension.                            3. No lymphovascular space invasion identified.               B. 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 Microcalcification present in foci of DCIS.     jat/jse    Electronically signed by Jeromy White MD on 10/1/2021 at 1421   Synoptic Checklist     Breast Biomarker Reporting Template  Protocol posted: 2/26/2020  BREAST: BIOMARKER REPORTING TEMPLATE - All Specimens  Test(s) Performed     Estrogen Receptor (ER) Status  Positive (greater than 10% of cells demonstrate nuclear positivity)    Percentage of Cells with Nuclear Positivity  99 %   Average Intensity of Staining  Strong    Test Type  Food and Drug Administration (FDA) cleared (test / vendor): [place]    Primary Antibody  SP1    Scoring System  Jessica    Proportion Score  5    Intensity Score  3    Total Jessica Score  8    Test(s) Performed     Progesterone Receptor (PgR) Status  Positive    Percentage of Cells with Nuclear Positivity  %    Average Intensity of Staining  Moderate     Test Type  Food and Drug Administration (FDA) cleared (test / vendor): [place]    Primary Antibody  1E2    Scoring System  Jessica    Proportion Score  5    Intensity Score  2    Total Jessica Score  7    Test(s) Performed     HER2 by Immunohistochemistry  Negative (Score 1+)    Test Type  Food and Drug Administration (FDA) cleared (test / vendor): [place]    Primary Antibody  4B5    Test(s) Performed  Ki-67    Percentage of Cells with Nuclear Positivity  16 %   Primary Antibody  30-9        Synoptic Checklist     INVASIVE CARCINOMA OF THE BREAST: Resection  8th Edition - Protocol posted: 6/30/2021  INVASIVE CARCINOMA OF THE BREAST: COMPLETE EXCISION - All Specimens  SPECIMEN   Procedure  Excision (less than total mastectomy)    Specimen Laterality  Left    TUMOR   Tumor Site  Upper inner quadrant      Clock position      10 o'clock    Tumor Site  Distance from nipple (Centimeters): 6 cm   Histologic Type  Invasive carcinoma of no special type (ductal)    Histologic Grade ([place] Histologic Score)     Glandular (Acinar) / Tubular Differentiation  Score 3    Nuclear Pleomorphism  Score 2    Mitotic Rate  Score 1    Overall Grade  Grade 2 (scores of 6 or 7)    Tumor Size  Greatest dimension of largest invasive focus (Millimeters): 6 mm   Additional Dimension (Millimeters)  4 mm     4 mm   Tumor Focality  Single focus of invasive carcinoma    Ductal Carcinoma In Situ (DCIS)  Present      Negative for extensive intraductal component (EIC)    Size (Extent) of DCIS  Estimated size (extent) of DCIS is at least (Millimeters): 12 mm   Additional Dimension (Millimeters)  6 mm     1 mm   Architectural Patterns  Cribriform      Micropapillary    Nuclear Grade  Grade II (intermediate)    Necrosis  Present, focal (small foci or single cell necrosis)    Lobular Carcinoma In Situ (LCIS)  Not identified    Tumor Extent     Lymphovascular Invasion  Not identified    Dermal Lymphovascular Invasion  Not identified     Microcalcifications  Present in DCIS      Present in non-neoplastic tissue    Treatment Effect in the Breast  No known presurgical therapy    MARGINS   Margin Status for Invasive Carcinoma  All margins negative for invasive carcinoma    Distance from Invasive Carcinoma to Closest Margin  11.5 mm   Closest Margin(s) to Invasive Carcinoma  Inferior    Distance from Invasive Carcinoma to Anterior Margin  22 mm   Distance from Invasive Carcinoma to Posterior Margin  12.2 mm   Distance from Invasive Carcinoma to Superior Margin  14 mm   Distance from Invasive Carcinoma to Inferior Margin  11.5 mm   Distance from Invasive Carcinoma to Medial Margin  16 mm   Distance from Invasive Carcinoma to Lateral Margin  36 mm   Margin Status for DCIS  All margins negative for DCIS    Distance from DCIS to Closest Margin  12 mm   Closest Margin(s) to DCIS  Superior    REGIONAL LYMPH NODES   Regional Lymph Node Status  Not applicable (no regional lymph nodes submitted or found)    PATHOLOGIC STAGE CLASSIFICATION (pTNM, AJCC 8th Edition)   Reporting of pT, pN, and (when applicable) pM categories is based on information available to the pathologist at the time the report is issued. As per the AJCC (Chapter 1, 8th Ed.) it is the managing physician’s responsibility to establish the final pathologic stage based upon all pertinent information, including but potentially not limited to this pathology report.   pT Category  pT1b    pN Category  pN not assigned (no nodes submitted or found)             Assessment/Plan   1.pT1bN0 grade 2 infiltrating ductal carcinoma left breast strongly ER/WI positive HER-2 negative post lumpectomy  · Arimidex started in 12/21  · Tolerating Arimidex well as of 3/22    2.  Moderate osteopenia hips normal spine on Fosamax for 3 years    3.  Hypertension on treatment    4.  Essential tremor    5.  Negative family history of breast or ovarian cancer-family history positive for lung cancer in 2 siblings who are  smokers    6 DVT 1970 while on BCP    Plan  1.  Continue Arimidex  2.  See me in 3 to 4 months for follow-up

## 2022-03-21 ENCOUNTER — TELEPHONE (OUTPATIENT)
Dept: ONCOLOGY | Facility: CLINIC | Age: 87
End: 2022-03-21

## 2022-03-21 DIAGNOSIS — Z12.11 COLON CANCER SCREENING: Primary | ICD-10-CM

## 2022-03-21 NOTE — TELEPHONE ENCOUNTER
Caller: Robert Dickey    Relationship: Self    Best call back number: [phone]    OR CAN REPLY VIA MY CHART ALSO     What is the best time to reach you: ANYTIME    Who are you requesting to speak with (clinical staff, provider,  specific staff member): DR JACK OR HER NURSE    Do you know the name of the person who called: ROBERT     What was the call regarding:     SAW DR JACK LAST WEEK 03/09  AND SHE WAS GOING TO DO COLOR GUARD FOR COLON CANCER AND DID NOT GET SCRIPT FOR IT.       NEED SCRIPT SENT TO     [id number] - [place], [place] - [address] [facility] [facility] [phone] [facility] [phone] FX    Do you require a callback: YES ONCE CALLED IN

## 2022-03-21 NOTE — TELEPHONE ENCOUNTER
Spoke with Do James, lab supervisor. Per her instruction, I have entered the cologuard order and message sent to Do to arrange for the order to be sent in. Notified patient that I completed the order.

## 2022-03-24 ENCOUNTER — OFFICE VISIT (OUTPATIENT)
Dept: SURGERY | Facility: CLINIC | Age: 87
End: 2022-03-24

## 2022-03-24 ENCOUNTER — TELEPHONE (OUTPATIENT)
Dept: SURGERY | Facility: CLINIC | Age: 87
End: 2022-03-24

## 2022-03-24 VITALS
RESPIRATION RATE: 16 BRPM | OXYGEN SATURATION: 98 % | HEART RATE: 64 BPM | WEIGHT: 194 LBS | BODY MASS INDEX: 33.12 KG/M2 | SYSTOLIC BLOOD PRESSURE: 136 MMHG | DIASTOLIC BLOOD PRESSURE: 74 MMHG | HEIGHT: 64 IN

## 2022-03-24 DIAGNOSIS — Z17.0 MALIGNANT NEOPLASM OF UPPER-INNER QUADRANT OF LEFT BREAST IN FEMALE, ESTROGEN RECEPTOR POSITIVE: Primary | ICD-10-CM

## 2022-03-24 DIAGNOSIS — C50.212 MALIGNANT NEOPLASM OF UPPER-INNER QUADRANT OF LEFT BREAST IN FEMALE, ESTROGEN RECEPTOR POSITIVE: Primary | ICD-10-CM

## 2022-03-24 DIAGNOSIS — Z12.31 ENCOUNTER FOR SCREENING MAMMOGRAM FOR MALIGNANT NEOPLASM OF BREAST: ICD-10-CM

## 2022-03-24 PROCEDURE — 99213 OFFICE O/P EST LOW 20 MIN: CPT | Performed by: SURGERY

## 2022-03-24 NOTE — PROGRESS NOTES
[facility]     Referring Provider: Tawanda Maya MD     Chief complaint: Routine follow up breast cancer     HPI:   10/21/21:   Ms. Haydee Dickey is a 85 yo woman, seen at the request of Dr. Tawanda Maya, for a new diagnosis of left breast cancer. This was initially detected as an imaging abnormality on routine screening. Her work-up is detailed in the oncologic history below. Prior to the biopsy, she denies any breast lumps, pain, skin changes, or nipple discharge. She denies any prior history of abnormal mammograms or breast biopsies. She denies any family history of breast or ovarian cancer.     12/03/21:  She underwent left partial mastectomy on 11/15/21. See surgery & pathology details below in oncologic history. She has been recovering well and has no complaints.      3/24/22, Interval History:  She returns today for scheduled follow-up. After her last visit, she saw Dr. García who did not recommend adjuvant radiation due to her low risk of local recurrence. She started Arimidex and has been tolerating this well. She denies any new breast related complaints.      Oncology/Hematology History   Malignant neoplasm of upper-inner quadrant of left breast in female, estrogen receptor positive (HCC)   8/23/2021 Initial Diagnosis    Malignant neoplasm of upper-inner quadrant of left breast in female, estrogen receptor positive (HCC)     8/24/2021 Imaging    Screening MMG with [facility] ( [facility]):  Scattered areas of fibroglandular density. There is a 0.5 cm focal asymmetry with suspected architectural distortion in the inner central posterior left breast. There are benign-appearing calcifications. There are no suspicious masses, calcifications, or areas of architectural distortion in the right breast.  BI-RADS 0: Incomplete.     9/13/2021 Imaging    Left Diagnostic MMG with Dorian & Left Breast US ( [facility]):  MMG:  With additional imaging, there is persistence of the area of focal asymmetry in the posterior  one-third medial aspect of the left breast. The mammographic appearance suggests the presence of a 0.4 cm lesion in the medial aspect of the left breast.   US:  At the 10 o'clock position on the order of 6 cm from the nipple there is a 0.4 cm ill-defined hypoechoic lesion that is a probable sonographic correlate for the mammographically visualized lesion.  BI-RADS 4: Suspicious.     9/29/2021 Biopsy    Left Breast, US-Guided Biopsy ([facility]):    1. Left Breast at 10 O'clock, 6 cm From Nipple (Not For Calcifications), Core Biopsy:                A. Invasive ductal carcinoma:                            1. Overall Balko Grade II (tubular score = 3, nuclear score = 2, mitotic score = 1).                            2. Invasive carcinoma measures at least 4 mm in greatest dimension.                            3. No lymphovascular space invasion identified.               B. 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 Microcalcification present in foci of DCIS.    ER+ (99%, strong)  NH+ (%, moderate)  HER2 negative (IHC 1+)  Ki-67 16%     11/15/2021 Surgery    Left TIGIST-guided partial mastectomy    1. Left Breast Partial Mastectomy, Additional Medial, Inferior and Posterior Margins:               A. No in situ nor infiltrating carcinoma identified.               B. New margins are negative for malignancy by additional 10 mm.     2. Left Breast Tigist Guided Partial Mastectomy:                A. Invasive ductal carcinoma:                            1. Invasive carcinoma measures 6.0 mm x 4.0 mm x 4.0 mm.                            2. Overall Alton grade II (tubular score = 3, nuclear score = 2,                                mitotic score = 1).                            3. No lymphovascular invasion identified.               B. Associated ductal carcinoma in situ (DCIS) with rare foci of ADH:                            1. DCIS spans an area estimated at 12 mm x 6  mm x 1 mm.                            2. Intermediate grade cribriform and micropapillary DCIS.                            3. Microcalcification present in DCIS.               C. All margins are negative for invasive carcinoma.                    Invasive carcinoma measures 1.5 mm from the closest (Inferior) margin of excision.                     All other margins measure at least 2.2 mm from invasive carcinoma including:                            Anterior margin =  22 mm                            Posterior margin = 2.2 mm                            Superior margin = 14 mm                            Inferior margin = 1.5 mm                             Lateral margin = 36 mm                            Medial margin = 6 mm                  D. All margins are negative for DCIS.                    DCIS measures 4 mm from the closest (Inferior) margin of excision.                    All other margins  measure at least 10 mm from DCIS including:                            Anterior margin = 22 mm                            Posterior margin = 10 mm                            Superior margin = 12 mm                            Inferior margin =  4 mm                             Lateral margin = 24 mm                            Medial margin =  12 mm                  E. Focal biopsy site changes are identified, and two metallic clips are retrieved.               F. No Pagetoid involvement of skin by malignancy identified.               G. No lobular neoplasia (LCIS, ALH) identified.               H. Non-neoplastic breast tissue with fibrocystic change, adenosis, focal sclerosing adenosis and                      cautery artifact.  Rare microcalcification present in benign breast tissue.               I.  Previous Biomarkers: Estrogen receptors: Positive (99%), Progesterone receptors: Positive (%),                    HER/2-urbano: Negative (Score 1+), Ki-67 = 16% (see [id number]).     12/15/2021 -  Hormonal Therapy     Arimidex         Review of Systems:   See interval history.       Medications:    Current Outpatient Medications:   •  alendronate (FOSAMAX) 70 MG tablet, take 1 tablet by mouth every 7 days, Disp: 12 tablet, Rfl: 1  •  anastrozole (ARIMIDEX) 1 MG tablet, , Disp: , Rfl:   •  benazepril (LOTENSIN) 5 MG tablet, Take 2 tablets by mouth Daily., Disp: 180 tablet, Rfl: 2  •  cyanocobalamin (VITAMIN B-12) 1000 MCG tablet, , Disp: , Rfl:   •  ferrous sulfate 325 (65 FE) MG tablet, Take 325 mg by mouth Daily With Breakfast., Disp: , Rfl:   •  propranolol (INDERAL) 20 MG tablet, Take 20 mg by mouth 2 (Two) Times a Day., Disp: , Rfl:       Allergies   Allergen Reactions   • Iodine Itching     IV ONLY   • Percocet [Oxycodone-Acetaminophen] Itching     causes ithching   • Iodinated Diagnostic Agents Rash     Patient reports rash occurred 30 yrs ago with contrast       Family History   Problem Relation Age of Onset   • Diabetes Mother    • Hypertension Mother    • Stroke Mother    • Hearing loss Mother         AIDES   • Heart disease Mother         CHF, PACER   • Thyroid disease Mother         THYROIDECTOMY   • Hypertension Father    • Stroke Father    • Alcohol abuse Father         DAYS OFF    • Heart disease Father    • Hyperlipidemia Father    • Kidney disease Father         KIDNEY STONE REMOVED   • Heart attack Father    • Asthma Daughter         ADULT ONSET   • Cancer Sister         LUNG   • COPD Sister    • Lung cancer Sister    • Cancer Sister         KIDNEY   • Kidney cancer Sister    • Cancer Brother         LUNG   • Lung cancer Brother    • Diabetes Maternal Aunt         NIDDM   • Thyroid disease Maternal Aunt         THYROIDECTOMY   • Diabetes Maternal Aunt         NIDDM   • Other Maternal Aunt         ESSENTIAL TREMOR   • Diabetes Sister         INSULIN   • Diabetes Maternal Grandmother         NIDDM   • Early death Brother         4 MO, ? WHOOPING COUGH   • Heart disease Sister    • Mental illness Sister          HOSPITALIZED   • Hyperlipidemia Sister    • Mental illness Brother         Schizophrenia/hosp   • Other Maternal Uncle         PARKINSON   • Malig Hyperthermia Neg Hx        PHYSICAL EXAMINATION:   Vitals:    03/24/22 0901   BP: 136/74   Pulse: 64   Resp: 16   SpO2: 98%     ECOG 0 - Asymptomatic  General: NAD, well appearing  Psych: a&o x 3, normal mood and affect  Eyes: EOMI, no scleral icterus  ENMT: neck supple without masses or thyromegaly, mucus membranes moist  MSK: normal gait, normal ROM in bilateral shoulders  Lymph nodes: no cervical, supraclavicular or axillary lymphadenopathy  Breast: symmetric, moderate size, grade 3 ptosis  Right: No visible abnormalities on inspection while seated, with arms raised or hands on hips. No masses, skin changes, or nipple abnormalities.  Left: Well-healed upper inner curvilinear scar, otherwise no visible abnormalities on inspection while seated, with arms raised or hands on hips. Scar is soft. No masses or nipple abnormalities.      Assessment:  86 y.o. F with a diagnosis of left breast cancer: Intermediate grade, invasive ductal carcinoma, ER/[place]+, Her2 negative. She underwent left partial mastectomy on 11/15/21, pT1bNx. Adjuvant radiation was not recommended due to her low risk of local recurrence. She is currently on Arimidex.    Plan:  -Continue follow-up with Dr. Pinzon.  -F/u in 8/2022 with mammogram with NP.  -She was instructed to call sooner with any questions, concerns or changes on BSE.    Maliha Andres MD      CC:  Tawanda Maya MD

## 2022-03-24 NOTE — TELEPHONE ENCOUNTER
Pt notified of the following appts:  Screening MMG at [facility] scheduled on 08/29/2022 at 10:30.  Follow up appt with Dipika is scheduled on 10/11/2022 at 11:30.    Pt verbalized understanding     CMA

## 2022-04-06 ENCOUNTER — OFFICE VISIT (OUTPATIENT)
Dept: FAMILY MEDICINE CLINIC | Facility: CLINIC | Age: 87
End: 2022-04-06

## 2022-04-06 VITALS
BODY MASS INDEX: 33.12 KG/M2 | OXYGEN SATURATION: 98 % | WEIGHT: 194 LBS | HEIGHT: 64 IN | DIASTOLIC BLOOD PRESSURE: 70 MMHG | SYSTOLIC BLOOD PRESSURE: 128 MMHG | HEART RATE: 61 BPM

## 2022-04-06 DIAGNOSIS — R42 VERTIGO: Primary | ICD-10-CM

## 2022-04-06 PROCEDURE — 99213 OFFICE O/P EST LOW 20 MIN: CPT | Performed by: FAMILY MEDICINE

## 2022-04-06 RX ORDER — VIT C/B6/B5/MAGNESIUM/HERB 173 50-5-6-5MG
CAPSULE ORAL
COMMUNITY
Start: 2021-12-01 | End: 2022-11-09

## 2022-04-06 RX ORDER — UREA 10 %
LOTION (ML) TOPICAL
COMMUNITY
Start: 2021-12-01 | End: 2023-02-08

## 2022-04-06 RX ORDER — CHOLECALCIFEROL (VITAMIN D3) 125 MCG
CAPSULE ORAL
COMMUNITY
Start: 2021-12-01 | End: 2022-11-09

## 2022-04-06 RX ORDER — ASPIRIN 81 MG/1
TABLET ORAL
COMMUNITY
Start: 2021-12-01 | End: 2022-11-09

## 2022-04-06 NOTE — PROGRESS NOTES
"Subjective   Haydee Dickey is a 86 y.o. female.   unsteasdy on feet (Started on Monday)    History of Present Illness   Haydee is here today with complaints of dizziness that began on Monday mornign.  She notes that when she woke up and still was lying down , he ceiling above her was \"swimming\". She got up and was dizzy, but it resolved fairly quickly.    The symptoms cleared on Monday but yesterday it was worse and she felt she was not safe to walk.  She did not felt like she is having trouble driving or doing other things but walking and looking up were concerning.  She feels that it is a little bit more present today.  She reports no other symptoms including no headache, nausea or vomiting. She has not fallen.  She states that she has had vertigo in the past and has worked with one of the physical therapist at [facility] for vestibular rehab and she had already made an appointment with 1 of these therapists and is requiring a referral.  She would like to get in soon so asking for my help'    The following portions of the patient's history were reviewed and updated as appropriate: allergies, current medications, past family history, past medical history, past social history, past surgical history and problem list.    Review of Systems   Constitutional: Negative.    Respiratory: Negative.    Cardiovascular: Negative.    Neurological: Positive for dizziness. Negative for weakness, numbness and confusion.   Psychiatric/Behavioral: Negative.        Objective   Physical Exam  Vitals and nursing note reviewed.   Constitutional:       Appearance: Normal appearance. She is normal weight.   HENT:      Head: Normocephalic and atraumatic.   Cardiovascular:      Rate and Rhythm: Normal rate and regular rhythm.      Pulses: Normal pulses.   Neurological:      General: No focal deficit present.      Mental Status: She is alert and oriented to person, place, and time.   Psychiatric:         Mood and Affect: Mood normal.         " Behavior: Behavior normal.         Thought Content: Thought content normal.         Judgment: Judgment normal.           Assessment/Plan   Problem List Items Addressed This Visit    None     Visit Diagnoses     Vertigo    -  Primary        I have placed a written referral for vestibular rehabilitation at the facility of her choice.       No follow-ups on file.   Answers for HPI/ROS submitted by the patient on 4/5/2022  Please describe your symptoms.: Balance is off  Have you had these symptoms before?: Yes  How long have you been having these symptoms?: 1-4 days  Please list any medications you are currently taking for this condition.: none  Please describe any probable cause for these symptoms. : Inner ear aging  What is the primary reason for your visit?: Other

## 2022-04-29 ENCOUNTER — APPOINTMENT (OUTPATIENT)
Dept: BONE DENSITY | Facility: HOSPITAL | Age: 87
End: 2022-04-29

## 2022-07-27 ENCOUNTER — OFFICE VISIT (OUTPATIENT)
Dept: ONCOLOGY | Facility: CLINIC | Age: 87
End: 2022-07-27

## 2022-07-27 ENCOUNTER — LAB (OUTPATIENT)
Dept: LAB | Facility: HOSPITAL | Age: 87
End: 2022-07-27

## 2022-07-27 VITALS
RESPIRATION RATE: 16 BRPM | TEMPERATURE: 97.3 F | DIASTOLIC BLOOD PRESSURE: 71 MMHG | WEIGHT: 191.7 LBS | HEART RATE: 63 BPM | HEIGHT: 64 IN | OXYGEN SATURATION: 98 % | SYSTOLIC BLOOD PRESSURE: 165 MMHG | BODY MASS INDEX: 32.73 KG/M2

## 2022-07-27 DIAGNOSIS — Z17.0 MALIGNANT NEOPLASM OF UPPER-INNER QUADRANT OF LEFT BREAST IN FEMALE, ESTROGEN RECEPTOR POSITIVE: ICD-10-CM

## 2022-07-27 DIAGNOSIS — Z79.811 AROMATASE INHIBITOR USE: ICD-10-CM

## 2022-07-27 DIAGNOSIS — C50.212 MALIGNANT NEOPLASM OF UPPER-INNER QUADRANT OF LEFT BREAST IN FEMALE, ESTROGEN RECEPTOR POSITIVE: ICD-10-CM

## 2022-07-27 DIAGNOSIS — Z17.0 MALIGNANT NEOPLASM OF UPPER-INNER QUADRANT OF LEFT BREAST IN FEMALE, ESTROGEN RECEPTOR POSITIVE: Primary | ICD-10-CM

## 2022-07-27 DIAGNOSIS — C50.212 MALIGNANT NEOPLASM OF UPPER-INNER QUADRANT OF LEFT BREAST IN FEMALE, ESTROGEN RECEPTOR POSITIVE: Primary | ICD-10-CM

## 2022-07-27 LAB
ALBUMIN SERPL-MCNC: 4.1 G/DL (ref 3.5–5.2)
ALBUMIN/GLOB SERPL: 1.8 G/DL (ref 1.1–2.4)
ALP SERPL-CCNC: 29 U/L (ref 38–116)
ALT SERPL W P-5'-P-CCNC: 20 U/L (ref 0–33)
ANION GAP SERPL CALCULATED.3IONS-SCNC: 10.5 MMOL/L (ref 5–15)
AST SERPL-CCNC: 25 U/L (ref 0–32)
BASOPHILS # BLD AUTO: 0.04 10*3/MM3 (ref 0–0.2)
BASOPHILS NFR BLD AUTO: 0.9 % (ref 0–1.5)
BILIRUB SERPL-MCNC: 0.4 MG/DL (ref 0.2–1.2)
BUN SERPL-MCNC: 17 MG/DL (ref 6–20)
BUN/CREAT SERPL: 16.3 (ref 7.3–30)
CALCIUM SPEC-SCNC: 9.5 MG/DL (ref 8.5–10.2)
CHLORIDE SERPL-SCNC: 106 MMOL/L (ref 98–107)
CO2 SERPL-SCNC: 24.5 MMOL/L (ref 22–29)
CREAT SERPL-MCNC: 1.04 MG/DL (ref 0.6–1.1)
DEPRECATED RDW RBC AUTO: 41.9 FL (ref 37–54)
EGFRCR SERPLBLD CKD-EPI 2021: 53.1 ML/MIN/1.73
EOSINOPHIL # BLD AUTO: 0.14 10*3/MM3 (ref 0–0.4)
EOSINOPHIL NFR BLD AUTO: 3.2 % (ref 0.3–6.2)
ERYTHROCYTE [DISTWIDTH] IN BLOOD BY AUTOMATED COUNT: 11.9 % (ref 12.3–15.4)
GLOBULIN UR ELPH-MCNC: 2.3 GM/DL (ref 1.8–3.5)
GLUCOSE SERPL-MCNC: 102 MG/DL (ref 74–124)
HCT VFR BLD AUTO: 34 % (ref 34–46.6)
HGB BLD-MCNC: 11.1 G/DL (ref 12–15.9)
IMM GRANULOCYTES # BLD AUTO: 0.01 10*3/MM3 (ref 0–0.05)
IMM GRANULOCYTES NFR BLD AUTO: 0.2 % (ref 0–0.5)
LYMPHOCYTES # BLD AUTO: 1.3 10*3/MM3 (ref 0.7–3.1)
LYMPHOCYTES NFR BLD AUTO: 29.3 % (ref 19.6–45.3)
MCH RBC QN AUTO: 31.3 PG (ref 26.6–33)
MCHC RBC AUTO-ENTMCNC: 32.6 G/DL (ref 31.5–35.7)
MCV RBC AUTO: 95.8 FL (ref 79–97)
MONOCYTES # BLD AUTO: 0.5 10*3/MM3 (ref 0.1–0.9)
MONOCYTES NFR BLD AUTO: 11.3 % (ref 5–12)
NEUTROPHILS NFR BLD AUTO: 2.45 10*3/MM3 (ref 1.7–7)
NEUTROPHILS NFR BLD AUTO: 55.1 % (ref 42.7–76)
NRBC BLD AUTO-RTO: 0 /100 WBC (ref 0–0.2)
PLATELET # BLD AUTO: 178 10*3/MM3 (ref 140–450)
PMV BLD AUTO: 9.5 FL (ref 6–12)
POTASSIUM SERPL-SCNC: 4.8 MMOL/L (ref 3.5–4.7)
PROT SERPL-MCNC: 6.4 G/DL (ref 6.3–8)
RBC # BLD AUTO: 3.55 10*6/MM3 (ref 3.77–5.28)
SODIUM SERPL-SCNC: 141 MMOL/L (ref 134–145)
WBC NRBC COR # BLD: 4.44 10*3/MM3 (ref 3.4–10.8)

## 2022-07-27 PROCEDURE — 85025 COMPLETE CBC W/AUTO DIFF WBC: CPT

## 2022-07-27 PROCEDURE — 99214 OFFICE O/P EST MOD 30 MIN: CPT | Performed by: INTERNAL MEDICINE

## 2022-07-27 PROCEDURE — 80053 COMPREHEN METABOLIC PANEL: CPT

## 2022-07-27 PROCEDURE — 36415 COLL VENOUS BLD VENIPUNCTURE: CPT

## 2022-07-27 NOTE — PROGRESS NOTES
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 post lumpectomy-Arimidex started/12/21                               REQUESTING PHYSICIAN: MD Tawanda Bañuelos MD    History of Present Illness patient is an 86-year-old lady with a recently diagnosed breast cancer who has been on Arimidex now for the last 7 months    She has noticed some thinning of her hair but no other significant side effects and she is very happy with this    Her daughter however came in with her today and reports a significant change in her cognitive function and her judgment which has been progressive much quicker than before since starting her Arimidex    Certainly with such a small tumor I think it is worth stopping the Arimidex for the next couple months to see how she does although I cannot be 100% sure that the Arimidex is changed anything    Due for mammography in August    She is up-to-date with screening but has not had a colonoscopy and at her age I do not think this is reasonable and I have recommended a Cologuard      Past Medical History:   Diagnosis Date   • Allergic Percocet, iv iodine   • Anemia    • Arthritis    • B12 deficiency    • BPPV (benign paroxysmal positional vertigo)    • Breast cancer (HCC)     LEFT   • Colon polyp 2009   • Deep vein thrombosis (HCC) 1970    HX, LEG   • Essential tremor    • Folliculitis 11/01/2017   • Gait instability    • GERD (gastroesophageal reflux disease) 1990   • History of blood transfusion 2014   • HL (hearing loss) 2015   • Hypertension 2001    on Rx   • Lower extremity neuropathy     bilateral   • Neuropathy    • Osteopenia 2019   • Osteoporosis    • Small vessel disease (HCC)    • Torn rotator cuff 2013    right arm   • Tremor 2005    BET   • Wears hearing aid     BILATERAL   • Wrist fracture, right 2015        Past Surgical History:   Procedure  Laterality Date   • APPENDECTOMY  1961   • BLEPHAROPLASTY Bilateral 10/19/2021    Procedure: BILATERAL UPPER LID BLEPHAROPLASTY;  Surgeon: Ruddy Moses MD;  Location: [facility] OR [facility] [place];  Service: Ophthalmology;  Laterality: Bilateral;   • BREAST BIOPSY     • BREAST LUMPECTOMY Left 11/15/2021    Procedure: Left JEAN-PAUL-guided partial mastectomy;  Surgeon: Maliha Andres MD;  Location: [facility] MAIN OR;  Service: General;  Laterality: Left;   • BROW LIFT Bilateral 10/19/2021    Procedure: BILATERAL TEMPORAL DIRECT BROWLIFT;  Surgeon: Ruddy Moses MD;  Location: [facility] OR [facility] [place];  Service: Ophthalmology;  Laterality: Bilateral;   • CARPAL TUNNEL RELEASE  1992    right wrist   • CATARACT EXTRACTION, BILATERAL  07/01/2018    R 7/18 and L 9/18 WITH LENS IMPLANTS   • DEQUERVAIN RELEASE Bilateral 2015   • DILATATION AND CURETTAGE  1960s   • EYE SURGERY  2018    Bilateral repairs   • FRACTURE SURGERY  2014    R hip, R  wrist   • HIP FRACTURE SURGERY Right    • HYSTERECTOMY  1975    Partial   • JOINT REPLACEMENT  2001, 2014    knees   • TONSILLECTOMY AND ADENOIDECTOMY  1953   • TOTAL KNEE ARTHROPLASTY Left 2001   • TOTAL KNEE ARTHROPLASTY Right 2014   • WRIST SURGERY  2015    FRACTURE RIGHT   Oncologic history  patient is an 86-year-old female in excellent health who was noted on routine screening mammogram this year to have an abnormality in the left breast that was not seen 2 years ago at her last mammogram.  This led to additional imaging and a biopsy Of the left breast on 9/29/2021 that revealed 4 mm grade 2 invasive ductal carcinoma with associated intermediate grade DCIS ER 99% UT 91% HER-2 negative with a Ki-67 of 16%.  Patient was referred to Dr. Andres and underwent lumpectomy on 11/15/2021 the finding of a residual 6 mm grade 2 invasive ductal carcinoma With associated DCIS measuring 12 mm clear margins and no sentinel nodes were removed.    Patient has done well postoperatively.  She is in the  radiation therapist that do not recommend radiation and is here to discuss adjuvant treatment.    She is  1 para 1 menarche was at age 14 menopause in her mid 50s she had a hysterectomy at age 52.  First childbirth was age 34 she did not breast-feed.  She took hormone replacement for about a year or less after menopause.    Family history is negative for breast cancer she has a brother with lung cancer and a sister with lung cancer both of whom are stroke smokers in 1 sister who  of metastatic cancer unknown primary.    She has never had a DVT or stroke or heart attack but has had issues with osteoporosis in her forearm although her spine is normal and her hips show mild to moderate osteopenia.  She has been on Fosamax for 3 years.  Her last bone density was in November of this year and shows normal spine lumbar spine and at left femoral neck and hip with osteopenia.      She is not a smoker or drinker     Discussed the benefits of Arimidex in the adjuvant setting and the risk benefit ratio which favors its use even at her age.The side effects and toxicities of the Aromatase inhibitors was discussed with the patient including, hot flashes, mood swings and hair thinning.Significant arthralgias and worsening bone density were also discussed. Baseline bone density evaluation was reviewed    I have prescribed Arimidex No. 90 to her mail order pharmacy and she will start this and call me for any side effects    Obviously if her bone density worsens we will switch to Prolia and consider tamoxifen      Current Outpatient Medications on File Prior to Visit   Medication Sig Dispense Refill   • alendronate (FOSAMAX) 70 MG tablet take 1 tablet by mouth every 7 days 12 tablet 1   • anastrozole (ARIMIDEX) 1 MG tablet      • aspirin 81 MG EC tablet      • benazepril (LOTENSIN) 5 MG tablet Take 2 tablets by mouth Daily. 180 tablet 2   • BIOTIN 5000 PO      • CALCIUM CARBONATE-VIT D-MIN PO      • Cholecalciferol (Vitamin  D3) 50 MCG (2000 UT) tablet      • cyanocobalamin (VITAMIN B-12) 1000 MCG tablet      • ferrous sulfate 325 (65 FE) MG tablet Take 325 mg by mouth Daily With Breakfast.     • folic acid (FOLVITE) 800 MCG tablet      • Multiple Vitamin (VITAMIN E/FOLIC ACID/B-6/B-12 PO)      • propranolol (INDERAL) 20 MG tablet Take 20 mg by mouth 2 (Two) Times a Day.     • Turmeric 500 MG capsule        No current facility-administered medications on file prior to visit.        ALLERGIES:    Allergies   Allergen Reactions   • Iodine Itching     IV ONLY   • Percocet [Oxycodone-Acetaminophen] Itching     causes ithching   • Iodinated Diagnostic Agents Rash     Patient reports rash occurred 30 yrs ago with contrast        Social History     Socioeconomic History   • Marital status:    • Number of children: 1   Tobacco Use   • Smoking status: Never Smoker   • Smokeless tobacco: Never Used   Vaping Use   • Vaping Use: Never used   Substance and Sexual Activity   • Alcohol use: No   • Drug use: No   • Sexual activity: Defer        Family History   Problem Relation Age of Onset   • Diabetes Mother    • Hypertension Mother    • Stroke Mother    • Hearing loss Mother         AIDES   • Heart disease Mother         CHF, PACER   • Thyroid disease Mother         THYROIDECTOMY   • Hypertension Father    • Stroke Father    • Alcohol abuse Father         DAYS OFF    • Heart disease Father    • Hyperlipidemia Father    • Kidney disease Father         KIDNEY STONE REMOVED   • Heart attack Father    • Asthma Daughter         ADULT ONSET   • Cancer Sister         LUNG   • COPD Sister    • Lung cancer Sister    • Cancer Sister         KIDNEY   • Kidney cancer Sister    • Cancer Brother         LUNG   • Lung cancer Brother    • Diabetes Maternal Aunt         NIDDM   • Thyroid disease Maternal Aunt         THYROIDECTOMY   • Diabetes Maternal Aunt         NIDDM   • Other Maternal Aunt         ESSENTIAL TREMOR   • Diabetes Sister          "INSULIN   • Diabetes Maternal Grandmother         NIDDM   • Early death Brother         4 MO, ? WHOOPING COUGH   • Heart disease Sister    • Mental illness Sister         HOSPITALIZED   • Hyperlipidemia Sister    • Mental illness Brother         Schizophrenia/hosp   • Other Maternal Uncle         PARKINSON   • Malig Hyperthermia Neg Hx         Review of Systems   Constitutional: Negative.    HENT: Negative.    Respiratory: Negative.    Cardiovascular: Negative.    Gastrointestinal: Negative.    Genitourinary: Negative.    Musculoskeletal: Negative.    Neurological: Negative.    Hematological: Negative.    Psychiatric/Behavioral: Negative.    All other systems reviewed and are negative.       Objective     Vitals:    07/27/22 1004   BP: 165/71   Pulse: 63   Resp: 16   Temp: 97.3 °F (36.3 °C)   TempSrc: Temporal   SpO2: 98%   Weight: 87 kg (191 lb 11.2 oz)   Height: 162.6 cm (64.02\")   PainSc: 0-No pain     Current Status 3/9/2022   ECOG score 0       Physical Exam  This patient's ACP documentation is up to date, and there's nothing further left to document.      CONSTITUTIONAL:  Vital signs reviewed.  No distress, looks comfortable.  EYES:  Conjunctivae and lids unremarkable.  PERRLA  EARS,NOSE,MOUTH,THROAT:  Ears and nose appear unremarkable.  Lips, teeth, gums appear unremarkable.  RESPIRATORY:  Normal respiratory effort.  Lungs clear to auscultation bilaterally.  BREASTS: Right breast is benign left breast shows well-healed lumpectomy in the upper inner quadrant of left breast  CARDIOVASCULAR:  Normal S1, S2.  No murmurs rubs or gallops.  No significant lower extremity edema.  GASTROINTESTINAL: Abdomen appears unremarkable.  Nontender.  No hepatomegaly.  No splenomegaly.  LYMPHATIC:  No cervical, supraclavicular, axillary lymphadenopathy.  SKIN:  Warm.  No rashes.  PSYCHIATRIC:  Normal judgment and insight.  Normal mood and affect-as far as I can detect  I have reexamined the patient and the results are consistent " with the previously documented exam. Coy Pinzon MD     RECENT LABS:  Hematology WBC   Date Value Ref Range Status   07/27/2022 4.44 3.40 - 10.80 10*3/mm3 Final   11/04/2020 3.87 3.40 - 10.80 10*3/mm3 Final     RBC   Date Value Ref Range Status   07/27/2022 3.55 (L) 3.77 - 5.28 10*6/mm3 Final   11/04/2020 3.61 (L) 3.77 - 5.28 10*6/mm3 Final     Hemoglobin   Date Value Ref Range Status   07/27/2022 11.1 (L) 12.0 - 15.9 g/dL Final     Hematocrit   Date Value Ref Range Status   07/27/2022 34.0 34.0 - 46.6 % Final     Platelets   Date Value Ref Range Status   07/27/2022 178 140 - 450 10*3/mm3 Final        Final Diagnosis   1. Left Breast at 10 O'clock, 6 cm From Nipple (Not For Calcifications), Core Biopsy:                A. Invasive ductal carcinoma:                            1. Overall [place] Grade II (tubular score = 3, nuclear score = 2, mitotic score = 1).                            2. Invasive carcinoma measures at least 4 mm in greatest dimension.                            3. No lymphovascular space invasion identified.               B. 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 Microcalcification present in foci of DCIS.     jat/jse    Electronically signed by Jeromy White MD on 10/1/2021 at 1421   Synoptic Checklist     Breast Biomarker Reporting Template  Protocol posted: 2/26/2020  BREAST: BIOMARKER REPORTING TEMPLATE - All Specimens  Test(s) Performed     Estrogen Receptor (ER) Status  Positive (greater than 10% of cells demonstrate nuclear positivity)    Percentage of Cells with Nuclear Positivity  99 %   Average Intensity of Staining  Strong    Test Type  Food and Drug Administration (FDA) cleared (test / vendor): [place]    Primary Antibody  SP1    Scoring System  Jessica    Proportion Score  5    Intensity Score  3    Total Jessica Score  8    Test(s) Performed     Progesterone Receptor (PgR) Status  Positive    Percentage of Cells with  Nuclear Positivity  %    Average Intensity of Staining  Moderate    Test Type  Food and Drug Administration (FDA) cleared (test / vendor): [place]    Primary Antibody  1E2    Scoring System  Jessica    Proportion Score  5    Intensity Score  2    Total Jessica Score  7    Test(s) Performed     HER2 by Immunohistochemistry  Negative (Score 1+)    Test Type  Food and Drug Administration (FDA) cleared (test / vendor): [place]    Primary Antibody  4B5    Test(s) Performed  Ki-67    Percentage of Cells with Nuclear Positivity  16 %   Primary Antibody  30-9        Synoptic Checklist     INVASIVE CARCINOMA OF THE BREAST: Resection  8th Edition - Protocol posted: 6/30/2021  INVASIVE CARCINOMA OF THE BREAST: COMPLETE EXCISION - All Specimens  SPECIMEN   Procedure  Excision (less than total mastectomy)    Specimen Laterality  Left    TUMOR   Tumor Site  Upper inner quadrant      Clock position      10 o'clock    Tumor Site  Distance from nipple (Centimeters): 6 cm   Histologic Type  Invasive carcinoma of no special type (ductal)    Histologic Grade ([place] Histologic Score)     Glandular (Acinar) / Tubular Differentiation  Score 3    Nuclear Pleomorphism  Score 2    Mitotic Rate  Score 1    Overall Grade  Grade 2 (scores of 6 or 7)    Tumor Size  Greatest dimension of largest invasive focus (Millimeters): 6 mm   Additional Dimension (Millimeters)  4 mm     4 mm   Tumor Focality  Single focus of invasive carcinoma    Ductal Carcinoma In Situ (DCIS)  Present      Negative for extensive intraductal component (EIC)    Size (Extent) of DCIS  Estimated size (extent) of DCIS is at least (Millimeters): 12 mm   Additional Dimension (Millimeters)  6 mm     1 mm   Architectural Patterns  Cribriform      Micropapillary    Nuclear Grade  Grade II (intermediate)    Necrosis  Present, focal (small foci or single cell necrosis)    Lobular Carcinoma In Situ (LCIS)  Not identified    Tumor Extent     Lymphovascular Invasion  Not  identified    Dermal Lymphovascular Invasion  Not identified    Microcalcifications  Present in DCIS      Present in non-neoplastic tissue    Treatment Effect in the Breast  No known presurgical therapy    MARGINS   Margin Status for Invasive Carcinoma  All margins negative for invasive carcinoma    Distance from Invasive Carcinoma to Closest Margin  11.5 mm   Closest Margin(s) to Invasive Carcinoma  Inferior    Distance from Invasive Carcinoma to Anterior Margin  22 mm   Distance from Invasive Carcinoma to Posterior Margin  12.2 mm   Distance from Invasive Carcinoma to Superior Margin  14 mm   Distance from Invasive Carcinoma to Inferior Margin  11.5 mm   Distance from Invasive Carcinoma to Medial Margin  16 mm   Distance from Invasive Carcinoma to Lateral Margin  36 mm   Margin Status for DCIS  All margins negative for DCIS    Distance from DCIS to Closest Margin  12 mm   Closest Margin(s) to DCIS  Superior    REGIONAL LYMPH NODES   Regional Lymph Node Status  Not applicable (no regional lymph nodes submitted or found)    PATHOLOGIC STAGE CLASSIFICATION (pTNM, AJCC 8th Edition)   Reporting of pT, pN, and (when applicable) pM categories is based on information available to the pathologist at the time the report is issued. As per the AJCC (Chapter 1, 8th Ed.) it is the managing physician’s responsibility to establish the final pathologic stage based upon all pertinent information, including but potentially not limited to this pathology report.   pT Category  pT1b    pN Category  pN not assigned (no nodes submitted or found)             Assessment & Plan   1.pT1bN0 grade 2 infiltrating ductal carcinoma left breast strongly ER/MA positive HER-2 negative post lumpectomy  · Arimidex started in 12/21  · Tolerating Arimidex well as of 3/22  · Stop Arimidex in 7/22 and reassess-daughter to call me    2.  Moderate osteopenia hips normal spine on Fosamax for 3 years    3.  Hypertension on treatment    4.  Essential  tremor    5.  Negative family history of breast or ovarian cancer-family history positive for lung cancer in 2 siblings who are smokers    6 DVT 1970 while on BCP    Plan  1.  Stop Arimidex  2.  See me in 3 to 4 months for follow-up    Her daughter who is a therapist and counselor has noticed a significant change in her mentation and judgment and repeated falls all of which started after the Arimidex and because her tumor was very small I think is very reasonable to stop Arimidex and reassess her in a couple of months    I spent 30 total minutes, face-to-face, caring for Haydee today.  Greater than 50% of this time involved counseling and/or coordination of care as documented within this note.

## 2022-08-29 ENCOUNTER — HOSPITAL ENCOUNTER (OUTPATIENT)
Dept: MAMMOGRAPHY | Facility: HOSPITAL | Age: 87
Discharge: HOME | End: 2022-08-29
Admitting: SURGERY

## 2022-08-29 DIAGNOSIS — Z12.31 ENCOUNTER FOR SCREENING MAMMOGRAM FOR MALIGNANT NEOPLASM OF BREAST: ICD-10-CM

## 2022-08-29 PROCEDURE — 77063 BREAST TOMOSYNTHESIS BI: CPT

## 2022-08-29 PROCEDURE — 77067 SCR MAMMO BI INCL CAD: CPT

## 2022-08-30 ENCOUNTER — TELEPHONE (OUTPATIENT)
Dept: SURGERY | Facility: CLINIC | Age: 87
End: 2022-08-30

## 2022-08-30 NOTE — TELEPHONE ENCOUNTER
Patient is aware.     ----- Message from YNES Saravia sent at 8/29/2022  3:04 PM EDT -----  Please let patient know her imaging is stable, I will see her 10/11/22. thanks

## 2022-09-07 ENCOUNTER — APPOINTMENT (OUTPATIENT)
Dept: GENERAL RADIOLOGY | Facility: HOSPITAL | Age: 87
End: 2022-09-07

## 2022-09-07 ENCOUNTER — HOSPITAL ENCOUNTER (EMERGENCY)
Facility: HOSPITAL | Age: 87
Discharge: HOME | End: 2022-09-07
Attending: EMERGENCY MEDICINE | Admitting: EMERGENCY MEDICINE

## 2022-09-07 ENCOUNTER — APPOINTMENT (OUTPATIENT)
Dept: CT IMAGING | Facility: HOSPITAL | Age: 87
End: 2022-09-07

## 2022-09-07 VITALS
RESPIRATION RATE: 16 BRPM | OXYGEN SATURATION: 96 % | WEIGHT: 197 LBS | BODY MASS INDEX: 33.63 KG/M2 | DIASTOLIC BLOOD PRESSURE: 74 MMHG | HEART RATE: 71 BPM | SYSTOLIC BLOOD PRESSURE: 134 MMHG | TEMPERATURE: 98.2 F | HEIGHT: 64 IN

## 2022-09-07 DIAGNOSIS — R53.1 GENERALIZED WEAKNESS: ICD-10-CM

## 2022-09-07 DIAGNOSIS — S01.81XA FACIAL LACERATION, INITIAL ENCOUNTER: Primary | ICD-10-CM

## 2022-09-07 LAB
ALBUMIN SERPL-MCNC: 4.1 G/DL (ref 3.5–5.2)
ALBUMIN/GLOB SERPL: 2.1 G/DL
ALP SERPL-CCNC: 26 U/L (ref 39–117)
ALT SERPL W P-5'-P-CCNC: 20 U/L (ref 1–33)
ANION GAP SERPL CALCULATED.3IONS-SCNC: 10 MMOL/L (ref 5–15)
AST SERPL-CCNC: 28 U/L (ref 1–32)
BASOPHILS # BLD AUTO: 0.05 10*3/MM3 (ref 0–0.2)
BASOPHILS NFR BLD AUTO: 0.7 % (ref 0–1.5)
BILIRUB SERPL-MCNC: 0.3 MG/DL (ref 0–1.2)
BILIRUB UR QL STRIP: NEGATIVE
BUN SERPL-MCNC: 18 MG/DL (ref 8–23)
BUN/CREAT SERPL: 18.6 (ref 7–25)
CALCIUM SPEC-SCNC: 9.6 MG/DL (ref 8.6–10.5)
CHLORIDE SERPL-SCNC: 104 MMOL/L (ref 98–107)
CLARITY UR: CLEAR
CO2 SERPL-SCNC: 25 MMOL/L (ref 22–29)
COLOR UR: YELLOW
CREAT SERPL-MCNC: 0.97 MG/DL (ref 0.57–1)
DEPRECATED RDW RBC AUTO: 40.5 FL (ref 37–54)
EGFRCR SERPLBLD CKD-EPI 2021: 57.7 ML/MIN/1.73
EOSINOPHIL # BLD AUTO: 0.15 10*3/MM3 (ref 0–0.4)
EOSINOPHIL NFR BLD AUTO: 2.1 % (ref 0.3–6.2)
ERYTHROCYTE [DISTWIDTH] IN BLOOD BY AUTOMATED COUNT: 11.8 % (ref 12.3–15.4)
GLOBULIN UR ELPH-MCNC: 2 GM/DL
GLUCOSE SERPL-MCNC: 101 MG/DL (ref 65–99)
GLUCOSE UR STRIP-MCNC: NEGATIVE MG/DL
HCT VFR BLD AUTO: 33.5 % (ref 34–46.6)
HGB BLD-MCNC: 11 G/DL (ref 12–15.9)
HGB UR QL STRIP.AUTO: NEGATIVE
IMM GRANULOCYTES # BLD AUTO: 0.02 10*3/MM3 (ref 0–0.05)
IMM GRANULOCYTES NFR BLD AUTO: 0.3 % (ref 0–0.5)
KETONES UR QL STRIP: NEGATIVE
LEUKOCYTE ESTERASE UR QL STRIP.AUTO: NEGATIVE
LYMPHOCYTES # BLD AUTO: 1.42 10*3/MM3 (ref 0.7–3.1)
LYMPHOCYTES NFR BLD AUTO: 19.9 % (ref 19.6–45.3)
MCH RBC QN AUTO: 31 PG (ref 26.6–33)
MCHC RBC AUTO-ENTMCNC: 32.8 G/DL (ref 31.5–35.7)
MCV RBC AUTO: 94.4 FL (ref 79–97)
MONOCYTES # BLD AUTO: 0.69 10*3/MM3 (ref 0.1–0.9)
MONOCYTES NFR BLD AUTO: 9.7 % (ref 5–12)
NEUTROPHILS NFR BLD AUTO: 4.79 10*3/MM3 (ref 1.7–7)
NEUTROPHILS NFR BLD AUTO: 67.3 % (ref 42.7–76)
NITRITE UR QL STRIP: NEGATIVE
NRBC BLD AUTO-RTO: 0 /100 WBC (ref 0–0.2)
PH UR STRIP.AUTO: 5.5 [PH] (ref 5–8)
PLATELET # BLD AUTO: 188 10*3/MM3 (ref 140–450)
PMV BLD AUTO: 9.4 FL (ref 6–12)
POTASSIUM SERPL-SCNC: 4 MMOL/L (ref 3.5–5.2)
PROT SERPL-MCNC: 6.1 G/DL (ref 6–8.5)
PROT UR QL STRIP: NEGATIVE
RBC # BLD AUTO: 3.55 10*6/MM3 (ref 3.77–5.28)
SODIUM SERPL-SCNC: 139 MMOL/L (ref 136–145)
SP GR UR STRIP: 1.01 (ref 1–1.03)
UROBILINOGEN UR QL STRIP: NORMAL
WBC NRBC COR # BLD: 7.12 10*3/MM3 (ref 3.4–10.8)

## 2022-09-07 PROCEDURE — 99283 EMERGENCY DEPT VISIT LOW MDM: CPT

## 2022-09-07 PROCEDURE — 36415 COLL VENOUS BLD VENIPUNCTURE: CPT

## 2022-09-07 PROCEDURE — 81003 URINALYSIS AUTO W/O SCOPE: CPT | Performed by: EMERGENCY MEDICINE

## 2022-09-07 PROCEDURE — 70450 CT HEAD/BRAIN W/O DYE: CPT

## 2022-09-07 PROCEDURE — 99284 EMERGENCY DEPT VISIT MOD MDM: CPT

## 2022-09-07 PROCEDURE — 85025 COMPLETE CBC W/AUTO DIFF WBC: CPT | Performed by: EMERGENCY MEDICINE

## 2022-09-07 PROCEDURE — 80053 COMPREHEN METABOLIC PANEL: CPT | Performed by: EMERGENCY MEDICINE

## 2022-09-07 PROCEDURE — 71120 X-RAY EXAM BREASTBONE 2/>VWS: CPT

## 2022-09-07 RX ORDER — LIDOCAINE HYDROCHLORIDE AND EPINEPHRINE 10; 10 MG/ML; UG/ML
10 INJECTION, SOLUTION INFILTRATION; PERINEURAL ONCE
Status: DISCONTINUED | OUTPATIENT
Start: 2022-09-07 | End: 2022-09-07 | Stop reason: CLARIF

## 2022-09-07 RX ORDER — LIDOCAINE HYDROCHLORIDE 10 MG/ML
10 INJECTION, SOLUTION INFILTRATION; PERINEURAL ONCE
Status: DISCONTINUED | OUTPATIENT
Start: 2022-09-07 | End: 2022-09-07

## 2022-09-07 RX ADMIN — LIDOCAINE HYDROCHLORIDE 10 ML: 10; .005 INJECTION, SOLUTION EPIDURAL; INFILTRATION; INTRACAUDAL; PERINEURAL at 17:34

## 2022-09-07 NOTE — ED TRIAGE NOTES
Pt arrives via ems from home. Pt was walking to her office with her lunch when she tripped and fell. Pt hit her face on the floor. Pt's lower lip has puncture wounds noted, skin tear noted on chest, L hand.  Pt denies pain, loc. Pt is on 81mg ASA daily.

## 2022-09-07 NOTE — ED NOTES
"Pt's daughter is concerned about her mother because she keeps asking who staff is and our names. The daughter states that she normally does not have short term memory issues and feels like \"something is off.\" Willian WU made aware and new orders in place  "

## 2022-09-07 NOTE — ED PROVIDER NOTES
EMERGENCY DEPARTMENT ENCOUNTER    Room Number:  33/33  Date of encounter:  9/7/2022  PCP: Tawanda Maya MD  Historian: Patient and patient's daughter      HPI:  Chief Complaint: Mechanical fall at home.  A complete HPI/ROS/PMH/PSH/SH/FH are unobtainable due to: Limited due to poor historian    Context: Haydee Dickey is a 86 y.o. female who presents to the ED c/o mechanical fall at home today, where she tripped and fell forward onto the floor.  She was holding some dishes and feels like they hit her in the face and caused lacerations.  She does not believe that she hit her head or had loss consciousness.  She complains of moderate pain around the mouth and a loose tooth.  It hurts to move it.  She does not have a headache or neck pain.  No nausea or vomiting, no dizziness.  Patient also complains of some moderate tenderness and pain in the anterior sternum with an abrasion.  It hurts to palpate and move, but no difficulty breathing.    The daughter provides peripheral history that she has been living with her recently and struggling with her cognition as well as balance.  The patient has been somewhat reluctant to talk with her PCP about this and even saw him in the office this week.  Daughter is concerned because since she fell she seems to be more confused than usual and would like evaluation of that as well as the injuries      PAST MEDICAL HISTORY  Active Ambulatory Problems     Diagnosis Date Noted   • Acute left flank pain 11/14/2018   • B12 deficiency 07/28/2014   • Benign essential hypertension 07/28/2014   • Benign paroxysmal positional vertigo 01/21/2019   • H/O abdominal hysterectomy 01/21/2019   • H/O total knee replacement 01/21/2019   • Hip fracture (HCC) 07/28/2014   • Torn rotator cuff 01/21/2019   • Osteoporosis    • Essential tremor    • Small vessel disease (HCC)    • Gait instability    • Edema of lower extremity 01/27/2019   • Malignant neoplasm of upper-inner quadrant of left breast in  female, estrogen receptor positive (HCC) 10/21/2021   • Disorder of rotator cuff 01/21/2019   • Aromatase inhibitor use 03/09/2022     Resolved Ambulatory Problems     Diagnosis Date Noted   • Osteopenia 04/25/2017     Past Medical History:   Diagnosis Date   • Allergic Percocet, iv iodine   • Anemia    • Arthritis    • BPPV (benign paroxysmal positional vertigo)    • Breast cancer (HCC)    • Colon polyp 2009   • Deep vein thrombosis (HCC) 1970   • Folliculitis 11/01/2017   • GERD (gastroesophageal reflux disease) 1990   • History of blood transfusion 2014   • HL (hearing loss) 2015   • Hypertension 2001   • Lower extremity neuropathy    • Neuropathy    • Tremor 2005   • Wears hearing aid    • Wrist fracture, right 2015         PAST SURGICAL HISTORY  Past Surgical History:   Procedure Laterality Date   • APPENDECTOMY  1961   • BLEPHAROPLASTY Bilateral 10/19/2021    Procedure: BILATERAL UPPER LID BLEPHAROPLASTY;  Surgeon: Ruddy Moses MD;  Location: [facility] OR [facility];  Service: Ophthalmology;  Laterality: Bilateral;   • BREAST BIOPSY     • BREAST LUMPECTOMY Left 11/15/2021    Procedure: Left JEAN-PAUL-guided partial mastectomy;  Surgeon: Maliha Andres MD;  Location: [facility] OR;  Service: General;  Laterality: Left;   • BROW LIFT Bilateral 10/19/2021    Procedure: BILATERAL TEMPORAL DIRECT BROWLIFT;  Surgeon: Ruddy Moses MD;  Location: [facility];  Service: Ophthalmology;  Laterality: Bilateral;   • CARPAL TUNNEL RELEASE  1992    right wrist   • CATARACT EXTRACTION, BILATERAL  07/01/2018    R 7/18 and L 9/18 WITH LENS IMPLANTS   • DEQUERVAIN RELEASE Bilateral 2015   • DILATATION AND CURETTAGE  1960s   • EYE SURGERY  2018    Bilateral repairs   • FRACTURE SURGERY  2014    R hip, R  wrist   • HIP FRACTURE SURGERY Right    • HYSTERECTOMY  1975    Partial   • JOINT REPLACEMENT  2001, 2014    knees   • TONSILLECTOMY AND ADENOIDECTOMY  1953   • TOTAL KNEE ARTHROPLASTY Left 2001   • TOTAL KNEE  ARTHROPLASTY Right 2014   • WRIST SURGERY  2015    FRACTURE RIGHT         FAMILY HISTORY  Family History   Problem Relation Age of Onset   • Diabetes Mother    • Hypertension Mother    • Stroke Mother    • Hearing loss Mother         AIDES   • Heart disease Mother         CHF, PACER   • Thyroid disease Mother         THYROIDECTOMY   • Hypertension Father    • Stroke Father    • Alcohol abuse Father         DAYS OFF    • Heart disease Father    • Hyperlipidemia Father    • Kidney disease Father         KIDNEY STONE REMOVED   • Heart attack Father    • Asthma Daughter         ADULT ONSET   • Cancer Sister         LUNG   • COPD Sister    • Lung cancer Sister    • Cancer Sister         KIDNEY   • Kidney cancer Sister    • Cancer Brother         LUNG   • Lung cancer Brother    • Diabetes Maternal Aunt         NIDDM   • Thyroid disease Maternal Aunt         THYROIDECTOMY   • Diabetes Maternal Aunt         NIDDM   • Other Maternal Aunt         ESSENTIAL TREMOR   • Diabetes Sister         INSULIN   • Diabetes Maternal Grandmother         NIDDM   • Early death Brother         4 MO, ? WHOOPING COUGH   • Heart disease Sister    • Mental illness Sister         HOSPITALIZED   • Hyperlipidemia Sister    • Mental illness Brother         Schizophrenia/hosp   • Other Maternal Uncle         PARKINSON   • Malig Hyperthermia Neg Hx          SOCIAL HISTORY  Social History     Socioeconomic History   • Marital status:    • Number of children: 1   Tobacco Use   • Smoking status: Never Smoker   • Smokeless tobacco: Never Used   Vaping Use   • Vaping Use: Never used   Substance and Sexual Activity   • Alcohol use: No   • Drug use: No   • Sexual activity: Defer         ALLERGIES  Iodine, Percocet [oxycodone-acetaminophen], and Iodinated diagnostic agents        REVIEW OF SYSTEMS  Review of Systems     Limited due to poor historian      PHYSICAL EXAM    I have reviewed the triage vital signs and nursing notes.    ED Triage  Vitals [09/07/22 1548]   Temp Heart Rate Resp BP SpO2   98.2 °F (36.8 °C) 78 16 141/72 98 %      Temp src Heart Rate Source Patient Position BP Location FiO2 (%)   Oral -- -- -- --       Physical Exam  GENERAL: Awake and alert, pleasant nontoxic-appearing  HENT: nares patent, 2 small lacerations on the outer aspect of the lower lip, not crossing the vermilion border.  There is an abrasion on the buccal mucosa and one of the front incisors is a little loose and tender.  Neck nontender to palpation  EYES: no scleral icterus  CV: regular rhythm, regular rate  RESPIRATORY: normal effort  ABDOMEN: soft  MUSCULOSKELETAL: no deformity  NEURO: alert, moves all extremities, follows commands, nonfocal neuro exam, NIH 0  SKIN: warm, dry        LAB RESULTS  Recent Results (from the past 24 hour(s))   Urinalysis With Microscopic If Indicated (No Culture) - Urine, Catheter    Collection Time: 09/07/22  6:08 PM    Specimen: Urine, Catheter   Result Value Ref Range    Color, UA Yellow Yellow, Straw    Appearance, UA Clear Clear    pH, UA 5.5 5.0 - 8.0    Specific Gravity, UA 1.008 1.005 - 1.030    Glucose, UA Negative Negative    Ketones, UA Negative Negative    Bilirubin, UA Negative Negative    Blood, UA Negative Negative    Protein, UA Negative Negative    Leuk Esterase, UA Negative Negative    Nitrite, UA Negative Negative    Urobilinogen, UA 0.2 E.U./dL 0.2 - 1.0 E.U./dL   Comprehensive Metabolic Panel    Collection Time: 09/07/22  6:14 PM    Specimen: Blood   Result Value Ref Range    Glucose 101 (H) 65 - 99 mg/dL    BUN 18 8 - 23 mg/dL    Creatinine 0.97 0.57 - 1.00 mg/dL    Sodium 139 136 - 145 mmol/L    Potassium 4.0 3.5 - 5.2 mmol/L    Chloride 104 98 - 107 mmol/L    CO2 25.0 22.0 - 29.0 mmol/L    Calcium 9.6 8.6 - 10.5 mg/dL    Total Protein 6.1 6.0 - 8.5 g/dL    Albumin 4.10 3.50 - 5.20 g/dL    ALT (SGPT) 20 1 - 33 U/L    AST (SGOT) 28 1 - 32 U/L    Alkaline Phosphatase 26 (L) 39 - 117 U/L    Total Bilirubin 0.3 0.0 -  1.2 mg/dL    Globulin 2.0 gm/dL    A/G Ratio 2.1 g/dL    BUN/Creatinine Ratio 18.6 7.0 - 25.0    Anion Gap 10.0 5.0 - 15.0 mmol/L    eGFR 57.7 (L) >60.0 mL/min/1.73   CBC Auto Differential    Collection Time: 09/07/22  6:14 PM    Specimen: Blood   Result Value Ref Range    WBC 7.12 3.40 - 10.80 10*3/mm3    RBC 3.55 (L) 3.77 - 5.28 10*6/mm3    Hemoglobin 11.0 (L) 12.0 - 15.9 g/dL    Hematocrit 33.5 (L) 34.0 - 46.6 %    MCV 94.4 79.0 - 97.0 fL    MCH 31.0 26.6 - 33.0 pg    MCHC 32.8 31.5 - 35.7 g/dL    RDW 11.8 (L) 12.3 - 15.4 %    RDW-SD 40.5 37.0 - 54.0 fl    MPV 9.4 6.0 - 12.0 fL    Platelets 188 140 - 450 10*3/mm3    Neutrophil % 67.3 42.7 - 76.0 %    Lymphocyte % 19.9 19.6 - 45.3 %    Monocyte % 9.7 5.0 - 12.0 %    Eosinophil % 2.1 0.3 - 6.2 %    Basophil % 0.7 0.0 - 1.5 %    Immature Grans % 0.3 0.0 - 0.5 %    Neutrophils, Absolute 4.79 1.70 - 7.00 10*3/mm3    Lymphocytes, Absolute 1.42 0.70 - 3.10 10*3/mm3    Monocytes, Absolute 0.69 0.10 - 0.90 10*3/mm3    Eosinophils, Absolute 0.15 0.00 - 0.40 10*3/mm3    Basophils, Absolute 0.05 0.00 - 0.20 10*3/mm3    Immature Grans, Absolute 0.02 0.00 - 0.05 10*3/mm3    nRBC 0.0 0.0 - 0.2 /100 WBC       Ordered the above labs and independently reviewed the results.        RADIOLOGY  XR Sternum PA & Lateral    Result Date: 9/7/2022  2 VIEWS OF THE STERNUM 09/07/2022  CLINICAL HISTORY: Patient feel, sternal pain.  FINDINGS: Oblique AP and 2 separate lateral views of the sternum are submitted for interpretation. On the lateral views costochondral calcifications overlap the majority of the mid and inferior body of the sternum limiting evaluation of the anterior and posterior cortex of the sternum. No discernible acute sternal fracture is identified. If the patient has persistent significant focal sternal tenderness a CT could be obtained to exclude the possibility of an x-ray occult fracture.      CT Head Without Contrast    Result Date: 9/7/2022  EMERGENCY NONCONTRAST HEAD CT  09/07/2022  CLINICAL HISTORY: Patient fell with head injury, takes aspirin  TECHNIQUE: Spiral CT images were obtained from the base of the skull to the vertex without intravenous contrast. The images were reformatted and are submitted in 3 mm thick axial, sagittal and coronal CT sections with brain algorithm and 2 mm thick axial CT sections with high resolution bone algorithm.  COMPARISON: This is correlated to prior MRI of the brain on 01/31/2019 and head CT on 04/13/2018.  FINDINGS: There is minimal low-density in the periventricular white matter consistent with minimal small vessel disease. The remainder of the brain parenchyma is normal in attenuation. The ventricles are normal in size. I see no focal mass effect. There is no midline shift. No extra-axial fluid collections are identified. There is no evidence of acute intracranial hemorrhage. No acute skull fracture is identified. The calvarium and the skull base are normal in appearance. The paranasal sinuses and the mastoid air cells and the middle ear cavities are clear.      1. No acute intracranial abnormality is seen. There is minimal small vessel disease in the cerebral white matter. The remainder of the head CT is normal with no acute skull fracture or intracranial hemorrhage identified.  Radiation dose reduction techniques were utilized, including automated exposure control and exposure modulation based on body size.         I ordered the above noted radiological studies. Reviewed by me and discussed with radiologist.  See dictation for official radiology interpretation.      PROCEDURES    Procedures      MEDICATIONS GIVEN IN ER    Medications   lidocaine-EPINEPHrine (XYLOCAINE W/EPI) 1 %-1:142806 injection 10 mL (10 mL Injection Given by Other 9/7/22 0353)         PROGRESS, DATA ANALYSIS, CONSULTS, AND MEDICAL DECISION MAKING    All labs have been independently reviewed by me.  All radiology studies have been reviewed by me and discussed with  radiologist dictating the report.   EKG's independently viewed and interpreted by me.  Discussion below represents my analysis of pertinent findings related to patient's condition, differential diagnosis, treatment plan and final disposition.        ED Course as of 09/07/22 2209   Wed Sep 07, 2022   2208 CBC shows chronic stable anemia compared to previous, chemistry unremarkable and urinalysis negative [DP]   2208 CT scan of the head without contrast shows no acute intracranial abnormalities and only some chronic small vessel ischemic change and age-related atrophy [DP]   2208 X-ray of the sternum shows no fracture [DP]   2209 Spoke with family and patient at length, and advised that there is not appear to be anything acute going on tonight.  They clearly state that she has been having difficulty with ambulation now for quite some time and they have been encouraging her to see someone about it.  She has a walker at home but refuses to use it unless she is out in public. [DP]   2209 The lacerations been repaired by the nurse practitioner, please see her note for details.  They are comfortable with discharge home at this time and follow-up with PCP [DP]      ED Course User Index  [DP] Maximilian Dorsey MD           PPE: The patient wore a surgical mask throughout the entire patient encounter. I wore an N95.    AS OF 22:09 EDT VITALS:    BP - 134/74  HR - 71  TEMP - 98.2 °F (36.8 °C) (Oral)  O2 SATS - 96%        DIAGNOSIS  Final diagnoses:   Facial laceration, initial encounter   Generalized weakness         DISPOSITION  Discharge           Maximilian Dorsey MD  09/07/22 2209

## 2022-09-07 NOTE — ED PROVIDER NOTES
Laceration Repair    Date/Time: 9/7/2022 4:30 PM  Performed by: Machelle Burgess PA  Authorized by: Maximilian Dorsey MD     Consent:     Consent obtained:  Verbal    Consent given by:  Patient    Risks, benefits, and alternatives were discussed: yes      Risks discussed:  Infection, need for additional repair, pain, poor cosmetic result and poor wound healing    Alternatives discussed:  No treatment  Anesthesia:     Anesthesia method:  Nerve block    Block location:  Bilateral mental nerve block    Block needle gauge:  27 G    Block anesthetic:  Lidocaine 1% WITH epi    Block injection procedure:  Anatomic landmarks identified, introduced needle, incremental injection, negative aspiration for blood and anatomic landmarks palpated    Block outcome:  Anesthesia achieved  Laceration details:     Location:  Lip    Lip location:  Lower exterior lip    Length (cm):  3 (3 distinct 1cm lac to the lower lip)  Pre-procedure details:     Preparation:  Patient was prepped and draped in usual sterile fashion  Exploration:     Hemostasis achieved with:  Direct pressure    Wound exploration: wound explored through full range of motion and entire depth of wound visualized      Wound extent: no fascia violation noted, no foreign bodies/material noted, no muscle damage noted, no tendon damage noted, no underlying fracture noted and no vascular damage noted      Contaminated: no    Treatment:     Area cleansed with:  Chlorhexidine and saline    Amount of cleaning:  Standard    Irrigation solution:  Sterile saline    Irrigation method:  Syringe    Debridement:  None    Undermining:  None  Skin repair:     Repair method:  Sutures    Suture size:  6-0    Suture material:  Nylon    Suture technique:  Simple interrupted    Number of sutures:  11  Approximation:     Approximation:  Close    Vermilion border well-aligned: yes    Repair type:     Repair type:  Intermediate  Post-procedure details:     Dressing:  Open (no dressing)     Procedure completion:  Tolerated well, no immediate complications           Machelle Burgess PA  09/07/22 2238

## 2022-09-07 NOTE — DISCHARGE INSTRUCTIONS
Suture removal in 7 to 10 days per your PCP.  Please discuss with your PCP the ambulatory problems you have been having, and I recommend that you use a walker at all times.  If you would like to have one provided for you we will do that

## 2022-09-08 ENCOUNTER — HOSPITAL ENCOUNTER (EMERGENCY)
Facility: HOSPITAL | Age: 87
Discharge: HOME | End: 2022-09-08
Attending: EMERGENCY MEDICINE | Admitting: EMERGENCY MEDICINE

## 2022-09-08 ENCOUNTER — TELEPHONE (OUTPATIENT)
Dept: FAMILY MEDICINE CLINIC | Facility: CLINIC | Age: 87
End: 2022-09-08

## 2022-09-08 VITALS
HEART RATE: 78 BPM | OXYGEN SATURATION: 97 % | WEIGHT: 197 LBS | HEIGHT: 64 IN | TEMPERATURE: 99.3 F | BODY MASS INDEX: 33.63 KG/M2 | SYSTOLIC BLOOD PRESSURE: 155 MMHG | RESPIRATION RATE: 18 BRPM | DIASTOLIC BLOOD PRESSURE: 83 MMHG

## 2022-09-08 DIAGNOSIS — Z98.890 HISTORY OF RECENT DENTAL PROCEDURE: ICD-10-CM

## 2022-09-08 DIAGNOSIS — K06.8 GUMS, BLEEDING: Primary | ICD-10-CM

## 2022-09-08 DIAGNOSIS — I10 ESSENTIAL HYPERTENSION: ICD-10-CM

## 2022-09-08 PROCEDURE — 99283 EMERGENCY DEPT VISIT LOW MDM: CPT

## 2022-09-08 RX ORDER — TRANEXAMIC ACID 100 MG/ML
500 INJECTION, SOLUTION INTRAVENOUS ONCE
Status: COMPLETED | OUTPATIENT
Start: 2022-09-08 | End: 2022-09-08

## 2022-09-08 RX ORDER — BENAZEPRIL HYDROCHLORIDE 5 MG/1
TABLET, FILM COATED ORAL
Qty: 180 TABLET | Refills: 0 | Status: SHIPPED | OUTPATIENT
Start: 2022-09-08 | End: 2022-11-03

## 2022-09-08 RX ADMIN — TRANEXAMIC ACID 500 MG: 100 INJECTION INTRAVENOUS at 17:37

## 2022-09-08 NOTE — DISCHARGE INSTRUCTIONS
Do not bite into any foods.  Use back teeth for all chewing.  Do not swish any fluids around the incision.  Call the dentist tomorrow for further directions.

## 2022-09-08 NOTE — TELEPHONE ENCOUNTER
Patient was in ED yesterday - received stitches in three different places.  She was advised to see provider in 7-10 days for removal.  But does she need to be seen prior to this.

## 2022-09-08 NOTE — ED TRIAGE NOTES
Pt arrived at ED from home via PV. Pt fell yesterday and landed on her face. She went to dentist today, they removed a tooth. Bleeding has not stopped. Pt takes 81 mg of aspirin. Pt took aspirin this morning.     Pt wearing PPE along with ED staff.

## 2022-09-08 NOTE — ED PROVIDER NOTES
MD ATTESTATION NOTE    The MARIE and I have discussed this patient's history, physical exam, and treatment plan.  I have reviewed the documentation and personally had a face to face interaction with the patient. I affirm the documentation and agree with the treatment and plan.  The attached note describes my personal findings.    I provided a substantive portion of the care of this patient. I personally performed the physical exam, in its entirety.    Haydee Dickey is a 86 y.o. female who presents to the ED c/o having bleeding from her dental site.  She reports that earlier today she had a dental procedure performed after falling yesterday and fracturing her tooth.  She states that she has had persistent oozing after having this dental procedure today.  The dentist tried to get it to stop to no avail.  She was sent here for further evaluation.  She is on 81 mg aspirin daily.      On exam:  GENERAL: Awake, alert, no acute distress  SKIN: Warm, dry  HENT: Normocephalic, sutured right lower lip.  Mild oozing from her dental site.  EYES: no scleral icterus  CV: regular rhythm, regular rate  RESPIRATORY: normal effort, lungs clear  ABDOMEN: soft, nontender, nondistended  MUSCULOSKELETAL: no deformity  NEURO: alert, moves all extremities, follows commands    Labs  No results found for this or any previous visit (from the past 24 hour(s)).    Radiology  No Radiology Exams Resulted Within Past 24 Hours    Medical Decision Making:  ED Course as of 09/10/22 1719   Thu Sep 08, 2022   1624 Patient presents with bleeding from dental procedure performed earlier today.  Patient is on aspirin 81 mg.  Plan for hemostasis. [EE]   1815   Hemostasis achieved with TXA and Surgicel gauze.  Patient monitored for approximately 1 hour without any subsequent bleeding.  We will discharge. [EE]   1825 Recheck of patient.  She has had no significant further bleeding.  Plan to discharge. [EE]      ED Course User Index  [EE] Kofi Roberts PA        Procedures:  Procedures    Plan to place Surgicel at the site to try to get control.  If this does not work we will add TXA.  She has no stridor or drooling or significant hemorrhage currently.    PPE: The patient wore a mask and I wore an N95 mask throughout the entire patient encounter.      The patient has a COVID HM Topic on their chart, and they are fully vaccinated.    Diagnosis  Final diagnoses:   Gums, bleeding   History of recent dental procedure       Note Disclaimer: At [facility], we believe that sharing information builds trust and better relationships. You are receiving this note because you recently visited [facility]. It is possible you will see health information before a provider has talked with you about it. This kind of information can be easy to misunderstand. To help you fully understand what it means for your health, we urge you to discuss this note with your provider.     Franklyn Hugo MD  09/08/22 7447       Franklyn Hugo MD  09/10/22 5354

## 2022-09-08 NOTE — ED PROVIDER NOTES
EMERGENCY DEPARTMENT ENCOUNTER    Room Number:  07/07  Date of encounter:  9/8/2022  PCP: Tawanda Maya MD  Historian: Patient, daughter      I used full protective equipment while examining this patient.  This includes face mask, gloves and protective eyewear.  I washed my hands before entering the room and immediately upon leaving the room      HPI:  Chief Complaint: Bleeding wound  A complete HPI/ROS/PMH/PSH/SH/FH are unobtainable due to: Nothing    Context: Haydee Dickey is a 86 y.o. female who presents to the ED c/o continued bleeding after a dental procedure performed earlier today.  Patient was actually seen in the ER yesterday after mechanical fall.  Patient had multiple injuries including a fractured tooth to the lower incisors.  She followed up with her dentist today who removed the fractured tooth.  They placed several sutures in the gums.  They were unable to get the bleeding to stop in the dental office after several hours.  She was sent to the ER for further evaluation.  She does take aspirin 81 mg, however denies any other blood thinners.  She denies any lightheadedness or dizziness.  She does complain of aches and pains from yesterday however states overall she feels much better.    Review of Medical Records  Reviewed ER visit from 9/7/2022.  Patient was seen for fall.  She had a laceration to her lower lip, as well as a negative head CT.  Patient had a CBC yesterday that showed platelets of 188,000.    PAST MEDICAL HISTORY  Active Ambulatory Problems     Diagnosis Date Noted   • Acute left flank pain 11/14/2018   • B12 deficiency 07/28/2014   • Benign essential hypertension 07/28/2014   • Benign paroxysmal positional vertigo 01/21/2019   • H/O abdominal hysterectomy 01/21/2019   • H/O total knee replacement 01/21/2019   • Hip fracture (HCC) 07/28/2014   • Torn rotator cuff 01/21/2019   • Osteoporosis    • Essential tremor    • Small vessel disease (HCC)    • Gait instability    • Edema of lower  extremity 01/27/2019   • Malignant neoplasm of upper-inner quadrant of left breast in female, estrogen receptor positive (HCC) 10/21/2021   • Disorder of rotator cuff 01/21/2019   • Aromatase inhibitor use 03/09/2022     Resolved Ambulatory Problems     Diagnosis Date Noted   • Osteopenia 04/25/2017     Past Medical History:   Diagnosis Date   • Allergic Percocet, iv iodine   • Anemia    • Arthritis    • BPPV (benign paroxysmal positional vertigo)    • Breast cancer (HCC)    • Colon polyp 2009   • Deep vein thrombosis (HCC) 1970   • Folliculitis 11/01/2017   • GERD (gastroesophageal reflux disease) 1990   • History of blood transfusion 2014   • HL (hearing loss) 2015   • Hypertension 2001   • Lower extremity neuropathy    • Neuropathy    • Tremor 2005   • Wears hearing aid    • Wrist fracture, right 2015         PAST SURGICAL HISTORY  Past Surgical History:   Procedure Laterality Date   • APPENDECTOMY  1961   • BLEPHAROPLASTY Bilateral 10/19/2021    Procedure: BILATERAL UPPER LID BLEPHAROPLASTY;  Surgeon: Ruddy Moses MD;  [place] [facility] [place] [facility] [place]  Service: Ophthalmology;  Laterality: Bilateral;   • BREAST BIOPSY     • BREAST LUMPECTOMY Left 11/15/2021    Procedure: Left JEAN-PAUL-guided partial mastectomy;  Surgeon: Maliha Andres MD;  Location: [facility] OR;  Service: General;  Laterality: Left;   • BROW LIFT Bilateral 10/19/2021    Procedure: BILATERAL TEMPORAL DIRECT BROWLIFT;  Surgeon: Ruddy Moses MD;  Location: [facility];  Service: Ophthalmology;  Laterality: Bilateral;   • CARPAL TUNNEL RELEASE  1992    right wrist   • CATARACT EXTRACTION, BILATERAL  07/01/2018    R 7/18 and L 9/18 WITH LENS IMPLANTS   • DEQUERVAIN RELEASE Bilateral 2015   • DILATATION AND CURETTAGE  1960s   • EYE SURGERY  2018    Bilateral repairs   • FRACTURE SURGERY  2014    R hip, R  wrist   • HIP FRACTURE SURGERY Right    • HYSTERECTOMY  1975    Partial   • JOINT REPLACEMENT  2001, 2014    knees   •  TONSILLECTOMY AND ADENOIDECTOMY  1953   • TOTAL KNEE ARTHROPLASTY Left 2001   • TOTAL KNEE ARTHROPLASTY Right 2014   • WRIST SURGERY  2015    FRACTURE RIGHT         FAMILY HISTORY  Family History   Problem Relation Age of Onset   • Diabetes Mother    • Hypertension Mother    • Stroke Mother    • Hearing loss Mother         AIDES   • Heart disease Mother         CHF, PACER   • Thyroid disease Mother         THYROIDECTOMY   • Hypertension Father    • Stroke Father    • Alcohol abuse Father         DAYS OFF    • Heart disease Father    • Hyperlipidemia Father    • Kidney disease Father         KIDNEY STONE REMOVED   • Heart attack Father    • Asthma Daughter         ADULT ONSET   • Cancer Sister         LUNG   • COPD Sister    • Lung cancer Sister    • Cancer Sister         KIDNEY   • Kidney cancer Sister    • Cancer Brother         LUNG   • Lung cancer Brother    • Diabetes Maternal Aunt         NIDDM   • Thyroid disease Maternal Aunt         THYROIDECTOMY   • Diabetes Maternal Aunt         NIDDM   • Other Maternal Aunt         ESSENTIAL TREMOR   • Diabetes Sister         INSULIN   • Diabetes Maternal Grandmother         NIDDM   • Early death Brother         4 MO, ? WHOOPING COUGH   • Heart disease Sister    • Mental illness Sister         HOSPITALIZED   • Hyperlipidemia Sister    • Mental illness Brother         Schizophrenia/hosp   • Other Maternal Uncle         PARKINSON   • Malig Hyperthermia Neg Hx          SOCIAL HISTORY  Social History     Socioeconomic History   • Marital status:    • Number of children: 1   Tobacco Use   • Smoking status: Never Smoker   • Smokeless tobacco: Never Used   Vaping Use   • Vaping Use: Never used   Substance and Sexual Activity   • Alcohol use: No   • Drug use: No   • Sexual activity: Defer         ALLERGIES  Iodine, Percocet [oxycodone-acetaminophen], and Iodinated diagnostic agents        REVIEW OF SYSTEMS  All systems reviewed and negative except for those  discussed in HPI.       PHYSICAL EXAM    I have reviewed the triage vital signs and nursing notes.    ED Triage Vitals [09/08/22 1544]   Temp Heart Rate Resp BP SpO2   99.3 °F (37.4 °C) 78 18 -- 97 %      Temp src Heart Rate Source Patient Position BP Location FiO2 (%)   -- -- -- -- --       Physical Exam  GENERAL: Alert, oriented, not distressed  HENT: 2 lacerations to the external lower lip.  There are sutures placed to the left lower gumline.  There is mild oozing of blood.  No other dental injury is seen.  EYES: no scleral icterus, EOMI  CV: regular rhythm, regular rate, no murmur  RESPIRATORY: normal effort, CTA  ABDOMEN: soft, nontender  MUSCULOSKELETAL: no deformity, FROM, no calf swelling or tenderness  NEURO: alert, moves all extremities, follows commands  SKIN: warm, dry    MEDICATIONS GIVEN IN ER    Medications   tranexamic acid 500 MG/5ML nasal (ED/Crit Care for epistaxis) - VIAL DISPENSE 500 mg (500 mg Nasal Given by Other 9/8/22 1737)       PROGRESS, DATA ANALYSIS, CONSULTS, AND MEDICAL DECISION MAKING    All labs have been independently reviewed by me.  All radiology studies have been reviewed by me and discussed with radiologist dictating the report.   EKG's independently viewed and interpreted by me.  Discussion below represents my analysis of pertinent findings related to patient's condition, differential diagnosis, treatment plan and final disposition.    I have discussed case with Dr. Hugo, emergency room physician.  He has performed his own bedside examination and agrees with treatment plan.    ED Course as of 09/08/22 1949   Th Sep 08, 2022   1624 Patient presents with bleeding from dental procedure performed earlier today.  Patient is on aspirin 81 mg.  Plan for hemostasis. [EE]   1815   Hemostasis achieved with TXA and Surgicel gauze.  Patient monitored for approximately 1 hour without any subsequent bleeding.  We will discharge. [EE]   1825 Recheck of patient.  She has had no significant  further bleeding.  Plan to discharge. [EE]      ED Course User Index  [EE] Kofi Roberts PA       AS OF 19:49 EDT VITALS:    BP - 155/83  HR - 78  TEMP - 99.3 °F (37.4 °C)  O2 SATS - 97%        DIAGNOSIS  Final diagnoses:   Gums, bleeding   History of recent dental procedure         DISPOSITION  Discharged      Dictated utilizing Dragon dictation     Kofi Roberts PA  09/08/22 3072

## 2022-09-14 ENCOUNTER — PATIENT OUTREACH (OUTPATIENT)
Dept: CASE MANAGEMENT | Facility: OTHER | Age: 87
End: 2022-09-14

## 2022-09-14 NOTE — OUTREACH NOTE
AMBULATORY CASE MANAGEMENT NOTE    Name and Relationship of Patient/Support Person: Haydee Dickey - Self    Patient Outreach    Outgong call to the patient.  Introduced self and explained the role of RN-ACM.  Discussed recent ED visit.  Patient has her follow up appointments made with the dentist and to have her stitches removed.  She denies any needs.  Discussed services and she declines HRCM needs and program at this time.    LETTY LEMOS  Ambulatory Case Management    9/14/2022, 15:31 EDT

## 2022-09-15 ENCOUNTER — OFFICE VISIT (OUTPATIENT)
Dept: FAMILY MEDICINE CLINIC | Facility: CLINIC | Age: 87
End: 2022-09-15

## 2022-09-15 VITALS
SYSTOLIC BLOOD PRESSURE: 144 MMHG | OXYGEN SATURATION: 97 % | DIASTOLIC BLOOD PRESSURE: 72 MMHG | HEART RATE: 76 BPM | RESPIRATION RATE: 16 BRPM

## 2022-09-15 DIAGNOSIS — D64.9 ANEMIA, UNSPECIFIED TYPE: ICD-10-CM

## 2022-09-15 DIAGNOSIS — S09.93XS FACIAL INJURY, SEQUELA: Primary | ICD-10-CM

## 2022-09-15 PROCEDURE — 99213 OFFICE O/P EST LOW 20 MIN: CPT | Performed by: FAMILY MEDICINE

## 2022-09-15 NOTE — PROGRESS NOTES
Subjective   Haydee Dickey is a 86 y.o. female.   Fall (Stitch Removal)    History of Present Illness   Ms Dickey is here for stitch removal.  She fell 9/7 and cut her lip.  She has bruising on her chest and Lt arm.  She broke her tooth and has had oral surgery.   When asked about the event she states that she was walking into the dining room with the bowl of salad and the next thing she knew she was on the floor.  She states she did not pass out.  She states she was completely awake and aware when this happened.  She was immediately taken to the emergency room by her family and was evaluated.  She has 11 stitches placed into her lower lip.  She has healed well.  She continues to get follow-up with her dentist.  She has some significant bruising and her left upper arm and chest where she fell.  She notes that this was because she is taking aspirin.      She has a hx of vertgo and has been to therapy and this sorted out before this fall happened.    The following portions of the patient's history were reviewed and updated as appropriate: allergies, current medications, past family history, past medical history, past social history, past surgical history and problem list.    Review of Systems   Constitutional: Negative.    HENT: Negative.    Eyes: Negative.    Respiratory: Negative.    Cardiovascular: Negative.    Genitourinary: Negative.    Musculoskeletal:        Bruising on left upper arm and right side of chest   Skin: Negative.    Neurological: Negative.  Negative for dizziness, light-headedness and confusion.   Psychiatric/Behavioral: Negative.        Objective   Physical Exam  Vitals and nursing note reviewed.   Constitutional:       Appearance: She is well-developed.   HENT:      Head: Normocephalic and atraumatic.      Comments: Well healing scar under bottom lip.  Eyes:      Pupils: Pupils are equal, round, and reactive to light.   Cardiovascular:      Rate and Rhythm: Normal rate and regular rhythm.       Heart sounds: Normal heart sounds.      Comments: Resolving bruises on right side of upper chest and left side of upper arm  Pulmonary:      Effort: Pulmonary effort is normal.      Breath sounds: Normal breath sounds.   Skin:     General: Skin is warm and dry.   Neurological:      General: No focal deficit present.      Mental Status: She is alert and oriented to person, place, and time.   Psychiatric:         Mood and Affect: Mood normal.         Behavior: Behavior normal.         Thought Content: Thought content normal.         Judgment: Judgment normal.           Assessment & Plan   Problem List Items Addressed This Visit    None     Visit Diagnoses     Facial injury, sequela    -  Primary  11 stitches were removed from her lower lip.  And this has resolved well.    Anemia, unspecified type      She has a past history of anemia and because of all the bruising I like to have her back in 2 weeks to check a see BC to make sure we are not missing any thing else.  She has agreed to this plan.               Return in about 2 weeks (around 9/29/2022) for labs.   Answers for HPI/ROS submitted by the patient on 9/9/2022  Please describe your symptoms.: Suture removal  Have you had these symptoms before?: No  How long have you been having these symptoms?: 1-2 weeks  Please describe any probable cause for these symptoms. : A fall  What is the primary reason for your visit?: Other

## 2022-09-23 DIAGNOSIS — N28.9 ABNORMAL KIDNEY FUNCTION: ICD-10-CM

## 2022-09-23 DIAGNOSIS — D50.9 IRON DEFICIENCY ANEMIA, UNSPECIFIED IRON DEFICIENCY ANEMIA TYPE: Primary | ICD-10-CM

## 2022-09-23 DIAGNOSIS — R26.81 GAIT INSTABILITY: Primary | ICD-10-CM

## 2022-09-23 RX ORDER — ALENDRONATE SODIUM 70 MG/1
TABLET ORAL
Qty: 12 TABLET | Refills: 1 | Status: SHIPPED | OUTPATIENT
Start: 2022-09-23 | End: 2022-10-11

## 2022-09-29 ENCOUNTER — LAB (OUTPATIENT)
Dept: FAMILY MEDICINE CLINIC | Facility: CLINIC | Age: 87
End: 2022-09-29

## 2022-09-29 DIAGNOSIS — C50.212 MALIGNANT NEOPLASM OF UPPER-INNER QUADRANT OF LEFT BREAST IN FEMALE, ESTROGEN RECEPTOR POSITIVE: ICD-10-CM

## 2022-09-29 DIAGNOSIS — Z17.0 MALIGNANT NEOPLASM OF UPPER-INNER QUADRANT OF LEFT BREAST IN FEMALE, ESTROGEN RECEPTOR POSITIVE: ICD-10-CM

## 2022-09-30 LAB
ALBUMIN SERPL-MCNC: 4.3 G/DL (ref 3.5–5.2)
ALBUMIN/GLOB SERPL: 2.9 G/DL
ALP SERPL-CCNC: 28 U/L (ref 39–117)
ALT SERPL-CCNC: 16 U/L (ref 1–33)
AST SERPL-CCNC: 25 U/L (ref 1–32)
BILIRUB SERPL-MCNC: 0.4 MG/DL (ref 0–1.2)
BUN SERPL-MCNC: 23 MG/DL (ref 8–23)
BUN/CREAT SERPL: 18.5 (ref 7–25)
CALCIUM SERPL-MCNC: 9.5 MG/DL (ref 8.6–10.5)
CHLORIDE SERPL-SCNC: 103 MMOL/L (ref 98–107)
CO2 SERPL-SCNC: 27 MMOL/L (ref 22–29)
CREAT SERPL-MCNC: 1.24 MG/DL (ref 0.57–1)
EGFRCR SERPLBLD CKD-EPI 2021: 43 ML/MIN/1.73
ERYTHROCYTE [DISTWIDTH] IN BLOOD BY AUTOMATED COUNT: 11.8 % (ref 12.3–15.4)
GLOBULIN SER CALC-MCNC: 1.5 GM/DL
GLUCOSE SERPL-MCNC: 116 MG/DL (ref 65–99)
HCT VFR BLD AUTO: 34.7 % (ref 34–46.6)
HGB BLD-MCNC: 11.1 G/DL (ref 12–15.9)
MCH RBC QN AUTO: 30.5 PG (ref 26.6–33)
MCHC RBC AUTO-ENTMCNC: 32 G/DL (ref 31.5–35.7)
MCV RBC AUTO: 95.3 FL (ref 79–97)
PLATELET # BLD AUTO: 177 10*3/MM3 (ref 140–450)
POTASSIUM SERPL-SCNC: 5.5 MMOL/L (ref 3.5–5.2)
PROT SERPL-MCNC: 5.8 G/DL (ref 6–8.5)
RBC # BLD AUTO: 3.64 10*6/MM3 (ref 3.77–5.28)
SODIUM SERPL-SCNC: 138 MMOL/L (ref 136–145)
WBC # BLD AUTO: 4.57 10*3/MM3 (ref 3.4–10.8)

## 2022-10-06 DIAGNOSIS — N28.9 ABNORMAL KIDNEY FUNCTION: Primary | ICD-10-CM

## 2022-10-11 ENCOUNTER — OFFICE VISIT (OUTPATIENT)
Dept: SURGERY | Facility: CLINIC | Age: 87
End: 2022-10-11

## 2022-10-11 VITALS
WEIGHT: 197 LBS | BODY MASS INDEX: 33.63 KG/M2 | SYSTOLIC BLOOD PRESSURE: 122 MMHG | DIASTOLIC BLOOD PRESSURE: 72 MMHG | HEIGHT: 64 IN

## 2022-10-11 DIAGNOSIS — Z17.0 MALIGNANT NEOPLASM OF UPPER-INNER QUADRANT OF LEFT BREAST IN FEMALE, ESTROGEN RECEPTOR POSITIVE: Primary | ICD-10-CM

## 2022-10-11 DIAGNOSIS — C50.212 MALIGNANT NEOPLASM OF UPPER-INNER QUADRANT OF LEFT BREAST IN FEMALE, ESTROGEN RECEPTOR POSITIVE: Primary | ICD-10-CM

## 2022-10-11 PROCEDURE — 99213 OFFICE O/P EST LOW 20 MIN: CPT | Performed by: NURSE PRACTITIONER

## 2022-10-11 NOTE — PROGRESS NOTES
[facility]     Referring Provider: Dr. Tawanda Maya     Chief complaint: Routine follow up breast cancer     HPI:   10/21/21: seen by Dr Andres  Ms. Haydee Dickey is a 85 yo woman, seen at the request of Dr. Tawanda Maya, for a new diagnosis of left breast cancer. This was initially detected as an imaging abnormality on routine screening. Her work-up is detailed in the oncologic history below. Prior to the biopsy, she denies any breast lumps, pain, skin changes, or nipple discharge. She denies any prior history of abnormal mammograms or breast biopsies. She denies any family history of breast or ovarian cancer.     12/03/21:  seen by Dr Andres  She underwent left partial mastectomy on 11/15/21. See surgery & pathology details below in oncologic history. She has been recovering well and has no complaints.      3/24/22:  Seen by Dr Andres  She returns today for scheduled follow-up. After her last visit, she saw Dr. García who did not recommend adjuvant radiation due to her low risk of local recurrence. She started Arimidex and has been tolerating this well. She denies any new breast related complaints.    10/11/22, Interval History:  She returns today for follow up with  No breast complaints.  She stopped arimidex in 6/22 and did not noticed any changes in cognition, restarted it in 8/27/22.  Fell recently with facial, oral trauma.  Soft tissue injury upper body.    Her last clinic visit with Dr Pinzon was in 7/22:  Her daughter however came in with her today and reports a significant change in her cognitive function and her judgment which has been progressive much quicker than before since starting her Arimidex.     Certainly with such a small tumor I think it is worth stopping the Arimidex for the next couple months to see how she does although I cannot be 100% sure that the Arimidex is changed anything    Bilateral screening mammogram on 8/29/22 was stable, BiRads 2.  (see full report  below)      Oncology/Hematology History   Malignant neoplasm of upper-inner quadrant of left breast in female, estrogen receptor positive (HCC)   8/23/2021 Initial Diagnosis    Malignant neoplasm of upper-inner quadrant of left breast in female, estrogen receptor positive (HCC)     8/24/2021 Imaging    Screening MMG with Dorian ( [facility]):  Scattered areas of fibroglandular density. There is a 0.5 cm focal asymmetry with suspected architectural distortion in the inner central posterior left breast. There are benign-appearing calcifications. There are no suspicious masses, calcifications, or areas of architectural distortion in the right breast.  BI-RADS 0: Incomplete.     9/13/2021 Imaging    Left Diagnostic MMG with Dorian & Left Breast US ( [facility]):  MMG:  With additional imaging, there is persistence of the area of focal asymmetry in the posterior one-third medial aspect of the left breast. The mammographic appearance suggests the presence of a 0.4 cm lesion in the medial aspect of the left breast.   US:  At the 10 o'clock position on the order of 6 cm from the nipple there is a 0.4 cm ill-defined hypoechoic lesion that is a probable sonographic correlate for the mammographically visualized lesion.  BI-RADS 4: Suspicious.     9/29/2021 Biopsy    [facility]    1. Left Breast at 10 O'clock, 6 cm From Nipple (Not For Calcifications), Core Biopsy:                A. Invasive ductal carcinoma:                            1. Overall Alton Grade II (tubular score = 3, nuclear score = 2, mitotic score = 1).                            2. Invasive carcinoma measures at least 4 mm in greatest dimension.                            3. No lymphovascular space invasion identified.               B. 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 Microcalcification present in foci of DCIS.    ER+ (99%, strong)  FL+ (%, moderate)  HER2 negative (IHC  1+)  Ki-67 16%     11/15/2021 Surgery    Left TIGIST-guided partial mastectomy    1. Left Breast Partial Mastectomy, Additional Medial, Inferior and Posterior Margins:               A. No in situ nor infiltrating carcinoma identified.               B. New margins are negative for malignancy by additional 10 mm.     2. Left Breast Tigist Guided Partial Mastectomy:                A. Invasive ductal carcinoma:                            1. Invasive carcinoma measures 6.0 mm x 4.0 mm x 4.0 mm.                            2. Overall Alton grade II (tubular score = 3, nuclear score = 2,                                mitotic score = 1).                            3. No lymphovascular invasion identified.               B. Associated ductal carcinoma in situ (DCIS) with rare foci of ADH:                            1. DCIS spans an area estimated at 12 mm x 6 mm x 1 mm.                            2. Intermediate grade cribriform and micropapillary DCIS.                            3. Microcalcification present in DCIS.               C. All margins are negative for invasive carcinoma.                    Invasive carcinoma measures 1.5 mm from the closest (Inferior) margin of excision.                     All other margins measure at least 2.2 mm from invasive carcinoma including:                            Anterior margin =  22 mm                            Posterior margin = 2.2 mm                            Superior margin = 14 mm                            Inferior margin = 1.5 mm                             Lateral margin = 36 mm                            Medial margin = 6 mm                  D. All margins are negative for DCIS.                    DCIS measures 4 mm from the closest (Inferior) margin of excision.                    All other margins  measure at least 10 mm from DCIS including:                            Anterior margin = 22 mm                            Posterior margin = 10 mm                             Superior margin = 12 mm                            Inferior margin =  4 mm                             Lateral margin = 24 mm                            Medial margin =  12 mm                  E. Focal biopsy site changes are identified, and two metallic clips are retrieved.               F. No Pagetoid involvement of skin by malignancy identified.               G. No lobular neoplasia (LCIS, ALH) identified.               H. Non-neoplastic breast tissue with fibrocystic change, adenosis, focal sclerosing adenosis and                      cautery artifact.  Rare microcalcification present in benign breast tissue.               I.  Previous Biomarkers: Estrogen receptors: Positive (99%), Progesterone receptors: Positive (%),                    HER/2-urbano: Negative (Score 1+), Ki-67 = 16% (see [id number]).     12/15/2021 -  Hormonal Therapy    Arimidex     8/29/2022 Imaging    Bilateral screening mammogram with tomosynthesis at [facility]  HISTORY: Screening. Previous left lumpectomy for breast cancer.   Standard images and R2 were obtained followed by digital tomosynthesis.  There is mild scattered fibroglandular density which is symmetric except for some extremely minimal post lumpectomy changes in the posterior third of the upper left breast as compared to the presurgical mammogram  dated 08/24/2021. There are no findings in either breast to suggest malignancy.   CONCLUSION: Benign mammogram with very minimal post lobectomy changes in left breast.    BI-RADS CATEGORY 2: Benign findings.         Review of Systems:   See interval history.       Medications:    Current Outpatient Medications:   •  anastrozole (ARIMIDEX) 1 MG tablet, , Disp: , Rfl:   •  aspirin 81 MG EC tablet, , Disp: , Rfl:   •  benazepril (LOTENSIN) 5 MG tablet, TAKE TWO TABLETS BY MOUTH DAILY, Disp: 180 tablet, Rfl: 0  •  BIOTIN 5000 PO, , Disp: , Rfl:   •  CALCIUM CARBONATE-VIT D-MIN PO, , Disp: , Rfl:   •  Cholecalciferol (Vitamin D3) 50 MCG (2000  UT) tablet, , Disp: , Rfl:   •  cyanocobalamin (VITAMIN B-12) 1000 MCG tablet, , Disp: , Rfl:   •  ferrous sulfate 325 (65 FE) MG tablet, Take 325 mg by mouth Daily With Breakfast., Disp: , Rfl:   •  folic acid (FOLVITE) 800 MCG tablet, , Disp: , Rfl:   •  propranolol (INDERAL) 20 MG tablet, Take 20 mg by mouth 2 (Two) Times a Day., Disp: , Rfl:   •  Turmeric 500 MG capsule, , Disp: , Rfl:       Allergies   Allergen Reactions   • Iodine Itching     IV ONLY   • Percocet [Oxycodone-Acetaminophen] Itching     causes ithching   • Iodinated Diagnostic Agents Rash     Patient reports rash occurred 30 yrs ago with contrast       Family History   Problem Relation Age of Onset   • Diabetes Mother    • Hypertension Mother    • Stroke Mother    • Hearing loss Mother         AIDES   • Heart disease Mother         CHF, PACER   • Thyroid disease Mother         THYROIDECTOMY   • Hypertension Father    • Stroke Father    • Alcohol abuse Father         DAYS OFF    • Heart disease Father    • Hyperlipidemia Father    • Kidney disease Father         KIDNEY STONE REMOVED   • Heart attack Father    • Asthma Daughter         ADULT ONSET   • Cancer Sister         LUNG   • COPD Sister    • Lung cancer Sister    • Cancer Sister         KIDNEY   • Kidney cancer Sister    • Cancer Brother         LUNG   • Lung cancer Brother    • Diabetes Maternal Aunt         NIDDM   • Thyroid disease Maternal Aunt         THYROIDECTOMY   • Diabetes Maternal Aunt         NIDDM   • Other Maternal Aunt         ESSENTIAL TREMOR   • Diabetes Sister         INSULIN   • Diabetes Maternal Grandmother         NIDDM   • Early death Brother         4 MO, ? WHOOPING COUGH   • Heart disease Sister    • Mental illness Sister         HOSPITALIZED   • Hyperlipidemia Sister    • Mental illness Brother         Schizophrenia/hosp   • Other Maternal Uncle         PARKINSON   • Malig Hyperthermia Neg Hx        PHYSICAL EXAMINATION:   Vitals:    10/11/22 1128   BP:  "122/72      /72 (BP Location: Left arm)   Ht 162.6 cm (64\")   Wt 89.4 kg (197 lb)   BMI 33.81 kg/m²     I reviewed physical exam, no changes noted    ECOG 0 - Asymptomatic  General: NAD, well appearing  Psych: a&o x 3, normal mood and affect  Eyes: EOMI, no scleral icterus  ENMT: neck supple without masses or thyromegaly, mucus membranes moist  MSK: normal gait, normal ROM in bilateral shoulders  Lymph nodes: no cervical, supraclavicular or axillary lymphadenopathy  Breast: symmetric, moderate size, grade 3 ptosis  Right: No visible abnormalities on inspection while seated, with arms raised or hands on hips. No masses, skin changes, or nipple abnormalities.  Left: Well-healed upper inner curvilinear scar, otherwise no visible abnormalities on inspection while seated, with arms raised or hands on hips. Scar is soft. No masses or nipple abnormalities.      Assessment:   1)   86 y.o. F with a diagnosis of left breast cancer: Intermediate grade, invasive ductal carcinoma, ER/VT+, Her2 negative. She underwent left partial mastectomy on 11/15/21, pT1bNx. Adjuvant radiation was not recommended due to her low risk of local recurrence. She is currently on Arimidex, held 7/22.    Discussion:  We discussed her follow up which includes clinical breast exam every 6 months for 2 years, with mammogram annually then  mammogram and exam annually until 5 years from diagnosis.      Plan:  -Continue follow-up with Dr. Pinzon.  -exam in 6 months.  -She was instructed to call sooner with any questions, concerns or changes on BSE.    Dipika Wynn, APRN      CC:  Dr. Tawanda Maya  "

## 2022-11-03 ENCOUNTER — LAB (OUTPATIENT)
Dept: LAB | Facility: HOSPITAL | Age: 87
End: 2022-11-03

## 2022-11-03 ENCOUNTER — OFFICE VISIT (OUTPATIENT)
Dept: ONCOLOGY | Facility: CLINIC | Age: 87
End: 2022-11-03

## 2022-11-03 VITALS
DIASTOLIC BLOOD PRESSURE: 84 MMHG | HEART RATE: 63 BPM | OXYGEN SATURATION: 96 % | WEIGHT: 190.6 LBS | TEMPERATURE: 97.3 F | HEIGHT: 64 IN | BODY MASS INDEX: 32.54 KG/M2 | SYSTOLIC BLOOD PRESSURE: 139 MMHG

## 2022-11-03 DIAGNOSIS — C50.212 MALIGNANT NEOPLASM OF UPPER-INNER QUADRANT OF LEFT BREAST IN FEMALE, ESTROGEN RECEPTOR POSITIVE: Primary | ICD-10-CM

## 2022-11-03 DIAGNOSIS — C50.212 MALIGNANT NEOPLASM OF UPPER-INNER QUADRANT OF LEFT BREAST IN FEMALE, ESTROGEN RECEPTOR POSITIVE: ICD-10-CM

## 2022-11-03 DIAGNOSIS — Z17.0 MALIGNANT NEOPLASM OF UPPER-INNER QUADRANT OF LEFT BREAST IN FEMALE, ESTROGEN RECEPTOR POSITIVE: ICD-10-CM

## 2022-11-03 DIAGNOSIS — Z17.0 MALIGNANT NEOPLASM OF UPPER-INNER QUADRANT OF LEFT BREAST IN FEMALE, ESTROGEN RECEPTOR POSITIVE: Primary | ICD-10-CM

## 2022-11-03 LAB
ALBUMIN SERPL-MCNC: 4.3 G/DL (ref 3.5–5.2)
ALBUMIN/GLOB SERPL: 1.7 G/DL (ref 1.1–2.4)
ALP SERPL-CCNC: 28 U/L (ref 38–116)
ALT SERPL W P-5'-P-CCNC: 21 U/L (ref 0–33)
ANION GAP SERPL CALCULATED.3IONS-SCNC: 8.8 MMOL/L (ref 5–15)
AST SERPL-CCNC: 29 U/L (ref 0–32)
BASOPHILS # BLD AUTO: 0.03 10*3/MM3 (ref 0–0.2)
BASOPHILS NFR BLD AUTO: 0.6 % (ref 0–1.5)
BILIRUB SERPL-MCNC: 0.3 MG/DL (ref 0.2–1.2)
BUN SERPL-MCNC: 14 MG/DL (ref 6–20)
BUN/CREAT SERPL: 13.9 (ref 7.3–30)
CALCIUM SPEC-SCNC: 10.2 MG/DL (ref 8.5–10.2)
CHLORIDE SERPL-SCNC: 107 MMOL/L (ref 98–107)
CO2 SERPL-SCNC: 26.2 MMOL/L (ref 22–29)
CREAT SERPL-MCNC: 1.01 MG/DL (ref 0.6–1.1)
DEPRECATED RDW RBC AUTO: 41.4 FL (ref 37–54)
EGFRCR SERPLBLD CKD-EPI 2021: 54.7 ML/MIN/1.73
EOSINOPHIL # BLD AUTO: 0.14 10*3/MM3 (ref 0–0.4)
EOSINOPHIL NFR BLD AUTO: 2.6 % (ref 0.3–6.2)
ERYTHROCYTE [DISTWIDTH] IN BLOOD BY AUTOMATED COUNT: 11.9 % (ref 12.3–15.4)
GLOBULIN UR ELPH-MCNC: 2.6 GM/DL (ref 1.8–3.5)
GLUCOSE SERPL-MCNC: 109 MG/DL (ref 74–124)
HCT VFR BLD AUTO: 35.3 % (ref 34–46.6)
HGB BLD-MCNC: 11.5 G/DL (ref 12–15.9)
IMM GRANULOCYTES # BLD AUTO: 0.01 10*3/MM3 (ref 0–0.05)
IMM GRANULOCYTES NFR BLD AUTO: 0.2 % (ref 0–0.5)
LYMPHOCYTES # BLD AUTO: 1.43 10*3/MM3 (ref 0.7–3.1)
LYMPHOCYTES NFR BLD AUTO: 26.7 % (ref 19.6–45.3)
MCH RBC QN AUTO: 31.2 PG (ref 26.6–33)
MCHC RBC AUTO-ENTMCNC: 32.6 G/DL (ref 31.5–35.7)
MCV RBC AUTO: 95.7 FL (ref 79–97)
MONOCYTES # BLD AUTO: 0.51 10*3/MM3 (ref 0.1–0.9)
MONOCYTES NFR BLD AUTO: 9.5 % (ref 5–12)
NEUTROPHILS NFR BLD AUTO: 3.24 10*3/MM3 (ref 1.7–7)
NEUTROPHILS NFR BLD AUTO: 60.4 % (ref 42.7–76)
NRBC BLD AUTO-RTO: 0 /100 WBC (ref 0–0.2)
PLATELET # BLD AUTO: 187 10*3/MM3 (ref 140–450)
PMV BLD AUTO: 9.3 FL (ref 6–12)
POTASSIUM SERPL-SCNC: 5 MMOL/L (ref 3.5–4.7)
PROT SERPL-MCNC: 6.9 G/DL (ref 6.3–8)
RBC # BLD AUTO: 3.69 10*6/MM3 (ref 3.77–5.28)
SODIUM SERPL-SCNC: 142 MMOL/L (ref 134–145)
WBC NRBC COR # BLD: 5.36 10*3/MM3 (ref 3.4–10.8)

## 2022-11-03 PROCEDURE — 80053 COMPREHEN METABOLIC PANEL: CPT

## 2022-11-03 PROCEDURE — 36415 COLL VENOUS BLD VENIPUNCTURE: CPT

## 2022-11-03 PROCEDURE — 85025 COMPLETE CBC W/AUTO DIFF WBC: CPT

## 2022-11-03 PROCEDURE — 99214 OFFICE O/P EST MOD 30 MIN: CPT | Performed by: INTERNAL MEDICINE

## 2022-11-03 RX ORDER — AMLODIPINE BESYLATE 5 MG/1
5 TABLET ORAL DAILY
COMMUNITY
Start: 2022-10-21

## 2022-11-03 RX ORDER — BENAZEPRIL HYDROCHLORIDE 5 MG/1
5 TABLET, FILM COATED ORAL DAILY
COMMUNITY
Start: 2022-09-08 | End: 2023-02-22

## 2022-11-03 RX ORDER — PYRIDOXINE HCL (VITAMIN B6) 100 MG
1 TABLET ORAL DAILY
COMMUNITY
End: 2022-11-09

## 2022-11-03 NOTE — PROGRESS NOTES
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    Her daughter called me and complained that she thought her personality had changed completely with the Arimidex and I called her back in July and asked her to stop it and she stopped it for 2 months and saw no difference and went back on it on her own and wants to stay on it although I told her I have very low threshold to stop it because of her age and small cancer and low risk of recurrence      She has noticed some thinning of her hair but no other significant side effects and she is very happy with this   mammography in August 2022 was thankfully benign    She is up-to-date with screening but has not had a colonoscopy and at her age I do not think this is reasonable and I have recommended a Cologuard      Past Medical History:   Diagnosis Date   • Allergic Percocet, iv iodine   • Anemia    • Arthritis    • B12 deficiency    • BPPV (benign paroxysmal positional vertigo)    • Breast cancer (HCC)     LEFT   • Colon polyp 2009   • Deep vein thrombosis (HCC) 1970    HX, LEG   • Essential tremor    • Folliculitis 11/01/2017   • Gait instability    • GERD (gastroesophageal reflux disease) 1990   • History of blood transfusion 2014   • HL (hearing loss) 2015   • Hypertension 2001    on Rx   • Lower extremity neuropathy     bilateral   • Neuropathy    • Osteopenia 2019   • Osteoporosis    • Potassium (K) excess 10/21/2022    Seeing nephrologist -- Dr. Coats   • Small vessel disease (HCC)    • Torn rotator cuff 2013    right arm   • Tremor 2005    BET   • Wears  hearing aid     BILATERAL   • Wrist fracture, right 2015        Past Surgical History:   Procedure Laterality Date   • APPENDECTOMY  1961   • BLEPHAROPLASTY Bilateral 10/19/2021    Procedure: BILATERAL UPPER LID BLEPHAROPLASTY;  Surgeon: Ruddy Moses MD;  Location: [facility] OR [facility] [place];  Service: Ophthalmology;  Laterality: Bilateral;   • BREAST BIOPSY     • BREAST LUMPECTOMY Left 11/15/2021    Procedure: Left JEAN-PAUL-guided partial mastectomy;  Surgeon: Maliha Andres MD;  Location: [facility] OR;  Service: General;  Laterality: Left;   • BROW LIFT Bilateral 10/19/2021    Procedure: BILATERAL TEMPORAL DIRECT BROWLIFT;  Surgeon: Ruddy Moses MD;  Location: [facility] OR [facility] [place];  Service: Ophthalmology;  Laterality: Bilateral;   • CARPAL TUNNEL RELEASE  1992    right wrist   • CATARACT EXTRACTION, BILATERAL  07/01/2018    R 7/18 and L 9/18 WITH LENS IMPLANTS   • DEQUERVAIN RELEASE Bilateral 2015   • DILATATION AND CURETTAGE  1960s   • EYE SURGERY  2018    Bilateral repairs   • FRACTURE SURGERY  2014    R hip, R  wrist   • HIP FRACTURE SURGERY Right    • HYSTERECTOMY  1975    Partial   • JOINT REPLACEMENT  2001, 2014    knees   • TONSILLECTOMY AND ADENOIDECTOMY  1953   • TOTAL KNEE ARTHROPLASTY Left 2001   • TOTAL KNEE ARTHROPLASTY Right 2014   • WRIST SURGERY  2015    FRACTURE RIGHT   Oncologic history  patient is an 86-year-old female in excellent health who was noted on routine screening mammogram this year to have an abnormality in the left breast that was not seen 2 years ago at her last mammogram.  This led to additional imaging and a biopsy Of the left breast on 9/29/2021 that revealed 4 mm grade 2 invasive ductal carcinoma with associated intermediate grade DCIS ER 99% KS 91% HER-2 negative with a Ki-67 of 16%.  Patient was referred to Dr. Andres and underwent lumpectomy on 11/15/2021 the finding of a residual 6 mm grade 2 invasive ductal carcinoma With associated DCIS measuring 12 mm clear margins  and no sentinel nodes were removed.    Patient has done well postoperatively.  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    She is  1 para 1 menarche was at age 14 menopause in her mid 50s she had a hysterectomy at age 52.  First childbirth was age 34 she did not breast-feed.  She took hormone replacement for about a year or less after menopause.    Family history is negative for breast cancer she has a brother with lung cancer and a sister with lung cancer both of whom are stroke smokers in 1 sister who  of metastatic cancer unknown primary.    She has never had a DVT or stroke or heart attack but has had issues with osteoporosis in her forearm although her spine is normal and her hips show mild to moderate osteopenia.  She has been on Fosamax for 3 years.  Her last bone density was in November of this year and shows normal spine lumbar spine and at left femoral neck and hip with osteopenia.      She is not a smoker or drinker     Discussed the benefits of Arimidex in the adjuvant setting and the risk benefit ratio which favors its use even at her age.The side effects and toxicities of the Aromatase inhibitors was discussed with the patient including, hot flashes, mood swings and hair thinning.Significant arthralgias and worsening bone density were also discussed. Baseline bone density evaluation was reviewed    I have prescribed Arimidex No. 90 to her mail order pharmacy and she will start this and call me for any side effects    Obviously if her bone density worsens we will switch to Prolia and consider tamoxifen      Current Outpatient Medications on File Prior to Visit   Medication Sig Dispense Refill   • amLODIPine (NORVASC) 5 MG tablet Take 1 tablet by mouth Daily.     • anastrozole (ARIMIDEX) 1 MG tablet      • aspirin 81 MG EC tablet      • benazepril (LOTENSIN) 5 MG tablet Take 1 tablet by mouth Daily.     • BIOTIN 5000 PO      •  CALCIUM CARBONATE-VIT D-MIN PO      • Cholecalciferol (Vitamin D3) 50 MCG (2000 UT) tablet      • cyanocobalamin (VITAMIN B-12) 1000 MCG tablet      • ferrous sulfate 325 (65 FE) MG tablet Take 325 mg by mouth Daily With Breakfast.     • folic acid (FOLVITE) 800 MCG tablet      • propranolol (INDERAL) 20 MG tablet Take 20 mg by mouth 2 (Two) Times a Day.     • pyridoxine (VITAMIN B-6) 100 MG tablet Take 1 tablet by mouth Daily.     • Turmeric 500 MG capsule      • [DISCONTINUED] benazepril (LOTENSIN) 5 MG tablet TAKE TWO TABLETS BY MOUTH DAILY 180 tablet 0     No current facility-administered medications on file prior to visit.        ALLERGIES:    Allergies   Allergen Reactions   • Iodine Itching     IV ONLY   • Percocet [Oxycodone-Acetaminophen] Itching     causes ithching   • Iodinated Diagnostic Agents Rash     Patient reports rash occurred 30 yrs ago with contrast        Social History     Socioeconomic History   • Marital status:    • Number of children: 1   Tobacco Use   • Smoking status: Never   • Smokeless tobacco: Never   Vaping Use   • Vaping Use: Never used   Substance and Sexual Activity   • Alcohol use: No   • Drug use: No   • Sexual activity: Defer        Family History   Problem Relation Age of Onset   • Diabetes Mother    • Hypertension Mother    • Stroke Mother    • Hearing loss Mother         AIDES   • Heart disease Mother         CHF, PACER   • Thyroid disease Mother         THYROIDECTOMY   • Hypertension Father    • Stroke Father    • Alcohol abuse Father         DAYS OFF    • Heart disease Father    • Hyperlipidemia Father    • Kidney disease Father         KIDNEY STONE REMOVED   • Heart attack Father    • Asthma Daughter         ADULT ONSET   • Cancer Sister         LUNG   • COPD Sister    • Lung cancer Sister    • Cancer Sister         KIDNEY   • Kidney cancer Sister    • Cancer Brother         LUNG   • Lung cancer Brother    • Diabetes Maternal Aunt         NIDDM   • Thyroid  "disease Maternal Aunt         THYROIDECTOMY   • Diabetes Maternal Aunt         NIDDM   • Other Maternal Aunt         ESSENTIAL TREMOR   • Diabetes Sister         INSULIN   • Diabetes Maternal Grandmother         NIDDM   • Early death Brother         4 MO, ? WHOOPING COUGH   • Heart disease Sister    • Mental illness Sister         HOSPITALIZED   • Hyperlipidemia Sister    • Mental illness Brother         Schizophrenia/hosp   • Other Maternal Uncle         PARKINSON   • Malig Hyperthermia Neg Hx         Review of Systems   Constitutional: Negative.    HENT: Negative.    Respiratory: Negative.    Cardiovascular: Negative.    Gastrointestinal: Negative.    Genitourinary: Negative.    Musculoskeletal: Negative.    Neurological: Negative.    Hematological: Negative.    Psychiatric/Behavioral: Negative.    All other systems reviewed and are negative.       Objective     Vitals:    11/03/22 1151   BP: 139/84   Pulse: 63   Temp: 97.3 °F (36.3 °C)   TempSrc: Temporal   SpO2: 96%   Weight: 86.5 kg (190 lb 9.6 oz)   Height: 162.6 cm (64.02\")   PainSc: 0-No pain     Current Status 11/3/2022   ECOG score 0       Physical Exam  This patient's ACP documentation is up to date, and there's nothing further left to document.      CONSTITUTIONAL:  Vital signs reviewed.  No distress, looks comfortable.  EYES:  Conjunctivae and lids unremarkable.  PERRLA  EARS,NOSE,MOUTH,THROAT:  Ears and nose appear unremarkable.  Lips, teeth, gums appear unremarkable.  RESPIRATORY:  Normal respiratory effort.  Lungs clear to auscultation bilaterally.  BREASTS: Right breast is benign left breast shows well-healed lumpectomy in the upper inner quadrant of left breast with a small pea-sized nodule in the scar  CARDIOVASCULAR:  Normal S1, S2.  No murmurs rubs or gallops.  No significant lower extremity edema.  GASTROINTESTINAL: Abdomen appears unremarkable.  Nontender.  No hepatomegaly.  No splenomegaly.  LYMPHATIC:  No cervical, supraclavicular, axillary " lymphadenopathy.  SKIN:  Warm.  No rashes.  PSYCHIATRIC:  Normal judgment and insight.  Normal mood and affect-as far as I can detect  I have reexamined the patient and the results are consistent with the previously documented exam. Coy Pinzon MD     RECENT LABS:  Hematology WBC   Date Value Ref Range Status   11/03/2022 5.36 3.40 - 10.80 10*3/mm3 Final   09/29/2022 4.57 3.40 - 10.80 10*3/mm3 Final     RBC   Date Value Ref Range Status   11/03/2022 3.69 (L) 3.77 - 5.28 10*6/mm3 Final   09/29/2022 3.64 (L) 3.77 - 5.28 10*6/mm3 Final     Hemoglobin   Date Value Ref Range Status   11/03/2022 11.5 (L) 12.0 - 15.9 g/dL Final     Hematocrit   Date Value Ref Range Status   11/03/2022 35.3 34.0 - 46.6 % Final     Platelets   Date Value Ref Range Status   11/03/2022 187 140 - 450 10*3/mm3 Final        Final Diagnosis   1. Left Breast at 10 O'clock, 6 cm From Nipple (Not For Calcifications), Core Biopsy:                A. Invasive ductal carcinoma:                            1. Overall Alton Grade II (tubular score = 3, nuclear score = 2, mitotic score = 1).                            2. Invasive carcinoma measures at least 4 mm in greatest dimension.                            3. No lymphovascular space invasion identified.               B. 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 Microcalcification present in foci of DCIS.     jat/jse    Electronically signed by Jeromy White MD on 10/1/2021 at 1421   Synoptic Checklist     Breast Biomarker Reporting Template  Protocol posted: 2/26/2020  BREAST: BIOMARKER REPORTING TEMPLATE - All Specimens  Test(s) Performed     Estrogen Receptor (ER) Status  Positive (greater than 10% of cells demonstrate nuclear positivity)    Percentage of Cells with Nuclear Positivity  99 %   Average Intensity of Staining  Strong    Test Type  Food and Drug Administration (FDA) cleared (test / vendor): [facility]    Primary Antibody   SP1    Scoring System  Jessica    Proportion Score  5    Intensity Score  3    Total Jessica Score  8    Test(s) Performed     Progesterone Receptor (PgR) Status  Positive    Percentage of Cells with Nuclear Positivity  %    Average Intensity of Staining  Moderate    Test Type  Food and Drug Administration (FDA) cleared (test / vendor): [facility]    Primary Antibody  1E2    Scoring System  Jessica    Proportion Score  5    Intensity Score  2    Total Jessica Score  7    Test(s) Performed     HER2 by Immunohistochemistry  Negative (Score 1+)    Test Type  Food and Drug Administration (FDA) cleared (test / vendor): [facility]    Primary Antibody  4B5    Test(s) Performed  Ki-67    Percentage of Cells with Nuclear Positivity  16 %   Primary Antibody  30-9        Synoptic Checklist     INVASIVE CARCINOMA OF THE BREAST: Resection  8th Edition - Protocol posted: 6/30/2021  INVASIVE CARCINOMA OF THE BREAST: COMPLETE EXCISION - All Specimens  SPECIMEN   Procedure  Excision (less than total mastectomy)    Specimen Laterality  Left    TUMOR   Tumor Site  Upper inner quadrant      Clock position      10 o'clock    Tumor Site  Distance from nipple (Centimeters): 6 cm   Histologic Type  Invasive carcinoma of no special type (ductal)    Histologic Grade (Alton Histologic Score)     Glandular (Acinar) / Tubular Differentiation  Score 3    Nuclear Pleomorphism  Score 2    Mitotic Rate  Score 1    Overall Grade  Grade 2 (scores of 6 or 7)    Tumor Size  Greatest dimension of largest invasive focus (Millimeters): 6 mm   Additional Dimension (Millimeters)  4 mm     4 mm   Tumor Focality  Single focus of invasive carcinoma    Ductal Carcinoma In Situ (DCIS)  Present      Negative for extensive intraductal component (EIC)    Size (Extent) of DCIS  Estimated size (extent) of DCIS is at least (Millimeters): 12 mm   Additional Dimension (Millimeters)  6 mm     1 mm   Architectural Patterns  Cribriform      Micropapillary    Nuclear Grade   Grade II (intermediate)    Necrosis  Present, focal (small foci or single cell necrosis)    Lobular Carcinoma In Situ (LCIS)  Not identified    Tumor Extent     Lymphovascular Invasion  Not identified    Dermal Lymphovascular Invasion  Not identified    Microcalcifications  Present in DCIS      Present in non-neoplastic tissue    Treatment Effect in the Breast  No known presurgical therapy    MARGINS   Margin Status for Invasive Carcinoma  All margins negative for invasive carcinoma    Distance from Invasive Carcinoma to Closest Margin  11.5 mm   Closest Margin(s) to Invasive Carcinoma  Inferior    Distance from Invasive Carcinoma to Anterior Margin  22 mm   Distance from Invasive Carcinoma to Posterior Margin  12.2 mm   Distance from Invasive Carcinoma to Superior Margin  14 mm   Distance from Invasive Carcinoma to Inferior Margin  11.5 mm   Distance from Invasive Carcinoma to Medial Margin  16 mm   Distance from Invasive Carcinoma to Lateral Margin  36 mm   Margin Status for DCIS  All margins negative for DCIS    Distance from DCIS to Closest Margin  12 mm   Closest Margin(s) to DCIS  Superior    REGIONAL LYMPH NODES   Regional Lymph Node Status  Not applicable (no regional lymph nodes submitted or found)    PATHOLOGIC STAGE CLASSIFICATION (pTNM, AJCC 8th Edition)   Reporting of pT, pN, and (when applicable) pM categories is based on information available to the pathologist at the time the report is issued. As per the AJCC (Chapter 1, 8th Ed.) it is the managing physician’s responsibility to establish the final pathologic stage based upon all pertinent information, including but potentially not limited to this pathology report.   pT Category  pT1b    pN Category  pN not assigned (no nodes submitted or found)             Assessment & Plan   1.pT1bN0 grade 2 infiltrating ductal carcinoma left breast strongly ER/CA positive HER-2 negative post lumpectomy  · Arimidex started in 12/21  · Tolerating Arimidex well as of  3/22  · Stop Arimidex in 7/22 and reassess-daughter to call me  · Patient stopped Arimidex for 2 months could see no difference and resumed it on her own and continues on  · Discussed the fact that her risk was low and at her age this was not crucial medication but she still wants to take it    2.  Moderate osteopenia hips normal spine on Fosamax for 3 years    3.  Hypertension on treatment    4.  Essential tremor    5.  Negative family history of breast or ovarian cancer-family history positive for lung cancer in 2 siblings who are smokers    6 DVT 1970 while on BCP    Plan  1.  continue Arimidex  2.  See me 12 mo

## 2022-11-09 ENCOUNTER — OFFICE VISIT (OUTPATIENT)
Dept: FAMILY MEDICINE CLINIC | Facility: CLINIC | Age: 87
End: 2022-11-09

## 2022-11-09 VITALS
HEART RATE: 68 BPM | OXYGEN SATURATION: 98 % | WEIGHT: 188 LBS | RESPIRATION RATE: 16 BRPM | DIASTOLIC BLOOD PRESSURE: 60 MMHG | SYSTOLIC BLOOD PRESSURE: 114 MMHG | BODY MASS INDEX: 32.1 KG/M2 | HEIGHT: 64 IN

## 2022-11-09 DIAGNOSIS — I10 BENIGN ESSENTIAL HYPERTENSION: ICD-10-CM

## 2022-11-09 DIAGNOSIS — Z00.00 MEDICARE ANNUAL WELLNESS VISIT, SUBSEQUENT: Primary | ICD-10-CM

## 2022-11-09 DIAGNOSIS — E53.8 B12 DEFICIENCY: ICD-10-CM

## 2022-11-09 DIAGNOSIS — Z23 NEED FOR VACCINATION: ICD-10-CM

## 2022-11-09 DIAGNOSIS — G25.0 ESSENTIAL TREMOR: ICD-10-CM

## 2022-11-09 DIAGNOSIS — D64.9 ANEMIA, UNSPECIFIED TYPE: ICD-10-CM

## 2022-11-09 PROBLEM — E87.5 HYPERKALEMIA: Status: ACTIVE | Noted: 2022-10-19

## 2022-11-09 PROBLEM — E55.9 VITAMIN D DEFICIENCY: Status: ACTIVE | Noted: 2022-10-19

## 2022-11-09 PROBLEM — I12.9 BENIGN HYPERTENSIVE KIDNEY DISEASE WITH CHRONIC KIDNEY DISEASE: Status: ACTIVE | Noted: 2022-10-19

## 2022-11-09 PROBLEM — N18.31 STAGE 3A CHRONIC KIDNEY DISEASE (HCC): Status: ACTIVE | Noted: 2022-10-19

## 2022-11-09 PROBLEM — G62.9 PERIPHERAL NEUROPATHY: Status: ACTIVE | Noted: 2022-10-21

## 2022-11-09 PROBLEM — Z85.3 PERSONAL HISTORY OF BREAST CANCER: Status: ACTIVE | Noted: 2022-10-19

## 2022-11-09 PROCEDURE — G0439 PPPS, SUBSEQ VISIT: HCPCS | Performed by: FAMILY MEDICINE

## 2022-11-09 PROCEDURE — 1160F RVW MEDS BY RX/DR IN RCRD: CPT | Performed by: FAMILY MEDICINE

## 2022-11-09 PROCEDURE — 1170F FXNL STATUS ASSESSED: CPT | Performed by: FAMILY MEDICINE

## 2022-11-09 PROCEDURE — 99213 OFFICE O/P EST LOW 20 MIN: CPT | Performed by: FAMILY MEDICINE

## 2022-11-09 NOTE — PROGRESS NOTES
Medicare Subsequent Wellness Visit  Subjective   History of Present Illness    Haydee Dickey is a 87 y.o. female who presents for an Medicare Wellness Visit. In addition, we addressed the following health issues:    She has a new diagnosis of a left upper inner quadrant breast cancer.  She is followed by Dr. Andres and had a lumpectomy.she is taking anastrozole and tolerating well.     Haydee has chronic hypertension and has been well controlled on current medications.She  is monitored by me every 6 months and is here today for follow up. She is tolerating medications without side effect. She reports no vision changes, headaches or lightheadedness. She is requesting refills of medications.     She has chronic benign tremor and she feels like it is getting worse.  She is taking propranolol and that seems to help.    She has mild persistent benign hypertension and is taking a low-dose of amlodipine and tolerating well.    She has chronic B12 deficiency and has been taking B12  2000 a day. She has been doing well on current medication.  Level will be checked today to make sure its appropriate dose.  She also has chronic mild iron based anemia and is taking ferrous sulfate daily.  Labs indicates she is doing well on that dose.     She has renal disease and is followed by nephrology.    She is struggling with chronic gait instability and is now working with physical therapy.  She has had a few falls that have been pretty significant.    Essential tremor followed by neurology and on propranolol for tremor.    Patient Care Team:  Tawanda Maya MD as PCP - General (Family Medicine)  Edmund Zavaleta MD as Surgeon (Orthopedic Surgery)  Debbie Batista MD as Surgeon (Hand Surgery)  Lemuel Mosqueda MD (Ophthalmology)  Coy Pinzon MD as Consulting Physician (Hematology and Oncology)  Judy García MD as Consulting Physician (Radiation Oncology)  Maliha Andres MD as Referring Physician (Breast  Surgery)    Recent Hospitalizations:  none    Age-appropriate Screening Schedule:  Refer to the list below for future screening recommendations based on patient's age. Orders for these recommended tests are listed in the plan section. The patient has been provided with a written plan.      Health Maintenance   Topic Date Due   • INFLUENZA VACCINE  08/01/2022   • MAMMOGRAM  08/29/2023   • ANNUAL WELLNESS VISIT  11/09/2023   • DXA SCAN  11/23/2023   • TDAP/TD VACCINES (2 - Td or Tdap) 10/15/2024   • COVID-19 Vaccine  Completed   • Pneumococcal Vaccine 65+  Completed   • ZOSTER VACCINE  Completed       Depression Screen:   PHQ-2/PHQ-9 Depression Screening 11/9/2022   Retired PHQ-9 Total Score -   Retired Total Score -   Little Interest or Pleasure in Doing Things 0-->not at all   Feeling Down, Depressed or Hopeless 0-->not at all   PHQ-9: Brief Depression Severity Measure Score 0       Functional and Cognitive Screening:  Functional & Cognitive Status 11/9/2022   Do you have difficulty preparing food and eating? No   Do you have difficulty bathing yourself, getting dressed or grooming yourself? No   Do you have difficulty using the toilet? No   Do you have difficulty moving around from place to place? No   Do you have trouble with steps or getting out of a bed or a chair? No   Current Diet Well Balanced Diet   Dental Exam Up to date   Eye Exam Up to date   Exercise (times per week) 1 times per week   Current Exercises Include (No Data)        Exercise Comment Physical Therapy fr balance   Current Exercise Activities Include -   Do you need help using the phone?  No   Are you deaf or do you have serious difficulty hearing?  Yes   Do you need help with transportation? No   Do you need help shopping? No   Do you need help preparing meals?  No   Do you need help with housework?  No   Do you need help with laundry? No   Do you need help taking your medications? No   Do you need help managing money? No   Do you ever drive or  "ride in a car without wearing a seat belt? No   Have you felt unusual stress, anger or loneliness in the last month? No   Who do you live with? Alone   If you need help, do you have trouble finding someone available to you? No   Have you been bothered in the last four weeks by sexual problems? No   Do you have difficulty concentrating, remembering or making decisions? No     Does the patient have evidence of cognitive impairment?     Compared to one year ago, the patient feels their physical health and mental health are the same.     BMI is >= 30 and <35. (Class 1 Obesity). The following options were offered after discussion;: none (medical contraindication)     Review of Systems   Constitutional: Negative.    HENT: Negative.    Eyes: Negative.    Respiratory: Negative.    Cardiovascular: Negative.    Gastrointestinal: Negative.    Endocrine: Negative.    Genitourinary: Negative.    Musculoskeletal: Negative.    Skin: Negative.    Allergic/Immunologic: Negative.    Neurological: Positive for tremors.   Hematological: Negative.    Psychiatric/Behavioral: Negative.        Objective     Vitals:    11/09/22 1106   BP: 114/60   Pulse: 68   Resp: 16   SpO2: 98%   Weight: 85.3 kg (188 lb)   Height: 162.6 cm (64\")     Body mass index is 32.27 kg/m².    PHYSICAL EXAM  Vitals reviewed and on chart.  HEENT: PERRLA, EOMI. Oral mucosa moist,   No LAD.  CV: RRR, no murmurs, rubs, clicks or gallops  LUNGS: CTA bilaterally  EXT: No edema, FROM in bilateral upper and lower ext  NEURO: CN II - XII grossly intact  PSYCH: good mood, positive affect, alert and engaged. No thoughts of self harm  expressed.    ASSESSMENT AND PLAN  Mammogram: Up-to-date  Colon cancer screening : No longer recommended  Bone Density : Up-to-date  Problem List Items Addressed This Visit        Cardiac and Vasculature    Benign essential hypertension    Overview     Well controlled on current medication, will refill medication today and as needed. She will RTO " for repeat B/P check in 6 months.Benazepril 5 mg a day.             Endocrine and Metabolic    B12 deficiency      Overview     Managing well on 2000 mcg every other day.  She feels that she has a decent energy level for her age.            Hematology and Neoplasia    Absolute anemia    Overview     He is managing well on ferrous sulfate supplement.            Neuro    Essential tremor   Other Visit Diagnoses     Medicare annual wellness visit, subsequent    -  Primary      Need for vaccination        Relevant Orders    Fluzone High-Dose 65+yrs ([id number])          Orders:  Orders Placed This Encounter   Procedures   • Fluzone High-Dose 65+yrs ([id number])   • Vitamin B12       Follow Up:  Return in about 3 months (around 2/9/2023), or Labs for anemia and B12.     ADVANCED DIRECTIVES:   ACP discussion was held with the patient during this visit. Patient has an advance directive in EMR which is still valid.     An After Visit Summary and PPPS with all of these plans were given to the patient.

## 2022-11-09 NOTE — PATIENT INSTRUCTIONS
Medicare Wellness  Personal Prevention Plan of Service     Date of Office Visit:    Encounter Provider:  Tawanda Maya MD  Place of Service:  [facility]  Patient Name: Haydee Dickey  :  1935    As part of the Medicare Wellness portion of your visit today, we are providing you with this personalized preventive plan of services (PPPS). This plan is based upon recommendations of the [place] Preventive Services Task Force (USPSTF) and the Advisory Committee on Immunization Practices (ACIP).    This lists the preventive care services that should be considered, and provides dates of when you are due. Items listed as completed are up-to-date and do not require any further intervention.    Health Maintenance   Topic Date Due    INFLUENZA VACCINE  2022    MAMMOGRAM  2023    ANNUAL WELLNESS VISIT  2023    DXA SCAN  2023    TDAP/TD VACCINES (2 - Td or Tdap) 10/15/2024    COVID-19 Vaccine  Completed    Pneumococcal Vaccine 65+  Completed    ZOSTER VACCINE  Completed       No orders of the defined types were placed in this encounter.      Return in about 3 months (around 2023), or Labs for anemia and B12.

## 2022-11-10 LAB — VIT B12 SERPL-MCNC: 816 PG/ML (ref 211–946)

## 2022-11-11 PROCEDURE — G0008 ADMIN INFLUENZA VIRUS VAC: HCPCS | Performed by: FAMILY MEDICINE

## 2022-11-11 PROCEDURE — 90662 IIV NO PRSV INCREASED AG IM: CPT | Performed by: FAMILY MEDICINE

## 2023-01-03 RX ORDER — ANASTROZOLE 1 MG/1
TABLET ORAL
Qty: 90 TABLET | Refills: 2 | Status: SHIPPED | OUTPATIENT
Start: 2023-01-03

## 2023-02-08 ENCOUNTER — LAB (OUTPATIENT)
Dept: LAB | Facility: HOSPITAL | Age: 88
End: 2023-02-08
Payer: MEDICARE

## 2023-02-08 ENCOUNTER — OFFICE VISIT (OUTPATIENT)
Dept: FAMILY MEDICINE CLINIC | Facility: CLINIC | Age: 88
End: 2023-02-08
Payer: MEDICARE

## 2023-02-08 ENCOUNTER — TRANSCRIBE ORDERS (OUTPATIENT)
Dept: ADMINISTRATIVE | Facility: HOSPITAL | Age: 88
End: 2023-02-08
Payer: MEDICARE

## 2023-02-08 VITALS
RESPIRATION RATE: 20 BRPM | HEART RATE: 67 BPM | WEIGHT: 188 LBS | SYSTOLIC BLOOD PRESSURE: 128 MMHG | HEIGHT: 64 IN | BODY MASS INDEX: 32.1 KG/M2 | OXYGEN SATURATION: 98 % | DIASTOLIC BLOOD PRESSURE: 68 MMHG

## 2023-02-08 DIAGNOSIS — E53.8 DEFICIENCY OF VITAMIN B12: Primary | ICD-10-CM

## 2023-02-08 DIAGNOSIS — N18.31 CHRONIC KIDNEY DISEASE (CKD) STAGE G3A/A1, MODERATELY DECREASED GLOMERULAR FILTRATION RATE (GFR) BETWEEN 45-59 ML/MIN/1.73 SQUARE METER AND ALBUMINURIA CREATININE RATIO LESS THAN 30 MG/G (CMS/H*: ICD-10-CM

## 2023-02-08 DIAGNOSIS — E53.8 DEFICIENCY OF VITAMIN B12: ICD-10-CM

## 2023-02-08 DIAGNOSIS — D64.9 ANEMIA, UNSPECIFIED TYPE: ICD-10-CM

## 2023-02-08 DIAGNOSIS — N18.31 STAGE 3A CHRONIC KIDNEY DISEASE: Primary | ICD-10-CM

## 2023-02-08 DIAGNOSIS — N18.31 CHRONIC KIDNEY DISEASE (CKD) STAGE G3A/A1, MODERATELY DECREASED GLOMERULAR FILTRATION RATE (GFR) BETWEEN 45-59 ML/MIN/1.73 SQUARE METER AND ALBUMINURIA CREATININE RATIO LESS THAN 30 MG/G (CMS/H*: Primary | ICD-10-CM

## 2023-02-08 DIAGNOSIS — I10 BENIGN ESSENTIAL HYPERTENSION: ICD-10-CM

## 2023-02-08 PROBLEM — I12.9 BENIGN HYPERTENSIVE KIDNEY DISEASE WITH CHRONIC KIDNEY DISEASE: Status: ACTIVE | Noted: 2022-10-19

## 2023-02-08 PROBLEM — G62.9 PERIPHERAL NEUROPATHY: Status: ACTIVE | Noted: 2022-10-21

## 2023-02-08 PROBLEM — E87.5 HYPERKALEMIA: Status: ACTIVE | Noted: 2022-10-19

## 2023-02-08 LAB
ALBUMIN SERPL-MCNC: 4.2 G/DL (ref 3.5–5.2)
ANION GAP SERPL CALCULATED.3IONS-SCNC: 7.4 MMOL/L (ref 5–15)
BASOPHILS # BLD AUTO: 0.04 10*3/MM3 (ref 0–0.2)
BASOPHILS NFR BLD AUTO: 0.7 % (ref 0–1.5)
BUN SERPL-MCNC: 17 MG/DL (ref 8–23)
BUN/CREAT SERPL: 17.7 (ref 7–25)
CALCIUM SPEC-SCNC: 10 MG/DL (ref 8.6–10.5)
CHLORIDE SERPL-SCNC: 103 MMOL/L (ref 98–107)
CO2 SERPL-SCNC: 28.6 MMOL/L (ref 22–29)
CREAT SERPL-MCNC: 0.96 MG/DL (ref 0.57–1)
DEPRECATED RDW RBC AUTO: 37.7 FL (ref 37–54)
EGFRCR SERPLBLD CKD-EPI 2021: 58.1 ML/MIN/1.73
EOSINOPHIL # BLD AUTO: 0.14 10*3/MM3 (ref 0–0.4)
EOSINOPHIL NFR BLD AUTO: 2.6 % (ref 0.3–6.2)
ERYTHROCYTE [DISTWIDTH] IN BLOOD BY AUTOMATED COUNT: 11.5 % (ref 12.3–15.4)
FERRITIN SERPL-MCNC: 187 NG/ML (ref 13–150)
GLUCOSE SERPL-MCNC: 103 MG/DL (ref 65–99)
HCT VFR BLD AUTO: 35.4 % (ref 34–46.6)
HGB BLD-MCNC: 11.9 G/DL (ref 12–15.9)
IMM GRANULOCYTES # BLD AUTO: 0.03 10*3/MM3 (ref 0–0.05)
IMM GRANULOCYTES NFR BLD AUTO: 0.5 % (ref 0–0.5)
IRON 24H UR-MRATE: 103 MCG/DL (ref 37–145)
IRON SATN MFR SERPL: 33 % (ref 20–50)
LYMPHOCYTES # BLD AUTO: 1.42 10*3/MM3 (ref 0.7–3.1)
LYMPHOCYTES NFR BLD AUTO: 26 % (ref 19.6–45.3)
MCH RBC QN AUTO: 30.4 PG (ref 26.6–33)
MCHC RBC AUTO-ENTMCNC: 33.6 G/DL (ref 31.5–35.7)
MCV RBC AUTO: 90.5 FL (ref 79–97)
MONOCYTES # BLD AUTO: 0.5 10*3/MM3 (ref 0.1–0.9)
MONOCYTES NFR BLD AUTO: 9.2 % (ref 5–12)
NEUTROPHILS NFR BLD AUTO: 3.33 10*3/MM3 (ref 1.7–7)
NEUTROPHILS NFR BLD AUTO: 61 % (ref 42.7–76)
NRBC BLD AUTO-RTO: 0 /100 WBC (ref 0–0.2)
PHOSPHATE SERPL-MCNC: 4.3 MG/DL (ref 2.5–4.5)
PLATELET # BLD AUTO: 201 10*3/MM3 (ref 140–450)
PMV BLD AUTO: 10.1 FL (ref 6–12)
POTASSIUM SERPL-SCNC: 4.5 MMOL/L (ref 3.5–5.2)
RBC # BLD AUTO: 3.91 10*6/MM3 (ref 3.77–5.28)
SODIUM SERPL-SCNC: 139 MMOL/L (ref 136–145)
TIBC SERPL-MCNC: 311 MCG/DL (ref 298–536)
TRANSFERRIN SERPL-MCNC: 209 MG/DL (ref 200–360)
VIT B12 BLD-MCNC: 462 PG/ML (ref 211–946)
WBC NRBC COR # BLD: 5.46 10*3/MM3 (ref 3.4–10.8)

## 2023-02-08 PROCEDURE — 84466 ASSAY OF TRANSFERRIN: CPT

## 2023-02-08 PROCEDURE — 99214 OFFICE O/P EST MOD 30 MIN: CPT | Performed by: FAMILY MEDICINE

## 2023-02-08 PROCEDURE — 82728 ASSAY OF FERRITIN: CPT

## 2023-02-08 PROCEDURE — 85025 COMPLETE CBC W/AUTO DIFF WBC: CPT

## 2023-02-08 PROCEDURE — 36415 COLL VENOUS BLD VENIPUNCTURE: CPT

## 2023-02-08 PROCEDURE — 82607 VITAMIN B-12: CPT

## 2023-02-08 PROCEDURE — 80069 RENAL FUNCTION PANEL: CPT

## 2023-02-08 PROCEDURE — 83540 ASSAY OF IRON: CPT

## 2023-02-08 RX ORDER — LANOLIN ALCOHOL/MO/W.PET/CERES
400 CREAM (GRAM) TOPICAL DAILY
COMMUNITY

## 2023-02-08 NOTE — PROGRESS NOTES
Subjective   Haydee Dickey is a 87 y.o. female.   Hypertension and labs    History of Present Illness     Patient here for a 3 month follow up and labs today.  She states she is doing well today  She has been working on gait with PT and is feels that this helped with her balance and gait.    She was was diagnosed  left upper inner quadrant breast cancer about a year and a half ago.  She is followed by Dr. Andres and had a lumpectomy.She is taking anastrozole and tolerating well.     Haydee has chronic hypertension and has been well controlled on current medications.She  is monitored by me every 6 months and is here today for follow up. She is tolerating medications without side effect. She reports no vision changes, headaches or lightheadedness. She is requesting refills of medications.     She has chronic benign tremor and she feels like it is getting worse.  She is taking propranolol and that seems to help.     She has mild persistent benign hypertension and is taking a low-dose of amlodipine and tolerating well.     She has chronic B12 deficiency and has been taking B12  2000 a day. She has been doing well on current medication.  Level will be checked today to make sure its appropriate dose.  She also has chronic mild iron based anemia and is taking ferrous sulfate daily.  Labs indicates she is doing well on that dose.     She has renal disease and is followed by nephrology.     She is struggling with chronic gait instability and is now working with physical therapy.  She has had a few falls that have been pretty significant.  Feels that this work is helping improve her gait.  She reports no recent falls.    Review of Systems   Constitutional: Negative.    HENT: Negative.    Eyes: Negative.    Respiratory: Negative.    Cardiovascular: Negative.    Genitourinary: Negative.    Skin: Negative.    Neurological: Negative.  Negative for dizziness, light-headedness and confusion.   Psychiatric/Behavioral: Negative.   "      Objective   Vitals:    02/08/23 0837   BP: 128/68   Pulse: 67   Resp: 20   SpO2: 98%   Weight: 85.3 kg (188 lb)   Height: 162.6 cm (64\")   Already  Body mass index is 32.27 kg/m².    Physical Exam  Vitals and nursing note reviewed.   Constitutional:       Appearance: Normal appearance. She is normal weight.   HENT:      Head: Normocephalic and atraumatic.   Cardiovascular:      Rate and Rhythm: Normal rate and regular rhythm.      Pulses: Normal pulses.   Neurological:      General: No focal deficit present.      Mental Status: She is alert and oriented to person, place, and time.   Psychiatric:         Mood and Affect: Mood normal.         Behavior: Behavior normal.         Thought Content: Thought content normal.         Judgment: Judgment normal.           Assessment & Plan   Problem List Items Addressed This Visit        Cardiac and Vasculature    Benign essential hypertension    Overview     Well controlled on current medication, will refill medication today and as needed. She will RTO for repeat B/P check in 6 months.Benazepril 5 mg a day.             Genitourinary and Reproductive     Stage 3a chronic kidney disease (HCC) - Primary    Overview     Stable and followed by nephrology.            Hematology and Neoplasia    Absolute anemia    Overview     She is taking ferrous sulfate as advised and is followed by hematology.  Her hemoglobin at last labs was 11.8.         Relevant Medications    folic acid (FOLVITE) 400 MCG tablet     Labs will be done by nephrology and will send me a copy.  CBC, CMP and B12     No orders of the defined types were placed in this encounter.       Return in about 6 months (around 8/8/2023) for Recheck.   Answers for HPI/ROS submitted by the patient on 2/7/2023  Please describe your symptoms.: Jose G Request  Have you had these symptoms before?: No  How long have you been having these symptoms?: 1-4 days  Please list any medications you are currently taking for this condition.: " None  Please describe any probable cause for these symptoms. : None  What is the primary reason for your visit?: Other

## 2023-02-22 RX ORDER — BENAZEPRIL HYDROCHLORIDE 5 MG/1
TABLET, FILM COATED ORAL
Qty: 180 TABLET | Refills: 0 | Status: SHIPPED | OUTPATIENT
Start: 2023-02-22

## 2023-03-24 ENCOUNTER — TELEPHONE (OUTPATIENT)
Dept: FAMILY MEDICINE CLINIC | Facility: CLINIC | Age: 88
End: 2023-03-24
Payer: MEDICARE

## 2023-03-24 DIAGNOSIS — U07.1 COVID-19 VIRUS DETECTED: Primary | ICD-10-CM

## 2023-03-24 NOTE — TELEPHONE ENCOUNTER
Heather spoke to pt  she cannot take Paxlovid due to kidney disease and amlodipine.  Treat symptoms

## 2023-03-24 NOTE — TELEPHONE ENCOUNTER
Called pt regarding appt with SILVERIO Estiven for positive Covid test. Pt informed me that she just wants Paxlovid and was told that she had to come to the office and have a positive test to get the medication. After speaking with Dr. Maya, pt was informed that she does not need to come to the office and that you will f/u with her regarding Paxlovid.

## 2023-04-07 ENCOUNTER — TELEPHONE (OUTPATIENT)
Dept: FAMILY MEDICINE CLINIC | Facility: CLINIC | Age: 88
End: 2023-04-07

## 2023-04-07 NOTE — TELEPHONE ENCOUNTER
Caller: NobleHaydee    Relationship: Self    Best call back number:    [phone]     What medication are you requesting: DECONGESTANT    What are your current symptoms: HEAD CONGESTION/YELLOW DRAINAGE    How long have you been experiencing symptoms: SHE IS ON DAY 4 OF REBOUND COVID    Have you had these symptoms before:    [x] Yes  [] No    Have you been treated for these symptoms before:   [x] Yes  [] No    If a prescription is needed, what is your preferred pharmacy and phone number:  [id number] - [place], [place] - [address] AT [place] [facility] - [phone] [facility] [phone]   [phone]    Additional notes:PLEASE CALL AND ADVISE.

## 2023-04-18 ENCOUNTER — OFFICE VISIT (OUTPATIENT)
Dept: SURGERY | Facility: CLINIC | Age: 88
End: 2023-04-18
Payer: MEDICARE

## 2023-04-18 VITALS
DIASTOLIC BLOOD PRESSURE: 80 MMHG | SYSTOLIC BLOOD PRESSURE: 118 MMHG | BODY MASS INDEX: 32.1 KG/M2 | WEIGHT: 188 LBS | HEIGHT: 64 IN

## 2023-04-18 DIAGNOSIS — Z12.31 ENCOUNTER FOR SCREENING MAMMOGRAM FOR MALIGNANT NEOPLASM OF BREAST: ICD-10-CM

## 2023-04-18 DIAGNOSIS — C50.212 MALIGNANT NEOPLASM OF UPPER-INNER QUADRANT OF LEFT BREAST IN FEMALE, ESTROGEN RECEPTOR POSITIVE: Primary | ICD-10-CM

## 2023-04-18 DIAGNOSIS — Z17.0 MALIGNANT NEOPLASM OF UPPER-INNER QUADRANT OF LEFT BREAST IN FEMALE, ESTROGEN RECEPTOR POSITIVE: Primary | ICD-10-CM

## 2023-04-18 PROCEDURE — 3074F SYST BP LT 130 MM HG: CPT | Performed by: NURSE PRACTITIONER

## 2023-04-18 PROCEDURE — 1160F RVW MEDS BY RX/DR IN RCRD: CPT | Performed by: NURSE PRACTITIONER

## 2023-04-18 PROCEDURE — 1159F MED LIST DOCD IN RCRD: CPT | Performed by: NURSE PRACTITIONER

## 2023-04-18 PROCEDURE — 3079F DIAST BP 80-89 MM HG: CPT | Performed by: NURSE PRACTITIONER

## 2023-04-18 PROCEDURE — 99213 OFFICE O/P EST LOW 20 MIN: CPT | Performed by: NURSE PRACTITIONER

## 2023-04-18 RX ORDER — AMLODIPINE BESYLATE 5 MG/1
5 TABLET ORAL DAILY
COMMUNITY

## 2023-04-18 RX ORDER — BENAZEPRIL HYDROCHLORIDE 5 MG/1
TABLET, FILM COATED ORAL
COMMUNITY
Start: 2022-10-21
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
## 2023-04-19 ENCOUNTER — TELEPHONE (OUTPATIENT)
Dept: SURGERY | Facility: CLINIC | Age: 88
End: 2023-04-19
Payer: MEDICARE

## 2023-08-02 RX ORDER — BENAZEPRIL HYDROCHLORIDE 5 MG/1
TABLET, FILM COATED ORAL
Qty: 180 TABLET | Refills: 0 | Status: SHIPPED | OUTPATIENT
Start: 2023-08-02

## 2023-09-13 ENCOUNTER — HOSPITAL ENCOUNTER (OUTPATIENT)
Dept: MAMMOGRAPHY | Facility: HOSPITAL | Age: 88
Discharge: HOME | End: 2023-09-13
Admitting: NURSE PRACTITIONER
Payer: MEDICARE

## 2023-09-13 DIAGNOSIS — Z12.31 ENCOUNTER FOR SCREENING MAMMOGRAM FOR MALIGNANT NEOPLASM OF BREAST: ICD-10-CM

## 2023-09-13 DIAGNOSIS — C50.212 MALIGNANT NEOPLASM OF UPPER-INNER QUADRANT OF LEFT BREAST IN FEMALE, ESTROGEN RECEPTOR POSITIVE: ICD-10-CM

## 2023-09-13 DIAGNOSIS — Z17.0 MALIGNANT NEOPLASM OF UPPER-INNER QUADRANT OF LEFT BREAST IN FEMALE, ESTROGEN RECEPTOR POSITIVE: ICD-10-CM

## 2023-09-13 PROCEDURE — 77067 SCR MAMMO BI INCL CAD: CPT

## 2023-09-13 PROCEDURE — 77063 BREAST TOMOSYNTHESIS BI: CPT

## 2023-09-20 ENCOUNTER — TELEPHONE (OUTPATIENT)
Dept: SURGERY | Facility: CLINIC | Age: 88
End: 2023-09-20
Payer: MEDICARE

## 2023-09-20 ENCOUNTER — OFFICE VISIT (OUTPATIENT)
Dept: SURGERY | Facility: CLINIC | Age: 88
End: 2023-09-20
Payer: MEDICARE

## 2023-09-20 VITALS
DIASTOLIC BLOOD PRESSURE: 80 MMHG | SYSTOLIC BLOOD PRESSURE: 138 MMHG | BODY MASS INDEX: 32.1 KG/M2 | WEIGHT: 188 LBS | HEIGHT: 64 IN

## 2023-09-20 DIAGNOSIS — Z17.0 MALIGNANT NEOPLASM OF UPPER-INNER QUADRANT OF LEFT BREAST IN FEMALE, ESTROGEN RECEPTOR POSITIVE: Primary | ICD-10-CM

## 2023-09-20 DIAGNOSIS — Z12.31 ENCOUNTER FOR SCREENING MAMMOGRAM FOR MALIGNANT NEOPLASM OF BREAST: ICD-10-CM

## 2023-09-20 DIAGNOSIS — C50.212 MALIGNANT NEOPLASM OF UPPER-INNER QUADRANT OF LEFT BREAST IN FEMALE, ESTROGEN RECEPTOR POSITIVE: Primary | ICD-10-CM

## 2023-09-20 RX ORDER — CYANOCOBALAMIN/FOLIC AC/VIT B6 0.2-.5-5MG
400 TABLET ORAL DAILY
COMMUNITY

## 2023-09-20 RX ORDER — BENAZEPRIL HYDROCHLORIDE 5 MG/1
5 TABLET, FILM COATED ORAL DAILY
COMMUNITY
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
## 2023-09-26 ENCOUNTER — TELEPHONE (OUTPATIENT)
Dept: SURGERY | Facility: CLINIC | Age: 88
End: 2023-09-26
Payer: MEDICARE

## 2023-09-26 NOTE — TELEPHONE ENCOUNTER
Discussed patient request to amend note from 9/20/23.    Patient is currently taking arimidex after taking a short break from it in 11/2022.  She continues to follow up with Dr Pinzon.

## 2023-10-03 ENCOUNTER — TELEPHONE (OUTPATIENT)
Dept: ONCOLOGY | Facility: CLINIC | Age: 88
End: 2023-10-03

## 2023-10-03 NOTE — TELEPHONE ENCOUNTER
Caller: Haydee Dickey    Relationship to patient: Self    Best call back number: [phone]    Chief complaint:     Type of visit: LAB & F/U 1     Requested date:11/2/2023 EARLIER TIME, CALL TO R/S     If rescheduling, when is the original appointment: 11/2/2023

## 2023-10-09 RX ORDER — ANASTROZOLE 1 MG/1
1 TABLET ORAL DAILY
Qty: 90 TABLET | Refills: 0 | Status: SHIPPED | OUTPATIENT
Start: 2023-10-09

## 2023-11-15 ENCOUNTER — OFFICE VISIT (OUTPATIENT)
Dept: FAMILY MEDICINE CLINIC | Facility: CLINIC | Age: 88
End: 2023-11-15
Payer: MEDICARE

## 2023-11-15 ENCOUNTER — TELEPHONE (OUTPATIENT)
Dept: ONCOLOGY | Facility: CLINIC | Age: 88
End: 2023-11-15
Payer: MEDICARE

## 2023-11-15 VITALS
OXYGEN SATURATION: 100 % | RESPIRATION RATE: 16 BRPM | HEART RATE: 77 BPM | SYSTOLIC BLOOD PRESSURE: 144 MMHG | DIASTOLIC BLOOD PRESSURE: 66 MMHG | HEIGHT: 64 IN | BODY MASS INDEX: 31.24 KG/M2 | WEIGHT: 183 LBS

## 2023-11-15 DIAGNOSIS — Z23 NEED FOR VACCINATION: ICD-10-CM

## 2023-11-15 DIAGNOSIS — I10 BENIGN ESSENTIAL HYPERTENSION: ICD-10-CM

## 2023-11-15 DIAGNOSIS — D50.9 IRON DEFICIENCY ANEMIA, UNSPECIFIED IRON DEFICIENCY ANEMIA TYPE: ICD-10-CM

## 2023-11-15 DIAGNOSIS — N18.31 STAGE 3A CHRONIC KIDNEY DISEASE: ICD-10-CM

## 2023-11-15 DIAGNOSIS — E53.8 B12 DEFICIENCY: ICD-10-CM

## 2023-11-15 DIAGNOSIS — Z00.00 MEDICARE ANNUAL WELLNESS VISIT, SUBSEQUENT: Primary | ICD-10-CM

## 2023-11-15 DIAGNOSIS — R73.9 BLOOD GLUCOSE ELEVATED: ICD-10-CM

## 2023-11-15 DIAGNOSIS — R26.89 BALANCE DISORDER: ICD-10-CM

## 2023-11-15 DIAGNOSIS — R42 VERTIGO: ICD-10-CM

## 2023-11-15 LAB
ERYTHROCYTE [DISTWIDTH] IN BLOOD BY AUTOMATED COUNT: 12 % (ref 12.3–15.4)
HCT VFR BLD AUTO: 37 % (ref 34–46.6)
HGB BLD-MCNC: 12.4 G/DL (ref 12–15.9)
MCH RBC QN AUTO: 31 PG (ref 26.6–33)
MCHC RBC AUTO-ENTMCNC: 33.5 G/DL (ref 31.5–35.7)
MCV RBC AUTO: 92.5 FL (ref 79–97)
PLATELET # BLD AUTO: 187 10*3/MM3 (ref 140–450)
RBC # BLD AUTO: 4 10*6/MM3 (ref 3.77–5.28)
WBC # BLD AUTO: 4.2 10*3/MM3 (ref 3.4–10.8)

## 2023-11-15 NOTE — PROGRESS NOTES
Medicare Subsequent Wellness Visit  Subjective   Haydee Dickey is a 88 y.o. female who presents for an Medicare Wellness Visit.   Patient Care Team:  Tawanda Maya MD as PCP - General (Family Medicine)  Edmund Zavaleta MD as Surgeon (Orthopedic Surgery)  Debbie Batista MD as Surgeon (Hand Surgery)  Lemuel Mosqueda MD (Ophthalmology)  Coy Pinzon MD as Consulting Physician (Hematology and Oncology)  Judy García MD as Consulting Physician (Radiation Oncology)  Maliha Andres MD as Referring Physician (Breast Surgery)    Recent Hospitalizations:  none    Age-appropriate Screening Schedule:  Refer to the list below for future screening recommendations based on patient's age. The patient has been provided with a written plan.    Health Maintenance   Topic Date Due    DXA SCAN  11/23/2023    MAMMOGRAM  09/13/2024    TDAP/TD VACCINES (2 - Td or Tdap) 10/15/2024    ANNUAL WELLNESS VISIT  11/15/2024    BMI FOLLOWUP  11/15/2024    COVID-19 Vaccine  Completed    INFLUENZA VACCINE  Completed    Pneumococcal Vaccine 65+  Completed    ZOSTER VACCINE  Completed     Outpatient Medications Prior to Visit   Medication Sig Dispense Refill    amLODIPine (NORVASC) 5 MG tablet Take 1 tablet by mouth Daily.      anastrozole (ARIMIDEX) 1 MG tablet TAKE ONE TABLET BY MOUTH ONE TIME DAILY 90 tablet 0    benazepril (LOTENSIN) 5 MG tablet Take 1 tablet by mouth Daily.      cyanocobalamin (VITAMIN B-12) 1000 MCG tablet       ferrous sulfate 325 (65 FE) MG tablet Take 1 tablet by mouth Daily With Breakfast.      folic acid (FOLVITE) 400 MCG tablet Take 1 tablet by mouth Daily.      propranolol (INDERAL) 20 MG tablet Take 1 tablet by mouth 2 (Two) Times a Day.      Folic Acid-Vit B6-Vit B12 (B Complex-Folic Acid) 500-5-200 MCG-MG-MCG tablet Take 400 mcg by mouth Daily.       No facility-administered medications prior to visit.      Patient Active Problem List   Diagnosis    Acute left flank pain    B12 deficiency     Benign essential hypertension    Benign paroxysmal positional vertigo    H/O abdominal hysterectomy    H/O total knee replacement    Hip fracture    Torn rotator cuff    Osteoporosis    Essential tremor    Gait instability    Edema of lower extremity    Malignant neoplasm of upper-inner quadrant of left breast in female, estrogen receptor positive    Disorder of rotator cuff    Aromatase inhibitor use    Personal history of breast cancer    Vitamin D deficiency    Absolute anemia    Benign hypertensive kidney disease with chronic kidney disease    Hyperkalemia    Peripheral neuropathy    Stage 3a chronic kidney disease     Depression Screen:       11/15/2023     9:13 AM   PHQ-2/PHQ-9 Depression Screening   Little Interest or Pleasure in Doing Things 0-->not at all   Feeling Down, Depressed or Hopeless 0-->not at all   PHQ-9: Brief Depression Severity Measure Score 0       Functional and Cognitive Screenin/15/2023     9:14 AM   Functional & Cognitive Status   Do you have difficulty preparing food and eating? No   Do you have difficulty bathing yourself, getting dressed or grooming yourself? No   Do you have difficulty using the toilet? No   Do you have difficulty moving around from place to place? No   Do you have trouble with steps or getting out of a bed or a chair? No   Current Diet Well Balanced Diet   Dental Exam Up to date   Eye Exam Up to date   Exercise (times per week) 0 times per week   Current Exercises Include No Regular Exercise   Do you need help using the phone?  No   Are you deaf or do you have serious difficulty hearing?  Yes   Do you need help to go to places out of walking distance? No   Do you need help shopping? No   Do you need help preparing meals?  No   Do you need help with housework?  No   Do you need help with laundry? No   Do you need help taking your medications? No   Do you need help managing money? No   Do you ever drive or ride in a car without wearing a seat belt? No  "  Have you felt unusual stress, anger or loneliness in the last month? No   Who do you live with? Child   If you need help, do you have trouble finding someone available to you? No   Have you been bothered in the last four weeks by sexual problems? No   Do you have difficulty concentrating, remembering or making decisions? No     Does the patient have evidence of cognitive impairment? none    Compared to one year ago, the patient feels their physical health and mental health are the same.     BMI is >= 30 and <35. (Class 1 Obesity). The following options were offered after discussion;: nutrition counseling/recommendations       Objective     Vitals:    11/15/23 0908   BP: 144/66   Pulse: 77   Resp: 16   SpO2: 100%   Weight: 83 kg (183 lb)   Height: 161.3 cm (63.5\")     Body mass index is 31.91 kg/m².    Follow Up:  Medicare Well visit in one year    ADVANCED DIRECTIVES:   ACP discussion was held with the patient during this visit. Patient has an advance directive in EMR which is still valid.     An After Visit Summary and PPPS with all of these plans were given to the patient.     Additional E&M Note during same encounter follows:  Patient has multiple medical problems which are significant and separately identifiable that require additional work above and beyond the Medicare Wellness Visit.      Subjective   Haydee Dickey is a 88 y.o. female who also presents for Annual Exam (Subsequent Medicare Exam) and Hypertension   HPI  Hypertension  Haydee has chronic hypertension and has been well controlled on current medications.She  is monitored by me every 6 months and is here today for follow up. She is tolerating medications without side effect. She reports no vision changes, headaches or lightheadedness. She is requesting refills of medications.   She has chronic B12 deficiency and has been taking B12  2000 a day. She has been doing well on current medication.  Level will be checked today to make sure its appropriate " dose.  She also has chronic mild iron based anemia and is taking ferrous sulfate daily.  Labs indicates she is doing well on that dose. 12  Balance issue she is struggling with gait instability and has been working with physical therapy.  She now has a walker and she is planning to work with her physical therapist again on managing her balance concerns.  She notes rash on buttok that comes and goes.  Does not cause her any discomfort or pain and resolves rather quickly.    Review of Systems    PHYSICAL EXAM  Vitals reviewed   HEENT: PERRLA, EOMI. Oral mucosa moist,   No LAD.  CV: RRR, no murmurs, rubs, clicks or gallops  LUNGS: CTA bilaterally  EXT: No edema, FROM in bilateral upper and lower ext  NEURO: CN II - XII grossly intact  PSYCH: good mood, positive affect, alert and engaged. No thoughts of self harm  expressed.  Assessment & Plan   Problem List Items Addressed This Visit          Cardiac and Vasculature    Benign essential hypertension    Overview     Well controlled on current medication, will refill medication today and as needed. She will RTO for repeat B/P check in 6 months.Amlodipine 5 mg a day and benazepril 5 mg a day.             Endocrine and Metabolic    B12 deficiency    Overview     Managing well on 2000 mcg every other day.  She feels that she has a decent energy level for her age.            Genitourinary and Reproductive     Stage 3a chronic kidney disease    Overview     Stable and followed by nephrology.            Hematology and Neoplasia    Absolute anemia    Overview     She is taking ferrous sulfate as advised and is followed by hematology.  Her hemoglobin at last labs was 11.9.         Relevant Orders    Vitamin B12     Other Visit Diagnoses       Need for vaccination    -  Primary    Relevant Orders    Fluzone High-Dose 65+yrs (5501-8906) (Completed)    Balance disorder        Relevant Orders    Ambulatory Referral to Physical Therapy Evaluate and treat (Completed)    Vertigo         Relevant Orders    Ambulatory Referral to Physical Therapy Evaluate and treat (Completed)    Medicare annual wellness visit, subsequent        Relevant Orders    CBC (No Diff)    Comprehensive metabolic panel    Lipid panel    Blood glucose elevated        Relevant Orders    Hemoglobin A1c               Orders Placed This Encounter   Procedures    Fluzone High-Dose 65+yrs (8459-2515)    Vitamin B12    Hemoglobin A1c    CBC (No Diff)    Comprehensive metabolic panel    Lipid panel    Ambulatory Referral to Physical Therapy Evaluate and treat        Return in about 6 months (around 5/15/2024) for Recheck.

## 2023-11-15 NOTE — TELEPHONE ENCOUNTER
Caller: Haydee Dickey    Relationship to patient: Self    Best call back number: [phone]     Chief complaint: PATIENT CALLING TO RESCHEDULE 11/29/23    Type of visit: LAB AND FU1    Requested date: CAN BE SAME DAY BEFORE 1 PM OR ANOTHER DAY BEFORE 1 PM

## 2023-11-15 NOTE — TELEPHONE ENCOUNTER
Called patient back to let her know that I would send this to dr sutton and her  , patient is sharing her car and needs earlier appt, she stated that she could come in early and sit there , she will sit there all day if she has to and wait on her ride home . Sent to dr sutton and stephanie

## 2023-11-15 NOTE — PATIENT INSTRUCTIONS
Medicare Wellness  Personal Prevention Plan of Service     Date of Office Visit:    Encounter Provider:  Tawanda Maya MD  Place of Service:  [facility]  Patient Name: Haydee Dickey  :  1935    As part of the Medicare Wellness portion of your visit today, we are providing you with this personalized preventive plan of services (PPPS). This plan is based upon recommendations of the [place] Preventive Services Task Force (USPSTF) and the Advisory Committee on Immunization Practices (ACIP).    This lists the preventive care services that should be considered, and provides dates of when you are due. Items listed as completed are up-to-date and do not require any further intervention.    Health Maintenance   Topic Date Due    INFLUENZA VACCINE  2023    DXA SCAN  2023    MAMMOGRAM  2024    TDAP/TD VACCINES (2 - Td or Tdap) 10/15/2024    ANNUAL WELLNESS VISIT  11/15/2024    BMI FOLLOWUP  11/15/2024    COVID-19 Vaccine  Completed    Pneumococcal Vaccine 65+  Completed    ZOSTER VACCINE  Completed       Orders Placed This Encounter   Procedures    Fluzone High-Dose 65+yrs (0788-2514)    Vitamin B12     Order Specific Question:   Release to patient     Answer:   Routine Release [[id number]]    Hemoglobin A1c     Order Specific Question:   Release to patient     Answer:   Routine Release [[id number]]    CBC (No Diff)     Order Specific Question:   Release to patient     Answer:   Routine Release [[id number]]    Comprehensive metabolic panel     Order Specific Question:   Release to patient     Answer:   Routine Release [[id number]]    Lipid panel     Order Specific Question:   Release to patient     Answer:   Routine Release [[id number]]    Ambulatory Referral to Physical Therapy Evaluate and treat     Referral Priority:   Routine     Referral Type:   Physical Therapy     Referral Reason:   Specialty Services Required     Requested Specialty:   Physical  Therapy     Number of Visits Requested:   1       Return in about 6 months (around 5/15/2024) for Recheck.

## 2023-11-16 LAB
ALBUMIN SERPL-MCNC: 4.5 G/DL (ref 3.5–5.2)
ALBUMIN/GLOB SERPL: 2.3 G/DL
ALP SERPL-CCNC: 41 U/L (ref 39–117)
ALT SERPL-CCNC: 13 U/L (ref 1–33)
AST SERPL-CCNC: 19 U/L (ref 1–32)
BILIRUB SERPL-MCNC: 0.4 MG/DL (ref 0–1.2)
BUN SERPL-MCNC: 17 MG/DL (ref 8–23)
BUN/CREAT SERPL: 21.8 (ref 7–25)
CALCIUM SERPL-MCNC: 10.1 MG/DL (ref 8.6–10.5)
CHLORIDE SERPL-SCNC: 106 MMOL/L (ref 98–107)
CHOLEST SERPL-MCNC: 181 MG/DL (ref 0–200)
CO2 SERPL-SCNC: 30.1 MMOL/L (ref 22–29)
CREAT SERPL-MCNC: 0.78 MG/DL (ref 0.57–1)
EGFRCR SERPLBLD CKD-EPI 2021: 74.1 ML/MIN/1.73
GLOBULIN SER CALC-MCNC: 2 GM/DL
GLUCOSE SERPL-MCNC: 101 MG/DL (ref 65–99)
HBA1C MFR BLD: 5.6 % (ref 4.8–5.6)
HDLC SERPL-MCNC: 51 MG/DL (ref 40–60)
LDLC SERPL CALC-MCNC: 118 MG/DL (ref 0–100)
POTASSIUM SERPL-SCNC: 5.1 MMOL/L (ref 3.5–5.2)
PROT SERPL-MCNC: 6.5 G/DL (ref 6–8.5)
SODIUM SERPL-SCNC: 143 MMOL/L (ref 136–145)
TRIGL SERPL-MCNC: 64 MG/DL (ref 0–150)
VIT B12 SERPL-MCNC: 1008 PG/ML (ref 211–946)
VLDLC SERPL CALC-MCNC: 12 MG/DL (ref 5–40)

## 2023-11-29 ENCOUNTER — LAB (OUTPATIENT)
Dept: LAB | Facility: HOSPITAL | Age: 88
End: 2023-11-29
Payer: MEDICARE

## 2023-11-29 ENCOUNTER — OFFICE VISIT (OUTPATIENT)
Dept: ONCOLOGY | Facility: CLINIC | Age: 88
End: 2023-11-29
Payer: MEDICARE

## 2023-11-29 VITALS
HEIGHT: 64 IN | DIASTOLIC BLOOD PRESSURE: 82 MMHG | OXYGEN SATURATION: 96 % | WEIGHT: 190.9 LBS | SYSTOLIC BLOOD PRESSURE: 145 MMHG | HEART RATE: 64 BPM | BODY MASS INDEX: 32.59 KG/M2 | TEMPERATURE: 97.8 F | RESPIRATION RATE: 16 BRPM

## 2023-11-29 DIAGNOSIS — C50.212 MALIGNANT NEOPLASM OF UPPER-INNER QUADRANT OF LEFT BREAST IN FEMALE, ESTROGEN RECEPTOR POSITIVE: Primary | ICD-10-CM

## 2023-11-29 DIAGNOSIS — Z17.0 MALIGNANT NEOPLASM OF UPPER-INNER QUADRANT OF LEFT BREAST IN FEMALE, ESTROGEN RECEPTOR POSITIVE: ICD-10-CM

## 2023-11-29 DIAGNOSIS — Z17.0 MALIGNANT NEOPLASM OF UPPER-INNER QUADRANT OF LEFT BREAST IN FEMALE, ESTROGEN RECEPTOR POSITIVE: Primary | ICD-10-CM

## 2023-11-29 DIAGNOSIS — C50.212 MALIGNANT NEOPLASM OF UPPER-INNER QUADRANT OF LEFT BREAST IN FEMALE, ESTROGEN RECEPTOR POSITIVE: ICD-10-CM

## 2023-11-29 DIAGNOSIS — Z79.811 AROMATASE INHIBITOR USE: ICD-10-CM

## 2023-11-29 LAB
ALBUMIN SERPL-MCNC: 3.6 G/DL (ref 3.5–5.2)
ALBUMIN/GLOB SERPL: 1.4 G/DL
ALP SERPL-CCNC: 43 U/L (ref 39–117)
ALT SERPL W P-5'-P-CCNC: 18 U/L (ref 1–33)
ANION GAP SERPL CALCULATED.3IONS-SCNC: 11.3 MMOL/L (ref 5–15)
AST SERPL-CCNC: 30 U/L (ref 1–32)
BASOPHILS # BLD AUTO: 0.05 10*3/MM3 (ref 0–0.2)
BASOPHILS NFR BLD AUTO: 1 % (ref 0–1.5)
BILIRUB SERPL-MCNC: 0.3 MG/DL (ref 0–1.2)
BUN SERPL-MCNC: 13 MG/DL (ref 8–23)
BUN/CREAT SERPL: 14 (ref 7–25)
CALCIUM SPEC-SCNC: 9.1 MG/DL (ref 8.6–10.5)
CHLORIDE SERPL-SCNC: 108 MMOL/L (ref 98–107)
CO2 SERPL-SCNC: 26.7 MMOL/L (ref 22–29)
CREAT SERPL-MCNC: 0.93 MG/DL (ref 0.6–1.1)
DEPRECATED RDW RBC AUTO: 41.4 FL (ref 37–54)
EGFRCR SERPLBLD CKD-EPI 2021: 60 ML/MIN/1.73
EOSINOPHIL # BLD AUTO: 0.13 10*3/MM3 (ref 0–0.4)
EOSINOPHIL NFR BLD AUTO: 2.6 % (ref 0.3–6.2)
ERYTHROCYTE [DISTWIDTH] IN BLOOD BY AUTOMATED COUNT: 12 % (ref 12.3–15.4)
GLOBULIN UR ELPH-MCNC: 2.6 GM/DL
GLUCOSE SERPL-MCNC: 108 MG/DL (ref 65–99)
HCT VFR BLD AUTO: 35 % (ref 34–46.6)
HGB BLD-MCNC: 11.7 G/DL (ref 12–15.9)
IMM GRANULOCYTES # BLD AUTO: 0.01 10*3/MM3 (ref 0–0.05)
IMM GRANULOCYTES NFR BLD AUTO: 0.2 % (ref 0–0.5)
LYMPHOCYTES # BLD AUTO: 1.48 10*3/MM3 (ref 0.7–3.1)
LYMPHOCYTES NFR BLD AUTO: 30.1 % (ref 19.6–45.3)
MCH RBC QN AUTO: 31.5 PG (ref 26.6–33)
MCHC RBC AUTO-ENTMCNC: 33.4 G/DL (ref 31.5–35.7)
MCV RBC AUTO: 94.1 FL (ref 79–97)
MONOCYTES # BLD AUTO: 0.58 10*3/MM3 (ref 0.1–0.9)
MONOCYTES NFR BLD AUTO: 11.8 % (ref 5–12)
NEUTROPHILS NFR BLD AUTO: 2.66 10*3/MM3 (ref 1.7–7)
NEUTROPHILS NFR BLD AUTO: 54.3 % (ref 42.7–76)
NRBC BLD AUTO-RTO: 0 /100 WBC (ref 0–0.2)
PLATELET # BLD AUTO: 193 10*3/MM3 (ref 140–450)
PMV BLD AUTO: 9.5 FL (ref 6–12)
POTASSIUM SERPL-SCNC: 4.6 MMOL/L (ref 3.5–5.2)
PROT SERPL-MCNC: 6.2 G/DL (ref 6–8.5)
RBC # BLD AUTO: 3.72 10*6/MM3 (ref 3.77–5.28)
SODIUM SERPL-SCNC: 146 MMOL/L (ref 136–145)
WBC NRBC COR # BLD AUTO: 4.91 10*3/MM3 (ref 3.4–10.8)

## 2023-11-29 PROCEDURE — 36415 COLL VENOUS BLD VENIPUNCTURE: CPT

## 2023-11-29 PROCEDURE — 85025 COMPLETE CBC W/AUTO DIFF WBC: CPT

## 2023-11-29 PROCEDURE — 80053 COMPREHEN METABOLIC PANEL: CPT

## 2023-11-29 RX ORDER — ANASTROZOLE 1 MG/1
1 TABLET ORAL DAILY
Qty: 90 TABLET | Refills: 3 | Status: SHIPPED | OUTPATIENT
Start: 2023-11-29

## 2023-11-29 NOTE — PROGRESS NOTES
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    Her daughter called me and complained that she thought her personality had changed completely with the Arimidex and I called her back in July and asked her to stop it and she stopped it for 2 months and saw no difference and went back on it on her own and wants to stay on it although I told her I have very low threshold to stop it because of her age and small cancer and low risk of recurrence      She has noticed some thinning of her hair but no other significant side effects and she is very happy with this   mammography in September 2023 was thankfully benign    She is up-to-date with screening but has not had a colonoscopy and at her age I do not think this is reasonable and I have recommended a Cologuard      Past Medical History:   Diagnosis Date    Allergic Percocet, iv iodine    Anemia     Arthritis     B12 deficiency     BPPV (benign paroxysmal positional vertigo)     Breast cancer     LEFT    Cataract 2018  jaime, 2021 jaime blepharoplasty    Colon polyp 2009    COVID 03/24/2023    Rebound Covid following Paxlovid 4/1/23    Deep vein thrombosis 1970    HX, LEG    Essential tremor     Folliculitis 11/01/2017    Gait instability     GERD (gastroesophageal reflux disease) 1990    History of blood transfusion 2014    HL (hearing loss) 2015    Hypertension 2001    on Rx    Lower extremity neuropathy     bilateral    Neuropathy     Osteopenia 2019    Osteoporosis     Potassium (K) excess 10/21/2022    Seeing nephrologist -- Dr. Coats    Small vessel disease      Torn rotator cuff 2013    right arm    Tremor 2005    BET    Urinary tract infection 2018    approx 1 every 5 years    Wears hearing aid     BILATERAL    Wrist fracture, right 2015        Past Surgical History:   Procedure Laterality Date    ADENOIDECTOMY  1953    APPENDECTOMY  1961    BLEPHAROPLASTY Bilateral 10/19/2021    Procedure: BILATERAL UPPER LID BLEPHAROPLASTY;  Surgeon: Ruddy Moses MD;  [place] [facility] [place] [facility] [place]  Service: Ophthalmology;  Laterality: Bilateral;    BREAST BIOPSY      BREAST LUMPECTOMY Left 11/15/2021    Procedure: Left JEAN-PAUL-guided partial mastectomy;  Surgeon: Maliha Andres MD;  Location: [facility] OR;  Service: General;  Laterality: Left;    BROW LIFT Bilateral 10/19/2021    Procedure: BILATERAL TEMPORAL DIRECT BROWLIFT;  Surgeon: Ruddy Moses MD;  [place] [facility] [place] [facility] [place];  Service: Ophthalmology;  Laterality: Bilateral;    CARPAL TUNNEL RELEASE  1992    right wrist    CATARACT EXTRACTION, BILATERAL  07/01/2018    R 7/18 and L 9/18 WITH LENS IMPLANTS    COLONOSCOPY      DEQUERVAIN RELEASE Bilateral 2015    DILATATION AND CURETTAGE  1960s    EYE SURGERY  2018    Bilateral repairs    FRACTURE SURGERY  2014    R hip, R  wrist    HAND SURGERY Right     HIP FRACTURE SURGERY Right     HYSTERECTOMY  1975    Partial    JOINT REPLACEMENT  2001, 2014    knees    TONSILLECTOMY AND ADENOIDECTOMY  1953    TOTAL KNEE ARTHROPLASTY Left 2001    TOTAL KNEE ARTHROPLASTY Right 2014    WRIST SURGERY  2015    FRACTURE RIGHT   Oncologic history  patient is an 86-year-old female in excellent health who was noted on routine screening mammogram this year to have an abnormality in the left breast that was not seen 2 years ago at her last mammogram.  This led to additional imaging and a biopsy Of the left breast on 9/29/2021 that revealed 4 mm grade 2 invasive ductal carcinoma with associated intermediate grade DCIS ER 99% NM 91% HER-2 negative with a Ki-67 of 16%.  Patient was referred  to Dr. Andres and underwent lumpectomy on 11/15/2021 the finding of a residual 6 mm grade 2 invasive ductal carcinoma With associated DCIS measuring 12 mm clear margins and no sentinel nodes were removed.    Patient has done well postoperatively.  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    She is  1 para 1 menarche was at age 14 menopause in her mid 50s she had a hysterectomy at age 52.  First childbirth was age 34 she did not breast-feed.  She took hormone replacement for about a year or less after menopause.    Family history is negative for breast cancer she has a brother with lung cancer and a sister with lung cancer both of whom are stroke smokers in 1 sister who  of metastatic cancer unknown primary.    She has never had a DVT or stroke or heart attack but has had issues with osteoporosis in her forearm although her spine is normal and her hips show mild to moderate osteopenia.  She has been on Fosamax for 3 years.  Her last bone density was in November of this year and shows normal spine lumbar spine and at left femoral neck and hip with osteopenia.      She is not a smoker or drinker     Discussed the benefits of Arimidex in the adjuvant setting and the risk benefit ratio which favors its use even at her age.The side effects and toxicities of the Aromatase inhibitors was discussed with the patient including, hot flashes, mood swings and hair thinning.Significant arthralgias and worsening bone density were also discussed. Baseline bone density evaluation was reviewed    I have prescribed Arimidex No. 90 to her mail order pharmacy and she will start this and call me for any side effects    Obviously if her bone density worsens we will switch to Prolia and consider tamoxifen      Current Outpatient Medications on File Prior to Visit   Medication Sig Dispense Refill    amLODIPine (NORVASC) 5 MG tablet Take 1 tablet by mouth Daily.       benazepril (LOTENSIN) 5 MG tablet Take 1 tablet by mouth Daily.      cyanocobalamin (VITAMIN B-12) 1000 MCG tablet       ferrous sulfate 325 (65 FE) MG tablet Take 1 tablet by mouth Daily With Breakfast.      folic acid (FOLVITE) 400 MCG tablet Take 1 tablet by mouth Daily.      propranolol (INDERAL) 20 MG tablet Take 1 tablet by mouth 2 (Two) Times a Day.      [DISCONTINUED] anastrozole (ARIMIDEX) 1 MG tablet TAKE ONE TABLET BY MOUTH ONE TIME DAILY 90 tablet 0     No current facility-administered medications on file prior to visit.        ALLERGIES:    Allergies   Allergen Reactions    Iodine Itching     IV ONLY    Percocet [Oxycodone-Acetaminophen] Itching     causes ithching    Iodinated Contrast Media Rash     Patient reports rash occurred 30 yrs ago with contrast        Social History     Socioeconomic History    Marital status:     Number of children: 1   Tobacco Use    Smoking status: Never    Smokeless tobacco: Never   Vaping Use    Vaping Use: Never used   Substance and Sexual Activity    Alcohol use: No    Drug use: No    Sexual activity: Never        Family History   Problem Relation Age of Onset    Diabetes Mother     Hypertension Mother     Stroke Mother     Hearing loss Mother         AIDS    Heart disease Mother         CHF, PACER    Thyroid disease Mother         THYROIDECTOMY    Hypertension Father     Stroke Father     Alcohol abuse Father         DAYS OFF     Heart disease Father         CHF    Hyperlipidemia Father     Kidney disease Father         KIDNEY STONE REMOVED    Heart attack Father     Cancer Sister         LUNG    COPD Sister     Lung cancer Sister     Diabetes Sister     Cancer Sister         KIDNEY    Kidney cancer Sister     Diabetes Sister         INSULIN    Hypertension Sister     Heart disease Sister         ON AUTOPSY    Mental illness Sister         HOSPITALIZED    Depression Sister     Hyperlipidemia Sister     Cancer Brother         LUNG    Lung cancer  "Brother     Early death Brother         2 MO, ? WHOOPING COUGH    Mental illness Brother         Schizophrenia/hosp    Depression Brother     Early death Brother     Asthma Daughter         ADULT ONSET    Diabetes Maternal Grandmother         NIDDM    Diabetes Maternal Aunt         NIDDM    Thyroid disease Maternal Aunt         THYROIDECTOMY    Diabetes Maternal Aunt         NIDDM    Other Maternal Aunt         ESSENTIAL TREMOR    Hearing loss Maternal Aunt     Other Maternal Uncle         PARKINSON    Malig Hyperthermia Neg Hx     Breast cancer Neg Hx         Review of Systems   Constitutional: Negative.    HENT: Negative.     Respiratory: Negative.     Cardiovascular: Negative.    Gastrointestinal: Negative.    Genitourinary: Negative.    Musculoskeletal: Negative.    Neurological: Negative.    Hematological: Negative.    Psychiatric/Behavioral: Negative.     All other systems reviewed and are negative.       Objective     Vitals:    11/29/23 1551   BP: 145/82   Pulse: 64   Resp: 16   Temp: 97.8 °F (36.6 °C)   TempSrc: Temporal   SpO2: 96%   Weight: 86.6 kg (190 lb 14.4 oz)   Height: 161.3 cm (63.5\")   PainSc: 0-No pain           11/29/2023     3:38 PM   Current Status   ECOG score 1       Physical Exam  This patient's ACP documentation is up to date, and there's nothing further left to document.      CONSTITUTIONAL:  Vital signs reviewed.  No distress, looks comfortable.  EYES:  Conjunctivae and lids unremarkable.  PERRLA  EARS,NOSE,MOUTH,THROAT:  Ears and nose appear unremarkable.  Lips, teeth, gums appear unremarkable.  RESPIRATORY:  Normal respiratory effort.  Lungs clear to auscultation bilaterally.  BREASTS: Right breast is benign left breast shows well-healed lumpectomy in the upper inner quadrant of left breast with a small pea-sized nodule in the scar-breast exam not done at patient's request  CARDIOVASCULAR:  Normal S1, S2.  No murmurs rubs or gallops.  No significant lower extremity " edema.  GASTROINTESTINAL: Abdomen appears unremarkable.  Nontender.  No hepatomegaly.  No splenomegaly.  LYMPHATIC:  No cervical, supraclavicular, axillary lymphadenopathy.  SKIN:  Warm.  No rashes.  PSYCHIATRIC:  Normal judgment and insight.  Normal mood and affect-as far as I can detect  I have reexamined the patient and the results are consistent with the previously documented exam. Coy Pinzon MD     RECENT LABS:  Hematology WBC   Date Value Ref Range Status   11/29/2023 4.91 3.40 - 10.80 10*3/mm3 Final   11/15/2023 4.20 3.40 - 10.80 10*3/mm3 Final     RBC   Date Value Ref Range Status   11/29/2023 3.72 (L) 3.77 - 5.28 10*6/mm3 Final   11/15/2023 4.00 3.77 - 5.28 10*6/mm3 Final     Hemoglobin   Date Value Ref Range Status   11/29/2023 11.7 (L) 12.0 - 15.9 g/dL Final     Hematocrit   Date Value Ref Range Status   11/29/2023 35.0 34.0 - 46.6 % Final     Platelets   Date Value Ref Range Status   11/29/2023 193 140 - 450 10*3/mm3 Final        Final Diagnosis   1. Left Breast at 10 O'clock, 6 cm From Nipple (Not For Calcifications), Core Biopsy:                A. Invasive ductal carcinoma:                            1. Overall Alburnett Grade II (tubular score = 3, nuclear score = 2, mitotic score = 1).                            2. Invasive carcinoma measures at least 4 mm in greatest dimension.                            3. No lymphovascular space invasion identified.               B. 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 Microcalcification present in foci of DCIS.     jat/jse    Electronically signed by Jeromy White MD on 10/1/2021 at 1421   Synoptic Checklist     Breast Biomarker Reporting Template  Protocol posted: 2/26/2020  BREAST: BIOMARKER REPORTING TEMPLATE - All Specimens  Test(s) Performed     Estrogen Receptor (ER) Status  Positive (greater than 10% of cells demonstrate nuclear positivity)    Percentage of Cells with Nuclear Positivity   99 %   Average Intensity of Staining  Strong    Test Type  Food and Drug Administration (FDA) cleared (test / vendor): [facility]    Primary Antibody  SP1    Scoring System  Jessica    Proportion Score  5    Intensity Score  3    Total Jessica Score  8    Test(s) Performed     Progesterone Receptor (PgR) Status  Positive    Percentage of Cells with Nuclear Positivity  %    Average Intensity of Staining  Moderate    Test Type  Food and Drug Administration (FDA) cleared (test / vendor): [facility]    Primary Antibody  1E2    Scoring System  Jessica    Proportion Score  5    Intensity Score  2    Total Jessica Score  7    Test(s) Performed     HER2 by Immunohistochemistry  Negative (Score 1+)    Test Type  Food and Drug Administration (FDA) cleared (test / vendor): [facility]    Primary Antibody  4B5    Test(s) Performed  Ki-67    Percentage of Cells with Nuclear Positivity  16 %   Primary Antibody  30-9        Synoptic Checklist     INVASIVE CARCINOMA OF THE BREAST: Resection  8th Edition - Protocol posted: 6/30/2021  INVASIVE CARCINOMA OF THE BREAST: COMPLETE EXCISION - All Specimens  SPECIMEN   Procedure  Excision (less than total mastectomy)    Specimen Laterality  Left    TUMOR   Tumor Site  Upper inner quadrant      Clock position      10 o'clock    Tumor Site  Distance from nipple (Centimeters): 6 cm   Histologic Type  Invasive carcinoma of no special type (ductal)    Histologic Grade (Alton Histologic Score)     Glandular (Acinar) / Tubular Differentiation  Score 3    Nuclear Pleomorphism  Score 2    Mitotic Rate  Score 1    Overall Grade  Grade 2 (scores of 6 or 7)    Tumor Size  Greatest dimension of largest invasive focus (Millimeters): 6 mm   Additional Dimension (Millimeters)  4 mm     4 mm   Tumor Focality  Single focus of invasive carcinoma    Ductal Carcinoma In Situ (DCIS)  Present      Negative for extensive intraductal component (EIC)    Size (Extent) of DCIS  Estimated size (extent) of DCIS is at  least (Millimeters): 12 mm   Additional Dimension (Millimeters)  6 mm     1 mm   Architectural Patterns  Cribriform      Micropapillary    Nuclear Grade  Grade II (intermediate)    Necrosis  Present, focal (small foci or single cell necrosis)    Lobular Carcinoma In Situ (LCIS)  Not identified    Tumor Extent     Lymphovascular Invasion  Not identified    Dermal Lymphovascular Invasion  Not identified    Microcalcifications  Present in DCIS      Present in non-neoplastic tissue    Treatment Effect in the Breast  No known presurgical therapy    MARGINS   Margin Status for Invasive Carcinoma  All margins negative for invasive carcinoma    Distance from Invasive Carcinoma to Closest Margin  11.5 mm   Closest Margin(s) to Invasive Carcinoma  Inferior    Distance from Invasive Carcinoma to Anterior Margin  22 mm   Distance from Invasive Carcinoma to Posterior Margin  12.2 mm   Distance from Invasive Carcinoma to Superior Margin  14 mm   Distance from Invasive Carcinoma to Inferior Margin  11.5 mm   Distance from Invasive Carcinoma to Medial Margin  16 mm   Distance from Invasive Carcinoma to Lateral Margin  36 mm   Margin Status for DCIS  All margins negative for DCIS    Distance from DCIS to Closest Margin  12 mm   Closest Margin(s) to DCIS  Superior    REGIONAL LYMPH NODES   Regional Lymph Node Status  Not applicable (no regional lymph nodes submitted or found)    PATHOLOGIC STAGE CLASSIFICATION (pTNM, AJCC 8th Edition)   Reporting of pT, pN, and (when applicable) pM categories is based on information available to the pathologist at the time the report is issued. As per the AJCC (Chapter 1, 8th Ed.) it is the managing physician’s responsibility to establish the final pathologic stage based upon all pertinent information, including but potentially not limited to this pathology report.   pT Category  pT1b    pN Category  pN not assigned (no nodes submitted or found)             Assessment & Plan   1.pT1bN0 grade 2  infiltrating ductal carcinoma left breast strongly ER/MN positive HER-2 negative post lumpectomy  Arimidex started in 12/21  Tolerating Arimidex well as of 3/22  Stop Arimidex in 7/22 and reassess-daughter to call me  Patient stopped Arimidex for 2 months could see no difference and resumed it on her own and continues on  Discussed the fact that her risk was low and at her age this was not crucial medication but she still wants to take it  Tolerating Arimidex well as of 12/23    2.  Moderate osteopenia hips normal spine on Fosamax for 3 years    3.  Hypertension on treatment    4.  Essential tremor    5.  Negative family history of breast or ovarian cancer-family history positive for lung cancer in 2 siblings who are smokers    6 DVT 1970 while on BCP    Plan  1.  continue Arimidex  2.  See me 12 mo

## 2024-02-15 RX ORDER — BENAZEPRIL HYDROCHLORIDE 5 MG/1
10 TABLET ORAL DAILY
Qty: 180 TABLET | Refills: 1 | Status: SHIPPED | OUTPATIENT
Start: 2024-02-15

## 2024-04-05 DIAGNOSIS — R42 VERTIGO: Primary | ICD-10-CM

## 2024-04-26 ENCOUNTER — TELEPHONE (OUTPATIENT)
Dept: FAMILY MEDICINE CLINIC | Facility: CLINIC | Age: 89
End: 2024-04-26
Payer: MEDICARE

## 2024-04-30 ENCOUNTER — OFFICE VISIT (OUTPATIENT)
Dept: FAMILY MEDICINE CLINIC | Facility: CLINIC | Age: 89
End: 2024-04-30
Payer: MEDICARE

## 2024-04-30 VITALS
SYSTOLIC BLOOD PRESSURE: 124 MMHG | HEIGHT: 64 IN | BODY MASS INDEX: 32.27 KG/M2 | DIASTOLIC BLOOD PRESSURE: 60 MMHG | OXYGEN SATURATION: 96 % | RESPIRATION RATE: 16 BRPM | HEART RATE: 72 BPM | WEIGHT: 189 LBS

## 2024-04-30 DIAGNOSIS — M54.32 SCIATICA OF LEFT SIDE: ICD-10-CM

## 2024-04-30 DIAGNOSIS — R94.31 ABNORMAL EKG: ICD-10-CM

## 2024-04-30 DIAGNOSIS — Z78.0 POST-MENOPAUSAL: ICD-10-CM

## 2024-04-30 DIAGNOSIS — M54.2 NECK PAIN ON LEFT SIDE: Primary | ICD-10-CM

## 2024-04-30 DIAGNOSIS — E53.8 B12 DEFICIENCY: ICD-10-CM

## 2024-04-30 PROBLEM — R79.89 HIGH SERUM CREATININE: Chronic | Status: ACTIVE | Noted: 2022-10-19

## 2024-04-30 PROCEDURE — 93000 ELECTROCARDIOGRAM COMPLETE: CPT | Performed by: FAMILY MEDICINE

## 2024-04-30 PROCEDURE — 1160F RVW MEDS BY RX/DR IN RCRD: CPT | Performed by: FAMILY MEDICINE

## 2024-04-30 PROCEDURE — 1159F MED LIST DOCD IN RCRD: CPT | Performed by: FAMILY MEDICINE

## 2024-04-30 PROCEDURE — 99214 OFFICE O/P EST MOD 30 MIN: CPT | Performed by: FAMILY MEDICINE

## 2024-04-30 RX ORDER — METHYLPREDNISOLONE 4 MG/1
TABLET ORAL
Qty: 21 TABLET | Refills: 0 | Status: SHIPPED | OUTPATIENT
Start: 2024-04-30

## 2024-04-30 RX ORDER — BENAZEPRIL HYDROCHLORIDE 5 MG/1
5 TABLET ORAL DAILY
COMMUNITY

## 2024-04-30 NOTE — PROGRESS NOTES
"Subjective   Haydee Dickey is a 88 y.o. female.   Jaw Pain, Bleeding/Bruising, and Sciatica    History of Present Illness  Ms Dickey is here for several issues:  1) Jaw Pain X 1 Month worse at times with movement. She is concerned that it might be cardiac in nature because it comes and goes and is sometimes worse with  ambulation. She  states she cannot feel any masses and she has no discomfort with touching the area.    2) She has chronic sciatica on her right side and feels it has flared . She has a hx of sciatica on both sides of her back and needs help with medication on occasion but rarely .  She would like steroid pack.  She has been going to PT  3) She also notes bruising on her right leg from bellow the hip to her knee on posterior side. No pain or discomfort but the discoloration persists.    4) She has B12 deficiency and needs labs to evaluate today.     Review of Systems   Constitutional: Negative.    HENT: Negative.          Left neck pain   Eyes: Negative.    Respiratory:  Negative for cough, chest tightness, shortness of breath and stridor.    Cardiovascular:  Positive for leg swelling. Negative for chest pain and palpitations.   Gastrointestinal: Negative.    Musculoskeletal:  Positive for back pain and neck pain.   Skin:  Positive for bruise.   Neurological: Negative.    Psychiatric/Behavioral: Negative.         Objective   Vitals:    04/30/24 0948   BP: 124/60   Pulse: 72   Resp: 16   SpO2: 96%   Weight: 85.7 kg (189 lb)   Height: 161.3 cm (63.5\")      Body mass index is 32.95 kg/m².        Physical Exam  Vitals reviewed.   HENT:      Head: Normocephalic and atraumatic.      Nose: Nose normal.      Mouth/Throat:      Mouth: Mucous membranes are moist.   Cardiovascular:      Rate and Rhythm: Normal rate and regular rhythm.      Pulses: Normal pulses.      Heart sounds: Normal heart sounds. No murmur heard.  Pulmonary:      Effort: Pulmonary effort is normal.      Breath sounds: Normal breath sounds. "   Musculoskeletal:      Cervical back: Normal range of motion. No tenderness.      Comments: Mild swelling of both feet   Skin:     General: Skin is warm.      Findings: Bruising present. No lesion.   Neurological:      General: No focal deficit present.      Mental Status: She is alert and oriented to person, place, and time.   Psychiatric:         Mood and Affect: Mood normal.         Behavior: Behavior normal.         ECG 12 Lead    Date/Time: 4/30/2024 12:03 PM  Performed by: Tawanda Maya MD    Authorized by: Tawanda Maya MD  Rhythm: sinus rhythm  Conduction: 1st degree AV block  ST Segments: ST segments normal  T Waves: T waves normal    Clinical impression: abnormal EKG  Comments: Septal myocadial infarction           Assessment & Plan   Problem List Items Addressed This Visit          Endocrine and Metabolic    B12 deficiency    Overview     Managing well on 2000 mcg every other day.  She feels that she has a decent energy level for her age.         Relevant Orders    Vitamin B12     Other Visit Diagnoses       Post-menopausal    -  Primary    Relevant Orders    DEXA Bone Density Axial    Sciatica of left side      I have advised her to monitor carefully her response to this medication.  She feels that is the only way she will get some relief and so I have prescribed for her with the condition that she is very careful while on this medication and will let me know if she has any problems.  She has agreed to this plan.    Relevant Medications    methylPREDNISolone (MEDROL) 4 MG dose pack    Abnormal EKG      Due to the episodic pain in her neck that she was relating to movement and ambulation, an EKG was done and there was significan findings that she agreed and evaluation from a cardiologist is the next best step.  I encouraged her to go to the ER if her symptoms get worse.    Relevant Orders    Ambulatory Referral to Cardiology    ECG 12 Lead    Neck pain on left side      She has episodic neck  pain that she relates to movement consequently an EKG was done and decision to follow-up with cardiology was determined.                 Orders Placed This Encounter   Procedures    DEXA Bone Density Axial    Vitamin B12    Ambulatory Referral to Cardiology    ECG 12 Lead        No follow-ups on file.   Answers submitted by the patient for this visit:  Other (Submitted on 4/28/2024)  Please describe your symptoms.: Back pain, L side of jaw pain  Have you had these symptoms before?: No  How long have you been having these symptoms?: Greater than 2 weeks  Please list any medications you are currently taking for this condition.: Tylenol  Primary Reason for Visit (Submitted on 4/28/2024)  What is the primary reason for your visit?: Other

## 2024-05-01 LAB — VIT B12 SERPL-MCNC: 1593 PG/ML (ref 211–946)

## 2024-05-10 ENCOUNTER — TRANSCRIBE ORDERS (OUTPATIENT)
Dept: LAB | Facility: HOSPITAL | Age: 89
End: 2024-05-10
Payer: MEDICARE

## 2024-05-10 ENCOUNTER — LAB (OUTPATIENT)
Dept: LAB | Facility: HOSPITAL | Age: 89
End: 2024-05-10
Payer: MEDICARE

## 2024-05-10 DIAGNOSIS — R79.89 HYPOURICEMIA: ICD-10-CM

## 2024-05-10 DIAGNOSIS — R79.89 HYPOURICEMIA: Primary | ICD-10-CM

## 2024-05-10 LAB
ALBUMIN SERPL-MCNC: 4.1 G/DL (ref 3.5–5.2)
ANION GAP SERPL CALCULATED.3IONS-SCNC: 9.6 MMOL/L (ref 5–15)
BASOPHILS # BLD AUTO: 0.02 10*3/MM3 (ref 0–0.2)
BASOPHILS NFR BLD AUTO: 0.4 % (ref 0–1.5)
BUN SERPL-MCNC: 14 MG/DL (ref 8–23)
BUN/CREAT SERPL: 14.7 (ref 7–25)
CALCIUM SPEC-SCNC: 9.7 MG/DL (ref 8.6–10.5)
CHLORIDE SERPL-SCNC: 107 MMOL/L (ref 98–107)
CO2 SERPL-SCNC: 27.4 MMOL/L (ref 22–29)
CREAT SERPL-MCNC: 0.95 MG/DL (ref 0.57–1)
DEPRECATED RDW RBC AUTO: 39.6 FL (ref 37–54)
EGFRCR SERPLBLD CKD-EPI 2021: 58.5 ML/MIN/1.73
EOSINOPHIL # BLD AUTO: 0.19 10*3/MM3 (ref 0–0.4)
EOSINOPHIL NFR BLD AUTO: 4 % (ref 0.3–6.2)
ERYTHROCYTE [DISTWIDTH] IN BLOOD BY AUTOMATED COUNT: 12.1 % (ref 12.3–15.4)
GLUCOSE SERPL-MCNC: 108 MG/DL (ref 65–99)
HCT VFR BLD AUTO: 38 % (ref 34–46.6)
HGB BLD-MCNC: 12.3 G/DL (ref 12–15.9)
IMM GRANULOCYTES # BLD AUTO: 0.03 10*3/MM3 (ref 0–0.05)
IMM GRANULOCYTES NFR BLD AUTO: 0.6 % (ref 0–0.5)
LYMPHOCYTES # BLD AUTO: 1.25 10*3/MM3 (ref 0.7–3.1)
LYMPHOCYTES NFR BLD AUTO: 26.1 % (ref 19.6–45.3)
MCH RBC QN AUTO: 29.5 PG (ref 26.6–33)
MCHC RBC AUTO-ENTMCNC: 32.4 G/DL (ref 31.5–35.7)
MCV RBC AUTO: 91.1 FL (ref 79–97)
MONOCYTES # BLD AUTO: 0.63 10*3/MM3 (ref 0.1–0.9)
MONOCYTES NFR BLD AUTO: 13.2 % (ref 5–12)
NEUTROPHILS NFR BLD AUTO: 2.67 10*3/MM3 (ref 1.7–7)
NEUTROPHILS NFR BLD AUTO: 55.7 % (ref 42.7–76)
NRBC BLD AUTO-RTO: 0 /100 WBC (ref 0–0.2)
PHOSPHATE SERPL-MCNC: 4 MG/DL (ref 2.5–4.5)
PLATELET # BLD AUTO: 243 10*3/MM3 (ref 140–450)
PMV BLD AUTO: 9.3 FL (ref 6–12)
POTASSIUM SERPL-SCNC: 4.3 MMOL/L (ref 3.5–5.2)
RBC # BLD AUTO: 4.17 10*6/MM3 (ref 3.77–5.28)
SODIUM SERPL-SCNC: 144 MMOL/L (ref 136–145)
WBC NRBC COR # BLD AUTO: 4.79 10*3/MM3 (ref 3.4–10.8)

## 2024-05-10 PROCEDURE — 80069 RENAL FUNCTION PANEL: CPT

## 2024-05-10 PROCEDURE — 85025 COMPLETE CBC W/AUTO DIFF WBC: CPT

## 2024-05-10 PROCEDURE — 36415 COLL VENOUS BLD VENIPUNCTURE: CPT

## 2024-05-20 ENCOUNTER — OFFICE VISIT (OUTPATIENT)
Age: 89
End: 2024-05-20
Payer: MEDICARE

## 2024-05-20 VITALS
WEIGHT: 189 LBS | BODY MASS INDEX: 32.27 KG/M2 | DIASTOLIC BLOOD PRESSURE: 90 MMHG | HEIGHT: 64 IN | SYSTOLIC BLOOD PRESSURE: 144 MMHG | HEART RATE: 65 BPM | OXYGEN SATURATION: 96 %

## 2024-05-20 DIAGNOSIS — R94.31 ABNORMAL EKG: Primary | ICD-10-CM

## 2024-05-20 PROCEDURE — 99204 OFFICE O/P NEW MOD 45 MIN: CPT | Performed by: INTERNAL MEDICINE

## 2024-05-20 PROCEDURE — 93000 ELECTROCARDIOGRAM COMPLETE: CPT | Performed by: INTERNAL MEDICINE

## 2024-05-20 NOTE — PROGRESS NOTES
Subjective:     Encounter Date:05/20/2024      Patient ID: Haydee Dickey is a 88 y.o. female.    Chief Complaint:  HPI:   This is an 88-year-old woman who presents today for evaluation of episodic neck pain.  She is usually followed by Dr. Maya for primary care.  An EKG was performed at Dr. Maya's/ Last .  Review of vital signs show the blood pressures intermittently well-controlled.  She presents today, states she had repeated episodes of neck pain lasting < 30 seconds occurring at random in the left neck since march of this year/ 2-3 months. No correlation to eating, swallowing, exertion. Had dental Xrays which were normal. No actual jaw pain, no chest or arm pain. No dyspnea, dizziness.   Blood pressures recently spiking in to the 160s-170s without clear reasons. Sees Dr. Coats for CKD/hyperkalemia.     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 Here to help care for her granddaughter.     The following portions of the patient's history were reviewed and updated as appropriate: allergies, current medications, past family history, past medical history, past social history, past surgical history and problem list.     REVIEW OF SYSTEMS:   All systems reviewed.  Pertinent positives identified in HPI.  All other systems are negative.    Past Medical History:   Diagnosis Date    Allergic Percocet, iv iodine    Anemia     Arthritis     B12 deficiency     BPPV (benign paroxysmal positional vertigo)     Breast cancer     LEFT    Cataract 2018  jaime, 2021 jaime blepharoplasty    Colon polyp 2009    COVID 03/24/2023    Rebound Covid following Paxlovid 4/1/23    Deep vein thrombosis 1970    HX, LEG    Essential tremor     Folliculitis 11/01/2017    Gait instability     GERD (gastroesophageal reflux disease) 1990    History of blood transfusion 2014    HL (hearing loss) 2015    Hypertension 2001    on Rx    Lower extremity neuropathy     bilateral    Neuropathy      Osteopenia 2019    Osteoporosis     Potassium (K) excess 10/21/2022    Seeing nephrologist -- Dr. Coats    Small vessel disease     Torn rotator cuff 2013    right arm    Tremor 2005    BET    Urinary tract infection 2018    approx 1 every 5 years    Wears hearing aid     BILATERAL    Wrist fracture, right 2015       Family History   Problem Relation Age of Onset    Diabetes Mother     Hypertension Mother     Stroke Mother     Hearing loss Mother         AIDS    Heart disease Mother         CHF, PACER    Thyroid disease Mother         THYROIDECTOMY    Hypertension Father     Stroke Father     Alcohol abuse Father         DAYS OFF     Heart disease Father         CHF    Hyperlipidemia Father     Kidney disease Father         KIDNEY STONE REMOVED    Heart attack Father     Cancer Sister         LUNG    COPD Sister     Lung cancer Sister     Diabetes Sister     Cancer Sister         KIDNEY    Kidney cancer Sister     Diabetes Sister         INSULIN    Hypertension Sister     Heart disease Sister         ON AUTOPSY    Mental illness Sister         HOSPITALIZED    Depression Sister     Hyperlipidemia Sister     Cancer Brother         LUNG    Lung cancer Brother     Early death Brother         2 MO, ? WHOOPING COUGH    Mental illness Brother         Schizophrenia/hosp    Depression Brother     Early death Brother     Asthma Daughter         ADULT ONSET    Diabetes Maternal Grandmother         NIDDM    Diabetes Maternal Aunt         NIDDM    Thyroid disease Maternal Aunt         THYROIDECTOMY    Diabetes Maternal Aunt         NIDDM    Other Maternal Aunt         ESSENTIAL TREMOR    Hearing loss Maternal Aunt     Other Maternal Uncle         PARKINSON    Malig Hyperthermia Neg Hx     Breast cancer Neg Hx        Social History     Socioeconomic History    Marital status:     Number of children: 1   Tobacco Use    Smoking status: Never    Smokeless tobacco: Never   Vaping Use    Vaping status: Never Used    Substance and Sexual Activity    Alcohol use: No    Drug use: No    Sexual activity: Never       Allergies   Allergen Reactions    Iodine Itching     IV ONLY    Percocet [Oxycodone-Acetaminophen] Itching     causes ithching    Iodinated Contrast Media Rash     Patient reports rash occurred 30 yrs ago with contrast       Past Surgical History:   Procedure Laterality Date    ADENOIDECTOMY  1953    APPENDECTOMY  1961    BLEPHAROPLASTY Bilateral 10/19/2021    Procedure: BILATERAL UPPER LID BLEPHAROPLASTY;  Surgeon: Ruddy Moses MD;  Location: [facility] OR [facility];  Service: Ophthalmology;  Laterality: Bilateral;    BREAST BIOPSY      BREAST LUMPECTOMY Left 11/15/2021    Procedure: Left JEAN-PAUL-guided partial mastectomy;  Surgeon: Maliha Andres MD;  Location: [facility] OR;  Service: General;  Laterality: Left;    BROW LIFT Bilateral 10/19/2021    Procedure: BILATERAL TEMPORAL DIRECT BROWLIFT;  Surgeon: Ruddy Moses MD;  Location: [facility] OR [facility];  Service: Ophthalmology;  Laterality: Bilateral;    CARPAL TUNNEL RELEASE  1992    right wrist    CATARACT EXTRACTION, BILATERAL  07/01/2018    R 7/18 and L 9/18 WITH LENS IMPLANTS    COLONOSCOPY      DEQUERVAIN RELEASE Bilateral 2015    DILATATION AND CURETTAGE  1960s    EYE SURGERY  2018    Bilateral repairs    FRACTURE SURGERY  2014    R hip, R  wrist    HAND SURGERY Right     HIP FRACTURE SURGERY Right     HYSTERECTOMY  1975    Partial    JOINT REPLACEMENT  2001, 2014    knees    TONSILLECTOMY AND ADENOIDECTOMY  1953    TOTAL KNEE ARTHROPLASTY Left 2001    TOTAL KNEE ARTHROPLASTY Right 2014    TRIGGER FINGER RELEASE Right     WRIST SURGERY  2015    FRACTURE RIGHT         ECG 12 Lead    Date/Time: 5/20/2024 4:34 PM  Performed by: Trupti Ibanez MD    Authorized by: Trupti Ibanez MD  Comparison: compared with previous ECG   Rhythm: sinus rhythm  Conduction: 1st degree AV block  Q waves: III, aVF, V1 and V2    ST Segments: ST segments normal  T Waves: T  waves normal  QRS axis: left  Other: no other findings    Clinical impression: abnormal EKG  Comments: New inferior q waves             Objective:         PHYSICAL EXAM:  GEN: VSS, no distress,   Eyes: normal sclera, normal lids and lashes  HENT: moist mucus membranes,   Respiratory: CTAB, no rales or wheezes  CV: RRR, no murmurs, , +2 DP and 2+ carotid pulses b/l  GI: NABS, soft,  Nontender, nondistended  MSK: no edema, no scoliosis or kyphosis  Skin: no rash, warm, dry  Heme/Lymph: no bruising or bleeding  Psych: organized thought, normal behavior and affect  Neuro: Cranial nerves grossly intact, Alert and Oriented x 3.         Assessment:          Diagnosis Plan   1. Abnormal EKG  Stress Test With Myocardial Perfusion One Day             Plan:       Atypical neck pain. Unclear if this is anginal. Very brief and not exertional. However her EKGs shows chronic anterior Q waves likely associated with breast tissue but new inferior q waves, not present on EKG from 2018. Will proceed with nuclear stress given HTN, CKD, abnormal EKG. If normal would consider CT neck to assess for masses though none palpable on exam.  HTN: recently rising. Antihypertensives managed by Dr. Coats who she will see tomorrow.     Dr. Maya, thank you very much for referring this kind patient to me. Please call me with any questions or concerns. I will see the patient again in the office in 3 months.      Trupti Ibanez MD  05/20/24  [facility]    Outpatient Encounter Medications as of 5/20/2024   Medication Sig Dispense Refill    amLODIPine (NORVASC) 5 MG tablet Take 1 tablet by mouth Daily.      anastrozole (ARIMIDEX) 1 MG tablet Take 1 tablet by mouth Daily. 90 tablet 3    benazepril (LOTENSIN) 5 MG tablet Take 1 tablet by mouth Daily.      cyanocobalamin (VITAMIN B-12) 1000 MCG tablet       ferrous sulfate 325 (65 FE) MG tablet Take 1 tablet by mouth Daily With Breakfast.      folic acid (FOLVITE) 400 MCG tablet Take 1  tablet by mouth Daily.      propranolol (INDERAL) 20 MG tablet Take 1 tablet by mouth 2 (Two) Times a Day.      methylPREDNISolone (MEDROL) 4 MG dose pack Take as directed on package instructions. 21 tablet 0     No facility-administered encounter medications on file as of 5/20/2024.

## 2024-05-30 ENCOUNTER — TELEPHONE (OUTPATIENT)
Dept: CARDIOLOGY | Facility: CLINIC | Age: 89
End: 2024-05-30
Payer: MEDICARE

## 2024-05-31 ENCOUNTER — TELEPHONE (OUTPATIENT)
Dept: CARDIOLOGY | Facility: CLINIC | Age: 89
End: 2024-05-31
Payer: MEDICARE

## 2024-05-31 ENCOUNTER — HOSPITAL ENCOUNTER (OUTPATIENT)
Dept: CARDIOLOGY | Facility: HOSPITAL | Age: 89
Discharge: HOME | End: 2024-05-31
Payer: MEDICARE

## 2024-05-31 VITALS — HEIGHT: 63 IN | WEIGHT: 180 LBS | BODY MASS INDEX: 31.89 KG/M2

## 2024-05-31 DIAGNOSIS — R94.31 ABNORMAL EKG: ICD-10-CM

## 2024-05-31 LAB
BH CV NUCLEAR PRIOR STUDY: 2
BH CV REST NUCLEAR ISOTOPE DOSE: 10.5 MCI
BH CV STRESS BP STAGE 1: NORMAL
BH CV STRESS COMMENTS STAGE 1: NORMAL
BH CV STRESS DOSE REGADENOSON STAGE 1: 0.4
BH CV STRESS DURATION MIN STAGE 1: 0
BH CV STRESS DURATION SEC STAGE 1: 10
BH CV STRESS HR STAGE 1: 114
BH CV STRESS NUCLEAR ISOTOPE DOSE: 33.5 MCI
BH CV STRESS PROTOCOL 1: NORMAL
BH CV STRESS RECOVERY BP: NORMAL MMHG
BH CV STRESS RECOVERY HR: 90 BPM
BH CV STRESS STAGE 1: 1
LV EF NUC BP: 75 %
MAXIMAL PREDICTED HEART RATE: 134 BPM
PERCENT MAX PREDICTED HR: 85.07 %
STRESS BASELINE BP: NORMAL MMHG
STRESS BASELINE HR: 83 BPM
STRESS PERCENT HR: 100 %
STRESS POST EXERCISE DUR MIN: 0 MIN
STRESS POST EXERCISE DUR SEC: 10 SEC
STRESS POST PEAK BP: NORMAL MMHG
STRESS POST PEAK HR: 114 BPM
STRESS TARGET HR: 114 BPM

## 2024-05-31 PROCEDURE — 25010000002 REGADENOSON 0.4 MG/5ML SOLUTION: Performed by: INTERNAL MEDICINE

## 2024-05-31 PROCEDURE — 78452 HT MUSCLE IMAGE SPECT MULT: CPT

## 2024-05-31 PROCEDURE — A9502 TC99M TETROFOSMIN: HCPCS | Performed by: INTERNAL MEDICINE

## 2024-05-31 PROCEDURE — 0 TECHNETIUM TETROFOSMIN KIT: Performed by: INTERNAL MEDICINE

## 2024-05-31 PROCEDURE — 93017 CV STRESS TEST TRACING ONLY: CPT

## 2024-05-31 RX ORDER — REGADENOSON 0.08 MG/ML
0.4 INJECTION, SOLUTION INTRAVENOUS
Status: COMPLETED | OUTPATIENT
Start: 2024-05-31 | End: 2024-05-31

## 2024-05-31 RX ADMIN — TETROFOSMIN 1 DOSE: 1.38 INJECTION, POWDER, LYOPHILIZED, FOR SOLUTION INTRAVENOUS at 11:52

## 2024-05-31 RX ADMIN — TETROFOSMIN 1 DOSE: 1.38 INJECTION, POWDER, LYOPHILIZED, FOR SOLUTION INTRAVENOUS at 12:48

## 2024-05-31 RX ADMIN — REGADENOSON 0.4 MG: 0.08 INJECTION, SOLUTION INTRAVENOUS at 12:48

## 2024-05-31 NOTE — TELEPHONE ENCOUNTER
Trupti Ibanez MD  P k [facility] Triage Nurses [place]  Please call patient with their normal test results.

## 2024-05-31 NOTE — TELEPHONE ENCOUNTER
Left voicemail for Haydee Dickey requesting callback.    HUB: please transfer to Triage if patient returns call    Thank you,  Valerie MAIER RN  Triage Nurse [facility]   14:33 EDT

## 2024-06-03 NOTE — TELEPHONE ENCOUNTER
Pt called back in to triage.  Results reviewed with pt.  Instructed to call with any further questions or concerns.  Verbalized understanding.    Kaitlin Troy RN  Triage Nurse, [facility]  06/03/24 08:41 EDT

## 2024-06-03 NOTE — TELEPHONE ENCOUNTER
Left voicemail for Hadyee Dickey requesting callback.    HUB: please transfer to Triage if patient returns call    Thank you,  Valerie MAIER RN  Triage Nurse [facility]   08:31 EDT

## 2024-06-21 ENCOUNTER — APPOINTMENT (OUTPATIENT)
Dept: GENERAL RADIOLOGY | Facility: HOSPITAL | Age: 89
End: 2024-06-21
Payer: MEDICARE

## 2024-06-21 ENCOUNTER — HOSPITAL ENCOUNTER (EMERGENCY)
Facility: HOSPITAL | Age: 89
Discharge: HOME | End: 2024-06-21
Attending: EMERGENCY MEDICINE
Payer: MEDICARE

## 2024-06-21 ENCOUNTER — APPOINTMENT (OUTPATIENT)
Dept: CT IMAGING | Facility: HOSPITAL | Age: 89
End: 2024-06-21
Payer: MEDICARE

## 2024-06-21 VITALS
TEMPERATURE: 99.5 F | HEART RATE: 94 BPM | SYSTOLIC BLOOD PRESSURE: 146 MMHG | RESPIRATION RATE: 18 BRPM | DIASTOLIC BLOOD PRESSURE: 70 MMHG | OXYGEN SATURATION: 94 %

## 2024-06-21 DIAGNOSIS — S00.03XA CONTUSION OF LEFT TEMPOROFRONTAL SCALP, INITIAL ENCOUNTER: ICD-10-CM

## 2024-06-21 DIAGNOSIS — S80.02XA CONTUSION OF LEFT KNEE, INITIAL ENCOUNTER: ICD-10-CM

## 2024-06-21 DIAGNOSIS — S09.90XA CLOSED HEAD INJURY, INITIAL ENCOUNTER: Primary | ICD-10-CM

## 2024-06-21 PROCEDURE — 90715 TDAP VACCINE 7 YRS/> IM: CPT | Performed by: NURSE PRACTITIONER

## 2024-06-21 PROCEDURE — 99284 EMERGENCY DEPT VISIT MOD MDM: CPT

## 2024-06-21 PROCEDURE — 70450 CT HEAD/BRAIN W/O DYE: CPT

## 2024-06-21 PROCEDURE — 90471 IMMUNIZATION ADMIN: CPT | Performed by: NURSE PRACTITIONER

## 2024-06-21 PROCEDURE — 25010000002 TETANUS-DIPHTH-ACELL PERTUSSIS 5-2.5-18.5 LF-MCG/0.5 SUSPENSION PREFILLED SYRINGE: Performed by: NURSE PRACTITIONER

## 2024-06-21 PROCEDURE — 73560 X-RAY EXAM OF KNEE 1 OR 2: CPT

## 2024-06-21 RX ORDER — ACETAMINOPHEN 500 MG
1000 TABLET ORAL ONCE
Status: COMPLETED | OUTPATIENT
Start: 2024-06-21 | End: 2024-06-21

## 2024-06-21 RX ADMIN — TETANUS TOXOID, REDUCED DIPHTHERIA TOXOID AND ACELLULAR PERTUSSIS VACCINE, ADSORBED 0.5 ML: 5; 2.5; 8; 8; 2.5 SUSPENSION INTRAMUSCULAR at 13:05

## 2024-06-21 RX ADMIN — ACETAMINOPHEN 1000 MG: 500 TABLET ORAL at 13:06

## 2024-06-21 NOTE — ED PROVIDER NOTES
MD ATTESTATION NOTE    The MARIE and I have discussed this patient's history, physical exam, and treatment plan.  I have reviewed the documentation and personally had a face to face interaction with the patient. I affirm the documentation and agree with the treatment and plan.  The attached note describes my personal findings.        SHARED APC FACE TO FACE: I performed a substantive part of the MDM during the patient's E/M visit. I personally evaluated and examined the patient. I personally made or approved the documented management plan and acknowledge its risk of complications.      Brief HPI: Patient presents for evaluation of head injury.  Patient states left knee gave out and she fell hitting the back of her head.  Patient did not lose consciousness.  Not on blood thinners.    PHYSICAL EXAM  ED Triage Vitals   Temp Heart Rate Resp BP SpO2   06/21/24 1225 06/21/24 1226 06/21/24 1226 06/21/24 1232 06/21/24 1226   99.5 °F (37.5 °C) 94 18 141/74 94 %      Temp src Heart Rate Source Patient Position BP Location FiO2 (%)   -- 06/21/24 1226 -- -- --    Monitor            GENERAL: no acute distress  HENT: nares patent  EYES: no scleral icterus  CV: regular rhythm, normal rate  RESPIRATORY: normal effort  ABDOMEN: soft  MUSCULOSKELETAL: no deformity  NEURO: alert, moves all extremities, follows commands  PSYCH:  calm, cooperative  SKIN: warm, dry.  Abrasion laceration left scalp    Vital signs and nursing notes reviewed.    CT head independently interpreted by me and shows no evidence of bleeding    ED Course as of 06/21/24 1429   Fri Jun 21, 2024   1419 CT Head Without Contrast  Per my independent interpretation is no acute intracranial hemorrhage, no midline shift [AH]      ED Course User Index  [AH] Dior Beverly APRN         Plan: discharge       Federico Grubbs MD  06/21/24 1429

## 2024-06-21 NOTE — ED PROVIDER NOTES
EMERGENCY DEPARTMENT ENCOUNTER  Room Number:  08/08  PCP: Tawanda Maya MD  Independent Historians: Patient      HPI:  Chief Complaint: had concerns including Fall and Head Injury.     A complete HPI/ROS/PMH/PSH/SH/FH are unobtainable due to:     Chronic or social conditions impacting patient care (Social Determinants of Health):       Context: Haydee Dickey is a 88 y.o. female with a medical history of hypertension who presents to the ED c/o acute headache status post tripping and falling.    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  Today she was loading up back of her car after bringing her compost to her friend's house states she tripped on a curb her right leg gave out and she struck the left side of her parietal scalp on a concrete curb.  She denies loss of consciousness.  She is not on any anticoagulant or antiplatelet agents.  States she is feeling well.  Unsure of her last tetanus immunization.  She denies any neck pain, chest pain or palpitations.  Accompanied to the ED by her daughter  She lives at home with her daughter felicia at bedside      Review of prior external notes (non-ED) -and- Review of prior external test results outside of this encounter:  stress test 5/3/24 low risk    Prescription drug monitoring program review:         PAST MEDICAL HISTORY  Active Ambulatory Problems     Diagnosis Date Noted    Acute left flank pain 11/14/2018    B12 deficiency 07/28/2014    Benign essential hypertension 07/28/2014    Benign paroxysmal positional vertigo 01/21/2019    H/O abdominal hysterectomy 01/21/2019    H/O total knee replacement 01/21/2019    Hip fracture 07/28/2014    Torn rotator cuff 01/21/2019    Osteoporosis     Essential tremor     Gait instability     Edema of lower extremity 01/27/2019    Malignant neoplasm of upper-inner quadrant of left breast in female, estrogen receptor positive 10/21/2021    Disorder of rotator cuff 01/21/2019    Aromatase  inhibitor use 03/09/2022    Personal history of breast cancer 10/19/2022    Vitamin D deficiency 10/19/2022    Absolute anemia 10/19/2022    Benign hypertensive kidney disease with chronic kidney disease 10/19/2022    Hyperkalemia 10/19/2022    Peripheral neuropathy 10/21/2022    Stage 3a chronic kidney disease 10/19/2022    High serum creatinine 10/19/2022     Resolved Ambulatory Problems     Diagnosis Date Noted    Osteopenia 04/25/2017     Past Medical History:   Diagnosis Date    Allergic Percocet, iv iodine    Anemia     Arthritis     BPPV (benign paroxysmal positional vertigo)     Breast cancer     Cataract 2018  jaime, 2021 jaime blepharoplasty    Colon polyp 2009    COVID 03/24/2023    Deep vein thrombosis 1970    Folliculitis 11/01/2017    GERD (gastroesophageal reflux disease) 1990    History of blood transfusion 2014    HL (hearing loss) 2015    Hypertension 2001    Lower extremity neuropathy     Neuropathy     Potassium (K) excess 10/21/2022    Small vessel disease     Tremor 2005    Urinary tract infection 2018    Wears hearing aid     Wrist fracture, right 2015         PAST SURGICAL HISTORY  Past Surgical History:   Procedure Laterality Date    ADENOIDECTOMY  1953    APPENDECTOMY  1961    BLEPHAROPLASTY Bilateral 10/19/2021    Procedure: BILATERAL UPPER LID BLEPHAROPLASTY;  Surgeon: Ruddy Moses MD;  Location: [facility];  Service: Ophthalmology;  Laterality: Bilateral;    BREAST BIOPSY      BREAST LUMPECTOMY Left 11/15/2021    Procedure: Left JEAN-PAUL-guided partial mastectomy;  Surgeon: Maliha Andres MD;  Location: [facility];  Service: General;  Laterality: Left;    BROW LIFT Bilateral 10/19/2021    Procedure: BILATERAL TEMPORAL DIRECT BROWLIFT;  Surgeon: Ruddy Moses MD;  Location: [facility];  Service: Ophthalmology;  Laterality: Bilateral;    CARPAL TUNNEL RELEASE  1992    right wrist    CATARACT EXTRACTION, BILATERAL  07/01/2018    R 7/18 and L 9/18 WITH LENS  IMPLANTS    COLONOSCOPY      DEQUERVAIN RELEASE Bilateral 2015    DILATATION AND CURETTAGE  1960s    EYE SURGERY  2018    Bilateral repairs    FRACTURE SURGERY  2014    R hip, R  wrist    HAND SURGERY Right     HIP FRACTURE SURGERY Right     HYSTERECTOMY  1975    Partial    JOINT REPLACEMENT  2001, 2014    knees    TONSILLECTOMY AND ADENOIDECTOMY  1953    TOTAL KNEE ARTHROPLASTY Left 2001    TOTAL KNEE ARTHROPLASTY Right 2014    TRIGGER FINGER RELEASE Right     WRIST SURGERY  2015    FRACTURE RIGHT         FAMILY HISTORY  Family History   Problem Relation Age of Onset    Diabetes Mother     Hypertension Mother     Stroke Mother     Hearing loss Mother         AIDS    Heart disease Mother         CHF, PACER    Thyroid disease Mother         THYROIDECTOMY    Hypertension Father     Stroke Father     Alcohol abuse Father         DAYS OFF     Heart disease Father         CHF    Hyperlipidemia Father     Kidney disease Father         KIDNEY STONE REMOVED    Heart attack Father     Cancer Sister         LUNG    COPD Sister     Lung cancer Sister     Diabetes Sister     Cancer Sister         KIDNEY    Kidney cancer Sister     Diabetes Sister         INSULIN    Hypertension Sister     Heart disease Sister         ON AUTOPSY    Mental illness Sister         HOSPITALIZED    Depression Sister     Hyperlipidemia Sister     Cancer Brother         LUNG    Lung cancer Brother     Early death Brother         2 MO, ? WHOOPING COUGH    Mental illness Brother         Schizophrenia/hosp    Depression Brother     Early death Brother     Asthma Daughter         ADULT ONSET    Diabetes Maternal Grandmother         NIDDM    Diabetes Maternal Aunt         NIDDM    Thyroid disease Maternal Aunt         THYROIDECTOMY    Diabetes Maternal Aunt         NIDDM    Other Maternal Aunt         ESSENTIAL TREMOR    Hearing loss Maternal Aunt     Other Maternal Uncle         PARKINSON    Malig Hyperthermia Neg Hx     Breast cancer Neg Hx           SOCIAL HISTORY  Social History     Socioeconomic History    Marital status:     Number of children: 1   Tobacco Use    Smoking status: Never    Smokeless tobacco: Never   Vaping Use    Vaping status: Never Used   Substance and Sexual Activity    Alcohol use: No    Drug use: No    Sexual activity: Never         ALLERGIES  Iodine, Percocet [oxycodone-acetaminophen], and Iodinated contrast media      REVIEW OF SYSTEMS  Review of Systems  Included in HPI  All systems reviewed and negative except for those discussed in HPI.      PHYSICAL EXAM    I have reviewed the triage vital signs and nursing notes.    ED Triage Vitals   Temp Heart Rate Resp BP SpO2   06/21/24 1225 06/21/24 1226 06/21/24 1226 -- 06/21/24 1226   99.5 °F (37.5 °C) 94 18  94 %      Temp src Heart Rate Source Patient Position BP Location FiO2 (%)   -- 06/21/24 1226 -- -- --    Monitor          Physical Exam  GENERAL: alert, no acute distress  SKIN: Warm, dry abrasion to scalp otherwise skin intact  HENT: There is an approximate 3 cm area of raised and soft tissue swelling overlying the left parietal occipital scalp with a slight abrasion that is hemostatic otherwise atraumatic and normocephalic exam  EYES: no scleral icterus no injection, extraocular movements are intact  CV: regular rhythm, regular rate no murmur or peripheral edema  RESPIRATORY: normal effort, lungs clear able to speak in full sentences without hint of distress  ABDOMEN: soft, nontender, nondistended  MUSCULOSKELETAL: no deformity, mild tenderness to palpation overlying left anterior knee with normal active and passive range of motion  NEURO: alert, moves all extremities, follows commands            LAB RESULTS  No results found for this or any previous visit (from the past 24 hour(s)).      RADIOLOGY  CT Head Without Contrast    Result Date: 6/21/2024  CT HEAD WITHOUT CONTRAST  CLINICAL HISTORY: fell hit head. Laceration.  TECHNIQUE: CT scan of the head was obtained with  3 mm axial soft tissue and 2 mm axial bone algorithm algorithm images. No intravenous contrast was administered. Sagittal and coronal reconstructions were obtained.  COMPARISON: No previous similar studies are available for comparison.  FINDINGS:   There is a left lateral parietal scalp hematoma. However, there is no evidence for a calvarial fracture or an extra-axial hemorrhage.  Mild changes of chronic small vessel ischemic phenomena are identified. The ventricles, sulci, and cisterns are age-appropriate. The gray-white matter differentiation is within normal limits. The basal ganglia and thalami are unremarkable. The posterior fossa structures are within normal limits.       No evidence for acute traumatic intracranial pathology.  Moderate size left parietal scalp hematoma.    Radiation dose reduction techniques were utilized, including automated exposure control and exposure modulation based on body size.  This report was finalized on 6/21/2024 2:01 PM by Dr. Heber Gomez M.D on Workstation: [id number]      XR Knee 1 or 2 View Left    Result Date: 6/21/2024  XR KNEE 1 OR 2 VW LEFT-  DATE OF EXAM: 6/21/2024 1:01 PM  INDICATION: Left knee pain status post fall today.  COMPARISON: 3/2/2020.  TECHNIQUE: 2 views of the knee were obtained.  FINDINGS: No evidence of acute fracture or dislocation. TKA with the hardware in its expected position and without evidence of hardware complications. Mild posterior subluxation of the tibia in relation to the femur. Small superior patellar enthesophyte. No significant joint effusion. Mild diffuse soft tissue edema.       1. Diffuse osteopenia and mild diffuse soft tissue edema, without radiographic evidence of acute fracture or dislocation. 2. TKA with the hardware and suspected position and without evidence of hardware complications. 3. Mild posterior subluxation of the tibia in relation to the femur, possible accentuated by patient positioning.  This report was finalized on  6/21/2024 1:18 PM by Uriel Ricci MD on Workstation: [id number]         MEDICATIONS GIVEN IN ER  Medications   Tetanus-Diphth-Acell Pertussis (BOOSTRIX) injection 0.5 mL (0.5 mL Intramuscular Given 6/21/24 1305)   acetaminophen (TYLENOL) tablet 1,000 mg (1,000 mg Oral Given 6/21/24 1306)         ORDERS PLACED DURING THIS VISIT:  Orders Placed This Encounter   Procedures    CT Head Without Contrast    XR Knee 1 or 2 View Left         OUTPATIENT MEDICATION MANAGEMENT:  No current Epic-ordered facility-administered medications on file.     Current Outpatient Medications Ordered in Epic   Medication Sig Dispense Refill    amLODIPine (NORVASC) 5 MG tablet Take 1 tablet by mouth Daily.      anastrozole (ARIMIDEX) 1 MG tablet Take 1 tablet by mouth Daily. 90 tablet 3    benazepril (LOTENSIN) 5 MG tablet Take 1 tablet by mouth Daily.      cyanocobalamin (VITAMIN B-12) 1000 MCG tablet       ferrous sulfate 325 (65 FE) MG tablet Take 1 tablet by mouth Daily With Breakfast.      folic acid (FOLVITE) 400 MCG tablet Take 1 tablet by mouth Daily.      methylPREDNISolone (MEDROL) 4 MG dose pack Take as directed on package instructions. 21 tablet 0    propranolol (INDERAL) 20 MG tablet Take 1 tablet by mouth 2 (Two) Times a Day.           PROCEDURES  Procedures            PROGRESS, DATA ANALYSIS, CONSULTS, AND MEDICAL DECISION MAKING  All labs have been independently interpreted by me.  All radiology studies have been reviewed by me. All EKG's have been independently viewed and interpreted by me.  Discussion below represents my analysis of pertinent findings related to patient's condition, differential diagnosis, treatment plan and final disposition.    Differential diagnosis includes but is not limited to dementia, concussion, closed head injury, contusion.    MDM: Patient here with what sounds like a mechanical fall with abrasion to left parietal scalp.  She is ANO x 4 and accompanied by family who reports she is at her neurologic  baseline.  There is some concern that she may have some underlying dementia but she resides at home with family.  She is not on any anticoagulation or antiplatelet medication, CT obtained does not reveal any acute intracranial findings.                      ED Course as of 06/21/24 1424   Fri Jun 21, 2024   1419 CT Head Without Contrast  Per my independent interpretation is no acute intracranial hemorrhage, no midline shift [AH]      ED Course User Index  [AH] Dior Beverly APRN             AS OF 14:24 EDT VITALS:    BP - 146/70  HR - 94  TEMP - 99.5 °F (37.5 °C)  O2 SATS - 94%    COMPLEXITY OF CARE  Admission was considered but after careful review of the patient's presentation, physical examination, diagnostic results, and response to treatment the patient may be safely discharged with outpatient follow-up.      DIAGNOSIS  Final diagnoses:   Closed head injury, initial encounter   Contusion of left temporofrontal scalp, initial encounter   Contusion of left knee, initial encounter         DISPOSITION  ED Disposition       ED Disposition   Discharge    Condition   Stable    Comment   --                Please note that portions of this document were completed with a voice recognition program.    Note Disclaimer: At [facility], we believe that sharing information builds trust and better relationships. You are receiving this note because you recently visited [facility]. It is possible you will see health information before a provider has talked with you about it. This kind of information can be easy to misunderstand. To help you fully understand what it means for your health, we urge you to discuss this note with your provider.         Dior Beverly APRN  06/21/24 1424

## 2024-06-21 NOTE — ED NOTES
PT reports her L knee gave out and she fell. Pt hit head, no LOC, no blood thinners. Pt denies other injury. Pt has Lac to side of head.

## 2024-06-27 ENCOUNTER — OFFICE VISIT (OUTPATIENT)
Dept: ORTHOPEDIC SURGERY | Facility: CLINIC | Age: 89
End: 2024-06-27
Payer: MEDICARE

## 2024-06-27 VITALS — TEMPERATURE: 98.6 F | WEIGHT: 186.6 LBS | BODY MASS INDEX: 33.06 KG/M2 | HEIGHT: 63 IN

## 2024-06-27 DIAGNOSIS — M25.362 KNEE INSTABILITY, LEFT: ICD-10-CM

## 2024-06-27 DIAGNOSIS — M25.562 ACUTE PAIN OF LEFT KNEE: Primary | ICD-10-CM

## 2024-06-28 ENCOUNTER — LAB (OUTPATIENT)
Dept: LAB | Facility: HOSPITAL | Age: 89
End: 2024-06-28
Payer: MEDICARE

## 2024-06-28 DIAGNOSIS — M25.362 KNEE INSTABILITY, LEFT: ICD-10-CM

## 2024-06-28 DIAGNOSIS — M25.562 ACUTE PAIN OF LEFT KNEE: ICD-10-CM

## 2024-06-28 LAB
CRP SERPL-MCNC: <0.3 MG/DL (ref 0–0.5)
ERYTHROCYTE [SEDIMENTATION RATE] IN BLOOD: 2 MM/HR (ref 0–30)

## 2024-06-28 PROCEDURE — 86140 C-REACTIVE PROTEIN: CPT

## 2024-06-28 PROCEDURE — 36415 COLL VENOUS BLD VENIPUNCTURE: CPT

## 2024-06-28 PROCEDURE — 85652 RBC SED RATE AUTOMATED: CPT

## 2024-07-05 ENCOUNTER — HOSPITAL ENCOUNTER (OUTPATIENT)
Dept: NUCLEAR MEDICINE | Facility: HOSPITAL | Age: 89
Discharge: HOME | End: 2024-07-05
Payer: MEDICARE

## 2024-07-05 DIAGNOSIS — M25.362 KNEE INSTABILITY, LEFT: ICD-10-CM

## 2024-07-05 DIAGNOSIS — M25.562 ACUTE PAIN OF LEFT KNEE: ICD-10-CM

## 2024-07-05 PROCEDURE — 78315 BONE IMAGING 3 PHASE: CPT

## 2024-07-05 PROCEDURE — 0 TECHNETIUM MEDRONATE KIT: Performed by: NURSE PRACTITIONER

## 2024-07-05 PROCEDURE — A9503 TC99M MEDRONATE: HCPCS | Performed by: NURSE PRACTITIONER

## 2024-07-05 RX ORDER — TC 99M MEDRONATE 20 MG/10ML
21.5 INJECTION, POWDER, LYOPHILIZED, FOR SOLUTION INTRAVENOUS
Status: COMPLETED | OUTPATIENT
Start: 2024-07-05 | End: 2024-07-05

## 2024-07-05 RX ADMIN — Medication 21.5 MILLICURIE: at 10:25

## 2024-07-10 ENCOUNTER — TRANSCRIBE ORDERS (OUTPATIENT)
Dept: ADMINISTRATIVE | Facility: HOSPITAL | Age: 89
End: 2024-07-10
Payer: MEDICARE

## 2024-07-10 ENCOUNTER — LAB (OUTPATIENT)
Dept: LAB | Facility: HOSPITAL | Age: 89
End: 2024-07-10
Payer: MEDICARE

## 2024-07-10 DIAGNOSIS — R79.89 HYPOURICEMIA: Primary | ICD-10-CM

## 2024-07-10 DIAGNOSIS — R79.89 HYPOURICEMIA: ICD-10-CM

## 2024-07-10 LAB
ANION GAP SERPL CALCULATED.3IONS-SCNC: 8.6 MMOL/L (ref 5–15)
BUN SERPL-MCNC: 24 MG/DL (ref 8–23)
BUN/CREAT SERPL: 23.8 (ref 7–25)
CALCIUM SPEC-SCNC: 10 MG/DL (ref 8.6–10.5)
CHLORIDE SERPL-SCNC: 104 MMOL/L (ref 98–107)
CO2 SERPL-SCNC: 26.4 MMOL/L (ref 22–29)
CREAT SERPL-MCNC: 1.01 MG/DL (ref 0.57–1)
EGFRCR SERPLBLD CKD-EPI 2021: 54.3 ML/MIN/1.73
GLUCOSE SERPL-MCNC: 106 MG/DL (ref 65–99)
POTASSIUM SERPL-SCNC: 4.8 MMOL/L (ref 3.5–5.2)
SODIUM SERPL-SCNC: 139 MMOL/L (ref 136–145)

## 2024-07-10 PROCEDURE — 36415 COLL VENOUS BLD VENIPUNCTURE: CPT

## 2024-07-10 PROCEDURE — 80048 BASIC METABOLIC PNL TOTAL CA: CPT

## 2024-07-18 ENCOUNTER — OFFICE VISIT (OUTPATIENT)
Dept: ORTHOPEDIC SURGERY | Facility: CLINIC | Age: 89
End: 2024-07-18
Payer: MEDICARE

## 2024-07-18 VITALS — BODY MASS INDEX: 32.83 KG/M2 | TEMPERATURE: 96.9 F | WEIGHT: 185.3 LBS | HEIGHT: 63 IN

## 2024-07-18 DIAGNOSIS — Z96.652 STATUS POST LEFT KNEE REPLACEMENT: Primary | ICD-10-CM

## 2024-07-18 PROBLEM — M17.9 OA (OSTEOARTHRITIS) OF KNEE: Status: ACTIVE | Noted: 2024-07-18

## 2024-07-18 RX ORDER — MELOXICAM 7.5 MG/1
15 TABLET ORAL ONCE
OUTPATIENT
Start: 2024-07-18 | End: 2024-07-18

## 2024-07-18 RX ORDER — CHLORHEXIDINE GLUCONATE 500 MG/1
CLOTH TOPICAL 2 TIMES DAILY
OUTPATIENT
Start: 2024-07-18

## 2024-07-18 RX ORDER — PREGABALIN 150 MG/1
150 CAPSULE ORAL ONCE
OUTPATIENT
Start: 2024-07-18 | End: 2024-07-18

## 2024-07-18 RX ORDER — LISINOPRIL AND HYDROCHLOROTHIAZIDE 12.5; 1 MG/1; MG/1
1 TABLET ORAL DAILY
COMMUNITY
Start: 2024-05-21 | End: 2025-05-21

## 2024-07-18 NOTE — PROGRESS NOTES
Patient: Haydee Dickey  YOB: 1935 88 y.o. female  Medical Record Number: [id number]    Chief Complaints:   Chief Complaint   Patient presents with    Left Knee - Initial Evaluation       History of Present Illness:Haydee Dickey is a 88 y.o. female who presents for follow-up of  left knee worsening instability -  s/p TKA 22 years ago now was doing fine until recently  but now using a cane with worsening instability and mechanical sx and pain,    Allergies:   Allergies   Allergen Reactions    Iodine Itching     IV ONLY    Percocet [Oxycodone-Acetaminophen] Itching     causes ithching    Iodinated Contrast Media Rash     Patient reports rash occurred 30 yrs ago with contrast       Medications:   Current Outpatient Medications   Medication Sig Dispense Refill    anastrozole (ARIMIDEX) 1 MG tablet Take 1 tablet by mouth Daily. 90 tablet 3    cyanocobalamin (VITAMIN B-12) 1000 MCG tablet       ferrous sulfate 325 (65 FE) MG tablet Take 1 tablet by mouth Daily With Breakfast.      folic acid (FOLVITE) 400 MCG tablet Take 1 tablet by mouth Daily.      lisinopril-hydrochlorothiazide (PRINZIDE,ZESTORETIC) 10-12.5 MG per tablet Take 1 tablet by mouth Daily.      propranolol (INDERAL) 20 MG tablet Take 1 tablet by mouth 2 (Two) Times a Day.      amLODIPine (NORVASC) 5 MG tablet Take 1 tablet by mouth Daily.      benazepril (LOTENSIN) 5 MG tablet Take 1 tablet by mouth Daily.      methylPREDNISolone (MEDROL) 4 MG dose pack Take as directed on package instructions. 21 tablet 0     No current facility-administered medications for this visit.         The following portions of the patient's history were reviewed and updated as appropriate: allergies, current medications, past family history, past medical history, past social history, past surgical history and problem list.    Review of Systems:   Pertinent positives/negative listed in HPI above    Physical Exam:   Vitals:    07/18/24 0924   Temp: 96.9 °F (36.1 °C)  "  Weight: 84.1 kg (185 lb 4.8 oz)   Height: 160 cm (63\")   PainSc: 0-No pain   PainLoc: Knee       General: A and O x 3, ASA, NAD      Knee Exam List: Knee:  left    ALIGNMENT:     Neutral  ,   Patella tracks   midline    GAIT:    Antalgic    SKIN:    No abnormality    RANGE OF MOTION:   Painful flexion past 100    STRENGTH:   5 / 5    LIGAMENTS:   Mild varus / valgus instability.   Mild anterior posterior stability negative  Lachman.        DISTAL PULSES:    Paplable    DISTAL SENSATION :   Intact    LYMPHATICS:     No   lymphadenopathy    OTHER:          - Positive  effusion      - No crepitance with ROM     Radiology:  Xrays 3views left knee(ap,lateral, sunrise) taken previously demonstratinga well positioned knee replacement without evidence of  loosening or osteolysis -  there is moderate AP subluxation of the femur c/w poly wear.       Assessment/Plan:  Left TKA 24 years old with instability secondary to poly wear.   Knee Plan List: Continuation of conservative management vs. TKA discussed.  The patient wishes to proceed with total knee replacement.  At this point the patient has failed the full compliment of conservative treatment and stating complete understanding of the risks/benefits/ anternatives wishes to proceed with surgical treatment.    Risk and benefits of surgery were reviewed.  Including, but not limited to, blood clots or pulmonary embolism, anesthesia risk, infection, fracture, skin/leg numbness, persistent pain/crepitance/popping/catching, failure of the implant, need for future surgeries, hematoma, possible nerve or blood vessel injury, need for transfusion, and potential risk of stroke,heart attack or death, among others.  The patient understands and wishes to proceed.     It was explained that if tissue has been repaired or reconstructed, there is also an increased chance of failure which may require further management.  Following the completion of the discussion, the patient expressed " understanding of this planned course of care, all their questions were answered and consent will be obtained preoperatively.    Operative Plan: [employer] Oxinium profix Total Knee Replacement poly change an overnight stay with home health rehab    There are no diagnoses linked to this encounter.    Edmund Zavaleta MD  7/18/2024

## 2024-07-26 PROBLEM — Z96.652 STATUS POST LEFT KNEE REPLACEMENT: Status: ACTIVE | Noted: 2024-07-18

## 2024-08-01 ENCOUNTER — PREP FOR SURGERY (OUTPATIENT)
Dept: OTHER | Facility: HOSPITAL | Age: 89
End: 2024-08-01
Payer: MEDICARE

## 2024-08-01 DIAGNOSIS — Z96.652 STATUS POST LEFT KNEE REPLACEMENT: Primary | ICD-10-CM

## 2024-08-01 RX ORDER — PREGABALIN 75 MG/1
150 CAPSULE ORAL ONCE
OUTPATIENT
Start: 2024-08-01 | End: 2024-08-01

## 2024-08-01 RX ORDER — MELOXICAM 15 MG/1
15 TABLET ORAL ONCE
OUTPATIENT
Start: 2024-08-01 | End: 2024-08-01

## 2024-08-01 RX ORDER — CHLORHEXIDINE GLUCONATE 500 MG/1
CLOTH TOPICAL 2 TIMES DAILY
OUTPATIENT
Start: 2024-08-01

## 2024-08-02 ENCOUNTER — HOSPITAL ENCOUNTER (OUTPATIENT)
Facility: HOSPITAL | Age: 89
Setting detail: SURGERY ADMIT
End: 2024-08-02
Attending: ORTHOPAEDIC SURGERY | Admitting: ORTHOPAEDIC SURGERY
Payer: MEDICARE

## 2024-08-26 ENCOUNTER — OFFICE VISIT (OUTPATIENT)
Dept: CARDIOLOGY | Facility: CLINIC | Age: 89
End: 2024-08-26
Payer: MEDICARE

## 2024-08-26 VITALS
DIASTOLIC BLOOD PRESSURE: 68 MMHG | SYSTOLIC BLOOD PRESSURE: 120 MMHG | HEART RATE: 66 BPM | WEIGHT: 188 LBS | HEIGHT: 63 IN | BODY MASS INDEX: 33.31 KG/M2 | OXYGEN SATURATION: 98 %

## 2024-08-26 DIAGNOSIS — I10 PRIMARY HYPERTENSION: Primary | ICD-10-CM

## 2024-08-26 PROCEDURE — 1159F MED LIST DOCD IN RCRD: CPT | Performed by: NURSE PRACTITIONER

## 2024-08-26 PROCEDURE — 99214 OFFICE O/P EST MOD 30 MIN: CPT | Performed by: NURSE PRACTITIONER

## 2024-08-26 PROCEDURE — 93000 ELECTROCARDIOGRAM COMPLETE: CPT | Performed by: NURSE PRACTITIONER

## 2024-08-26 PROCEDURE — 1160F RVW MEDS BY RX/DR IN RCRD: CPT | Performed by: NURSE PRACTITIONER

## 2024-08-26 NOTE — PROGRESS NOTES
Date of Office Visit: 2024  Encounter Provider: YNES Ansari  Place of Service: [facility] CARDIOLOGY  Patient Name: Haydee Dickey  :1935    Chief complaint:  Hypertension, atypical neck pain    HPI: Haydee Dickey is a 88 y.o. female who is a patient of  Dr. Ibanez and is new to me today.  She is a patient of Dr. Maya at [facility] [place] primary care in the [place].  She presented in May of this year for episodic evaluation of neck pain.  LDLs had also been elevated.  She has chronic kidney disease and hyperkalemia and was followed by Dr. Coats with nephrology.  She underwent stress test that was unremarkable with normal EF.  She had a CT of the head unremarkable.  She comes in today for follow-up.    She ended up doing some exercises for musculoskeletal pain of her neck and the neck pain has gone away.  She denies any chest pain or shortness of breath.  She has had a little swelling in her ankles and she follows with Dr. Coats with for that with nephrology.  She has had some ups and downs of her blood pressure she recently was taken off of amlodipine and switch to lisinopril/HCTZ combo and she plans to call Dr. Coats about her blood pressure management.    Previous testing and notes have been reviewed by me.   Past Medical History:   Diagnosis Date    Allergic Percocet, iv iodine    Anemia     Arthritis     B12 deficiency     BPPV (benign paroxysmal positional vertigo)     Breast cancer     LEFT    Cataract   jaime,  jaime blepharoplasty    Colon polyp     COVID 2023    Rebound Covid following Paxlovid 23    CTS (carpal tunnel syndrome) ,     D’quervain x3    Deep vein thrombosis 1970    HX, LEG    Essential tremor     Folliculitis 2017    Fracture of hip     Right ORIF    Fracture, femur     Left, No surgery    Fracture, radius     Right, orif    Gait instability     GERD (gastroesophageal reflux disease)      History of blood transfusion 2014    HL (hearing loss) 2015    Hypertension 2001    on Rx    Knee swelling 2001    Bilateral    Lower extremity neuropathy     bilateral    Neuropathy     Osteopenia 2019    Osteoporosis     Potassium (K) excess 10/21/2022    Seeing nephrologist -- Dr. Coats    Rotator cuff syndrome 2013    Fall induced    Small vessel disease     Torn rotator cuff 2013    right arm    Tremor 2005    BET    Urinary tract infection 2018    approx 1 every 5 years    Wears hearing aid     BILATERAL    Wrist fracture, right 2015       Past Surgical History:   Procedure Laterality Date    ADENOIDECTOMY  1953    APPENDECTOMY  1961    BLEPHAROPLASTY Bilateral 10/19/2021    Procedure: BILATERAL UPPER LID BLEPHAROPLASTY;  Surgeon: Ruddy Moses MD;  Location: [facility] OR [facility] [place];  Service: Ophthalmology;  Laterality: Bilateral;    BREAST BIOPSY      BREAST LUMPECTOMY Left 11/15/2021    Procedure: Left JEAN-PAUL-guided partial mastectomy;  Surgeon: Maliha Andres MD;  Location: [facility] OR;  Service: General;  Laterality: Left;    BROW LIFT Bilateral 10/19/2021    Procedure: BILATERAL TEMPORAL DIRECT BROWLIFT;  Surgeon: Ruddy Moses MD;  Location: [facility] OR [facility] [place];  Service: Ophthalmology;  Laterality: Bilateral;    CARPAL TUNNEL RELEASE  1992    right wrist    CATARACT EXTRACTION, BILATERAL  07/01/2018    R 7/18 and L 9/18 WITH LENS IMPLANTS    COLONOSCOPY      DEQUERVAIN RELEASE Bilateral 2015    DILATATION AND CURETTAGE  1960s    EYE SURGERY  2018    Bilateral repairs    FRACTURE SURGERY  2014    R hip, R  wrist    HAND SURGERY Right     HIP FRACTURE SURGERY Right     HYSTERECTOMY  1975    Partial    JOINT REPLACEMENT  2001, 2014    knees    TONSILLECTOMY AND ADENOIDECTOMY  1953    TOTAL KNEE ARTHROPLASTY Left 2001    TOTAL KNEE ARTHROPLASTY Right 2014    TRIGGER FINGER RELEASE Right     TRIGGER POINT INJECTION  2017, 2019    WRIST SURGERY  2015    FRACTURE RIGHT       Social History      Socioeconomic History    Marital status:     Number of children: 1   Tobacco Use    Smoking status: Never    Smokeless tobacco: Never   Vaping Use    Vaping status: Never Used   Substance and Sexual Activity    Alcohol use: No    Drug use: No    Sexual activity: Never       Family History   Problem Relation Age of Onset    Diabetes Mother     Hypertension Mother     Stroke Mother     Hearing loss Mother         AIDS    Heart disease Mother         CHF, PACER    Thyroid disease Mother         THYROIDECTOMY    Hypertension Father     Stroke Father     Alcohol abuse Father         DAYS OFF     Heart disease Father         CHF    Hyperlipidemia Father     Kidney disease Father         KIDNEY STONE REMOVED    Heart attack Father     Cancer Sister         LUNG    COPD Sister     Lung cancer Sister     Diabetes Sister     Cancer Sister         KIDNEY    Kidney cancer Sister     Diabetes Sister         INSULIN    Hypertension Sister     Heart disease Sister         ON AUTOPSY    Mental illness Sister         HOSPITALIZED    Depression Sister     Hyperlipidemia Sister     Cancer Brother         LUNG    Lung cancer Brother     Early death Brother         2 MO, ? WHOOPING COUGH    Mental illness Brother         Schizophrenia/hosp    Depression Brother     Early death Brother     Asthma Daughter         ADULT ONSET    Diabetes Maternal Grandmother         NIDDM    Diabetes Maternal Aunt         NIDDM    Thyroid disease Maternal Aunt         THYROIDECTOMY    Diabetes Maternal Aunt         NIDDM    Other Maternal Aunt         ESSENTIAL TREMOR    Hearing loss Maternal Aunt     Other Maternal Uncle         PARKINSON    Malig Hyperthermia Neg Hx     Breast cancer Neg Hx        Review of Systems   Constitutional: Negative for diaphoresis and malaise/fatigue.   Cardiovascular:  Negative for chest pain, claudication, dyspnea on exertion, irregular heartbeat, leg swelling, near-syncope, orthopnea, palpitations,  "paroxysmal nocturnal dyspnea and syncope.   Respiratory:  Negative for cough, shortness of breath and sleep disturbances due to breathing.    Musculoskeletal:  Negative for falls.   Neurological:  Negative for dizziness and weakness.   Psychiatric/Behavioral:  Negative for altered mental status and substance abuse.        Allergies   Allergen Reactions    Iodine Itching     IV ONLY    Percocet [Oxycodone-Acetaminophen] Itching     causes ithching    Iodinated Contrast Media Rash     Patient reports rash occurred 30 yrs ago with contrast         Current Outpatient Medications:     anastrozole (ARIMIDEX) 1 MG tablet, Take 1 tablet by mouth Daily., Disp: 90 tablet, Rfl: 3    cyanocobalamin (VITAMIN B-12) 1000 MCG tablet, , Disp: , Rfl:     ferrous sulfate 325 (65 FE) MG tablet, Take 1 tablet by mouth Daily With Breakfast., Disp: , Rfl:     folic acid (FOLVITE) 400 MCG tablet, Take 1 tablet by mouth Daily., Disp: , Rfl:     lisinopril-hydrochlorothiazide (PRINZIDE,ZESTORETIC) 10-12.5 MG per tablet, Take 1 tablet by mouth Daily., Disp: , Rfl:     propranolol (INDERAL) 20 MG tablet, Take 1 tablet by mouth 2 (Two) Times a Day., Disp: , Rfl:     amLODIPine (NORVASC) 5 MG tablet, Take 1 tablet by mouth Daily., Disp: , Rfl:     benazepril (LOTENSIN) 5 MG tablet, Take 1 tablet by mouth Daily., Disp: , Rfl:     methylPREDNISolone (MEDROL) 4 MG dose pack, Take as directed on package instructions., Disp: 21 tablet, Rfl: 0      Objective:     Vitals:    08/26/24 0934   BP: 120/68   BP Location: Right arm   Patient Position: Sitting   Pulse: 66   SpO2: 98%   Weight: 85.3 kg (188 lb)   Height: 160 cm (63\")     Body mass index is 33.3 kg/m².    PHYSICAL EXAM:    Constitutional:       General: Not in acute distress.     Appearance: Normal appearance. Well-developed.   Eyes:      Pupils: Pupils are equal, round, and reactive to light.   HENT:      Head: Normocephalic.   Neck:      Vascular: No carotid bruit or JVD.   Pulmonary:      " Effort: Pulmonary effort is normal. No tachypnea.      Breath sounds: Normal breath sounds. No wheezing. No rales.   Cardiovascular:      Normal rate. Regular rhythm.      No gallop.    Pulses:     Intact distal pulses.   Edema:     Peripheral edema absent.   Abdominal:      General: Bowel sounds are normal.      Palpations: Abdomen is soft.      Tenderness: There is no abdominal tenderness.   Musculoskeletal: Normal range of motion.      Cervical back: Normal range of motion and neck supple. No edema. Skin:     General: Skin is warm and dry.   Neurological:      Mental Status: Alert and oriented to person, place, and time.           ECG 12 Lead    Date/Time: 8/26/2024 10:50 AM  Performed by: Angelika Bueno APRN    Authorized by: Angelika Bueno APRN  Comparison: compared with previous ECG from 5/20/2024  Similar to previous ECG  Rhythm: sinus rhythm  Rate: normal  Conduction: non-specific intraventricular conduction delay  Q waves: V1 and V2    QRS axis: normal    Clinical impression: non-specific ECG            Assessment:     Neck pain- negative stress test. Resolved with exercises  Hypertension- Bp Managed by nephrology. Stable this morning but has been having some highs at home. She plans to call Dr. Coats  CKD- managed by [facility]       Plan:       Has a knee surgery planned in September. Will send  clearance letter. Patient would like to follow up as needed and I think that's fine.         Your medication list            Accurate as of August 26, 2024 10:02 AM. If you have any questions, ask your nurse or doctor.                CONTINUE taking these medications        Instructions Last Dose Given Next Dose Due   amLODIPine 5 MG tablet  Commonly known as: NORVASC      Take 1 tablet by mouth Daily.       anastrozole 1 MG tablet  Commonly known as: ARIMIDEX      Take 1 tablet by mouth Daily.       benazepril 5 MG tablet  Commonly known as: LOTENSIN      Take 1 tablet by mouth Daily.       cyanocobalamin  1000 MCG tablet  Commonly known as: VITAMIN B-12           ferrous sulfate 325 (65 FE) MG tablet      Take 1 tablet by mouth Daily With Breakfast.       folic acid 400 MCG tablet  Commonly known as: FOLVITE      Take 1 tablet by mouth Daily.       lisinopril-hydrochlorothiazide 10-12.5 MG per tablet  Commonly known as: PRINZIDE,ZESTORETIC      Take 1 tablet by mouth Daily.       methylPREDNISolone 4 MG dose pack  Commonly known as: MEDROL      Take as directed on package instructions.       propranolol 20 MG tablet  Commonly known as: INDERAL      Take 1 tablet by mouth 2 (Two) Times a Day.                  As always, it has been a pleasure to participate in your patient's care.      Sincerely,     Angelika QUICK

## 2024-09-09 ENCOUNTER — PRE-ADMISSION TESTING (OUTPATIENT)
Dept: PREADMISSION TESTING | Facility: HOSPITAL | Age: 89
End: 2024-09-09
Payer: MEDICARE

## 2024-09-09 VITALS
TEMPERATURE: 97.5 F | DIASTOLIC BLOOD PRESSURE: 78 MMHG | HEART RATE: 71 BPM | SYSTOLIC BLOOD PRESSURE: 155 MMHG | RESPIRATION RATE: 16 BRPM | BODY MASS INDEX: 33.15 KG/M2 | HEIGHT: 63 IN | WEIGHT: 187.1 LBS | OXYGEN SATURATION: 95 %

## 2024-09-09 LAB
ALBUMIN SERPL-MCNC: 4.1 G/DL (ref 3.5–5.2)
ANION GAP SERPL CALCULATED.3IONS-SCNC: 7.8 MMOL/L (ref 5–15)
BASOPHILS # BLD AUTO: 0.03 10*3/MM3 (ref 0–0.2)
BASOPHILS NFR BLD AUTO: 0.6 % (ref 0–1.5)
BUN SERPL-MCNC: 24 MG/DL (ref 8–23)
BUN/CREAT SERPL: 22.6 (ref 7–25)
CALCIUM SPEC-SCNC: 9.9 MG/DL (ref 8.6–10.5)
CHLORIDE SERPL-SCNC: 101 MMOL/L (ref 98–107)
CO2 SERPL-SCNC: 27.2 MMOL/L (ref 22–29)
CREAT SERPL-MCNC: 1.06 MG/DL (ref 0.57–1)
DEPRECATED RDW RBC AUTO: 42 FL (ref 37–54)
EGFRCR SERPLBLD CKD-EPI 2021: 51.3 ML/MIN/1.73
EOSINOPHIL # BLD AUTO: 0.11 10*3/MM3 (ref 0–0.4)
EOSINOPHIL NFR BLD AUTO: 2.4 % (ref 0.3–6.2)
ERYTHROCYTE [DISTWIDTH] IN BLOOD BY AUTOMATED COUNT: 12 % (ref 12.3–15.4)
GLUCOSE SERPL-MCNC: 106 MG/DL (ref 65–99)
HBA1C MFR BLD: 5.9 % (ref 4.8–5.6)
HCT VFR BLD AUTO: 34.9 % (ref 34–46.6)
HGB BLD-MCNC: 11.4 G/DL (ref 12–15.9)
IMM GRANULOCYTES # BLD AUTO: 0.02 10*3/MM3 (ref 0–0.05)
IMM GRANULOCYTES NFR BLD AUTO: 0.4 % (ref 0–0.5)
LYMPHOCYTES # BLD AUTO: 1.15 10*3/MM3 (ref 0.7–3.1)
LYMPHOCYTES NFR BLD AUTO: 24.6 % (ref 19.6–45.3)
MCH RBC QN AUTO: 31.2 PG (ref 26.6–33)
MCHC RBC AUTO-ENTMCNC: 32.7 G/DL (ref 31.5–35.7)
MCV RBC AUTO: 95.6 FL (ref 79–97)
MONOCYTES # BLD AUTO: 0.48 10*3/MM3 (ref 0.1–0.9)
MONOCYTES NFR BLD AUTO: 10.3 % (ref 5–12)
NEUTROPHILS NFR BLD AUTO: 2.88 10*3/MM3 (ref 1.7–7)
NEUTROPHILS NFR BLD AUTO: 61.7 % (ref 42.7–76)
NRBC BLD AUTO-RTO: 0 /100 WBC (ref 0–0.2)
PHOSPHATE SERPL-MCNC: 4 MG/DL (ref 2.5–4.5)
PLATELET # BLD AUTO: 184 10*3/MM3 (ref 140–450)
PMV BLD AUTO: 9.6 FL (ref 6–12)
POTASSIUM SERPL-SCNC: 5.1 MMOL/L (ref 3.5–5.2)
RBC # BLD AUTO: 3.65 10*6/MM3 (ref 3.77–5.28)
SODIUM SERPL-SCNC: 136 MMOL/L (ref 136–145)
VIT B12 BLD-MCNC: 1206 PG/ML (ref 211–946)
WBC NRBC COR # BLD AUTO: 4.67 10*3/MM3 (ref 3.4–10.8)

## 2024-09-09 PROCEDURE — 83036 HEMOGLOBIN GLYCOSYLATED A1C: CPT

## 2024-09-09 PROCEDURE — 80069 RENAL FUNCTION PANEL: CPT | Performed by: INTERNAL MEDICINE

## 2024-09-09 PROCEDURE — 85025 COMPLETE CBC W/AUTO DIFF WBC: CPT

## 2024-09-09 PROCEDURE — 82607 VITAMIN B-12: CPT | Performed by: INTERNAL MEDICINE

## 2024-09-09 PROCEDURE — 36415 COLL VENOUS BLD VENIPUNCTURE: CPT

## 2024-09-09 RX ORDER — SODIUM FLUORIDE 6 MG/ML
1 PASTE, DENTIFRICE DENTAL DAILY
COMMUNITY
Start: 2024-07-29

## 2024-09-09 NOTE — DISCHARGE INSTRUCTIONS
Take the following medications the morning of surgery:  PROPRANOLOL      If you are on prescription narcotic pain medication to control your pain you may also take that medication the morning of surgery.      General Instructions:     Do not eat solid food after midnight the night before surgery.  Clear liquids day of surgery are allowed but must be stopped at least two hours before your hospital arrival time.       Allowed clear liquids      Water, sodas, and tea or coffee with no cream or milk added.       12 to 20 ounces of a clear liquid that contains carbohydrates is recommended.  If non-diabetic, have Gatorade or Powerade.  If diabetic, have G2 or Powerade Zero.     Do not have liquids red in color.  Do not consume chicken, beef, pork or vegetable broth or bouillon cubes of any variety as they are not considered clear liquids and are not allowed.      Infants may have breast milk up to four hours before surgery.  Infants drinking formula may drink formula up to six hours before surgery.   Patients who avoid smoking, chewing tobacco and alcohol for 4 weeks prior to surgery have a reduced risk of post-operative complications.  Quit smoking as many days before surgery as you can.  Do not smoke, use chewing tobacco or drink alcohol the day of surgery.   If applicable bring your C-PAP/ BI-PAP machine in with you to preop day of surgery.  Bring any papers given to you in the doctor’s office.  Wear clean comfortable clothes.  Do not wear contact lenses, false eyelashes or make-up.  Bring a case for your glasses.   Bring crutches or walker if applicable.  Remove all piercings.  Leave jewelry and any other valuables at home.  Hair extensions with metal clips must be removed prior to surgery.  The Pre-Admission Testing nurse will instruct you to bring medications if unable to obtain an accurate list in Pre-Admission Testing.            Preventing a Surgical Site Infection:  For 2 to 3 days before surgery, avoid shaving  with a razor because the razor can irritate skin and make it easier to develop an infection.    Any areas of open skin can increase the risk of a post-operative wound infection by allowing bacteria to enter and travel throughout the body.  Notify your surgeon if you have any skin wounds / rashes even if it is not near the expected surgical site.  The area will need assessed to determine if surgery should be delayed until it is healed.  The night prior to surgery shower using a fresh bar of anti-bacterial soap (such as Dial) and clean washcloth.  Sleep in a clean bed with clean clothing.  Do not allow pets to sleep with you.  Shower on the morning of surgery using a fresh bar of anti-bacterial soap (such as Dial) and clean washcloth.  Dry with a clean towel and dress in clean clothing.  Ask your surgeon if you will be receiving antibiotics prior to surgery.  Make sure you, your family, and all healthcare providers clean their hands with soap and water or an alcohol based hand  before caring for you or your wound.    Day of surgery:  Your arrival time is approximately two hours before your scheduled surgery time.  Please note if you have an early arrival time the surgery doors do not open before 5:00 AM.  Upon arrival, a Pre-op nurse and Anesthesiologist will review your health history, obtain vital signs, and answer questions you may have.  The only belongings needed at this time will be a list of your home medications and if applicable your C-PAP/BI-PAP machine.  A Pre-op nurse will start an IV and you may receive medication in preparation for surgery, including something to help you relax.     Please be aware that surgery does come with discomfort.  We want to make every effort to control your discomfort so please discuss any uncontrolled symptoms with your nurse.   Your doctor will most likely have prescribed pain medications.      If you are going home after surgery you will receive individualized  written care instructions before being discharged.  A responsible adult must drive you to and from the hospital on the day of your surgery and ideally stay with you through the night.   .  Discharge prescriptions can be filled by the hospital pharmacy during regular pharmacy hours.  If you are having surgery late in the day/evening your prescription may be e-prescribed to your pharmacy.  Please verify your pharmacy hours or chose a 24 hour pharmacy to avoid not having access to your prescription because your pharmacy has closed for the day.    If you are staying overnight following surgery, you will be transported to your hospital room following the recovery period.  [facility] has all private rooms.    If you have any questions please call Pre-Admission Testing at [phone].  Deductibles and co-payments are collected on the day of service. Please be prepared to pay the required co-pay, deductible or deposit on the day of service as defined by your plan.    Call your surgeon immediately if you experience any of the following symptoms:  Sore Throat  Shortness of Breath or difficulty breathing  Cough  Chills  Body soreness or muscle pain  Headache  Fever  New loss of taste or smell  Do not arrive for your surgery ill.  Your procedure will need to be rescheduled to another time.  You will need to call your physician before the day of surgery to avoid any unnecessary exposure to hospital staff as well as other patients.        CHLORHEXIDINE CLOTH INSTRUCTIONS  The morning of surgery follow these instructions using the Chlorhexidine cloths you've been given.  These steps reduce bacteria on the body.  Do not use the cloths near your eyes, ears mouth, genitalia or on open wounds.  Throw the cloths away after use but do not try to flush them down a toilet.      Open and remove one cloth at a time from the package.    Leave the cloth unfolded and begin the bathing.  Massage the skin with the cloths using  gentle pressure to remove bacteria.  Do not scrub harshly.   Follow the steps below with one 2% CHG cloth per area (6 total cloths).  One cloth for neck, shoulders and chest.  One cloth for both arms, hands, fingers and underarms (do underarms last).  One cloth for the abdomen followed by groin.  One cloth for right leg and foot including between the toes.  One cloth for left leg and foot including between the toes.  The last cloth is to be used for the back of the neck, back and buttocks.    Allow the CHG to air dry 3 minutes on the skin which will give it time to work and decrease the chance of irritation.  The skin may feel sticky until it is dry.  Do not rinse with water or any other liquid or you will lose the beneficial effects of the CHG.  If mild skin irritation occurs, do rinse the skin to remove the CHG.  Report this to the nurse at time of admission.  Do not apply lotions, creams, ointments, deodorants or perfumes after using the clothes. Dress in clean clothes before coming to the hospital.

## 2024-09-12 ENCOUNTER — OFFICE VISIT (OUTPATIENT)
Dept: ORTHOPEDIC SURGERY | Facility: CLINIC | Age: 89
End: 2024-09-12
Payer: MEDICARE

## 2024-09-12 VITALS
TEMPERATURE: 98.2 F | BODY MASS INDEX: 33.73 KG/M2 | WEIGHT: 190.4 LBS | SYSTOLIC BLOOD PRESSURE: 110 MMHG | HEIGHT: 63 IN | DIASTOLIC BLOOD PRESSURE: 72 MMHG

## 2024-09-12 DIAGNOSIS — R52 PAIN: Primary | ICD-10-CM

## 2024-09-12 PROCEDURE — 1159F MED LIST DOCD IN RCRD: CPT | Performed by: NURSE PRACTITIONER

## 2024-09-12 PROCEDURE — 73562 X-RAY EXAM OF KNEE 3: CPT | Performed by: NURSE PRACTITIONER

## 2024-09-12 PROCEDURE — 1160F RVW MEDS BY RX/DR IN RCRD: CPT | Performed by: NURSE PRACTITIONER

## 2024-09-12 PROCEDURE — 77077 JOINT SURVEY SINGLE VIEW: CPT | Performed by: ORTHOPAEDIC SURGERY

## 2024-09-12 PROCEDURE — S0260 H&P FOR SURGERY: HCPCS | Performed by: NURSE PRACTITIONER

## 2024-09-12 NOTE — H&P
"Patient: Haydee Dickey    Date of Admission: 9/23/2024    YOB: 1935    Medical Record Number: [id number]    Admitting Physician: Dr. Edmund Zavaleta    Reason for Admission: Advanced polyethylene wear left knee    History of Present Illness: 88 y.o. female presents with symptoms consistent with advanced polyethylene wear left knee, she has tried and failed conservative measures, would like to proceed with surgery    Allergies:   Allergies   Allergen Reactions    Iodine Itching     IV ONLY    Percocet [Oxycodone-Acetaminophen] Itching    Iodinated Contrast Media Rash     Patient reports rash occurred 30 yrs ago with contrast   (\"IV\")         Current Medications:  Home Medications:    Current Outpatient Medications on File Prior to Visit   Medication Sig    anastrozole (ARIMIDEX) 1 MG tablet Take 1 tablet by mouth Daily.    cyanocobalamin (VITAMIN B-12) 1000 MCG tablet Take 100 mcg by mouth Daily.    ferrous sulfate 325 (65 FE) MG tablet Take 1 tablet by mouth Daily With Breakfast.    folic acid (FOLVITE) 400 MCG tablet Take 1 tablet by mouth Daily.    lisinopril-hydrochlorothiazide (PRINZIDE,ZESTORETIC) 10-12.5 MG per tablet Take 1 tablet by mouth Daily.    propranolol (INDERAL) 20 MG tablet Take 1 tablet by mouth 2 (Two) Times a Day.    Sodium Fluoride 5000 PPM 1.1 % paste Take 1 Application by mouth Daily.     No current facility-administered medications on file prior to visit.     PRN Meds:.    PMH:     Past Medical History:   Diagnosis Date    Allergic Percocet, iv iodine    Anemia     Arthritis     B12 deficiency     BPPV (benign paroxysmal positional vertigo)     Breast cancer     LEFT    Cataract 2018  jaime, 2021 jaime blepharoplasty    Colon polyp 2009    COVID 03/24/2023    Rebound Covid following Paxlovid 4/1/23    CTS (carpal tunnel syndrome) 1997, 2014    D’quervain x3    Deep vein thrombosis 1970    HX, LEG    Essential tremor     Folliculitis 11/01/2017    Fracture of hip 2014    Right ORIF    " "Fracture, femur 1995    Left, No surgery    Fracture, radius 1995    Right, orif    Gait instability     GERD (gastroesophageal reflux disease) 1990    History of blood transfusion 2014    History of fall 06/17/2024    \"KNEE GAVE OUT\"    HL (hearing loss) 2015    HEARING AIDS    Hypertension 2001    on Rx    Knee swelling 2001    Bilateral    Lower extremity neuropathy     bilateral    Neuropathy     Osteopenia 2019    Potassium (K) excess 10/21/2022    Seeing nephrologist -- Dr. Coats    Rotator cuff syndrome 2013    Fall induced    Sciatica     Small vessel disease     Torn rotator cuff 2013    right arm    Urinary tract infection 2018    approx 1 every 5 years    Wears hearing aid     BILATERAL    Wrist fracture, right 2015       PF/Surg/Soc Hx:     Past Surgical History:   Procedure Laterality Date    ADENOIDECTOMY  1953    APPENDECTOMY  1961    BLEPHAROPLASTY Bilateral 10/19/2021    Procedure: BILATERAL UPPER LID BLEPHAROPLASTY;  Surgeon: Ruddy Moses MD;  [place] [facility] [place] [facility] [place]  Service: Ophthalmology;  Laterality: Bilateral;    BREAST BIOPSY      BREAST LUMPECTOMY Left 11/15/2021    Procedure: Left JEAN-PAUL-guided partial mastectomy;  Surgeon: Maliha Adnres MD;  Location: [facility] OR;  Service: General;  Laterality: Left;    BROW LIFT Bilateral 10/19/2021    Procedure: BILATERAL TEMPORAL DIRECT BROWLIFT;  Surgeon: Ruddy Moses MD;  [place] [facility] [place] [facility] [place]  Service: Ophthalmology;  Laterality: Bilateral;    CARPAL TUNNEL RELEASE  1992    right wrist    CATARACT EXTRACTION, BILATERAL  07/01/2018    R 7/18 and L 9/18 WITH LENS IMPLANTS    COLONOSCOPY      DEQUERVAIN RELEASE Bilateral 2015    DILATATION AND CURETTAGE  1960s    EYE SURGERY  2018    Bilateral repairs    FRACTURE SURGERY  2014    R hip, R  wrist    HAND SURGERY Right     HIP FRACTURE SURGERY Right     HYSTERECTOMY  1975    Partial    JOINT REPLACEMENT  2001, 2014    TKA    TONSILLECTOMY AND ADENOIDECTOMY  1953    " TOTAL KNEE ARTHROPLASTY Left 2001    TOTAL KNEE ARTHROPLASTY Right 2014    TRIGGER FINGER RELEASE Right     TRIGGER POINT INJECTION  2017, 2019    WRIST SURGERY  2015    FRACTURE RIGHT        Social History     Occupational History    Occupation: RN     Employer: RETIRED     Comment: [employer]   Tobacco Use    Smoking status: Never    Smokeless tobacco: Never   Vaping Use    Vaping status: Never Used   Substance and Sexual Activity    Alcohol use: No    Drug use: No    Sexual activity: Never      Social History     Social History Narrative    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  Lives at home with her daughter, son in law, grand-daughter, and 1 dog.          Family History   Problem Relation Age of Onset    Diabetes Mother     Hypertension Mother     Stroke Mother     Hearing loss Mother         AIDS    Heart disease Mother         CHF, PACER    Thyroid disease Mother         THYROIDECTOMY    Hypertension Father     Stroke Father     Alcohol abuse Father         DAYS OFF     Heart disease Father         CHF    Hyperlipidemia Father     Kidney disease Father         KIDNEY STONE REMOVED    Heart attack Father     Cancer Sister         LUNG    COPD Sister     Lung cancer Sister     Diabetes Sister     Cancer Sister         KIDNEY    Kidney cancer Sister     Diabetes Sister         INSULIN    Hypertension Sister     Heart disease Sister         ON AUTOPSY    Mental illness Sister         HOSPITALIZED    Depression Sister     Hyperlipidemia Sister     Cancer Brother         LUNG    Lung cancer Brother     Early death Brother         2 MO, ? WHOOPING COUGH    Mental illness Brother         Schizophrenia/hosp    Depression Brother     Early death Brother     Asthma Daughter         ADULT ONSET    Diabetes Maternal Grandmother         NIDDM    Diabetes Maternal Aunt         NIDDM    Thyroid disease Maternal Aunt         THYROIDECTOMY    Diabetes Maternal Aunt         NIDDM     "Hearing loss Maternal Aunt     Carmela Hyperthermia Neg Hx     Breast cancer Neg Hx          Review of Systems:   A 14 point review of systems was performed, pertinent positives discussed above, all other systems are negative    Physical Exam: 88 y.o. female  Vital Signs :   Vitals:    09/12/24 1557   BP: 110/72   BP Location: Right arm   Patient Position: Sitting   Temp: 98.2 °F (36.8 °C)   Weight: 86.4 kg (190 lb 6.4 oz)   Height: 158.8 cm (62.5\")   PainSc: 0-No pain   PainLoc: Knee     General: Alert and Oriented x 3, No acute distress.  Psych: mood and affect appropriate; recent and remote memory intact  Eyes: conjunctivae clear; pupils equally round and reactive, sclerae anicteric  CV: no peripheral edema  Resp: normal respiratory effort  Skin: no rashes or wounds; normal turgor  Musculosketetal; pain and crepitance with knee range of motion  Vascular: palpable distal pulses    Xrays:  3 views of left knee as well as long view were ordered and reviewed today secondary to upcoming surgery and pain and show a previously replaced left knee with signs of polyethylene wear.  Compared to views are unchanged    Assessment: Advanced polyethylene wear left knee. Conservative measures have failed.      Plan:  The plan is to proceed with left knee polyethylene exchange the patient voiced understanding of the risks, benefits, and alternative forms of treatment that were discussed with Dr Zavaleta at the time of scheduling. 23 , patient has history of DVT so we will prescribe Eliquis postoperatively for 6 weeks, no NSAIDs because of history of kidney disease    Desi Pearl, APRN  9/12/2024         "

## 2024-09-16 ENCOUNTER — HOSPITAL ENCOUNTER (OUTPATIENT)
Dept: MAMMOGRAPHY | Facility: HOSPITAL | Age: 89
Discharge: HOME | End: 2024-09-16
Admitting: NURSE PRACTITIONER
Payer: MEDICARE

## 2024-09-16 DIAGNOSIS — Z12.31 ENCOUNTER FOR SCREENING MAMMOGRAM FOR MALIGNANT NEOPLASM OF BREAST: ICD-10-CM

## 2024-09-16 DIAGNOSIS — C50.212 MALIGNANT NEOPLASM OF UPPER-INNER QUADRANT OF LEFT BREAST IN FEMALE, ESTROGEN RECEPTOR POSITIVE: ICD-10-CM

## 2024-09-16 DIAGNOSIS — Z17.0 MALIGNANT NEOPLASM OF UPPER-INNER QUADRANT OF LEFT BREAST IN FEMALE, ESTROGEN RECEPTOR POSITIVE: ICD-10-CM

## 2024-09-16 PROCEDURE — 77063 BREAST TOMOSYNTHESIS BI: CPT

## 2024-09-16 PROCEDURE — 77067 SCR MAMMO BI INCL CAD: CPT

## 2024-09-20 ENCOUNTER — TELEPHONE (OUTPATIENT)
Dept: ORTHOPEDIC SURGERY | Facility: CLINIC | Age: 89
End: 2024-09-20
Payer: MEDICARE

## 2024-09-25 ENCOUNTER — TELEPHONE (OUTPATIENT)
Dept: ORTHOPEDIC SURGERY | Facility: CLINIC | Age: 89
End: 2024-09-25

## 2024-09-25 NOTE — TELEPHONE ENCOUNTER
Caller: Haydee Dickey    Relationship: Self    Best call back number: [phone] (home)     Who are you requesting to speak with (clinical staff, provider,  specific staff member): CLINICAL    What was the call regarding: [facility] DOBBS STATED HER SX ON 9/23/24 WAS POSTPONED BECAUSE THE SUPPLIER DID NOT HAVE A FULL SET OF INSERTS FOR HER KNEE. SHE WOULD LIKE TO KNOW WHEN HER SX WILL BE RESCHEDULED.      SHE ALSO HAS THE [employer] TECH BIKE AND SHE WANTS TO KNOW IF SHE SHOULD KEEP THIS BIKE UNTIL SHE HAS HER SX OR WHAT SHE NEEDS TO DO WITH THAT    Is it okay if the provider responds through MyChart: CALL
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
## 2024-10-03 ENCOUNTER — OFFICE VISIT (OUTPATIENT)
Dept: SURGERY | Facility: CLINIC | Age: 89
End: 2024-10-03
Payer: MEDICARE

## 2024-10-03 VITALS
BODY MASS INDEX: 34.02 KG/M2 | HEART RATE: 75 BPM | SYSTOLIC BLOOD PRESSURE: 142 MMHG | WEIGHT: 192 LBS | OXYGEN SATURATION: 96 % | HEIGHT: 63 IN | DIASTOLIC BLOOD PRESSURE: 78 MMHG

## 2024-10-03 DIAGNOSIS — Z17.0 MALIGNANT NEOPLASM OF UPPER-INNER QUADRANT OF LEFT BREAST IN FEMALE, ESTROGEN RECEPTOR POSITIVE: Primary | ICD-10-CM

## 2024-10-03 DIAGNOSIS — Z12.31 ENCOUNTER FOR SCREENING MAMMOGRAM FOR MALIGNANT NEOPLASM OF BREAST: ICD-10-CM

## 2024-10-03 DIAGNOSIS — C50.212 MALIGNANT NEOPLASM OF UPPER-INNER QUADRANT OF LEFT BREAST IN FEMALE, ESTROGEN RECEPTOR POSITIVE: Primary | ICD-10-CM

## 2024-10-03 PROCEDURE — 99213 OFFICE O/P EST LOW 20 MIN: CPT | Performed by: NURSE PRACTITIONER

## 2024-10-03 PROCEDURE — 1159F MED LIST DOCD IN RCRD: CPT | Performed by: NURSE PRACTITIONER

## 2024-10-03 PROCEDURE — 1160F RVW MEDS BY RX/DR IN RCRD: CPT | Performed by: NURSE PRACTITIONER

## 2024-10-15 NOTE — PROGRESS NOTES
"Patient: Haydee Dickey  YOB: 1935 88 y.o. female  Medical Record Number: [id number]    Chief Complaints:   Chief Complaint   Patient presents with    Left Knee - Initial Evaluation       History of Present Illness:Haydee Dickey is a 88 y.o. female who presents as a new patient both myself as well as to the practice since it has been several years since she was seen.  She had previous bilateral total knee replacements with the first 1 being done left knee in 2002.  She had subsequent right total knee replacement a few years ago by Dr. Zavaleta.  Patient's main complaint today is left knee pain and instability.  She has had multiple episodes of falls and the knee giving out, so much so she is now using a walker because she feels as though the knee might not hold her.  She feels as though the knee\" is unsteady and amadeo\" after her most recent fall she went to the ER, initial x-rays did not show any fractures and she was referred to see us if her symptoms do not improve, unfortunately have not    Allergies:   Allergies   Allergen Reactions    Iodine Itching     IV ONLY    Percocet [Oxycodone-Acetaminophen] Itching     causes ithching    Iodinated Contrast Media Rash     Patient reports rash occurred 30 yrs ago with contrast       Medications:   Current Outpatient Medications   Medication Sig Dispense Refill    anastrozole (ARIMIDEX) 1 MG tablet Take 1 tablet by mouth Daily. 90 tablet 3    cyanocobalamin (VITAMIN B-12) 1000 MCG tablet       ferrous sulfate 325 (65 FE) MG tablet Take 1 tablet by mouth Daily With Breakfast.      folic acid (FOLVITE) 400 MCG tablet Take 1 tablet by mouth Daily.      propranolol (INDERAL) 20 MG tablet Take 1 tablet by mouth 2 (Two) Times a Day.      amLODIPine (NORVASC) 5 MG tablet Take 1 tablet by mouth Daily.      benazepril (LOTENSIN) 5 MG tablet Take 1 tablet by mouth Daily.      methylPREDNISolone (MEDROL) 4 MG dose pack Take as directed on package instructions. 21 tablet 0 " "    No current facility-administered medications for this visit.         The following portions of the patient's history were reviewed and updated as appropriate: allergies, current medications, past family history, past medical history, past social history, past surgical history and problem list.    Review of Systems:   14 point review of systems was performed. All systems negative except pertinent positives/negatives listed in HPI above    Physical Exam:   Vitals:    06/27/24 0816   Temp: 98.6 °F (37 °C)   TempSrc: Temporal   Weight: 84.6 kg (186 lb 9.6 oz)   Height: 160 cm (62.99\")   PainSc:   1   PainLoc: Knee       General: A and O x 3, ASA, NAD   Skin clear no unusual lesions noted  Left knee patient is well-healed surgical incision noted she does appear to have some quad deficiency, but she is easily able to perform a straight leg raise she has no appreciable effusion no erythema ecchymosis or warmth noted.  She has 116 degrees flexion neutral in extension with significant laxity noted.  Calf is soft and nontender       Radiology:  Xrays 3views (ap,lateral, sunrise) left knee were reviewed and showed a left knee replacement she does have some overhang noted of the femoral component but I do not appreciate any obvious lucencies, no fractures.  No comparative views available    Assessment/Plan: Status post left TKA greater than 20 years ago, pain and instability    Patient and I discussed options, we will proceed with CRP, sed rate, bone scan with attention to left knee and patient would like to see Dr. Zavaleta after to discuss results, possible polyethylene exchange.  In addition she was placed in a knee immobilizer for stability will continue using alka Pearl, APRN  6/27/2024  " [FreeTextEntry1] : I Mariana Palafox RN, attest that I was present throughout the exam.

## 2024-10-18 ENCOUNTER — TRANSCRIBE ORDERS (OUTPATIENT)
Dept: ADMINISTRATIVE | Facility: HOSPITAL | Age: 89
End: 2024-10-18
Payer: MEDICARE

## 2024-10-18 ENCOUNTER — LAB (OUTPATIENT)
Dept: LAB | Facility: HOSPITAL | Age: 89
End: 2024-10-18
Payer: MEDICARE

## 2024-10-18 DIAGNOSIS — N18.2 CHRONIC KIDNEY DISEASE, STAGE II (MILD): ICD-10-CM

## 2024-10-18 DIAGNOSIS — N18.2 CHRONIC KIDNEY DISEASE, STAGE II (MILD): Primary | ICD-10-CM

## 2024-10-18 LAB
ANION GAP SERPL CALCULATED.3IONS-SCNC: 6.2 MMOL/L (ref 5–15)
BUN SERPL-MCNC: 25 MG/DL (ref 8–23)
BUN/CREAT SERPL: 18.9 (ref 7–25)
CALCIUM SPEC-SCNC: 9.7 MG/DL (ref 8.6–10.5)
CHLORIDE SERPL-SCNC: 104 MMOL/L (ref 98–107)
CO2 SERPL-SCNC: 27.8 MMOL/L (ref 22–29)
CREAT SERPL-MCNC: 1.32 MG/DL (ref 0.57–1)
EGFRCR SERPLBLD CKD-EPI 2021: 39.2 ML/MIN/1.73
GLUCOSE SERPL-MCNC: 99 MG/DL (ref 65–99)
POTASSIUM SERPL-SCNC: 3.8 MMOL/L (ref 3.5–5.2)
SODIUM SERPL-SCNC: 138 MMOL/L (ref 136–145)

## 2024-10-18 PROCEDURE — 80048 BASIC METABOLIC PNL TOTAL CA: CPT

## 2024-10-18 PROCEDURE — 36415 COLL VENOUS BLD VENIPUNCTURE: CPT

## 2024-11-08 ENCOUNTER — OFFICE VISIT (OUTPATIENT)
Dept: ONCOLOGY | Facility: CLINIC | Age: 89
End: 2024-11-08
Payer: MEDICARE

## 2024-11-08 ENCOUNTER — LAB (OUTPATIENT)
Dept: LAB | Facility: HOSPITAL | Age: 89
End: 2024-11-08
Payer: MEDICARE

## 2024-11-08 ENCOUNTER — TELEPHONE (OUTPATIENT)
Dept: ORTHOPEDIC SURGERY | Facility: CLINIC | Age: 89
End: 2024-11-08
Payer: MEDICARE

## 2024-11-08 VITALS
SYSTOLIC BLOOD PRESSURE: 148 MMHG | HEIGHT: 63 IN | RESPIRATION RATE: 16 BRPM | TEMPERATURE: 98.8 F | WEIGHT: 192.4 LBS | OXYGEN SATURATION: 97 % | BODY MASS INDEX: 34.09 KG/M2 | HEART RATE: 64 BPM | DIASTOLIC BLOOD PRESSURE: 83 MMHG

## 2024-11-08 DIAGNOSIS — Z17.0 MALIGNANT NEOPLASM OF UPPER-INNER QUADRANT OF LEFT BREAST IN FEMALE, ESTROGEN RECEPTOR POSITIVE: ICD-10-CM

## 2024-11-08 DIAGNOSIS — C50.212 MALIGNANT NEOPLASM OF UPPER-INNER QUADRANT OF LEFT BREAST IN FEMALE, ESTROGEN RECEPTOR POSITIVE: ICD-10-CM

## 2024-11-08 DIAGNOSIS — Z17.0 MALIGNANT NEOPLASM OF UPPER-INNER QUADRANT OF LEFT BREAST IN FEMALE, ESTROGEN RECEPTOR POSITIVE: Primary | ICD-10-CM

## 2024-11-08 DIAGNOSIS — C50.212 MALIGNANT NEOPLASM OF UPPER-INNER QUADRANT OF LEFT BREAST IN FEMALE, ESTROGEN RECEPTOR POSITIVE: Primary | ICD-10-CM

## 2024-11-08 LAB
ALBUMIN SERPL-MCNC: 3.9 G/DL (ref 3.5–5.2)
ALBUMIN/GLOB SERPL: 1.4 G/DL
ALP SERPL-CCNC: 39 U/L (ref 39–117)
ALT SERPL W P-5'-P-CCNC: 10 U/L (ref 1–33)
ANION GAP SERPL CALCULATED.3IONS-SCNC: 12 MMOL/L (ref 5–15)
AST SERPL-CCNC: 24 U/L (ref 1–32)
BASOPHILS # BLD AUTO: 0.04 10*3/MM3 (ref 0–0.2)
BASOPHILS NFR BLD AUTO: 0.8 % (ref 0–1.5)
BILIRUB SERPL-MCNC: 0.3 MG/DL (ref 0–1.2)
BUN SERPL-MCNC: 15 MG/DL (ref 8–23)
BUN/CREAT SERPL: 13.8 (ref 7–25)
CALCIUM SPEC-SCNC: 9.2 MG/DL (ref 8.6–10.5)
CHLORIDE SERPL-SCNC: 101 MMOL/L (ref 98–107)
CO2 SERPL-SCNC: 25 MMOL/L (ref 22–29)
CREAT SERPL-MCNC: 1.09 MG/DL (ref 0.57–1)
DEPRECATED RDW RBC AUTO: 41.6 FL (ref 37–54)
EGFRCR SERPLBLD CKD-EPI 2021: 49.3 ML/MIN/1.73
EOSINOPHIL # BLD AUTO: 0.15 10*3/MM3 (ref 0–0.4)
EOSINOPHIL NFR BLD AUTO: 3 % (ref 0.3–6.2)
ERYTHROCYTE [DISTWIDTH] IN BLOOD BY AUTOMATED COUNT: 11.8 % (ref 12.3–15.4)
GLOBULIN UR ELPH-MCNC: 2.8 GM/DL
GLUCOSE SERPL-MCNC: 84 MG/DL (ref 65–99)
HCT VFR BLD AUTO: 34.8 % (ref 34–46.6)
HGB BLD-MCNC: 11.3 G/DL (ref 12–15.9)
IMM GRANULOCYTES # BLD AUTO: 0.01 10*3/MM3 (ref 0–0.05)
IMM GRANULOCYTES NFR BLD AUTO: 0.2 % (ref 0–0.5)
LYMPHOCYTES # BLD AUTO: 1.05 10*3/MM3 (ref 0.7–3.1)
LYMPHOCYTES NFR BLD AUTO: 21 % (ref 19.6–45.3)
MCH RBC QN AUTO: 31.2 PG (ref 26.6–33)
MCHC RBC AUTO-ENTMCNC: 32.5 G/DL (ref 31.5–35.7)
MCV RBC AUTO: 96.1 FL (ref 79–97)
MONOCYTES # BLD AUTO: 0.6 10*3/MM3 (ref 0.1–0.9)
MONOCYTES NFR BLD AUTO: 12 % (ref 5–12)
NEUTROPHILS NFR BLD AUTO: 3.16 10*3/MM3 (ref 1.7–7)
NEUTROPHILS NFR BLD AUTO: 63 % (ref 42.7–76)
NRBC BLD AUTO-RTO: 0 /100 WBC (ref 0–0.2)
PLATELET # BLD AUTO: 182 10*3/MM3 (ref 140–450)
PMV BLD AUTO: 9.6 FL (ref 6–12)
POTASSIUM SERPL-SCNC: 3.7 MMOL/L (ref 3.5–5.2)
PROT SERPL-MCNC: 6.7 G/DL (ref 6–8.5)
RBC # BLD AUTO: 3.62 10*6/MM3 (ref 3.77–5.28)
SODIUM SERPL-SCNC: 138 MMOL/L (ref 136–145)
WBC NRBC COR # BLD AUTO: 5.01 10*3/MM3 (ref 3.4–10.8)

## 2024-11-08 PROCEDURE — 80053 COMPREHEN METABOLIC PANEL: CPT

## 2024-11-08 PROCEDURE — 85025 COMPLETE CBC W/AUTO DIFF WBC: CPT

## 2024-11-08 PROCEDURE — 36415 COLL VENOUS BLD VENIPUNCTURE: CPT

## 2024-11-08 NOTE — PROGRESS NOTES
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    Her daughter called me and complained that she thought her personality had changed completely with the Arimidex and I called her back in July and asked her to stop it and she stopped it for 2 months and saw no difference and went back on it on her own and wants to stay on it although I told her I have very low threshold to stop it because of her age and small cancer and low risk of recurrence  She appears to be tolerating Arimidex without any problems right now  She tells me she had a bone density in [facility] and this was thankfully normal    She has noticed some thinning of her hair but no other significant side effects and she is very happy with this   mammography in September 2024 was thankfully benign and I do not feel strongly about continued imaging although she wants very much to have a new imaging and I am okay with it    She is up-to-date with screening but has not had a colonoscopy and at her age I do not think this is reasonable and I have recommended a Cologuard      Past Medical History:   Diagnosis Date    Allergic Percocet, iv iodine    Anemia     Arthritis     B12 deficiency     BPPV (benign paroxysmal positional vertigo)     Breast cancer     LEFT    Cataract 2018  jaime, 2021 jaime blepharoplasty    Colon polyp 2009    COVID 03/24/2023    Rebound Covid following Paxlovid 4/1/23    CTS (carpal tunnel syndrome) 1997, 2014    D’quervain x3    Deep vein thrombosis 1970    HX, LEG    Essential tremor     Folliculitis 11/01/2017    Fracture of hip 2014    Right ORIF    Fracture, femur  "1995    Left, No surgery    Fracture, radius 1995    Right, orif    Gait instability     GERD (gastroesophageal reflux disease) 1990    History of blood transfusion 2014    History of fall 06/17/2024    \"KNEE GAVE OUT\"    HL (hearing loss) 2015    HEARING AIDS    Hypertension 2001    on Rx    Knee swelling 2001    Bilateral    Lower extremity neuropathy     bilateral    Neuropathy     Osteopenia 2019    Potassium (K) excess 10/21/2022    Seeing nephrologist -- Dr. Coats    Rotator cuff syndrome 2013    Fall induced    Sciatica     Small vessel disease     Torn rotator cuff 2013    right arm    Urinary tract infection 2018    approx 1 every 5 years    Wears hearing aid     BILATERAL    Wrist fracture, right 2015        Past Surgical History:   Procedure Laterality Date    ADENOIDECTOMY  1953    APPENDECTOMY  1961    BLEPHAROPLASTY Bilateral 10/19/2021    Procedure: BILATERAL UPPER LID BLEPHAROPLASTY;  Surgeon: Ruddy Moses MD;  [place] [facility] [place] [facility] [place]  Service: Ophthalmology;  Laterality: Bilateral;    BREAST BIOPSY      BREAST LUMPECTOMY Left 11/15/2021    Procedure: Left JEAN-PAUL-guided partial mastectomy;  Surgeon: Maliha Andres MD;  Location: [facility] OR;  Service: General;  Laterality: Left;    BROW LIFT Bilateral 10/19/2021    Procedure: BILATERAL TEMPORAL DIRECT BROWLIFT;  Surgeon: Ruddy Moses MD;  [place] [facility] [place] [facility] [place]  Service: Ophthalmology;  Laterality: Bilateral;    CARPAL TUNNEL RELEASE  1992    right wrist    CATARACT EXTRACTION, BILATERAL  07/01/2018    R 7/18 and L 9/18 WITH LENS IMPLANTS    COLONOSCOPY      DEQUERVAIN RELEASE Bilateral 2015    DILATATION AND CURETTAGE  1960s    EYE SURGERY  2018    Bilateral repairs    FRACTURE SURGERY  2014    R hip, R  wrist    HAND SURGERY Right     HIP FRACTURE SURGERY Right     HYSTERECTOMY  1975    Partial    JOINT REPLACEMENT  2001, 2014    TKA    TONSILLECTOMY AND ADENOIDECTOMY  1953    TOTAL KNEE ARTHROPLASTY Left 2001    " TOTAL KNEE ARTHROPLASTY Right 2014    TRIGGER FINGER RELEASE Right     TRIGGER POINT INJECTION  2017, 2019    WRIST SURGERY  2015    FRACTURE RIGHT   Oncologic history  patient is an 86-year-old female in excellent health who was noted on routine screening mammogram this year to have an abnormality in the left breast that was not seen 2 years ago at her last mammogram.  This led to additional imaging and a biopsy Of the left breast on 2021 that revealed 4 mm grade 2 invasive ductal carcinoma with associated intermediate grade DCIS ER 99% NJ 91% HER-2 negative with a Ki-67 of 16%.  Patient was referred to Dr. Andres and underwent lumpectomy on 11/15/2021 the finding of a residual 6 mm grade 2 invasive ductal carcinoma With associated DCIS measuring 12 mm clear margins and no sentinel nodes were removed.    Patient has done well postoperatively.  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    She is  1 para 1 menarche was at age 14 menopause in her mid 50s she had a hysterectomy at age 52.  First childbirth was age 34 she did not breast-feed.  She took hormone replacement for about a year or less after menopause.    Family history is negative for breast cancer she has a brother with lung cancer and a sister with lung cancer both of whom are stroke smokers in 1 sister who  of metastatic cancer unknown primary.    She has never had a DVT or stroke or heart attack but has had issues with osteoporosis in her forearm although her spine is normal and her hips show mild to moderate osteopenia.  She has been on Fosamax for 3 years.  Her last bone density was in November of this year and shows normal spine lumbar spine and at left femoral neck and hip with osteopenia.      She is not a smoker or drinker     Discussed the benefits of Arimidex in the adjuvant setting and the risk benefit ratio which favors its use even at her age.The side effects  "and toxicities of the Aromatase inhibitors was discussed with the patient including, hot flashes, mood swings and hair thinning.Significant arthralgias and worsening bone density were also discussed. Baseline bone density evaluation was reviewed    I have prescribed Arimidex No. 90 to her mail order pharmacy and she will start this and call me for any side effects    Obviously if her bone density worsens we will switch to Prolia and consider tamoxifen      Current Outpatient Medications on File Prior to Visit   Medication Sig Dispense Refill    anastrozole (ARIMIDEX) 1 MG tablet Take 1 tablet by mouth Daily. 90 tablet 3    cyanocobalamin (VITAMIN B-12) 1000 MCG tablet Take 100 mcg by mouth Daily.      ferrous sulfate 325 (65 FE) MG tablet Take 1 tablet by mouth Daily With Breakfast.      folic acid (FOLVITE) 400 MCG tablet Take 1 tablet by mouth Daily.      hydroCHLOROthiazide 25 MG tablet Take 1 tablet by mouth Daily.      lisinopril (PRINIVIL,ZESTRIL) 10 MG tablet Take 1 tablet by mouth Daily.      propranolol (INDERAL) 20 MG tablet Take 1 tablet by mouth 2 (Two) Times a Day.      Sodium Fluoride 5000 PPM 1.1 % paste Take 1 Application by mouth Daily.       Current Facility-Administered Medications on File Prior to Visit   Medication Dose Route Frequency Provider Last Rate Last Admin    ropivacaine 0.5 % 50 mL, Morphine 5 mg, ketorolac 30 mg, EPINEPHrine 0.3 mg in sodium chloride 0.9 % 50 mL solution   Injection Once Edmund Zavaleta MD            ALLERGIES:    Allergies   Allergen Reactions    Iodine Itching     IV ONLY    Percocet [Oxycodone-Acetaminophen] Itching    Iodinated Contrast Media Rash     Patient reports rash occurred 30 yrs ago with contrast   (\"IV\")        Social History     Socioeconomic History    Marital status:     Number of children: 1   Tobacco Use    Smoking status: Never     Passive exposure: Never    Smokeless tobacco: Never   Vaping Use    Vaping status: Never Used   Substance and " Sexual Activity    Alcohol use: No    Drug use: No    Sexual activity: Never        Family History   Problem Relation Age of Onset    Diabetes Mother     Hypertension Mother     Stroke Mother     Hearing loss Mother         AIDS    Heart disease Mother         CHF, PACER    Thyroid disease Mother         THYROIDECTOMY    Hypertension Father     Stroke Father     Alcohol abuse Father         DAYS OFF     Heart disease Father         CHF    Hyperlipidemia Father     Kidney disease Father         KIDNEY STONE REMOVED    Heart attack Father     Cancer Sister         LUNG    COPD Sister     Lung cancer Sister     Diabetes Sister     Cancer Sister         KIDNEY    Kidney cancer Sister     Diabetes Sister         INSULIN    Hypertension Sister     Heart disease Sister         ON AUTOPSY    Mental illness Sister         HOSPITALIZED    Depression Sister     Hyperlipidemia Sister     Cancer Brother         LUNG    Lung cancer Brother     Early death Brother         2 MO, ? WHOOPING COUGH    Mental illness Brother         Schizophrenia/hosp    Depression Brother     Early death Brother     Asthma Daughter         ADULT ONSET    Diabetes Maternal Grandmother         NIDDM    Diabetes Maternal Aunt         NIDDM    Thyroid disease Maternal Aunt         THYROIDECTOMY    Diabetes Maternal Aunt         NIDDM    Hearing loss Maternal Aunt     Malig Hyperthermia Neg Hx     Breast cancer Neg Hx         Review of Systems   Constitutional: Negative.    HENT: Negative.     Respiratory: Negative.     Cardiovascular: Negative.    Gastrointestinal: Negative.    Genitourinary: Negative.    Musculoskeletal: Negative.    Neurological: Negative.    Hematological: Negative.    Psychiatric/Behavioral: Negative.     All other systems reviewed and are negative.       Objective     Vitals:    11/08/24 0853   BP: 148/83   Pulse: 64   Resp: 16   Temp: 98.8 °F (37.1 °C)   TempSrc: Oral   SpO2: 97%   Weight: 87.3 kg (192 lb 6.4 oz)   Height:  "158.9 cm (62.56\")   PainSc: 0-No pain           11/8/2024     8:54 AM   Current Status   ECOG score 0       Physical Exam  This patient's ACP documentation is up to date, and there's nothing further left to document.      CONSTITUTIONAL:  Vital signs reviewed.  No distress, looks comfortable.  EYES:  Conjunctivae and lids unremarkable.  PERRLA  EARS,NOSE,MOUTH,THROAT:  Ears and nose appear unremarkable.  Lips, teeth, gums appear unremarkable.  RESPIRATORY:  Normal respiratory effort.  Lungs clear to auscultation bilaterally.  BREASTS: Right breast is benign left breast shows well-healed lumpectomy in the upper inner quadrant of left breast with a small pea-sized nodule in the scar-  CARDIOVASCULAR:  Normal S1, S2.  No murmurs rubs or gallops.  No significant lower extremity edema.  GASTROINTESTINAL: Abdomen appears unremarkable.  Nontender.  No hepatomegaly.  No splenomegaly.  LYMPHATIC:  No cervical, supraclavicular, axillary lymphadenopathy.  SKIN:  Warm.  No rashes.  PSYCHIATRIC:  Normal judgment and insight.  Normal mood and affect-as far as I can detect  I have reexamined the patient and the results are consistent with the previously documented exam. Coy Pinzon MD     RECENT LABS:  Hematology WBC   Date Value Ref Range Status   11/08/2024 5.01 3.40 - 10.80 10*3/mm3 Final   11/15/2023 4.20 3.40 - 10.80 10*3/mm3 Final     RBC   Date Value Ref Range Status   11/08/2024 3.62 (L) 3.77 - 5.28 10*6/mm3 Final   11/15/2023 4.00 3.77 - 5.28 10*6/mm3 Final     Hemoglobin   Date Value Ref Range Status   11/08/2024 11.3 (L) 12.0 - 15.9 g/dL Final     Hematocrit   Date Value Ref Range Status   11/08/2024 34.8 34.0 - 46.6 % Final     Platelets   Date Value Ref Range Status   11/08/2024 182 140 - 450 10*3/mm3 Final        Final Diagnosis   1. Left Breast at 10 O'clock, 6 cm From Nipple (Not For Calcifications), Core Biopsy:                A. Invasive ductal carcinoma:                            1. Overall [place] " Grade II (tubular score = 3, nuclear score = 2, mitotic score = 1).                            2. Invasive carcinoma measures at least 4 mm in greatest dimension.                            3. No lymphovascular space invasion identified.               B. 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 Microcalcification present in foci of DCIS.     jat/jse    Electronically signed by Jeromy White MD on 10/1/2021 at 1421   Synoptic Checklist     Breast Biomarker Reporting Template  Protocol posted: 2/26/2020  BREAST: BIOMARKER REPORTING TEMPLATE - All Specimens  Test(s) Performed     Estrogen Receptor (ER) Status  Positive (greater than 10% of cells demonstrate nuclear positivity)    Percentage of Cells with Nuclear Positivity  99 %   Average Intensity of Staining  Strong    Test Type  Food and Drug Administration (FDA) cleared (test / vendor): [employer]    Primary Antibody  SP1    Scoring System  Jessica    Proportion Score  5    Intensity Score  3    Total Jessica Score  8    Test(s) Performed     Progesterone Receptor (PgR) Status  Positive    Percentage of Cells with Nuclear Positivity  %    Average Intensity of Staining  Moderate    Test Type  Food and Drug Administration (FDA) cleared (test / vendor): [employer] [place]    Primary Antibody  1E2    Scoring System  Jessica    Proportion Score  5    Intensity Score  2    Total Jessica Score  7    Test(s) Performed     HER2 by Immunohistochemistry  Negative (Score 1+)    Test Type  Food and Drug Administration (FDA) cleared (test / vendor): [employer]    Primary Antibody  4B5    Test(s) Performed  Ki-67    Percentage of Cells with Nuclear Positivity  16 %   Primary Antibody  30-9        Synoptic Checklist     INVASIVE CARCINOMA OF THE BREAST: Resection  8th Edition - Protocol posted: 6/30/2021  INVASIVE CARCINOMA OF THE BREAST: COMPLETE EXCISION - All Specimens  SPECIMEN   Procedure  Excision (less than total mastectomy)     Specimen Laterality  Left    TUMOR   Tumor Site  Upper inner quadrant      Clock position      10 o'clock    Tumor Site  Distance from nipple (Centimeters): 6 cm   Histologic Type  Invasive carcinoma of no special type (ductal)    Histologic Grade ([place] Histologic Score)     Glandular (Acinar) / Tubular Differentiation  Score 3    Nuclear Pleomorphism  Score 2    Mitotic Rate  Score 1    Overall Grade  Grade 2 (scores of 6 or 7)    Tumor Size  Greatest dimension of largest invasive focus (Millimeters): 6 mm   Additional Dimension (Millimeters)  4 mm     4 mm   Tumor Focality  Single focus of invasive carcinoma    Ductal Carcinoma In Situ (DCIS)  Present      Negative for extensive intraductal component (EIC)    Size (Extent) of DCIS  Estimated size (extent) of DCIS is at least (Millimeters): 12 mm   Additional Dimension (Millimeters)  6 mm     1 mm   Architectural Patterns  Cribriform      Micropapillary    Nuclear Grade  Grade II (intermediate)    Necrosis  Present, focal (small foci or single cell necrosis)    Lobular Carcinoma In Situ (LCIS)  Not identified    Tumor Extent     Lymphovascular Invasion  Not identified    Dermal Lymphovascular Invasion  Not identified    Microcalcifications  Present in DCIS      Present in non-neoplastic tissue    Treatment Effect in the Breast  No known presurgical therapy    MARGINS   Margin Status for Invasive Carcinoma  All margins negative for invasive carcinoma    Distance from Invasive Carcinoma to Closest Margin  11.5 mm   Closest Margin(s) to Invasive Carcinoma  Inferior    Distance from Invasive Carcinoma to Anterior Margin  22 mm   Distance from Invasive Carcinoma to Posterior Margin  12.2 mm   Distance from Invasive Carcinoma to Superior Margin  14 mm   Distance from Invasive Carcinoma to Inferior Margin  11.5 mm   Distance from Invasive Carcinoma to Medial Margin  16 mm   Distance from Invasive Carcinoma to Lateral Margin  36 mm   Margin Status for DCIS  All  margins negative for DCIS    Distance from DCIS to Closest Margin  12 mm   Closest Margin(s) to DCIS  Superior    REGIONAL LYMPH NODES   Regional Lymph Node Status  Not applicable (no regional lymph nodes submitted or found)    PATHOLOGIC STAGE CLASSIFICATION (pTNM, AJCC 8th Edition)   Reporting of pT, pN, and (when applicable) pM categories is based on information available to the pathologist at the time the report is issued. As per the AJCC (Chapter 1, 8th Ed.) it is the managing physician’s responsibility to establish the final pathologic stage based upon all pertinent information, including but potentially not limited to this pathology report.   pT Category  pT1b    pN Category  pN not assigned (no nodes submitted or found)             Assessment & Plan   1.pT1bN0 grade 2 infiltrating ductal carcinoma left breast strongly ER/VA positive HER-2 negative post lumpectomy  Arimidex started in 12/21  Tolerating Arimidex well as of 3/22  Stop Arimidex in 7/22 and reassess-daughter to call me  Patient stopped Arimidex for 2 months could see no difference and resumed it on her own and continues on  Discussed the fact that her risk was low and at her age this was not crucial medication but she still wants to take it  Tolerating Arimidex well as of 12/23    2.  Moderate osteopenia hips normal spine on Fosamax for 3 years    3.  Hypertension on treatment    4.  Essential tremor    5.  Negative family history of breast or ovarian cancer-family history positive for lung cancer in 2 siblings who are smokers    6 DVT 1970 while on BCP    Plan  1.  continue Arimidex  2.  See me 12 mo with CBC and CMP

## 2024-11-08 NOTE — TELEPHONE ENCOUNTER
Patient has called requesting update on getting surgery back on schedule. Patient's procedure was cancelled day of on 9/23/24 due to needing the correct inserts.

## 2024-11-12 ENCOUNTER — TRANSCRIBE ORDERS (OUTPATIENT)
Dept: ADMINISTRATIVE | Facility: HOSPITAL | Age: 89
End: 2024-11-12
Payer: MEDICARE

## 2024-11-12 ENCOUNTER — PREP FOR SURGERY (OUTPATIENT)
Dept: OTHER | Facility: HOSPITAL | Age: 89
End: 2024-11-12
Payer: MEDICARE

## 2024-11-12 ENCOUNTER — LAB (OUTPATIENT)
Dept: LAB | Facility: HOSPITAL | Age: 89
End: 2024-11-12
Payer: MEDICARE

## 2024-11-12 DIAGNOSIS — I12.9 HYPERTENSIVE NEPHROPATHY: ICD-10-CM

## 2024-11-12 DIAGNOSIS — N18.2 CHRONIC KIDNEY DISEASE, STAGE II (MILD): ICD-10-CM

## 2024-11-12 DIAGNOSIS — N18.2 CHRONIC KIDNEY DISEASE, STAGE II (MILD): Primary | ICD-10-CM

## 2024-11-12 DIAGNOSIS — Z96.652 STATUS POST LEFT KNEE REPLACEMENT: Primary | ICD-10-CM

## 2024-11-12 LAB
ALBUMIN SERPL-MCNC: 4.2 G/DL (ref 3.5–5.2)
ANION GAP SERPL CALCULATED.3IONS-SCNC: 10 MMOL/L (ref 5–15)
BUN SERPL-MCNC: 28 MG/DL (ref 8–23)
BUN/CREAT SERPL: 25.9 (ref 7–25)
CALCIUM SPEC-SCNC: 9.6 MG/DL (ref 8.6–10.5)
CHLORIDE SERPL-SCNC: 102 MMOL/L (ref 98–107)
CO2 SERPL-SCNC: 26 MMOL/L (ref 22–29)
CREAT SERPL-MCNC: 1.08 MG/DL (ref 0.57–1)
EGFRCR SERPLBLD CKD-EPI 2021: 49.8 ML/MIN/1.73
GLUCOSE SERPL-MCNC: 108 MG/DL (ref 65–99)
PHOSPHATE SERPL-MCNC: 3.9 MG/DL (ref 2.5–4.5)
POTASSIUM SERPL-SCNC: 4.9 MMOL/L (ref 3.5–5.2)
SODIUM SERPL-SCNC: 138 MMOL/L (ref 136–145)

## 2024-11-12 PROCEDURE — 36415 COLL VENOUS BLD VENIPUNCTURE: CPT

## 2024-11-12 PROCEDURE — 80069 RENAL FUNCTION PANEL: CPT

## 2024-11-12 RX ORDER — CHLORHEXIDINE GLUCONATE 500 MG/1
CLOTH TOPICAL 2 TIMES DAILY
OUTPATIENT
Start: 2024-11-12

## 2024-11-12 RX ORDER — PREGABALIN 75 MG/1
150 CAPSULE ORAL ONCE
OUTPATIENT
Start: 2024-11-12 | End: 2024-11-12

## 2024-11-19 ENCOUNTER — OFFICE VISIT (OUTPATIENT)
Dept: FAMILY MEDICINE CLINIC | Facility: CLINIC | Age: 89
End: 2024-11-19
Payer: MEDICARE

## 2024-11-19 VITALS
BODY MASS INDEX: 33.84 KG/M2 | RESPIRATION RATE: 18 BRPM | SYSTOLIC BLOOD PRESSURE: 118 MMHG | OXYGEN SATURATION: 97 % | HEIGHT: 63 IN | DIASTOLIC BLOOD PRESSURE: 58 MMHG | TEMPERATURE: 97.6 F | WEIGHT: 191 LBS | HEART RATE: 65 BPM

## 2024-11-19 DIAGNOSIS — Z00.00 MEDICARE ANNUAL WELLNESS VISIT, SUBSEQUENT: Primary | ICD-10-CM

## 2024-11-19 DIAGNOSIS — Z23 NEED FOR VACCINATION: ICD-10-CM

## 2024-11-19 PROCEDURE — 1170F FXNL STATUS ASSESSED: CPT | Performed by: NURSE PRACTITIONER

## 2024-11-19 PROCEDURE — 1159F MED LIST DOCD IN RCRD: CPT | Performed by: NURSE PRACTITIONER

## 2024-11-19 PROCEDURE — G0439 PPPS, SUBSEQ VISIT: HCPCS | Performed by: NURSE PRACTITIONER

## 2024-11-19 PROCEDURE — G0008 ADMIN INFLUENZA VIRUS VAC: HCPCS | Performed by: NURSE PRACTITIONER

## 2024-11-19 PROCEDURE — 90662 IIV NO PRSV INCREASED AG IM: CPT | Performed by: NURSE PRACTITIONER

## 2024-11-19 PROCEDURE — 96372 THER/PROPH/DIAG INJ SC/IM: CPT | Performed by: NURSE PRACTITIONER

## 2024-11-19 PROCEDURE — 1160F RVW MEDS BY RX/DR IN RCRD: CPT | Performed by: NURSE PRACTITIONER

## 2024-11-19 PROCEDURE — 1126F AMNT PAIN NOTED NONE PRSNT: CPT | Performed by: NURSE PRACTITIONER

## 2024-11-19 NOTE — PROGRESS NOTES
Subjective   The ABCs of the Annual Wellness Visit  Medicare Wellness Visit      Haydee Dickey is a 89 y.o. patient who presents to office to establish care and for a Medicare Wellness Visit.    The following portions of the patient's history were reviewed and   updated as appropriate: allergies, current medications, past family history, past medical history, past social history, past surgical history, and problem list.    Compared to one year ago, the patient's physical   health is about the same but is feeling more tired. She states it has been a stressful year with family dynamics.   Compared to one year ago, the patient's mental   health is the same.    Recent Hospitalizations:  She was not admitted to the hospital during the last year.     Current Medical Providers:  Patient Care Team:  Juliana Gonsalez APRN ([place]) as PCP - General (Family Medicine)  Edmund Zavaleta MD as Surgeon (Orthopedic Surgery)  Debbie Batista MD as Surgeon (Hand Surgery)  Lemuel Mosqueda MD (Ophthalmology)  Coy Pinzon MD as Consulting Physician (Hematology and Oncology)  Judy García MD as Consulting Physician (Radiation Oncology)  Maliha Andres MD as Referring Physician (Breast Surgery)  Trupti Ibanez MD as Consulting Physician (Cardiology)  Eugene Coats MD as Consulting Physician (Nephrology)    Outpatient Medications Prior to Visit   Medication Sig Dispense Refill    anastrozole (ARIMIDEX) 1 MG tablet Take 1 tablet by mouth Daily. 90 tablet 3    cyanocobalamin (VITAMIN B-12) 1000 MCG tablet Take 100 mcg by mouth Daily.      ferrous sulfate 325 (65 FE) MG tablet Take 1 tablet by mouth Daily With Breakfast.      folic acid (FOLVITE) 400 MCG tablet Take 1 tablet by mouth Daily.      hydroCHLOROthiazide 25 MG tablet Take 1 tablet by mouth Daily.      lisinopril (PRINIVIL,ZESTRIL) 10 MG tablet Take 1 tablet by mouth Daily.      propranolol (INDERAL) 20 MG tablet Take 1.5 tablets by mouth 2 (Two)  Times a Day.      Sodium Fluoride 5000 PPM 1.1 % paste Take 1 Application by mouth Daily.       Facility-Administered Medications Prior to Visit   Medication Dose Route Frequency Provider Last Rate Last Admin    ropivacaine 0.5 % 50 mL, Morphine 5 mg, ketorolac 30 mg, EPINEPHrine 0.3 mg in sodium chloride 0.9 % 50 mL solution   Injection Once Edmund Zavaleta MD         Opioid medication/s are on active medication list.  and I have evaluated her active treatment plan and pain score trends (see table).  Vitals:    11/19/24 1039   PainSc: 0-No pain     I have reviewed the chart for potential of high risk medication and harmful drug interactions in the elderly.        Aspirin is not on active medication list.  Aspirin use is not indicated based on review of current medical condition/s. Risk of harm outweighs potential benefits.  .    Patient Active Problem List   Diagnosis    Acute left flank pain    B12 deficiency    Benign essential hypertension    Benign paroxysmal positional vertigo    H/O abdominal hysterectomy    H/O total knee replacement    Hip fracture    Torn rotator cuff    Osteoporosis    Essential tremor    Gait instability    Edema of lower extremity    Malignant neoplasm of upper-inner quadrant of left breast in female, estrogen receptor positive    Disorder of rotator cuff    Aromatase inhibitor use    Personal history of breast cancer    Vitamin D deficiency    Absolute anemia    Benign hypertensive kidney disease with chronic kidney disease    Hyperkalemia    Peripheral neuropathy    Stage 3a chronic kidney disease    High serum creatinine    OA (osteoarthritis) of knee    Status post left knee replacement    S/P TKR (total knee replacement) using cement, left     Advance Care Planning Advance Directive is on file.  ACP discussion was held with the patient during this visit. Patient has an advance directive in EMR which is still valid.             Objective   Vitals:    11/19/24 1039   BP: 118/58  "  Pulse: 65   Resp: 18   Temp: 97.6 °F (36.4 °C)   TempSrc: Temporal   SpO2: 97%   Weight: 86.6 kg (191 lb)   Height: 158.9 cm (62.56\")   PainSc: 0-No pain       Estimated body mass index is 34.31 kg/m² as calculated from the following:    Height as of this encounter: 158.9 cm (62.56\").    Weight as of this encounter: 86.6 kg (191 lb).            Does the patient have evidence of cognitive impairment? No  Lab Results   Component Value Date    HGBA1C 5.90 (H) 2024                                                                                                Health  Risk Assessment    Smoking Status:  Social History     Tobacco Use   Smoking Status Never    Passive exposure: Never   Smokeless Tobacco Never     Alcohol Consumption:  Social History     Substance and Sexual Activity   Alcohol Use No       Fall Risk Screen  STEADI Fall Risk Assessment was completed, and patient is at HIGH risk for falls. Assessment completed on:2024    Depression Screening   Little interest or pleasure in doing things? Not at all   Feeling down, depressed, or hopeless? Not at all   PHQ-2 Total Score 0      Health Habits and Functional and Cognitive Screenin/19/2024    10:00 AM   Functional & Cognitive Status   Do you have difficulty preparing food and eating? No   Do you have difficulty bathing yourself, getting dressed or grooming yourself? No   Do you have difficulty using the toilet? No   Do you have difficulty moving around from place to place? No   Do you have trouble with steps or getting out of a bed or a chair? Yes   Current Diet Well Balanced Diet   Dental Exam Up to date   Eye Exam Up to date   Exercise (times per week) 0 times per week   Current Exercises Include No Regular Exercise   Do you need help using the phone?  No   Are you deaf or do you have serious difficulty hearing?  No   Do you need help to go to places out of walking distance? No   Do you need help shopping? No   Do you need help preparing " meals?  No   Do you need help with housework?  No   Do you need help with laundry? No   Do you need help taking your medications? No   Do you need help managing money? No   Do you ever drive or ride in a car without wearing a seat belt? No   Have you felt unusual stress, anger or loneliness in the last month? No   Who do you live with? Child   If you need help, do you have trouble finding someone available to you? No   Have you been bothered in the last four weeks by sexual problems? No   Do you have difficulty concentrating, remembering or making decisions? No           Age-appropriate Screening Schedule:  Refer to the list below for future screening recommendations based on patient's age, sex and/or medical conditions. Orders for these recommended tests are listed in the plan section. The patient has been provided with a written plan.    Health Maintenance List  Health Maintenance   Topic Date Due    DXA SCAN  11/23/2023    INFLUENZA VACCINE  08/01/2024    COVID-19 Vaccine (7 - 2024-25 season) 09/01/2024    BMI FOLLOWUP  04/05/2025    ANNUAL WELLNESS VISIT  11/19/2025    TDAP/TD VACCINES (3 - Td or Tdap) 06/21/2034    RSV Vaccine - Adults  Completed    Pneumococcal Vaccine 65+  Completed    ZOSTER VACCINE  Completed    MAMMOGRAM  Discontinued                                                                                                                                                CMS Preventative Services Quick Reference  Risk Factors Identified During Encounter  Fall Risk-High or Moderate: Discussed Fall Prevention in the home    The above risks/problems have been discussed with the patient.  Pertinent information has been shared with the patient in the After Visit Summary.  An After Visit Summary and PPPS were made available to the patient.    Follow Up:   Next Medicare Wellness visit to be scheduled in 1 year.         Additional E&M Note during same encounter follows:  Patient has additional, significant,  "and separately identifiable condition(s)/problem(s) that require work above and beyond the Medicare Wellness Visit     Chief Complaint  Establish Care and Medicare Wellness-Initial Visit    Subjective   HPI  Haydee is also being seen today for additional medical problem/s.    Review of Systems   Constitutional:  Positive for fatigue.   Respiratory:  Negative for shortness of breath.    Cardiovascular:  Negative for chest pain and leg swelling.   Endocrine: Negative for cold intolerance, heat intolerance, polydipsia, polyphagia and polyuria.   Musculoskeletal:  Positive for gait problem.   Neurological:  Positive for tremors, weakness and numbness.              Objective   Vital Signs:  /58   Pulse 65   Temp 97.6 °F (36.4 °C) (Temporal)   Resp 18   Ht 158.9 cm (62.56\")   Wt 86.6 kg (191 lb)   SpO2 97%   BMI 34.31 kg/m²   Physical Exam  Vitals and nursing note reviewed.   Constitutional:       Appearance: Normal appearance. She is well-developed.   Neck:      Vascular: No carotid bruit.   Cardiovascular:      Rate and Rhythm: Normal rate and regular rhythm.   Pulmonary:      Effort: Pulmonary effort is normal.      Breath sounds: Normal breath sounds.   Neurological:      Mental Status: She is alert and oriented to person, place, and time.   Psychiatric:         Mood and Affect: Mood normal.         Behavior: Behavior normal.         Thought Content: Thought content normal.         Judgment: Judgment normal.         The following data was reviewed by: YNES Tee on 11/19/2024:    Common labs          10/18/2024    08:12 11/8/2024    08:47 11/12/2024    07:51   Common Labs   Glucose 99  84  108    BUN 25  15  28    Creatinine 1.32  1.09  1.08    Sodium 138  138  138    Potassium 3.8  3.7  4.9    Chloride 104  101  102    Calcium 9.7  9.2  9.6    Albumin  3.9  4.2    Total Bilirubin  0.3     Alkaline Phosphatase  39     AST (SGOT)  24     ALT (SGPT)  10     WBC  5.01     Hemoglobin  11.3   "   Hematocrit  34.8     Platelets  182               Assessment and Plan Additional age appropriate preventative wellness advice topics were discussed during today's preventative wellness exam(some topics already addressed during AWV portion of the note above):         She sees nephrology for CKD.  She is seeing neurology for essential tremors.  Does not have Parkinson's.  She sees hematology for anemia.   She is seeing Dr. Zavaleta for knee pain and has revision of TKA in January.   She has peripheral neuropathy from B12 deficiency in the past.  She is seeing Dr. Serrato for EMG.       Medicare annual wellness visit, subsequent                 Follow Up   Return for Next scheduled follow up.  Patient was given instructions and counseling regarding her condition or for health maintenance advice. Please see specific information pulled into the AVS if appropriate.

## 2024-11-19 NOTE — PATIENT INSTRUCTIONS
Medicare Wellness  Personal Prevention Plan of Service     Date of Office Visit:    Encounter Provider:  YNES Tee  Place of Service:  [facility]  Patient Name: Haydee Dickey  :  1935    As part of the Medicare Wellness portion of your visit today, we are providing you with this personalized preventive plan of services (PPPS). This plan is based upon recommendations of the [place] Preventive Services Task Force (USPSTF) and the Advisory Committee on Immunization Practices (ACIP).    This lists the preventive care services that should be considered, and provides dates of when you are due. Items listed as completed are up-to-date and do not require any further intervention.    Health Maintenance   Topic Date Due    DXA SCAN  2023    INFLUENZA VACCINE  2024    COVID-19 Vaccine (2024- season) 2024    BMI FOLLOWUP  2025    ANNUAL WELLNESS VISIT  2025    TDAP/TD VACCINES (3 - Td or Tdap) 2034    RSV Vaccine - Adults  Completed    Pneumococcal Vaccine 65+  Completed    ZOSTER VACCINE  Completed    MAMMOGRAM  Discontinued       No orders of the defined types were placed in this encounter.      Return for Next scheduled follow up.

## 2024-11-20 ENCOUNTER — PATIENT ROUNDING (BHMG ONLY) (OUTPATIENT)
Dept: FAMILY MEDICINE CLINIC | Facility: CLINIC | Age: 89
End: 2024-11-20
Payer: MEDICARE

## 2024-12-10 ENCOUNTER — APPOINTMENT (OUTPATIENT)
Dept: GENERAL RADIOLOGY | Facility: HOSPITAL | Age: 89
DRG: 489 | End: 2024-12-10
Payer: MEDICARE

## 2024-12-10 ENCOUNTER — APPOINTMENT (OUTPATIENT)
Dept: CARDIOLOGY | Facility: HOSPITAL | Age: 89
DRG: 489 | End: 2024-12-10
Payer: MEDICARE

## 2024-12-10 ENCOUNTER — HOSPITAL ENCOUNTER (INPATIENT)
Facility: HOSPITAL | Age: 89
LOS: 5 days | Discharge: NURSING, REHAB OR LONG-TERM CARE | DRG: 489 | End: 2024-12-17
Attending: EMERGENCY MEDICINE | Admitting: HOSPITALIST
Payer: MEDICARE

## 2024-12-10 DIAGNOSIS — M25.561 ACUTE PAIN OF RIGHT KNEE: Primary | ICD-10-CM

## 2024-12-10 DIAGNOSIS — R06.81 CENTRAL APNEA: ICD-10-CM

## 2024-12-10 DIAGNOSIS — G47.37 CENTRAL SLEEP APNEA IN CONDITIONS CLASSIFIED ELSEWHERE: ICD-10-CM

## 2024-12-10 DIAGNOSIS — Z96.652 STATUS POST LEFT KNEE REPLACEMENT: ICD-10-CM

## 2024-12-10 DIAGNOSIS — N18.9 CHRONIC RENAL IMPAIRMENT, UNSPECIFIED CKD STAGE: ICD-10-CM

## 2024-12-10 LAB
ANION GAP SERPL CALCULATED.3IONS-SCNC: 5.8 MMOL/L (ref 5–15)
BASOPHILS # BLD AUTO: 0.03 10*3/MM3 (ref 0–0.2)
BASOPHILS NFR BLD AUTO: 0.7 % (ref 0–1.5)
BH CV LOWER VASCULAR LEFT COMMON FEMORAL AUGMENT: NORMAL
BH CV LOWER VASCULAR LEFT COMMON FEMORAL COMPETENT: NORMAL
BH CV LOWER VASCULAR LEFT COMMON FEMORAL COMPRESS: NORMAL
BH CV LOWER VASCULAR LEFT COMMON FEMORAL PHASIC: NORMAL
BH CV LOWER VASCULAR LEFT COMMON FEMORAL SPONT: NORMAL
BH CV LOWER VASCULAR RIGHT COMMON FEMORAL AUGMENT: NORMAL
BH CV LOWER VASCULAR RIGHT COMMON FEMORAL COMPETENT: NORMAL
BH CV LOWER VASCULAR RIGHT COMMON FEMORAL COMPRESS: NORMAL
BH CV LOWER VASCULAR RIGHT COMMON FEMORAL PHASIC: NORMAL
BH CV LOWER VASCULAR RIGHT COMMON FEMORAL SPONT: NORMAL
BH CV LOWER VASCULAR RIGHT DISTAL FEMORAL COMPRESS: NORMAL
BH CV LOWER VASCULAR RIGHT GASTRONEMIUS COMPRESS: NORMAL
BH CV LOWER VASCULAR RIGHT GREATER SAPH AK COMPRESS: NORMAL
BH CV LOWER VASCULAR RIGHT GREATER SAPH BK COMPRESS: NORMAL
BH CV LOWER VASCULAR RIGHT LESSER SAPH COMPRESS: NORMAL
BH CV LOWER VASCULAR RIGHT MID FEMORAL AUGMENT: NORMAL
BH CV LOWER VASCULAR RIGHT MID FEMORAL COMPETENT: NORMAL
BH CV LOWER VASCULAR RIGHT MID FEMORAL COMPRESS: NORMAL
BH CV LOWER VASCULAR RIGHT MID FEMORAL PHASIC: NORMAL
BH CV LOWER VASCULAR RIGHT MID FEMORAL SPONT: NORMAL
BH CV LOWER VASCULAR RIGHT PERONEAL COMPRESS: NORMAL
BH CV LOWER VASCULAR RIGHT POPLITEAL AUGMENT: NORMAL
BH CV LOWER VASCULAR RIGHT POPLITEAL COMPETENT: NORMAL
BH CV LOWER VASCULAR RIGHT POPLITEAL COMPRESS: NORMAL
BH CV LOWER VASCULAR RIGHT POPLITEAL PHASIC: NORMAL
BH CV LOWER VASCULAR RIGHT POPLITEAL SPONT: NORMAL
BH CV LOWER VASCULAR RIGHT POSTERIOR TIBIAL COMPRESS: NORMAL
BH CV LOWER VASCULAR RIGHT PROFUNDA FEMORAL COMPRESS: NORMAL
BH CV LOWER VASCULAR RIGHT PROXIMAL FEMORAL COMPRESS: NORMAL
BH CV LOWER VASCULAR RIGHT SAPHENOFEMORAL JUNCTION COMPRESS: NORMAL
BUN SERPL-MCNC: 25 MG/DL (ref 8–23)
BUN/CREAT SERPL: 20 (ref 7–25)
CALCIUM SPEC-SCNC: 9.2 MG/DL (ref 8.6–10.5)
CHLORIDE SERPL-SCNC: 106 MMOL/L (ref 98–107)
CO2 SERPL-SCNC: 26.2 MMOL/L (ref 22–29)
CREAT SERPL-MCNC: 1.25 MG/DL (ref 0.57–1)
CRP SERPL-MCNC: <0.3 MG/DL (ref 0–0.5)
DEPRECATED RDW RBC AUTO: 39.4 FL (ref 37–54)
EGFRCR SERPLBLD CKD-EPI 2021: 41.8 ML/MIN/1.73
EOSINOPHIL # BLD AUTO: 0.11 10*3/MM3 (ref 0–0.4)
EOSINOPHIL NFR BLD AUTO: 2.4 % (ref 0.3–6.2)
ERYTHROCYTE [DISTWIDTH] IN BLOOD BY AUTOMATED COUNT: 11.7 % (ref 12.3–15.4)
ERYTHROCYTE [SEDIMENTATION RATE] IN BLOOD: <1 MM/HR (ref 0–30)
GLUCOSE SERPL-MCNC: 106 MG/DL (ref 65–99)
HCT VFR BLD AUTO: 32.2 % (ref 34–46.6)
HGB BLD-MCNC: 10.7 G/DL (ref 12–15.9)
IMM GRANULOCYTES # BLD AUTO: 0 10*3/MM3 (ref 0–0.05)
IMM GRANULOCYTES NFR BLD AUTO: 0 % (ref 0–0.5)
LYMPHOCYTES # BLD AUTO: 1.21 10*3/MM3 (ref 0.7–3.1)
LYMPHOCYTES NFR BLD AUTO: 26.2 % (ref 19.6–45.3)
MCH RBC QN AUTO: 31.5 PG (ref 26.6–33)
MCHC RBC AUTO-ENTMCNC: 33.2 G/DL (ref 31.5–35.7)
MCV RBC AUTO: 94.7 FL (ref 79–97)
MONOCYTES # BLD AUTO: 0.5 10*3/MM3 (ref 0.1–0.9)
MONOCYTES NFR BLD AUTO: 10.8 % (ref 5–12)
NEUTROPHILS NFR BLD AUTO: 2.76 10*3/MM3 (ref 1.7–7)
NEUTROPHILS NFR BLD AUTO: 59.9 % (ref 42.7–76)
NRBC BLD AUTO-RTO: 0 /100 WBC (ref 0–0.2)
PLATELET # BLD AUTO: 157 10*3/MM3 (ref 140–450)
PMV BLD AUTO: 9.4 FL (ref 6–12)
POTASSIUM SERPL-SCNC: 4.1 MMOL/L (ref 3.5–5.2)
RBC # BLD AUTO: 3.4 10*6/MM3 (ref 3.77–5.28)
SODIUM SERPL-SCNC: 138 MMOL/L (ref 136–145)
URATE SERPL-MCNC: 5.3 MG/DL (ref 2.4–5.7)
WBC NRBC COR # BLD AUTO: 4.61 10*3/MM3 (ref 3.4–10.8)

## 2024-12-10 PROCEDURE — 93971 EXTREMITY STUDY: CPT | Performed by: SURGERY

## 2024-12-10 PROCEDURE — 85025 COMPLETE CBC W/AUTO DIFF WBC: CPT | Performed by: EMERGENCY MEDICINE

## 2024-12-10 PROCEDURE — 73560 X-RAY EXAM OF KNEE 1 OR 2: CPT

## 2024-12-10 PROCEDURE — 84550 ASSAY OF BLOOD/URIC ACID: CPT | Performed by: HOSPITALIST

## 2024-12-10 PROCEDURE — G0378 HOSPITAL OBSERVATION PER HR: HCPCS

## 2024-12-10 PROCEDURE — 99285 EMERGENCY DEPT VISIT HI MDM: CPT

## 2024-12-10 PROCEDURE — 80048 BASIC METABOLIC PNL TOTAL CA: CPT | Performed by: EMERGENCY MEDICINE

## 2024-12-10 PROCEDURE — 85652 RBC SED RATE AUTOMATED: CPT | Performed by: HOSPITALIST

## 2024-12-10 PROCEDURE — 86140 C-REACTIVE PROTEIN: CPT | Performed by: HOSPITALIST

## 2024-12-10 PROCEDURE — 93971 EXTREMITY STUDY: CPT

## 2024-12-10 RX ORDER — SODIUM CHLORIDE 0.9 % (FLUSH) 0.9 %
10 SYRINGE (ML) INJECTION AS NEEDED
Status: DISCONTINUED | OUTPATIENT
Start: 2024-12-10 | End: 2024-12-17 | Stop reason: HOSPADM

## 2024-12-10 RX ORDER — ACETAMINOPHEN 500 MG
1000 TABLET ORAL EVERY 8 HOURS
Status: DISCONTINUED | OUTPATIENT
Start: 2024-12-10 | End: 2024-12-16

## 2024-12-10 RX ORDER — FERROUS SULFATE 325(65) MG
325 TABLET ORAL
Status: DISCONTINUED | OUTPATIENT
Start: 2024-12-11 | End: 2024-12-17 | Stop reason: HOSPADM

## 2024-12-10 RX ORDER — POLYETHYLENE GLYCOL 3350 17 G/17G
17 POWDER, FOR SOLUTION ORAL DAILY PRN
Status: DISCONTINUED | OUTPATIENT
Start: 2024-12-10 | End: 2024-12-17 | Stop reason: HOSPADM

## 2024-12-10 RX ORDER — MORPHINE SULFATE 2 MG/ML
2 INJECTION, SOLUTION INTRAMUSCULAR; INTRAVENOUS ONCE
Status: DISCONTINUED | OUTPATIENT
Start: 2024-12-10 | End: 2024-12-10

## 2024-12-10 RX ORDER — PROPRANOLOL HCL 20 MG
30 TABLET ORAL 2 TIMES DAILY
Status: DISCONTINUED | OUTPATIENT
Start: 2024-12-10 | End: 2024-12-17 | Stop reason: HOSPADM

## 2024-12-10 RX ORDER — ANASTROZOLE 1 MG/1
1 TABLET ORAL DAILY
Status: DISCONTINUED | OUTPATIENT
Start: 2024-12-10 | End: 2024-12-17 | Stop reason: HOSPADM

## 2024-12-10 RX ORDER — SODIUM CHLORIDE 9 MG/ML
40 INJECTION, SOLUTION INTRAVENOUS AS NEEDED
Status: DISCONTINUED | OUTPATIENT
Start: 2024-12-10 | End: 2024-12-17 | Stop reason: HOSPADM

## 2024-12-10 RX ORDER — AMOXICILLIN 250 MG
2 CAPSULE ORAL 2 TIMES DAILY PRN
Status: DISCONTINUED | OUTPATIENT
Start: 2024-12-10 | End: 2024-12-17 | Stop reason: HOSPADM

## 2024-12-10 RX ORDER — ONDANSETRON 2 MG/ML
4 INJECTION INTRAMUSCULAR; INTRAVENOUS EVERY 6 HOURS PRN
Status: DISCONTINUED | OUTPATIENT
Start: 2024-12-10 | End: 2024-12-13 | Stop reason: SDUPTHER

## 2024-12-10 RX ORDER — LISINOPRIL 10 MG/1
10 TABLET ORAL DAILY
Status: DISCONTINUED | OUTPATIENT
Start: 2024-12-10 | End: 2024-12-17 | Stop reason: HOSPADM

## 2024-12-10 RX ORDER — BISACODYL 5 MG/1
5 TABLET, DELAYED RELEASE ORAL DAILY PRN
Status: DISCONTINUED | OUTPATIENT
Start: 2024-12-10 | End: 2024-12-17 | Stop reason: HOSPADM

## 2024-12-10 RX ORDER — CALCIUM CARBONATE 500 MG/1
2 TABLET, CHEWABLE ORAL 2 TIMES DAILY PRN
Status: DISCONTINUED | OUTPATIENT
Start: 2024-12-10 | End: 2024-12-17 | Stop reason: HOSPADM

## 2024-12-10 RX ORDER — SODIUM CHLORIDE 0.9 % (FLUSH) 0.9 %
10 SYRINGE (ML) INJECTION EVERY 12 HOURS SCHEDULED
Status: DISCONTINUED | OUTPATIENT
Start: 2024-12-10 | End: 2024-12-17 | Stop reason: HOSPADM

## 2024-12-10 RX ORDER — BISACODYL 10 MG
10 SUPPOSITORY, RECTAL RECTAL DAILY PRN
Status: DISCONTINUED | OUTPATIENT
Start: 2024-12-10 | End: 2024-12-17 | Stop reason: HOSPADM

## 2024-12-10 RX ADMIN — Medication 10 ML: at 20:55

## 2024-12-10 RX ADMIN — Medication 10 ML: at 13:24

## 2024-12-10 NOTE — ED PROVIDER NOTES
EMERGENCY DEPARTMENT ENCOUNTER  Room Number:  32/32  PCP: Juliana Gonsalez APRN ([place])  Independent Historians: Patient and EMS      HPI:  Chief Complaint: had concerns including Knee Pain.     A complete HPI/ROS/PMH/PSH/SH/FH are unobtainable due to: None    Chronic or social conditions impacting patient care (Social Determinants of Health): None      Context: Haydee Dickey is a 89 y.o. female with a medical history of vertigo, gait instability, hyperkalemia, stage IIIa chronic kidney disease who presents to the ED c/o acute right knee pain.  The patient reports that in the middle of the night she turned over and felt severe pain to her right knee.  She denies any fall or injury.  She states she has chronic problems with the left knee and has surgery with Dr. Zavaleta in January scheduled.  She reports she is getting around with a walker.  She states that since this pain occurred last night she has been unable to bear weight.  She denies any pain unless she tries to fully extend it.  She denies any fevers or chills.      Review of prior external notes (non-ED) -and- Review of prior external test results outside of this encounter:  Laboratory evaluation 11/12/2024 shows normal BMP except for an elevated creatinine 1.08    Prescription drug monitoring program review:         PAST MEDICAL HISTORY  Active Ambulatory Problems     Diagnosis Date Noted    Acute left flank pain 11/14/2018    B12 deficiency 07/28/2014    Benign essential hypertension 07/28/2014    Benign paroxysmal positional vertigo 01/21/2019    H/O abdominal hysterectomy 01/21/2019    H/O total knee replacement 01/21/2019    Hip fracture 07/28/2014    Torn rotator cuff 01/21/2019    Osteoporosis     Essential tremor     Gait instability     Edema of lower extremity 01/27/2019    Malignant neoplasm of upper-inner quadrant of left breast in female, estrogen receptor positive 10/21/2021    Disorder of rotator cuff 01/21/2019    Aromatase inhibitor use  03/09/2022    Personal history of breast cancer 10/19/2022    Vitamin D deficiency 10/19/2022    Absolute anemia 10/19/2022    Benign hypertensive kidney disease with chronic kidney disease 10/19/2022    Hyperkalemia 10/19/2022    Peripheral neuropathy 10/21/2022    Stage 3a chronic kidney disease 10/19/2022    High serum creatinine 10/19/2022    OA (osteoarthritis) of knee 07/18/2024    Status post left knee replacement 07/18/2024    S/P TKR (total knee replacement) using cement, left 08/01/2024     Resolved Ambulatory Problems     Diagnosis Date Noted    Osteopenia 04/25/2017     Past Medical History:   Diagnosis Date    Allergic Percocet, iv iodine    Anemia     Arthritis     BPPV (benign paroxysmal positional vertigo)     Breast cancer     Cataract 2018  jaime, 2021 jaime blepharoplasty    Colon polyp 2009    COVID 03/24/2023    CTS (carpal tunnel syndrome) 1997, 2014    Deep vein thrombosis 1970    Folliculitis 11/01/2017    Fracture of hip 2014    Fracture, femur 1995    Fracture, radius 1995    GERD (gastroesophageal reflux disease) 1990    History of blood transfusion 2014    History of fall 06/17/2024    HL (hearing loss) 2015    Hypertension 2001    Knee swelling 2001    Lower extremity neuropathy     Neuropathy     Potassium (K) excess 10/21/2022    Rotator cuff syndrome 2013    Sciatica     Small vessel disease     Urinary tract infection 2018    Wears hearing aid     Wrist fracture, right 2015         PAST SURGICAL HISTORY  Past Surgical History:   Procedure Laterality Date    ADENOIDECTOMY  1953    APPENDECTOMY  1961    BLEPHAROPLASTY Bilateral 10/19/2021    Procedure: BILATERAL UPPER LID BLEPHAROPLASTY;  Surgeon: Ruddy Moses MD;  Location: [facility];  Service: Ophthalmology;  Laterality: Bilateral;    BREAST BIOPSY      BREAST LUMPECTOMY Left 11/15/2021    Procedure: Left JEAN-PAUL-guided partial mastectomy;  Surgeon: Maliha Andres MD;  Location: [facility];  Service: General;   Laterality: Left;    BROW LIFT Bilateral 10/19/2021    Procedure: BILATERAL TEMPORAL DIRECT BROWLIFT;  Surgeon: Ruddy Moses MD;  [place] [facility] [place] [facility] [place]  Service: [place]hthalmology;  Laterality: Bilateral;    CARPAL TUNNEL RELEASE  1992    right wrist    CATARACT EXTRACTION, BILATERAL  07/01/2018    R 7/18 and L 9/18 WITH LENS IMPLANTS    COLONOSCOPY      DEQUERVAIN RELEASE Bilateral 2015    DILATATION AND CURETTAGE  1960s    EYE SURGERY  2018    Bilateral repairs    FRACTURE SURGERY  2014    R hip, R  wrist    HAND SURGERY Right     HIP FRACTURE SURGERY Right     HYSTERECTOMY  1975    Partial    JOINT REPLACEMENT  2001, 2014    TKA    TONSILLECTOMY AND ADENOIDECTOMY  1953    TOTAL KNEE ARTHROPLASTY Left 2001    TOTAL KNEE ARTHROPLASTY Right 2014    TRIGGER FINGER RELEASE Right     TRIGGER POINT INJECTION  2017, 2019    WRIST SURGERY  2015    FRACTURE RIGHT         FAMILY HISTORY  Family History   Problem Relation Age of Onset    Diabetes Mother     Hypertension Mother     Stroke Mother     Hearing loss Mother         AIDS    Heart disease Mother         CHF, PACER    Thyroid disease Mother         THYROIDECTOMY    Hypertension Father     Stroke Father     Alcohol abuse Father         DAYS OFF     Heart disease Father         CHF    Hyperlipidemia Father     Kidney disease Father         KIDNEY STONE REMOVED    Heart attack Father     Cancer Sister         LUNG    COPD Sister     Lung cancer Sister     Diabetes Sister     Cancer Sister         KIDNEY    Kidney cancer Sister     Diabetes Sister         INSULIN    Hypertension Sister     Heart disease Sister         ON AUTOPSY    Mental illness Sister         HOSPITALIZED    Depression Sister     Hyperlipidemia Sister     Cancer Brother         LUNG    Lung cancer Brother     Early death Brother         2 MO, ? WHOOPING COUGH    Mental illness Brother         Schizophrenia/hosp    Depression Brother     Early death Brother     Asthma Daughter          ADULT ONSET    Diabetes Maternal Grandmother         NIDDM    Diabetes Maternal Aunt         NIDDM    Thyroid disease Maternal Aunt         THYROIDECTOMY    Diabetes Maternal Aunt         NIDDM    Hearing loss Maternal Aunt     Malig Hyperthermia Neg Hx     Breast cancer Neg Hx          SOCIAL HISTORY  Social History     Socioeconomic History    Marital status:     Number of children: 1   Tobacco Use    Smoking status: Never     Passive exposure: Never    Smokeless tobacco: Never   Vaping Use    Vaping status: Never Used   Substance and Sexual Activity    Alcohol use: No    Drug use: No    Sexual activity: Never         ALLERGIES  Iodine, Percocet [oxycodone-acetaminophen], and Iodinated contrast media      REVIEW OF SYSTEMS  Review of Systems  Included in HPI  All systems reviewed and negative except for those discussed in HPI.      PHYSICAL EXAM    I have reviewed the triage vital signs and nursing notes.    ED Triage Vitals [12/10/24 0803]   Temp Heart Rate Resp BP SpO2   98.1 °F (36.7 °C) 65 17 154/88 97 %      Temp src Heart Rate Source Patient Position BP Location FiO2 (%)   -- -- -- -- --       Physical Exam  GENERAL: Awake, alert, no acute distress  SKIN: Warm, dry  HENT: Normocephalic, atraumatic  EYES: no scleral icterus  CV: regular rhythm, regular rate  RESPIRATORY: normal effort, lungs clear  ABDOMEN: soft, nontender, nondistended  MUSCULOSKELETAL: no deformity.  Right knee held in flexion.  No significant effusion.  No open wounds.  No deformity.  No erythema.  Intact pulses.  No tenderness to the ankle or the hip.  Pain with extension of the right knee.  NEURO: alert, moves all extremities, follows commands            LAB RESULTS  Recent Results (from the past 24 hours)   Basic Metabolic Panel    Collection Time: 12/10/24  8:57 AM    Specimen: Blood   Result Value Ref Range    Glucose 106 (H) 65 - 99 mg/dL    BUN 25 (H) 8 - 23 mg/dL    Creatinine 1.25 (H) 0.57 - 1.00 mg/dL    Sodium 138 136 -  145 mmol/L    Potassium 4.1 3.5 - 5.2 mmol/L    Chloride 106 98 - 107 mmol/L    CO2 26.2 22.0 - 29.0 mmol/L    Calcium 9.2 8.6 - 10.5 mg/dL    BUN/Creatinine Ratio 20.0 7.0 - 25.0    Anion Gap 5.8 5.0 - 15.0 mmol/L    eGFR 41.8 (L) >60.0 mL/min/1.73   CBC Auto Differential    Collection Time: 12/10/24  8:57 AM    Specimen: Blood   Result Value Ref Range    WBC 4.61 3.40 - 10.80 10*3/mm3    RBC 3.40 (L) 3.77 - 5.28 10*6/mm3    Hemoglobin 10.7 (L) 12.0 - 15.9 g/dL    Hematocrit 32.2 (L) 34.0 - 46.6 %    MCV 94.7 79.0 - 97.0 fL    MCH 31.5 26.6 - 33.0 pg    MCHC 33.2 31.5 - 35.7 g/dL    RDW 11.7 (L) 12.3 - 15.4 %    RDW-SD 39.4 37.0 - 54.0 fl    MPV 9.4 6.0 - 12.0 fL    Platelets 157 140 - 450 10*3/mm3    Neutrophil % 59.9 42.7 - 76.0 %    Lymphocyte % 26.2 19.6 - 45.3 %    Monocyte % 10.8 5.0 - 12.0 %    Eosinophil % 2.4 0.3 - 6.2 %    Basophil % 0.7 0.0 - 1.5 %    Immature Grans % 0.0 0.0 - 0.5 %    Neutrophils, Absolute 2.76 1.70 - 7.00 10*3/mm3    Lymphocytes, Absolute 1.21 0.70 - 3.10 10*3/mm3    Monocytes, Absolute 0.50 0.10 - 0.90 10*3/mm3    Eosinophils, Absolute 0.11 0.00 - 0.40 10*3/mm3    Basophils, Absolute 0.03 0.00 - 0.20 10*3/mm3    Immature Grans, Absolute 0.00 0.00 - 0.05 10*3/mm3    nRBC 0.0 0.0 - 0.2 /100 WBC         RADIOLOGY  XR Knee 1 or 2 View Right    Result Date: 12/10/2024  XR RIGHT KNEE, 2 VIEWS  HISTORY: Right knee pain  COMPARISON: 11/24/2014  FINDINGS: There is a right knee arthroplasty that appears stable. Intramedullary mitchell and screws visualized in the distal right femur. There is no fracture or joint effusion. Some mild increase heterotopic ossification is seen medial to the knee.      No acute finding    This report was finalized on 12/10/2024 8:47 AM by Dr. Guillermo Conway M.D on Workstation: [id number]         MEDICATIONS GIVEN IN ER  Medications   sodium chloride 0.9 % flush 10 mL (has no administration in time range)         ORDERS PLACED DURING THIS VISIT:  Orders Placed This  Encounter   Procedures    XR Knee 1 or 2 View Right    Basic Metabolic Panel    CBC Auto Differential    Ortho (on-call MD unless specified)    [facility] (on-call MD unless specified) Details    Insert Peripheral IV    Initiate Observation Status    CBC & Differential         OUTPATIENT MEDICATION MANAGEMENT:  Current Facility-Administered Medications Ordered in Epic   Medication Dose Route Frequency Provider Last Rate Last Admin    ropivacaine 0.5 % 50 mL, Morphine 5 mg, ketorolac 30 mg, EPINEPHrine 0.3 mg in sodium chloride 0.9 % 50 mL solution   Injection Once Edmund Zavaleta MD        sodium chloride 0.9 % flush 10 mL  10 mL Intravenous PRN Franklyn Hugo MD         Current Outpatient Medications Ordered in Epic   Medication Sig Dispense Refill    anastrozole (ARIMIDEX) 1 MG tablet Take 1 tablet by mouth Daily. 90 tablet 3    cyanocobalamin (VITAMIN B-12) 1000 MCG tablet Take 100 mcg by mouth Daily.      ferrous sulfate 325 (65 FE) MG tablet Take 1 tablet by mouth Daily With Breakfast.      folic acid (FOLVITE) 400 MCG tablet Take 1 tablet by mouth Daily.      hydroCHLOROthiazide 25 MG tablet Take 1 tablet by mouth Daily.      lisinopril (PRINIVIL,ZESTRIL) 10 MG tablet Take 1 tablet by mouth Daily.      propranolol (INDERAL) 20 MG tablet Take 1.5 tablets by mouth 2 (Two) Times a Day.      Sodium Fluoride 5000 PPM 1.1 % paste Take 1 Application by mouth Daily.           PROCEDURES  Procedures            PROGRESS, DATA ANALYSIS, CONSULTS, AND MEDICAL DECISION MAKING  All labs have been independently interpreted by me.  All radiology studies have been reviewed by me. All EKG's have been independently viewed and interpreted by me.  Discussion below represents my analysis of pertinent findings related to patient's condition, differential diagnosis, treatment plan and final disposition.    Differential diagnosis includes but is not limited to internal derangement, fracture, sprain, strain.    Clinical Scores:                                      ED Course as of 12/10/24 1010   Tue Dec 10, 2024   0827 XR Knee 1 or 2 View Right  My independent interpretation of the imaging study is total knee replacement.  No gross fracture. [TR]   0939 Discussing with Dr. Zavaleta with orthopedics.  He agrees to consult.  Hospitalist to admit. [TR]   0939 I reviewed the workup and findings with the patient and family at the bedside.  She is still unable to extend her right knee fully.  Answered all questions.  Plan admission.  They are agreeable. [TR]   1009 Discussing with Merline with [facility].  She agrees to admit to [facility] [TR]      ED Course User Index  [TR] Franklyn Hugo MD             AS OF 10:10 EST VITALS:    BP - 137/62  HR - 63  TEMP - 98.1 °F (36.7 °C)  O2 SATS - 96%    COMPLEXITY OF CARE  The patient requires admission.      DIAGNOSIS  Final diagnoses:   Acute pain of right knee   Chronic renal impairment, unspecified CKD stage         DISPOSITION  ED Disposition       ED Disposition   Decision to Admit    Condition   --    Comment   Level of Care: Med/Surg [1]   Diagnosis: Right knee pain [[id number]]   Admitting Physician: SANJAY ROBLEDO [[id number]]   Attending Physician: SANJAY ROBLEDO [[id number]]   Bed Request Comments: ortho                  Please note that portions of this document were completed with a voice recognition program.    Note Disclaimer: At [facility], we believe that sharing information builds trust and better relationships. You are receiving this note because you recently visited [facility]. It is possible you will see health information before a provider has talked with you about it. This kind of information can be easy to misunderstand. To help you fully understand what it means for your health, we urge you to discuss this note with your provider.         Franklyn Hugo MD  12/10/24 1010

## 2024-12-10 NOTE — ED NOTES
.Nursing report ED to floor  Haydee Dickey  89 y.o.  female    HPI :  HPI  Stated Reason for Visit: knee pain    Chief Complaint  Chief Complaint   Patient presents with    Knee Pain       Admitting doctor:   Young Palmer MD    Admitting diagnosis:   The primary encounter diagnosis was Acute pain of right knee. A diagnosis of Chronic renal impairment, unspecified CKD stage was also pertinent to this visit.    Code status:   Current Code Status       Date Active Code Status Order ID Comments User Context       Not on file            Allergies:   Iodine, Percocet [oxycodone-acetaminophen], and Iodinated contrast media    Isolation:   No active isolations    Intake and Output  No intake or output data in the 24 hours ending 12/10/24 1035    Weight:   There were no vitals filed for this visit.    Most recent vitals:   Vitals:    12/10/24 0803 12/10/24 0901 12/10/24 1001   BP: 154/88 137/62 135/54   Pulse: 65 63 69   Resp: 17     Temp: 98.1 °F (36.7 °C)     SpO2: 97% 96% 99%       Active LDAs/IV Access:   Lines, Drains & Airways       Active LDAs       Name Placement date Placement time Site Days    Peripheral IV 12/10/24 0857 Right Antecubital 12/10/24  0857  Antecubital  less than 1                    Labs (abnormal labs have a star):   Labs Reviewed   BASIC METABOLIC PANEL - Abnormal; Notable for the following components:       Result Value    Glucose 106 (*)     BUN 25 (*)     Creatinine 1.25 (*)     eGFR 41.8 (*)     All other components within normal limits    Narrative:     GFR Categories in Chronic Kidney Disease (CKD)      GFR Category          GFR (mL/min/1.73)    Interpretation  G1                     90 or greater         Normal or high (1)  G2                      60-89                Mild decrease (1)  G3a                   45-59                Mild to moderate decrease  G3b                   30-44                Moderate to severe decrease  G4                    15-29                Severe  decrease  G5                    14 or less           Kidney failure          (1)In the absence of evidence of kidney disease, neither GFR category G1 or G2 fulfill the criteria for CKD.    eGFR calculation 2021 CKD-EPI creatinine equation, which does not include race as a factor   CBC WITH AUTO DIFFERENTIAL - Abnormal; Notable for the following components:    RBC 3.40 (*)     Hemoglobin 10.7 (*)     Hematocrit 32.2 (*)     RDW 11.7 (*)     All other components within normal limits   CBC AND DIFFERENTIAL    Narrative:     The following orders were created for panel order CBC & Differential.  Procedure                               Abnormality         Status                     ---------                               -----------         ------                     CBC Auto Differential[[id number]]        Abnormal            Final result                 Please view results for these tests on the individual orders.       EKG:   No orders to display       Meds given in ED:   Medications   sodium chloride 0.9 % flush 10 mL (has no administration in time range)       Imaging results:  XR Knee 1 or 2 View Right    Result Date: 12/10/2024  No acute finding    This report was finalized on 12/10/2024 8:47 AM by Dr. Guillermo Conway M.D on Workstation: [id number]       Ambulatory status:   - [id number]    Social issues:   Social History     Socioeconomic History    Marital status:     Number of children: 1   Tobacco Use    Smoking status: Never     Passive exposure: Never    Smokeless tobacco: Never   Vaping Use    Vaping status: Never Used   Substance and Sexual Activity    Alcohol use: No    Drug use: No    Sexual activity: Never       Peripheral Neurovascular  Peripheral Neurovascular (Adult)  Peripheral Neurovascular WDL: WDL, pulse assessment  Pulse Assessment: dorsalis pedis  LLE Neurovascular Assessment  Temperature LLE: warm  Color LLE: no discoloration  Sensation LLE: no numbness, no tenderness, no tingling  RLE  Neurovascular Assessment  Temperature RLE: warm  Color RLE: no discoloration  Sensation RLE: no numbness, no tingling, tenderness present    Neuro Cognitive  Neuro Cognitive (Adult)  Cognitive/Neuro/Behavioral WDL: WDL    Learning  Learning Assessment  Learning Readiness and Ability: no barriers identified    Respiratory  Respiratory WDL  Respiratory WDL: WDL    Abdominal Pain       Pain Assessments  Pain (Adult)  (0-10) Pain Rating: Rest: 5           Bailey Brewer RN  12/10/24 10:35 EST

## 2024-12-10 NOTE — H&P
Patient Name:  Haydee Dickey  YOB: 1935  MRN:  [id number]  Admit Date:  12/10/2024  Patient Care Team:  Juliana Gonsalez (Julienne)YNES as PCP - General (Family Medicine)  Edmund Zavaleta MD as Surgeon (Orthopedic Surgery)  Debbie Batista MD as Surgeon (Hand Surgery)  Lemuel Mosqueda MD (Ophthalmology)  Coy Pinzon MD as Consulting Physician (Hematology and Oncology)  Judy García MD as Consulting Physician (Radiation Oncology)  Maliha Andres MD as Referring Physician (Breast Surgery)  Trupti Ibanez MD as Consulting Physician (Cardiology)  Eugene Coats MD as Consulting Physician (Nephrology)      Subjective   History Present Illness     Chief Complaint   Patient presents with   • Knee Pain       Ms. Dickey is a 89 y.o. female with a history of hypertensive kidney disease, BPPV, anemia, breast cancer, osteoarthritis of both knees with previous knee replacements.  That presents to [facility] complaining of unable to bear weight on the right knee.  She turned over in the night and woke up with severe pain to the right knee and found she could not straighten the right knee.  This morning she was unable to bear weight on that leg in order to walk.  She denies any falls or known injuries.  She does have ongoing problems with both of her knees and is due to have a procedure to replace cartilage on the left knee this January with Dr. Zavaleta.  She sometimes will have sciatica problems on the left.  Yesterday evening she did have sciatic issues and weakness of the left leg and states she might have twisted the other leg trying to maneuver around.  She denies any illness symptoms, fever, chills, sweats, shortness of breath or cough.  No redness or swelling.  At baseline she uses a walker.  The knee is currently not hurting unless she tries to bend or extend.    The x-ray was negative for acute fracture of the right knee.      Review of Systems  "  Constitutional:  Positive for activity change. Negative for chills, diaphoresis, fatigue and fever.   HENT:  Negative for congestion.    Respiratory:  Negative for cough and shortness of breath.    Gastrointestinal:  Negative for abdominal pain, constipation, diarrhea and nausea.   Genitourinary:  Negative for difficulty urinating.   Skin:  Negative for wound.   Neurological:  Negative for dizziness and light-headedness.        Personal History     Past Medical History:   Diagnosis Date   • Allergic Percocet, iv iodine   • Anemia    • Arthritis    • B12 deficiency    • BPPV (benign paroxysmal positional vertigo)    • Breast cancer     LEFT   • Cataract 2018  jaime, 2021 jaime blepharoplasty   • Colon polyp 2009   • COVID 03/24/2023    Rebound Covid following Paxlovid 4/1/23   • CTS (carpal tunnel syndrome) 1997, 2014    D’quervain x3   • Deep vein thrombosis 1970    HX, LEG   • Essential tremor    • Folliculitis 11/01/2017   • Fracture of hip 2014    Right ORIF   • Fracture, femur 1995    Left, No surgery   • Fracture, radius 1995    Right, orif   • Gait instability    • GERD (gastroesophageal reflux disease) 1990   • History of blood transfusion 2014   • History of fall 06/17/2024    \"KNEE GAVE OUT\"   • HL (hearing loss) 2015    HEARING AIDS   • Hypertension 2001    on Rx   • Knee swelling 2001    Bilateral   • Lower extremity neuropathy     bilateral   • Neuropathy    • Osteopenia 2019   • Potassium (K) excess 10/21/2022    Seeing nephrologist -- Dr. Coats   • Rotator cuff syndrome 2013    Fall induced   • Sciatica    • Small vessel disease    • Torn rotator cuff 2013    right arm   • Urinary tract infection 2018    approx 1 every 5 years   • Wears hearing aid     BILATERAL   • Wrist fracture, right 2015     Past Surgical History:   Procedure Laterality Date   • ADENOIDECTOMY  1953   • APPENDECTOMY  1961   • BLEPHAROPLASTY Bilateral 10/19/2021    Procedure: BILATERAL UPPER LID BLEPHAROPLASTY;  Surgeon: Josué " Ruddy SAWYER MD;  [place] [facility] [place] [facility] [place]  Service: Ophthalmology;  Laterality: Bilateral;   • BREAST BIOPSY     • BREAST LUMPECTOMY Left 11/15/2021    Procedure: Left JEAN-PAUL-guided partial mastectomy;  Surgeon: Maliha Andres MD;  Location: [facility] MAIN OR;  Service: General;  Laterality: Left;   • BROW LIFT Bilateral 10/19/2021    Procedure: BILATERAL TEMPORAL DIRECT BROWLIFT;  Surgeon: Ruddy Moses MD;  [place] [facility] [place] [facility] [place]  Service: Ophthalmology;  Laterality: Bilateral;   • CARPAL TUNNEL RELEASE  1992    right wrist   • CATARACT EXTRACTION, BILATERAL  07/01/2018    R 7/18 and L 9/18 WITH LENS IMPLANTS   • COLONOSCOPY     • DEQUERVAIN RELEASE Bilateral 2015   • DILATATION AND CURETTAGE  1960s   • EYE SURGERY  2018    Bilateral repairs   • FRACTURE SURGERY  2014    R hip, R  wrist   • HAND SURGERY Right    • HIP FRACTURE SURGERY Right    • HYSTERECTOMY  1975    Partial   • JOINT REPLACEMENT  2001, 2014    TKA   • TONSILLECTOMY AND ADENOIDECTOMY  1953   • TOTAL KNEE ARTHROPLASTY Left 2001   • TOTAL KNEE ARTHROPLASTY Right 2014   • TRIGGER FINGER RELEASE Right    • TRIGGER POINT INJECTION  2017, 2019   • WRIST SURGERY  2015    FRACTURE RIGHT     Family History   Problem Relation Age of Onset   • Diabetes Mother    • Hypertension Mother    • Stroke Mother    • Hearing loss Mother         AIDS   • Heart disease Mother         CHF, PACER   • Thyroid disease Mother         THYROIDECTOMY   • Hypertension Father    • Stroke Father    • Alcohol abuse Father         DAYS OFF    • Heart disease Father         CHF   • Hyperlipidemia Father    • Kidney disease Father         KIDNEY STONE REMOVED   • Heart attack Father    • Cancer Sister         LUNG   • COPD Sister    • Lung cancer Sister    • Diabetes Sister    • Cancer Sister         KIDNEY   • Kidney cancer Sister    • Diabetes Sister         INSULIN   • Hypertension Sister    • Heart disease Sister         ON AUTOPSY   • Mental illness  Sister         HOSPITALIZED   • Depression Sister    • Hyperlipidemia Sister    • Cancer Brother         LUNG   • Lung cancer Brother    • Early death Brother         2 MO, ? WHOOPING COUGH   • Mental illness Brother         Schizophrenia/hosp   • Depression Brother    • Early death Brother    • Asthma Daughter         ADULT ONSET   • Diabetes Maternal Grandmother         NIDDM   • Diabetes Maternal Aunt         NIDDM   • Thyroid disease Maternal Aunt         THYROIDECTOMY   • Diabetes Maternal Aunt         NIDDM   • Hearing loss Maternal Aunt    • Malig Hyperthermia Neg Hx    • Breast cancer Neg Hx      Social History     Tobacco Use   • Smoking status: Never     Passive exposure: Never   • Smokeless tobacco: Never   Vaping Use   • Vaping status: Never Used   Substance Use Topics   • Alcohol use: No   • Drug use: No     Current Facility-Administered Medications on File Prior to Encounter   Medication Dose Route Frequency Provider Last Rate Last Admin   • ropivacaine 0.5 % 50 mL, Morphine 5 mg, ketorolac 30 mg, EPINEPHrine 0.3 mg in sodium chloride 0.9 % 50 mL solution   Injection Once Edmund Zavaleta MD         Current Outpatient Medications on File Prior to Encounter   Medication Sig Dispense Refill   • anastrozole (ARIMIDEX) 1 MG tablet Take 1 tablet by mouth Daily. 90 tablet 3   • cyanocobalamin (VITAMIN B-12) 1000 MCG tablet Take 100 mcg by mouth Daily.     • ferrous sulfate 325 (65 FE) MG tablet Take 1 tablet by mouth Daily With Breakfast.     • folic acid (FOLVITE) 400 MCG tablet Take 1 tablet by mouth Daily.     • hydroCHLOROthiazide 25 MG tablet Take 1 tablet by mouth Daily.     • lisinopril (PRINIVIL,ZESTRIL) 10 MG tablet Take 1 tablet by mouth Daily.     • propranolol (INDERAL) 20 MG tablet Take 1.5 tablets by mouth 2 (Two) Times a Day.     • Sodium Fluoride 5000 PPM 1.1 % paste Take 1 Application by mouth Daily.       Allergies   Allergen Reactions   • Iodine Itching     IV ONLY   • Percocet  "[Oxycodone-Acetaminophen] Itching   • Iodinated Contrast Media Rash     Patient reports rash occurred 30 yrs ago with contrast   (\"IV\")       Objective    Objective     Vital Signs  Temp:  [97.8 °F (36.6 °C)-98.1 °F (36.7 °C)] 97.8 °F (36.6 °C)  Heart Rate:  [63-69] 68  Resp:  [17-18] 18  BP: (135-154)/(54-88) 141/65  SpO2:  [96 %-99 %] 99 %  on   ;   Device (Oxygen Therapy): room air  There is no height or weight on file to calculate BMI.    Physical Exam  Constitutional:       General: She is not in acute distress.     Appearance: She is obese. She is not ill-appearing or toxic-appearing.   HENT:      Head: Normocephalic and atraumatic.      Mouth/Throat:      Mouth: Mucous membranes are moist.   Eyes:      Extraocular Movements: Extraocular movements intact.      Conjunctiva/sclera: Conjunctivae normal.      Pupils: Pupils are equal, round, and reactive to light.   Cardiovascular:      Rate and Rhythm: Normal rate and regular rhythm.      Pulses: Normal pulses.      Heart sounds: Normal heart sounds.   Pulmonary:      Effort: Pulmonary effort is normal. No respiratory distress.      Breath sounds: Normal breath sounds.   Abdominal:      General: Bowel sounds are normal. There is no distension.      Palpations: Abdomen is soft.   Musculoskeletal:      Comments: Both knees with chronic changes and scarring.  Unable to fully extend right knee.  Sensation intact.   Skin:     General: Skin is warm and dry.   Neurological:      General: No focal deficit present.      Mental Status: She is alert and oriented to person, place, and time.         Results Review:  I reviewed the patient's new clinical results.        Lab Results (last 24 hours)       Procedure Component Value Units Date/Time    CBC & Differential [[id number]]  (Abnormal) Collected: 12/10/24 0857    Specimen: Blood Updated: 12/10/24 0906    Narrative:      The following orders were created for panel order CBC & Differential.  Procedure                         "       Abnormality         Status                     ---------                               -----------         ------                     CBC Auto Differential[[id number]]        Abnormal            Final result                 Please view results for these tests on the individual orders.    Basic Metabolic Panel [[id number]]  (Abnormal) Collected: 12/10/24 0857    Specimen: Blood Updated: 12/10/24 0950     Glucose 106 mg/dL      BUN 25 mg/dL      Creatinine 1.25 mg/dL      Sodium 138 mmol/L      Potassium 4.1 mmol/L      Chloride 106 mmol/L      CO2 26.2 mmol/L      Calcium 9.2 mg/dL      BUN/Creatinine Ratio 20.0     Anion Gap 5.8 mmol/L      eGFR 41.8 mL/min/1.73     Narrative:      GFR Categories in Chronic Kidney Disease (CKD)      GFR Category          GFR (mL/min/1.73)    Interpretation  G1                     90 or greater         Normal or high (1)  G2                      60-89                Mild decrease (1)  G3a                   45-59                Mild to moderate decrease  G3b                   30-44                Moderate to severe decrease  G4                    15-29                Severe decrease  G5                    14 or less           Kidney failure          (1)In the absence of evidence of kidney disease, neither GFR category G1 or G2 fulfill the criteria for CKD.    eGFR calculation 2021 CKD-EPI creatinine equation, which does not include race as a factor    CBC Auto Differential [[id number]]  (Abnormal) Collected: 12/10/24 0857    Specimen: Blood Updated: 12/10/24 0906     WBC 4.61 10*3/mm3      RBC 3.40 10*6/mm3      Hemoglobin 10.7 g/dL      Hematocrit 32.2 %      MCV 94.7 fL      MCH 31.5 pg      MCHC 33.2 g/dL      RDW 11.7 %      RDW-SD 39.4 fl      MPV 9.4 fL      Platelets 157 10*3/mm3      Neutrophil % 59.9 %      Lymphocyte % 26.2 %      Monocyte % 10.8 %      Eosinophil % 2.4 %      Basophil % 0.7 %      Immature Grans % 0.0 %      Neutrophils, Absolute 2.76 10*3/mm3       Lymphocytes, Absolute 1.21 10*3/mm3      Monocytes, Absolute 0.50 10*3/mm3      Eosinophils, Absolute 0.11 10*3/mm3      Basophils, Absolute 0.03 10*3/mm3      Immature Grans, Absolute 0.00 10*3/mm3      nRBC 0.0 /100 WBC             Imaging Results (Last 24 Hours)       Procedure Component Value Units Date/Time    XR Knee 1 or 2 View Right [[id number]] Collected: 12/10/24 0832     Updated: 12/10/24 0850    Narrative:      XR RIGHT KNEE, 2 VIEWS     HISTORY: Right knee pain     COMPARISON: 11/24/2014     FINDINGS: There is a right knee arthroplasty that appears stable.  Intramedullary mitchell and screws visualized in the distal right femur.  There is no fracture or joint effusion. Some mild increase heterotopic  ossification is seen medial to the knee.       Impression:      No acute finding           This report was finalized on 12/10/2024 8:47 AM by Dr. Guillermo Conway M.D on Workstation: [id number]                   No orders to display        Assessment/Plan     Active Hospital Problems    Diagnosis  POA   • **Right knee pain [M25.561]  Yes   • S/P TKR (total knee replacement) using cement, left [Z96.652]  Not Applicable   • Benign hypertensive kidney disease with chronic kidney disease [I12.9]  Yes   • Stage 3a chronic kidney disease [N18.31]  Yes   • Benign essential hypertension [I10]  Yes      Resolved Hospital Problems   No resolved problems to display.     Admit and consult PT/OT.  Will consult orthopedics for recommendations and defer any further imaging to Ortho.    Creatinine 1.2, slightly up from prior 1.08. Creat 1.32 around October.  Will continue lisinopril and hold hydrochlorothiazide for now if she is going to be on Mobic.  Blood pressures are acceptable.    Resume propranolol.    Resume iron supplement.    Bowel regimen.    I discussed the patient's findings and my recommendations with patient.    VTE Prophylaxis - SCDs.  Code Status - Full code.       YNES Ramos  [facility]  12/10/24  14:54 EST

## 2024-12-11 LAB
ANION GAP SERPL CALCULATED.3IONS-SCNC: 8.1 MMOL/L (ref 5–15)
BASOPHILS # BLD AUTO: 0.02 10*3/MM3 (ref 0–0.2)
BASOPHILS NFR BLD AUTO: 0.6 % (ref 0–1.5)
BUN SERPL-MCNC: 26 MG/DL (ref 8–23)
BUN/CREAT SERPL: 22.6 (ref 7–25)
CALCIUM SPEC-SCNC: 8.9 MG/DL (ref 8.6–10.5)
CHLORIDE SERPL-SCNC: 109 MMOL/L (ref 98–107)
CO2 SERPL-SCNC: 23.9 MMOL/L (ref 22–29)
CREAT SERPL-MCNC: 1.15 MG/DL (ref 0.57–1)
DEPRECATED RDW RBC AUTO: 38.6 FL (ref 37–54)
EGFRCR SERPLBLD CKD-EPI 2021: 46.2 ML/MIN/1.73
EOSINOPHIL # BLD AUTO: 0.09 10*3/MM3 (ref 0–0.4)
EOSINOPHIL NFR BLD AUTO: 2.5 % (ref 0.3–6.2)
ERYTHROCYTE [DISTWIDTH] IN BLOOD BY AUTOMATED COUNT: 11.5 % (ref 12.3–15.4)
GLUCOSE SERPL-MCNC: 101 MG/DL (ref 65–99)
HCT VFR BLD AUTO: 29.2 % (ref 34–46.6)
HGB BLD-MCNC: 10 G/DL (ref 12–15.9)
IMM GRANULOCYTES # BLD AUTO: 0.01 10*3/MM3 (ref 0–0.05)
IMM GRANULOCYTES NFR BLD AUTO: 0.3 % (ref 0–0.5)
LYMPHOCYTES # BLD AUTO: 1.02 10*3/MM3 (ref 0.7–3.1)
LYMPHOCYTES NFR BLD AUTO: 28.1 % (ref 19.6–45.3)
MCH RBC QN AUTO: 31.8 PG (ref 26.6–33)
MCHC RBC AUTO-ENTMCNC: 34.2 G/DL (ref 31.5–35.7)
MCV RBC AUTO: 93 FL (ref 79–97)
MONOCYTES # BLD AUTO: 0.42 10*3/MM3 (ref 0.1–0.9)
MONOCYTES NFR BLD AUTO: 11.6 % (ref 5–12)
NEUTROPHILS NFR BLD AUTO: 2.07 10*3/MM3 (ref 1.7–7)
NEUTROPHILS NFR BLD AUTO: 56.9 % (ref 42.7–76)
NRBC BLD AUTO-RTO: 0 /100 WBC (ref 0–0.2)
PLATELET # BLD AUTO: 136 10*3/MM3 (ref 140–450)
PMV BLD AUTO: 9.4 FL (ref 6–12)
POTASSIUM SERPL-SCNC: 4.1 MMOL/L (ref 3.5–5.2)
RBC # BLD AUTO: 3.14 10*6/MM3 (ref 3.77–5.28)
SODIUM SERPL-SCNC: 141 MMOL/L (ref 136–145)
WBC NRBC COR # BLD AUTO: 3.63 10*3/MM3 (ref 3.4–10.8)

## 2024-12-11 PROCEDURE — 97110 THERAPEUTIC EXERCISES: CPT

## 2024-12-11 PROCEDURE — G0378 HOSPITAL OBSERVATION PER HR: HCPCS

## 2024-12-11 PROCEDURE — 80048 BASIC METABOLIC PNL TOTAL CA: CPT | Performed by: NURSE PRACTITIONER

## 2024-12-11 PROCEDURE — 85025 COMPLETE CBC W/AUTO DIFF WBC: CPT | Performed by: NURSE PRACTITIONER

## 2024-12-11 PROCEDURE — 97161 PT EVAL LOW COMPLEX 20 MIN: CPT

## 2024-12-11 RX ADMIN — ANASTROZOLE 1 MG: 1 TABLET, COATED ORAL at 08:50

## 2024-12-11 RX ADMIN — Medication 10 ML: at 08:50

## 2024-12-11 RX ADMIN — FERROUS SULFATE TAB 325 MG (65 MG ELEMENTAL FE) 325 MG: 325 (65 FE) TAB at 08:50

## 2024-12-11 RX ADMIN — PROPRANOLOL HYDROCHLORIDE 30 MG: 20 TABLET ORAL at 12:56

## 2024-12-11 RX ADMIN — LISINOPRIL 10 MG: 10 TABLET ORAL at 08:50

## 2024-12-11 RX ADMIN — PROPRANOLOL HYDROCHLORIDE 30 MG: 20 TABLET ORAL at 20:33

## 2024-12-11 NOTE — PLAN OF CARE
Goal Outcome Evaluation:  Plan of Care Reviewed With: patient           Outcome Evaluation: Pt 88 yo female presented to ED w acute R knee pain, pt reports she twisted it in the bed and couldnt ambulate. Pt reports she is sched to have L TKA 1/13/25 DR Zavaleta. On PT exam pt stood CGA rwx unable to full bear weight due to R knee medial LE pain. Awaiting ortho recs. Pt may benefit from skilled PT acutely to address deficits, DCs recs pending, pt lives w daughter and has rwx.    Anticipated Discharge Disposition (PT): home with home health, skilled nursing facility

## 2024-12-11 NOTE — SIGNIFICANT NOTE
12/11/24 1337   OTHER   Discipline occupational therapist   Rehab Time/Intention   Session Not Performed other (see comments)  (Pt is admitted with R knee pain, inability to weight bear. Awaiting ortho consult.)   Recommendation   OT - Next Appointment 12/12/24

## 2024-12-11 NOTE — PROGRESS NOTES
Discharge Planning Assessment  [facility]     Patient Name: Haydee Dickey  MRN: [id number]  Today's Date: 12/11/2024    Admit Date: 12/10/2024    Plan: Home with home health vs skilled rehab   Discharge Needs Assessment       Row Name 12/11/24 1637       Living Environment    People in Home child(doug), adult    Name(s) of People in Home marko Manning    Current Living Arrangements home    Primary Care Provided by self    Family Caregiver if Needed child(doug), adult    Family Caregiver Names Kerri Giordano [phone]    Quality of Family Relationships involved;helpful;supportive       Transition Planning    Patient/Family Anticipates Transition to home with family    Patient/Family Anticipated Services at Transition none    Transportation Anticipated family or friend will provide       Discharge Needs Assessment    Concerns to be Addressed discharge planning    Equipment Needed After Discharge walker, rolling    Provided Post Acute Provider List? Yes    Post Acute Provider List Nursing Home    Provided Post Acute Provider Quality & Resource List? Yes    Post Acute Provider Quality and Resource List Home Health    Delivered To Patient    Method of Delivery In person    Offered/Gave Vendor List yes                   Discharge Plan       Row Name 12/11/24 1648       Plan    Plan Home with home health vs skilled rehab    Plan Comments Spoke with patient at bedside, face sheet verified. She lives with her daughter and stated she is independent with ADL's she uses a rolling walker. She stated she can not bear weight on her right knee due to pain. She denies any pain while she is sitting in the chair. CCP will follow up after Orthopedic Doctor has made recommendation for her weight bearing status. BETH Hidalgo RN                  Continued Care and Services - Admitted Since 12/10/2024    No active coordination exists for this encounter.       Expected Discharge Date and Time       Expected Discharge Date Expected  Discharge Time    Dec 13, 2024            Demographic Summary       Row Name 12/11/24 1631       General Information    Arrived From emergency department    Referral Source admission list    Reason for Consult discharge planning    Preferred Language English                   Functional Status       Row Name 12/11/24 1634       Functional Status    Usual Activity Tolerance moderate    Current Activity Tolerance poor    Functional Status Comments stated she can not bear weight on Right extremity due to pain       Functional Status, IADL    Medications assistive person;independent    Meal Preparation assistive person    Housekeeping assistive person    Laundry assistive person    Shopping assistive person                   Psychosocial    No documentation.                  Abuse/Neglect    No documentation.                  Legal    No documentation.                  Substance Abuse    No documentation.                  Patient Forms    No documentation.                     Keren Hidalgo RN

## 2024-12-11 NOTE — PROGRESS NOTES
Name: Haydee Dickey ADMIT: 12/10/2024   : 1935  PCP: Juliana Gonsalez APRN ([place])    MRN: [id number] LOS: 0 days   AGE/SEX: 89 y.o. female  ROOM: [facility]     Subjective   Subjective   Ms. Dickey is sitting up in recliner, she reports she is unable to bear weight on her right knee, and swelling, with no known recent or distant trauma. She does note new popping with flexion of her right leg. She otherwise offers no complaints.     Objective   Objective   Vital Signs  Temp:  [97.3 °F (36.3 °C)-98.1 °F (36.7 °C)] 97.5 °F (36.4 °C)  Heart Rate:  [70-86] 82  Resp:  [16-18] 16  BP: (108-134)/(44-67) 116/60  SpO2:  [95 %-100 %] 95 %  on   ;   Device (Oxygen Therapy): room air  There is no height or weight on file to calculate BMI.  Physical Exam  Vitals and nursing note reviewed.   Constitutional:       General: She is not in acute distress.     Appearance: Normal appearance.   HENT:      Head: Normocephalic and atraumatic.      Mouth/Throat:      Mouth: Mucous membranes are moist. Mucous membranes are dry.      Pharynx: No posterior oropharyngeal erythema.   Eyes:      General: No scleral icterus.  Neck:      Vascular: No JVD.   Cardiovascular:      Rate and Rhythm: Normal rate and regular rhythm.      Pulses: Normal pulses.      Heart sounds: Normal heart sounds. No murmur heard.  Pulmonary:      Effort: Pulmonary effort is normal. No respiratory distress.      Breath sounds: Normal breath sounds.   Abdominal:      General: Bowel sounds are normal. There is no distension.      Palpations: Abdomen is soft.      Tenderness: There is no abdominal tenderness.   Musculoskeletal:         General: Tenderness and signs of injury present. No swelling.      Cervical back: Neck supple.      Right lower leg: Tenderness present. Edema present.      Left lower leg: No tenderness. Edema present.      Comments: Bilateral lower extremity edema  with noted point tenderness of right knee      Skin:     General: Skin is warm and  "dry.      Capillary Refill: Capillary refill takes less than 2 seconds.      Coloration: Skin is not jaundiced.      Findings: Erythema present. No rash.   Neurological:      General: No focal deficit present.      Mental Status: She is alert and oriented to person, place, and time. Mental status is at baseline.   Psychiatric:         Mood and Affect: Mood normal.         Behavior: Behavior normal.       Results Review     I reviewed the patient's new clinical results.  Results from last 7 days   Lab Units 12/11/24  0614 12/10/24  0857   WBC 10*3/mm3 3.63 4.61   HEMOGLOBIN g/dL 10.0* 10.7*   PLATELETS 10*3/mm3 136* 157     Results from last 7 days   Lab Units 12/11/24  0614 12/10/24  0857   SODIUM mmol/L 141 138   POTASSIUM mmol/L 4.1 4.1   CHLORIDE mmol/L 109* 106   CO2 mmol/L 23.9 26.2   BUN mg/dL 26* 25*   CREATININE mg/dL 1.15* 1.25*   GLUCOSE mg/dL 101* 106*   EGFR mL/min/1.73 46.2* 41.8*       Results from last 7 days   Lab Units 12/11/24  0614 12/10/24  0857   CALCIUM mg/dL 8.9 9.2       No results found for: \"HGBA1C\", \"POCGLU\"    XR Knee 1 or 2 View Right    Result Date: 12/10/2024  No acute finding    This report was finalized on 12/10/2024 8:47 AM by Dr. Guillermo Conway M.D on Workstation: [id number]       I have personally reviewed all medications:  Scheduled Medications  acetaminophen, 1,000 mg, Oral, Q8H  anastrozole, 1 mg, Oral, Daily  ferrous sulfate, 325 mg, Oral, Daily With Breakfast  lisinopril, 10 mg, Oral, Daily  propranolol, 30 mg, Oral, BID  sodium chloride, 10 mL, Intravenous, Q12H    Infusions   Diet  Diet: Regular/House; Fluid Consistency: Thin (IDDSI 0)    I have personally reviewed:  x Home antihypertensive   Laboratory   x   Microbiology   x   Radiology   x   EKG/Telemetry  x   Cardiology/Vascular     Pathology      Records       Assessment/Plan     Active Hospital Problems    Diagnosis  POA    **Right knee pain [M25.561]  Yes    S/P TKR (total knee replacement) using cement, left " [Z96.652]  Not Applicable    Benign hypertensive kidney disease with chronic kidney disease [I12.9]  Yes    Stage 3a chronic kidney disease [N18.31]  Yes    Benign essential hypertension [I10]  Yes      Resolved Hospital Problems   No resolved problems to display.       89 y.o. female admitted with     Right knee pain  Acute   S/p total knee replacement she reports approximatly   S/P TKR (total knee replacement) using cement, left  Orthopedic surgery consulted   She is scheduled for left knee replacement with Dr Zavaleta in January   Pain is currently controlled   Her WBC  remain normal- inflammatory markers are unremarkable - does not appear to be infection  Appreciate orthopedic surgery recommendations and assistance   Continue supportive care     Benign essential hypertension  Chronic  BP stable and at goal currently   Continue to monitor   Home antihypertensives restarted at time of admission      Stage 3a chronic kidney disease  Chronic   Her creatinine is stabilizing  Continue to monitor  Avoid nephrotoxins, hypovolemia, hypotension  Daily weights  Monitor I's and O's      SCDs for DVT prophylaxis.  Full code.  Discussed with patient.  Anticipate discharge home when cleared by consultants.  Expected Discharge Date: 12/13/2024; Expected Discharge Time:       YNES Macias  [facility]  12/11/24  13:28 EST

## 2024-12-11 NOTE — NURSING NOTE
RN contacted orthopedic provider, MD Waqar, to verify orthopedic surgery consult will be completed today; spoke with Reyna. Will verify who is on-call and contact with status update.    Provider contacted unit; will consult and evaluate today.

## 2024-12-11 NOTE — THERAPY EVALUATION
Acute Care - Physical Therapy Initial Evaluation  [facility]     Patient Name: Haydee Dickey  : 1935  MRN: [id number]  Today's Date: 2024   Onset of Illness/Injury or Date of Surgery: 12/10/24  Visit Dx:     ICD-10-CM ICD-9-CM   1. Acute pain of right knee  M25.561 719.46   2. Chronic renal impairment, unspecified CKD stage  N18.9 585.9     Patient Active Problem List   Diagnosis    Acute left flank pain    B12 deficiency    Benign essential hypertension    Benign paroxysmal positional vertigo    H/O abdominal hysterectomy    H/O total knee replacement    Hip fracture    Torn rotator cuff    Osteoporosis    Essential tremor    Gait instability    Edema of lower extremity    Malignant neoplasm of upper-inner quadrant of left breast in female, estrogen receptor positive    Disorder of rotator cuff    Aromatase inhibitor use    Personal history of breast cancer    Vitamin D deficiency    Absolute anemia    Benign hypertensive kidney disease with chronic kidney disease    Hyperkalemia    Peripheral neuropathy    Stage 3a chronic kidney disease    High serum creatinine    OA (osteoarthritis) of knee    Status post left knee replacement    S/P TKR (total knee replacement) using cement, left    Right knee pain     Past Medical History:   Diagnosis Date    Allergic Percocet, iv iodine    Anemia     Arthritis     B12 deficiency     BPPV (benign paroxysmal positional vertigo)     Breast cancer     LEFT    Cataract   jaime,  jaime blepharoplasty    Colon polyp     COVID 2023    Rebound Covid following Paxlovid 23    CTS (carpal tunnel syndrome) ,     D’quervain x3    Deep vein thrombosis 1970    HX, LEG    Essential tremor     Folliculitis 2017    Fracture of hip 2014    Right ORIF    Fracture, femur     Left, No surgery    Fracture, radius     Right, orif    Gait instability     GERD (gastroesophageal reflux disease)     History of blood transfusion     History  "of fall 06/17/2024    \"KNEE GAVE OUT\"    HL (hearing loss) 2015    HEARING AIDS    Hypertension 2001    on Rx    Knee swelling 2001    Bilateral    Lower extremity neuropathy     bilateral    Neuropathy     Osteopenia 2019    Potassium (K) excess 10/21/2022    Seeing nephrologist -- Dr. Coats    Rotator cuff syndrome 2013    Fall induced    Sciatica     Small vessel disease     Torn rotator cuff 2013    right arm    Urinary tract infection 2018    approx 1 every 5 years    Wears hearing aid     BILATERAL    Wrist fracture, right 2015     Past Surgical History:   Procedure Laterality Date    ADENOIDECTOMY  1953    APPENDECTOMY  1961    BLEPHAROPLASTY Bilateral 10/19/2021    Procedure: BILATERAL UPPER LID BLEPHAROPLASTY;  Surgeon: Ruddy Moses MD;  Location: [facility] OR [facility];  Service: Ophthalmology;  Laterality: Bilateral;    BREAST BIOPSY      BREAST LUMPECTOMY Left 11/15/2021    Procedure: Left JEAN-PAUL-guided partial mastectomy;  Surgeon: Maliha Andres MD;  Location: [facility] MAIN OR;  Service: General;  Laterality: Left;    BROW LIFT Bilateral 10/19/2021    Procedure: BILATERAL TEMPORAL DIRECT BROWLIFT;  Surgeon: Ruddy Moses MD;  Location: [facility] OR [facility];  Service: Ophthalmology;  Laterality: Bilateral;    CARPAL TUNNEL RELEASE  1992    right wrist    CATARACT EXTRACTION, BILATERAL  07/01/2018    R 7/18 and L 9/18 WITH LENS IMPLANTS    COLONOSCOPY      DEQUERVAIN RELEASE Bilateral 2015    DILATATION AND CURETTAGE  1960s    EYE SURGERY  2018    Bilateral repairs    FRACTURE SURGERY  2014    R hip, R  wrist    HAND SURGERY Right     HIP FRACTURE SURGERY Right     HYSTERECTOMY  1975    Partial    JOINT REPLACEMENT  2001, 2014    TKA    TONSILLECTOMY AND ADENOIDECTOMY  1953    TOTAL KNEE ARTHROPLASTY Left 2001    TOTAL KNEE ARTHROPLASTY Right 2014    TRIGGER FINGER RELEASE Right     TRIGGER POINT INJECTION  2017, 2019    WRIST SURGERY  2015    FRACTURE RIGHT     PT Assessment (Last 12 Hours)  "      PT Evaluation and Treatment       [facility] Name 12/11/24 1500          Physical Therapy Time and Intention    Subjective Information no complaints  -     Document Type evaluation  -     Mode of Treatment individual therapy;physical therapy  -     Patient Effort good  -     Symptoms Noted During/After Treatment none  -[facility] Name 12/11/24 1500          General Information    Patient Profile Reviewed yes  -     Onset of Illness/Injury or Date of Surgery 12/10/24  -     Patient Observations cooperative;alert;agree to therapy  -     General Observations of Patient pt reclined in chair no acute distress  -     Prior Level of Function independent:  -     Equipment Currently Used at Home walker, rolling  -     Existing Precautions/Restrictions fall  -     Benefits Reviewed patient:  -       Row Name 12/11/24 1500          Living Environment    Current Living Arrangements home  -[facility] Name 12/11/24 1500          Pain    Pretreatment Pain Rating 7/10  -     Posttreatment Pain Rating 7/10  -     Pain Location knee  -     Pain Side/Orientation right  -     Pain Management Interventions cold applied  -[facility] Name 12/11/24 1500          Cognition    Affect/Mental Status (Cognition) WFL  -[facility] Name 12/11/24 1500          Range of Motion Comprehensive    Comment, General Range of Motion RLE lacking full extension due to pain  -[facility] Name 12/11/24 1500          Strength Comprehensive (MMT)    Comment, General Manual Muscle Testing (MMT) Assessment LEs grossly at least 3/5  -[facility] Name 12/11/24 1500          Bed Mobility    Bed Mobility sit-supine;supine-sit  -     Comment, (Bed Mobility) NT  -[facility] Name 12/11/24 1500          Transfers    Transfers stand-sit transfer;sit-stand transfer  -[facility] Name 12/11/24 1500          Sit-Stand Transfer    Sit-Stand [place] (Transfers) contact guard  -     Assistive Device (Sit-Stand Transfers)  walker, front-wheeled  -       Row Name 12/11/24 1500          Stand-Sit Transfer    Stand-Sit [place] (Transfers) contact guard  -     Assistive Device (Stand-Sit Transfers) walker, front-wheeled  -       Row Name 12/11/24 1500          Gait/Stairs (Locomotion)    [place] Level (Gait) contact guard  -     Assistive [facility] (Gait) [facility]  -     Distance in Feet (Gait) 2  -LH     Pattern (Gait) step-through  -     Deviations/Abnormal Patterns (Gait) right sided deviations;antalgic;weight shifting decreased  -     Bilateral Gait Deviations forward flexed posture;heel strike decreased  -     Comment, (Gait/Stairs) unable to fully bear weight through RLE due to pain  -       Row Name 12/11/24 1500          Balance    Balance Assessment sitting static balance;standing static balance  -     Static Sitting Balance independent  -     Position, Sitting Balance unsupported;sitting in chair  -     Static Standing Balance standby assist  -     Position/Device Used, Standing Balance supported;walker, front-wheeled  -       Row Name 12/11/24 1500          Motor Skills    Therapeutic Exercise --  APs x 10  -       Row Name 12/11/24 1500          Plan of Care Review    Plan of Care Reviewed With patient  -     Outcome Evaluation Pt 88 yo female presented to ED w acute R knee pain, pt reports she twisted it in the bed and couldnt ambulate. Pt reports she is sched to have L TKA 1/13/25 DR Zavaleta. On PT exam pt stood CGA rwx unable to full bear weight due to R knee medial LE pain. Awaiting ortho recs. Pt may benefit from skilled PT acutely to address deficits, DCs recs pending, pt lives w daughter and has rwx.  -       Row Name 12/11/24 1500          Positioning and Restraints    Pre-Treatment Position sitting in chair/recliner  -     Post Treatment Position chair  -LH     In Chair reclined;call light within reach;encouraged to call for assist;exit alarm on;RLE elevated  ice  provided  -       Row Name 12/11/24 1500          Therapy Assessment/Plan (PT)    Rehab Potential (PT) good  -     Criteria for Skilled Interventions Met (PT) yes  -     Therapy Frequency (PT) 5 times/wk  -[facility] Name 12/11/24 1500          PT Evaluation Complexity    History, PT Evaluation Complexity 1-2 personal factors and/or comorbidities  -     Examination of Body Systems (PT Eval Complexity) total of 3 or more elements  -     Clinical Presentation (PT Evaluation Complexity) evolving  -     Clinical Decision Making (PT Evaluation Complexity) moderate complexity  -     Overall Complexity (PT Evaluation Complexity) moderate complexity  -[facility] Name 12/11/24 1500          Physical Therapy Goals    Gait Training Goal Selection (PT) gait training, PT goal 1  -[facility] Name 12/11/24 1500          Gait Training Goal 1 (PT)    Activity/Assistive Device (Gait Training Goal 1, PT) gait (walking locomotion);walker, rolling  -     [place] Level (Gait Training Goal 1, PT) modified independence  -     Distance (Gait Training Goal 1, PT) 100  -     Time Frame (Gait Training Goal 1, PT) 1 week  -               User Key  (r) = Recorded By, (t) = Taken By, (c) = Cosigned By      Initials Name Provider Type     Arianna Johnson, PT Physical Therapist                    Physical Therapy Education       Title: PT OT SLP Therapies (In Progress)       Topic: Physical Therapy (In Progress)       Point: Mobility training (In Progress)       Learning Progress Summary            Patient Acceptance, E, NR by  at 12/11/2024 1551                      Point: Home exercise program (In Progress)       Learning Progress Summary            Patient Acceptance, E, NR by  at 12/11/2024 1551                      Point: Body mechanics (In Progress)       Learning Progress Summary            Patient Acceptance, E, NR by  at 12/11/2024 1551                      Point: Precautions (In Progress)        Learning Progress Summary            Patient Acceptance, E, NR by  at 12/11/2024 1551                                      User Key       Initials Effective Dates Name Provider Type Discipline     06/16/21 -  Arianna Johnson, PT Physical Therapist PT                  PT Recommendation and Plan  Anticipated Discharge Disposition (PT): home with home health, skilled nursing facility  Planned Therapy Interventions (PT): balance training, bed mobility training, gait training, home exercise program, ROM (range of motion), stair training, strengthening, stretching, transfer training  Therapy Frequency (PT): 5 times/wk  Plan of Care Reviewed With: patient  Outcome Evaluation: Pt 89 yo female presented to ED w acute R knee pain, pt reports she twisted it in the bed and couldnt ambulate. Pt reports she is sched to have L TKA 1/13/25 DR Zavaleta. On PT exam pt stood CGA rwx unable to full bear weight due to R knee medial LE pain. Awaiting ortho recs. Pt may benefit from skilled PT acutely to address deficits, DCs recs pending, pt lives w daughter and has rwx.   Outcome Measures       Row Name 12/11/24 1500             How much help from another person do you currently need...    Turning from your back to your side while in flat bed without using bedrails? 4  -LH      Moving from lying on back to sitting on the side of a flat bed without bedrails? 4  -LH      Moving to and from a bed to a chair (including a wheelchair)? 3  -LH      Standing up from a chair using your arms (e.g., wheelchair, bedside chair)? 3  -LH      Climbing 3-5 steps with a railing? 2  -LH      To walk in hospital room? 2  -      AM-PAC 6 Clicks Score (PT) 18  -         Functional Assessment    Outcome Measure Options AM-PAC 6 Clicks Basic Mobility (PT)  -                User Key  (r) = Recorded By, (t) = Taken By, (c) = Cosigned By      Initials Name Provider Type     Arianna Johnson, PT Physical Therapist                     Time Calculation:    PT  Charges       Row Name 12/11/24 1552             Time Calculation    Start Time 1407  -      Stop Time 1430  -      Time Calculation (min) 23 min  -      PT Received On 12/11/24  -      PT - Next Appointment 12/12/24  -      PT Goal Re-Cert Due Date 12/18/24  -         Time Calculation- PT    Total Timed Code Minutes- PT 10 minute(s)  -                User Key  (r) = Recorded By, (t) = Taken By, (c) = Cosigned By      Initials Name Provider Type     Arianna Johnson, PT Physical Therapist                  Therapy Charges for Today       Code Description Service Date Service Provider Modifiers Qty    12623108699 HC PT EVAL LOW COMPLEXITY 3 12/11/2024 Arianna Johnson, PT GP 1    67675936033 HC PT THER PROC EA 15 MIN 12/11/2024 Arianna Johnson, PT GP 1            PT G-Codes  Outcome Measure Options: AM-PAC 6 Clicks Basic Mobility (PT)  AM-PAC 6 Clicks Score (PT): 18    Arianna Johnson, PT  12/11/2024

## 2024-12-12 ENCOUNTER — ANESTHESIA (OUTPATIENT)
Dept: PERIOP | Facility: HOSPITAL | Age: 89
End: 2024-12-12
Payer: MEDICARE

## 2024-12-12 ENCOUNTER — ANESTHESIA EVENT (OUTPATIENT)
Dept: PERIOP | Facility: HOSPITAL | Age: 89
End: 2024-12-12
Payer: MEDICARE

## 2024-12-12 ENCOUNTER — TELEPHONE (OUTPATIENT)
Dept: ORTHOPEDIC SURGERY | Facility: CLINIC | Age: 89
End: 2024-12-12
Payer: MEDICARE

## 2024-12-12 ENCOUNTER — APPOINTMENT (OUTPATIENT)
Dept: GENERAL RADIOLOGY | Facility: HOSPITAL | Age: 89
DRG: 489 | End: 2024-12-12
Payer: MEDICARE

## 2024-12-12 LAB
ANION GAP SERPL CALCULATED.3IONS-SCNC: 8.4 MMOL/L (ref 5–15)
ARTERIAL PATENCY WRIST A: ABNORMAL
ATMOSPHERIC PRESS: 757.8 MMHG
BASE EXCESS BLDA CALC-SCNC: -3.1 MMOL/L (ref 0–2)
BDY SITE: ABNORMAL
BUN SERPL-MCNC: 28 MG/DL (ref 8–23)
BUN/CREAT SERPL: 25 (ref 7–25)
CALCIUM SPEC-SCNC: 9.2 MG/DL (ref 8.6–10.5)
CHLORIDE SERPL-SCNC: 106 MMOL/L (ref 98–107)
CO2 BLDA-SCNC: 24.8 MMOL/L (ref 23–27)
CO2 SERPL-SCNC: 22.6 MMOL/L (ref 22–29)
CREAT SERPL-MCNC: 1.12 MG/DL (ref 0.57–1)
DEPRECATED RDW RBC AUTO: 38.3 FL (ref 37–54)
DEVICE COMMENT: ABNORMAL
EGFRCR SERPLBLD CKD-EPI 2021: 47.7 ML/MIN/1.73
ERYTHROCYTE [DISTWIDTH] IN BLOOD BY AUTOMATED COUNT: 11.4 % (ref 12.3–15.4)
GAS FLOW AIRWAY: 3 LPM
GLUCOSE SERPL-MCNC: 96 MG/DL (ref 65–99)
HCO3 BLDA-SCNC: 23.4 MMOL/L (ref 22–28)
HCT VFR BLD AUTO: 30.9 % (ref 34–46.6)
HEMODILUTION: NO
HGB BLD-MCNC: 10.4 G/DL (ref 12–15.9)
MCH RBC QN AUTO: 31.4 PG (ref 26.6–33)
MCHC RBC AUTO-ENTMCNC: 33.7 G/DL (ref 31.5–35.7)
MCV RBC AUTO: 93.4 FL (ref 79–97)
MODALITY: ABNORMAL
PCO2 BLDA: 46.5 MM HG (ref 35–45)
PH BLDA: 7.31 PH UNITS (ref 7.35–7.45)
PLATELET # BLD AUTO: 156 10*3/MM3 (ref 140–450)
PMV BLD AUTO: 9.8 FL (ref 6–12)
PO2 BLDA: 142.5 MM HG (ref 80–100)
POTASSIUM SERPL-SCNC: 4.5 MMOL/L (ref 3.5–5.2)
RBC # BLD AUTO: 3.31 10*6/MM3 (ref 3.77–5.28)
SAO2 % BLDCOA: 98.9 % (ref 92–98.5)
SET MECH RESP RATE: 16
SODIUM SERPL-SCNC: 137 MMOL/L (ref 136–145)
TOTAL RATE: 16 BREATHS/MINUTE
WBC NRBC COR # BLD AUTO: 4.02 10*3/MM3 (ref 3.4–10.8)

## 2024-12-12 PROCEDURE — 25010000002 SUGAMMADEX 200 MG/2ML SOLUTION: Performed by: ANESTHESIOLOGY

## 2024-12-12 PROCEDURE — 25010000002 HYDROMORPHONE PER 4 MG: Performed by: ANESTHESIOLOGY

## 2024-12-12 PROCEDURE — 25010000002 MORPHINE PER 10 MG: Performed by: ORTHOPAEDIC SURGERY

## 2024-12-12 PROCEDURE — C1776 JOINT DEVICE (IMPLANTABLE): HCPCS | Performed by: ORTHOPAEDIC SURGERY

## 2024-12-12 PROCEDURE — 27438 REVISE KNEECAP WITH IMPLANT: CPT | Performed by: NURSE PRACTITIONER

## 2024-12-12 PROCEDURE — 25010000002 VANCOMYCIN HCL 1.25 G RECONSTITUTED SOLUTION 1 EACH VIAL: Performed by: ORTHOPAEDIC SURGERY

## 2024-12-12 PROCEDURE — 25010000002 CEFAZOLIN PER 500 MG: Performed by: ORTHOPAEDIC SURGERY

## 2024-12-12 PROCEDURE — 73560 X-RAY EXAM OF KNEE 1 OR 2: CPT

## 2024-12-12 PROCEDURE — 25010000002 ROPIVACAINE PER 1 MG: Performed by: ANESTHESIOLOGY

## 2024-12-12 PROCEDURE — 0SPC09Z REMOVAL OF LINER FROM RIGHT KNEE JOINT, OPEN APPROACH: ICD-10-PCS | Performed by: ORTHOPAEDIC SURGERY

## 2024-12-12 PROCEDURE — 0SUV09Z SUPPLEMENT RIGHT KNEE JOINT, TIBIAL SURFACE WITH LINER, OPEN APPROACH: ICD-10-PCS | Performed by: ORTHOPAEDIC SURGERY

## 2024-12-12 PROCEDURE — 25010000002 DEXAMETHASONE SODIUM PHOSPHATE 20 MG/5ML SOLUTION: Performed by: ANESTHESIOLOGY

## 2024-12-12 PROCEDURE — 25010000002 ONDANSETRON PER 1 MG: Performed by: ANESTHESIOLOGY

## 2024-12-12 PROCEDURE — 25010000002 ROPIVACAINE PER 1 MG: Performed by: ORTHOPAEDIC SURGERY

## 2024-12-12 PROCEDURE — C1713 ANCHOR/SCREW BN/BN,TIS/BN: HCPCS | Performed by: ORTHOPAEDIC SURGERY

## 2024-12-12 PROCEDURE — 25010000002 LIDOCAINE 2% SOLUTION: Performed by: ANESTHESIOLOGY

## 2024-12-12 PROCEDURE — 25010000002 ACETAMINOPHEN 10 MG/ML SOLUTION: Performed by: ANESTHESIOLOGY

## 2024-12-12 PROCEDURE — 25810000003 LACTATED RINGERS PER 1000 ML: Performed by: ANESTHESIOLOGY

## 2024-12-12 PROCEDURE — 27438 REVISE KNEECAP WITH IMPLANT: CPT | Performed by: ORTHOPAEDIC SURGERY

## 2024-12-12 PROCEDURE — 0QUD0JZ SUPPLEMENT RIGHT PATELLA WITH SYNTHETIC SUBSTITUTE, OPEN APPROACH: ICD-10-PCS | Performed by: ORTHOPAEDIC SURGERY

## 2024-12-12 PROCEDURE — 85027 COMPLETE CBC AUTOMATED: CPT | Performed by: NURSE PRACTITIONER

## 2024-12-12 PROCEDURE — 25010000002 FENTANYL CITRATE (PF) 50 MCG/ML SOLUTION: Performed by: ANESTHESIOLOGY

## 2024-12-12 PROCEDURE — 82803 BLOOD GASES ANY COMBINATION: CPT | Performed by: ANESTHESIOLOGY

## 2024-12-12 PROCEDURE — 25010000002 PROPOFOL 10 MG/ML EMULSION: Performed by: ANESTHESIOLOGY

## 2024-12-12 PROCEDURE — 25810000003 SODIUM CHLORIDE 0.9 % SOLUTION 250 ML FLEX CONT: Performed by: ORTHOPAEDIC SURGERY

## 2024-12-12 PROCEDURE — 25010000002 PROPOFOL 500 MG/50ML EMULSION: Performed by: ANESTHESIOLOGY

## 2024-12-12 PROCEDURE — 25010000002 MAGNESIUM SULFATE PER 500 MG OF MAGNESIUM: Performed by: ANESTHESIOLOGY

## 2024-12-12 PROCEDURE — 80048 BASIC METABOLIC PNL TOTAL CA: CPT | Performed by: NURSE PRACTITIONER

## 2024-12-12 PROCEDURE — 94799 UNLISTED PULMONARY SVC/PX: CPT

## 2024-12-12 PROCEDURE — 97535 SELF CARE MNGMENT TRAINING: CPT

## 2024-12-12 PROCEDURE — 25010000002 KETOROLAC TROMETHAMINE PER 15 MG: Performed by: ORTHOPAEDIC SURGERY

## 2024-12-12 PROCEDURE — 25010000002 EPINEPHRINE 1 MG/ML SOLUTION 30 ML VIAL: Performed by: ORTHOPAEDIC SURGERY

## 2024-12-12 PROCEDURE — 36600 WITHDRAWAL OF ARTERIAL BLOOD: CPT | Performed by: ANESTHESIOLOGY

## 2024-12-12 PROCEDURE — 97165 OT EVAL LOW COMPLEX 30 MIN: CPT

## 2024-12-12 PROCEDURE — 94660 CPAP INITIATION&MGMT: CPT

## 2024-12-12 DEVICE — DEV CONTRL TISS STRATAFIX SYMM PDS PLUS VIL CT-1 60CM: Type: IMPLANTABLE DEVICE | Site: KNEE | Status: FUNCTIONAL

## 2024-12-12 DEVICE — CMT BONE PALACOS R HI/VISC 1X40: Type: IMPLANTABLE DEVICE | Site: KNEE | Status: FUNCTIONAL

## 2024-12-12 DEVICE — GEN II 7.5MM RESUR PAT 35MM
Type: IMPLANTABLE DEVICE | Site: KNEE | Status: FUNCTIONAL
Brand: GENESIS II

## 2024-12-12 DEVICE — DEV CONTRL TISS STRATAFIX SPIRAL MNCRYL UD 3/0 PLS 30CM: Type: IMPLANTABLE DEVICE | Site: KNEE | Status: FUNCTIONAL

## 2024-12-12 DEVICE — LEGION CRUCIATE RETAINING HIGH                                    FLEX HIGHLY CROSS LINKED                                    POLYETHYLENE SIZE 3-4 9MM
Type: IMPLANTABLE DEVICE | Site: KNEE | Status: FUNCTIONAL
Brand: LEGION

## 2024-12-12 RX ORDER — EPHEDRINE SULFATE 50 MG/ML
5 INJECTION, SOLUTION INTRAVENOUS ONCE AS NEEDED
Status: DISCONTINUED | OUTPATIENT
Start: 2024-12-12 | End: 2024-12-13 | Stop reason: HOSPADM

## 2024-12-12 RX ORDER — FENTANYL CITRATE 50 UG/ML
50 INJECTION, SOLUTION INTRAMUSCULAR; INTRAVENOUS
Status: DISCONTINUED | OUTPATIENT
Start: 2024-12-12 | End: 2024-12-12 | Stop reason: HOSPADM

## 2024-12-12 RX ORDER — HYDROCODONE BITARTRATE AND ACETAMINOPHEN 7.5; 325 MG/1; MG/1
1 TABLET ORAL EVERY 4 HOURS PRN
Status: DISCONTINUED | OUTPATIENT
Start: 2024-12-12 | End: 2024-12-17 | Stop reason: HOSPADM

## 2024-12-12 RX ORDER — FLUMAZENIL 0.1 MG/ML
0.2 INJECTION INTRAVENOUS AS NEEDED
Status: DISCONTINUED | OUTPATIENT
Start: 2024-12-12 | End: 2024-12-13 | Stop reason: HOSPADM

## 2024-12-12 RX ORDER — DIPHENHYDRAMINE HYDROCHLORIDE 50 MG/ML
12.5 INJECTION INTRAMUSCULAR; INTRAVENOUS
Status: DISCONTINUED | OUTPATIENT
Start: 2024-12-12 | End: 2024-12-13 | Stop reason: HOSPADM

## 2024-12-12 RX ORDER — DEXAMETHASONE SODIUM PHOSPHATE 4 MG/ML
INJECTION, SOLUTION INTRA-ARTICULAR; INTRALESIONAL; INTRAMUSCULAR; INTRAVENOUS; SOFT TISSUE
Status: COMPLETED | OUTPATIENT
Start: 2024-12-12 | End: 2024-12-12

## 2024-12-12 RX ORDER — MIDAZOLAM HYDROCHLORIDE 1 MG/ML
0.5 INJECTION, SOLUTION INTRAMUSCULAR; INTRAVENOUS
Status: DISCONTINUED | OUTPATIENT
Start: 2024-12-12 | End: 2024-12-12 | Stop reason: HOSPADM

## 2024-12-12 RX ORDER — LABETALOL HYDROCHLORIDE 5 MG/ML
5 INJECTION, SOLUTION INTRAVENOUS
Status: DISCONTINUED | OUTPATIENT
Start: 2024-12-12 | End: 2024-12-13 | Stop reason: HOSPADM

## 2024-12-12 RX ORDER — ROPIVACAINE HYDROCHLORIDE 5 MG/ML
INJECTION, SOLUTION EPIDURAL; INFILTRATION; PERINEURAL
Status: COMPLETED | OUTPATIENT
Start: 2024-12-12 | End: 2024-12-12

## 2024-12-12 RX ORDER — PROPOFOL 10 MG/ML
INJECTION, EMULSION INTRAVENOUS AS NEEDED
Status: DISCONTINUED | OUTPATIENT
Start: 2024-12-12 | End: 2024-12-12 | Stop reason: SURG

## 2024-12-12 RX ORDER — ROCURONIUM BROMIDE 10 MG/ML
INJECTION, SOLUTION INTRAVENOUS AS NEEDED
Status: DISCONTINUED | OUTPATIENT
Start: 2024-12-12 | End: 2024-12-12 | Stop reason: SURG

## 2024-12-12 RX ORDER — FENTANYL CITRATE 50 UG/ML
INJECTION, SOLUTION INTRAMUSCULAR; INTRAVENOUS AS NEEDED
Status: DISCONTINUED | OUTPATIENT
Start: 2024-12-12 | End: 2024-12-12 | Stop reason: SURG

## 2024-12-12 RX ORDER — IPRATROPIUM BROMIDE AND ALBUTEROL SULFATE 2.5; .5 MG/3ML; MG/3ML
3 SOLUTION RESPIRATORY (INHALATION) ONCE AS NEEDED
Status: DISCONTINUED | OUTPATIENT
Start: 2024-12-12 | End: 2024-12-13 | Stop reason: HOSPADM

## 2024-12-12 RX ORDER — SODIUM CHLORIDE, SODIUM LACTATE, POTASSIUM CHLORIDE, CALCIUM CHLORIDE 600; 310; 30; 20 MG/100ML; MG/100ML; MG/100ML; MG/100ML
9 INJECTION, SOLUTION INTRAVENOUS CONTINUOUS
Status: ACTIVE | OUTPATIENT
Start: 2024-12-12 | End: 2024-12-13

## 2024-12-12 RX ORDER — ATROPINE SULFATE 0.4 MG/ML
0.4 INJECTION, SOLUTION INTRAMUSCULAR; INTRAVENOUS; SUBCUTANEOUS ONCE AS NEEDED
Status: DISCONTINUED | OUTPATIENT
Start: 2024-12-12 | End: 2024-12-13 | Stop reason: HOSPADM

## 2024-12-12 RX ORDER — HYDROMORPHONE HYDROCHLORIDE 1 MG/ML
0.25 INJECTION, SOLUTION INTRAMUSCULAR; INTRAVENOUS; SUBCUTANEOUS
Status: DISCONTINUED | OUTPATIENT
Start: 2024-12-12 | End: 2024-12-13 | Stop reason: HOSPADM

## 2024-12-12 RX ORDER — FAMOTIDINE 10 MG/ML
20 INJECTION, SOLUTION INTRAVENOUS ONCE
Status: COMPLETED | OUTPATIENT
Start: 2024-12-12 | End: 2024-12-12

## 2024-12-12 RX ORDER — SODIUM CHLORIDE, SODIUM LACTATE, POTASSIUM CHLORIDE, CALCIUM CHLORIDE 600; 310; 30; 20 MG/100ML; MG/100ML; MG/100ML; MG/100ML
INJECTION, SOLUTION INTRAVENOUS CONTINUOUS PRN
Status: DISCONTINUED | OUTPATIENT
Start: 2024-12-12 | End: 2024-12-12 | Stop reason: SURG

## 2024-12-12 RX ORDER — MAGNESIUM SULFATE HEPTAHYDRATE 500 MG/ML
INJECTION, SOLUTION INTRAMUSCULAR; INTRAVENOUS AS NEEDED
Status: DISCONTINUED | OUTPATIENT
Start: 2024-12-12 | End: 2024-12-12 | Stop reason: SURG

## 2024-12-12 RX ORDER — PHENYLEPHRINE HCL IN 0.9% NACL 1 MG/10 ML
SYRINGE (ML) INTRAVENOUS AS NEEDED
Status: DISCONTINUED | OUTPATIENT
Start: 2024-12-12 | End: 2024-12-12 | Stop reason: SURG

## 2024-12-12 RX ORDER — FENTANYL CITRATE 50 UG/ML
25 INJECTION, SOLUTION INTRAMUSCULAR; INTRAVENOUS
Status: DISCONTINUED | OUTPATIENT
Start: 2024-12-12 | End: 2024-12-13 | Stop reason: HOSPADM

## 2024-12-12 RX ORDER — ACETAMINOPHEN 10 MG/ML
INJECTION, SOLUTION INTRAVENOUS AS NEEDED
Status: DISCONTINUED | OUTPATIENT
Start: 2024-12-12 | End: 2024-12-12 | Stop reason: SURG

## 2024-12-12 RX ORDER — MAGNESIUM HYDROXIDE 1200 MG/15ML
LIQUID ORAL AS NEEDED
Status: DISCONTINUED | OUTPATIENT
Start: 2024-12-12 | End: 2024-12-12 | Stop reason: HOSPADM

## 2024-12-12 RX ORDER — SODIUM CHLORIDE 0.9 % (FLUSH) 0.9 %
3-10 SYRINGE (ML) INJECTION AS NEEDED
Status: DISCONTINUED | OUTPATIENT
Start: 2024-12-12 | End: 2024-12-12 | Stop reason: HOSPADM

## 2024-12-12 RX ORDER — LIDOCAINE HYDROCHLORIDE 20 MG/ML
INJECTION, SOLUTION INFILTRATION; PERINEURAL AS NEEDED
Status: DISCONTINUED | OUTPATIENT
Start: 2024-12-12 | End: 2024-12-12 | Stop reason: SURG

## 2024-12-12 RX ORDER — ONDANSETRON 2 MG/ML
INJECTION INTRAMUSCULAR; INTRAVENOUS AS NEEDED
Status: DISCONTINUED | OUTPATIENT
Start: 2024-12-12 | End: 2024-12-12 | Stop reason: SURG

## 2024-12-12 RX ORDER — SODIUM CHLORIDE 0.9 % (FLUSH) 0.9 %
3 SYRINGE (ML) INJECTION EVERY 12 HOURS SCHEDULED
Status: DISCONTINUED | OUTPATIENT
Start: 2024-12-12 | End: 2024-12-12 | Stop reason: HOSPADM

## 2024-12-12 RX ORDER — NALOXONE HCL 0.4 MG/ML
0.2 VIAL (ML) INJECTION AS NEEDED
Status: DISCONTINUED | OUTPATIENT
Start: 2024-12-12 | End: 2024-12-13 | Stop reason: HOSPADM

## 2024-12-12 RX ORDER — HYDRALAZINE HYDROCHLORIDE 20 MG/ML
5 INJECTION INTRAMUSCULAR; INTRAVENOUS
Status: DISCONTINUED | OUTPATIENT
Start: 2024-12-12 | End: 2024-12-13 | Stop reason: HOSPADM

## 2024-12-12 RX ORDER — ONDANSETRON 2 MG/ML
4 INJECTION INTRAMUSCULAR; INTRAVENOUS ONCE AS NEEDED
Status: DISCONTINUED | OUTPATIENT
Start: 2024-12-12 | End: 2024-12-13 | Stop reason: HOSPADM

## 2024-12-12 RX ORDER — TRANEXAMIC ACID 100 MG/ML
INJECTION, SOLUTION INTRAVENOUS AS NEEDED
Status: DISCONTINUED | OUTPATIENT
Start: 2024-12-12 | End: 2024-12-12 | Stop reason: SURG

## 2024-12-12 RX ORDER — PREGABALIN 75 MG/1
75 CAPSULE ORAL ONCE
Status: COMPLETED | OUTPATIENT
Start: 2024-12-12 | End: 2024-12-12

## 2024-12-12 RX ADMIN — SUGAMMADEX 200 MG: 100 INJECTION, SOLUTION INTRAVENOUS at 17:54

## 2024-12-12 RX ADMIN — PROPOFOL 70 MG: 10 INJECTION, EMULSION INTRAVENOUS at 16:43

## 2024-12-12 RX ADMIN — FAMOTIDINE 20 MG: 10 INJECTION INTRAVENOUS at 16:24

## 2024-12-12 RX ADMIN — VANCOMYCIN HYDROCHLORIDE 1250 MG: 1.25 INJECTION, POWDER, LYOPHILIZED, FOR SOLUTION INTRAVENOUS at 16:25

## 2024-12-12 RX ADMIN — PREGABALIN 75 MG: 75 CAPSULE ORAL at 16:24

## 2024-12-12 RX ADMIN — FENTANYL CITRATE 25 MCG: 50 INJECTION, SOLUTION INTRAMUSCULAR; INTRAVENOUS at 17:21

## 2024-12-12 RX ADMIN — ACETAMINOPHEN 1000 MG: 1000 INJECTION INTRAVENOUS at 17:05

## 2024-12-12 RX ADMIN — SODIUM CHLORIDE, POTASSIUM CHLORIDE, SODIUM LACTATE AND CALCIUM CHLORIDE: 600; 310; 30; 20 INJECTION, SOLUTION INTRAVENOUS at 16:35

## 2024-12-12 RX ADMIN — ANASTROZOLE 1 MG: 1 TABLET, COATED ORAL at 08:58

## 2024-12-12 RX ADMIN — Medication 10 ML: at 08:59

## 2024-12-12 RX ADMIN — FENTANYL CITRATE 25 MCG: 50 INJECTION, SOLUTION INTRAMUSCULAR; INTRAVENOUS at 17:00

## 2024-12-12 RX ADMIN — Medication 100 MCG: at 17:26

## 2024-12-12 RX ADMIN — HYDROMORPHONE HYDROCHLORIDE 0.25 MG: 1 INJECTION, SOLUTION INTRAMUSCULAR; INTRAVENOUS; SUBCUTANEOUS at 18:26

## 2024-12-12 RX ADMIN — TRANEXAMIC ACID 1000 MG: 100 INJECTION, SOLUTION INTRAVENOUS at 17:10

## 2024-12-12 RX ADMIN — DEXAMETHASONE SODIUM PHOSPHATE 10 MG: 4 INJECTION, SOLUTION INTRAMUSCULAR; INTRAVENOUS at 16:48

## 2024-12-12 RX ADMIN — HYDROMORPHONE HYDROCHLORIDE 0.25 MG: 1 INJECTION, SOLUTION INTRAMUSCULAR; INTRAVENOUS; SUBCUTANEOUS at 18:40

## 2024-12-12 RX ADMIN — DEXAMETHASONE SODIUM PHOSPHATE 4 MG: 4 INJECTION, SOLUTION INTRAMUSCULAR; INTRAVENOUS at 15:57

## 2024-12-12 RX ADMIN — HYDROMORPHONE HYDROCHLORIDE 0.25 MG: 1 INJECTION, SOLUTION INTRAMUSCULAR; INTRAVENOUS; SUBCUTANEOUS at 18:22

## 2024-12-12 RX ADMIN — LISINOPRIL 10 MG: 10 TABLET ORAL at 08:58

## 2024-12-12 RX ADMIN — ONDANSETRON 4 MG: 2 INJECTION INTRAMUSCULAR; INTRAVENOUS at 17:54

## 2024-12-12 RX ADMIN — FENTANYL CITRATE 50 MCG: 50 INJECTION, SOLUTION INTRAMUSCULAR; INTRAVENOUS at 15:52

## 2024-12-12 RX ADMIN — Medication 100 MCG: at 16:53

## 2024-12-12 RX ADMIN — MAGNESIUM SULFATE HEPTAHYDRATE 1 G: 500 INJECTION, SOLUTION INTRAMUSCULAR; INTRAVENOUS at 16:58

## 2024-12-12 RX ADMIN — HYDROMORPHONE HYDROCHLORIDE 0.25 MG: 1 INJECTION, SOLUTION INTRAMUSCULAR; INTRAVENOUS; SUBCUTANEOUS at 18:16

## 2024-12-12 RX ADMIN — PROPOFOL 100 MCG/KG/MIN: 10 INJECTION, EMULSION INTRAVENOUS at 16:43

## 2024-12-12 RX ADMIN — HYDROMORPHONE HYDROCHLORIDE 0.25 MG: 1 INJECTION, SOLUTION INTRAMUSCULAR; INTRAVENOUS; SUBCUTANEOUS at 18:12

## 2024-12-12 RX ADMIN — SODIUM CHLORIDE, POTASSIUM CHLORIDE, SODIUM LACTATE AND CALCIUM CHLORIDE 9 ML/HR: 600; 310; 30; 20 INJECTION, SOLUTION INTRAVENOUS at 16:25

## 2024-12-12 RX ADMIN — ROPIVACAINE HYDROCHLORIDE 20 ML: 5 INJECTION EPIDURAL; INFILTRATION; PERINEURAL at 15:57

## 2024-12-12 RX ADMIN — LIDOCAINE HYDROCHLORIDE 80 MG: 20 INJECTION, SOLUTION INFILTRATION; PERINEURAL at 16:43

## 2024-12-12 RX ADMIN — FERROUS SULFATE TAB 325 MG (65 MG ELEMENTAL FE) 325 MG: 325 (65 FE) TAB at 08:57

## 2024-12-12 RX ADMIN — PROPRANOLOL HYDROCHLORIDE 30 MG: 20 TABLET ORAL at 08:58

## 2024-12-12 RX ADMIN — ROCURONIUM BROMIDE 40 MG: 10 INJECTION, SOLUTION INTRAVENOUS at 16:43

## 2024-12-12 RX ADMIN — SODIUM CHLORIDE 2000 MG: 900 INJECTION INTRAVENOUS at 16:25

## 2024-12-12 RX ADMIN — HYDROMORPHONE HYDROCHLORIDE 0.25 MG: 1 INJECTION, SOLUTION INTRAMUSCULAR; INTRAVENOUS; SUBCUTANEOUS at 18:37

## 2024-12-12 NOTE — PLAN OF CARE
Goal Outcome Evaluation:      Patient will remain free from falls while on this unit.

## 2024-12-12 NOTE — PLAN OF CARE
"Goal Outcome Evaluation:  Plan of Care Reviewed With: patient        Progress: no change  Outcome Evaluation: Pt is an 88 y/o F admitted to [facility] 12/10 with R knee pain, unable to ambulate. PMH: S/p TKA 2014, HTN, CKD.                         OT F/u in PM with no ortho notes In yet regarding status, per chart Pt isnt able to WB/ambulate through R knee. OT checking in with pt to determine if Ortho has seen her yet today and she is up in chair. She reports she is going for exploratory surgery at some point, was supposed to be today but was canceled and will be re-scheduled. Pt verb'd she is unable to walk on her R knee but has been performing stand and pivot to/from BSC with her walker. \"Are you going to be the one to help bring that bedside commode over\" OT continued w/ evaluation with no increased s/s distress of pain w/ mvmts to transition up to BSC as she reports she has been performing this all day. Observed supervision A w/ walker and stand pivot w TTWBing through R LE. Pending ortho recs and due to pain deferred further mobility at this time. OT plans to f/u post op and for recommendations on Wb'ing status. Pt was able to maintain static standing w/o increased pain to pull clothing down/up for toileting, and complete seated ADLs. Pt at this time is not safe to DC home, and pending performance/pain and Wb'ing status after surgery, may benefit from continued rehab at [facility] level. At this time POC rec 3x/week and recommend to f/u after surgery for re-assessment.    Anticipated Discharge Disposition (OT): skilled nursing facility                        "

## 2024-12-12 NOTE — SIGNIFICANT NOTE
12/12/24 6112   OTHER   Discipline physical therapist   Rehab Time/Intention   Session Not Performed other (see comments)  (awaiting ortho recs, however pt reports she is going for exploratory sx and awaiting further ortho input regarding knee pain. will plan to follow up.)

## 2024-12-12 NOTE — ANESTHESIA PROCEDURE NOTES
Peripheral Block      Patient reassessed immediately prior to procedure    Patient location during procedure: pre-op  Start time: 12/12/2024 3:54 PM  Stop time: 12/12/2024 3:57 PM  Reason for block: at surgeon's request and post-op pain management  Performed by  Anesthesiologist: Guillermo Mcbride MD  Preanesthetic Checklist  Completed: patient identified, IV checked, site marked, risks and benefits discussed, surgical consent, monitors and equipment checked, pre-op evaluation and timeout performed  Prep:  Pt Position: supine  Sterile barriers:cap, gloves, mask, alcohol skin prep and washed/disinfected hands  Prep: ChloraPrep  Patient monitoring: blood pressure monitoring, continuous pulse oximetry and EKG  Procedure    Sedation: yes    Guidance:ultrasound guided    ULTRASOUND INTERPRETATION.  Using ultrasound guidance a 21 G gauge needle was placed in close proximity to the femoral nerve, at which point, under ultrasound guidance anesthetic was injected in the area of the nerve and spread of the anesthesia was seen on ultrasound in close proximity thereto.  There were no abnormalities seen on ultrasound; a digital image was taken; and the patient tolerated the procedure with no complications. Images:still images obtained, printed/placed on chart    Laterality:right  Block Type:adductor canal block (Femoral Nerve at Adductor Canal)  Injection Technique:single-shot  Needle Type:short-bevel  Needle Gauge:21 G  Loss of resistance: normal.    Medications Used: dexamethasone (DECADRON) injection - Injection   4 mg - 12/12/2024 3:57:00 PM  ropivacaine (NAROPIN) 0.5 % injection - Injection   20 mL - 12/12/2024 3:57:00 PM      Medications  Comment:Ultrasound Interpretation:  Ultrasound guidance utilized for visualization of needle approach to femoral artery/nerve at adductor canal level and verification of local anesthetic disbursement to surrounding tissues. Photo printed and placed on chart for reference.    Post  Assessment  Injection Assessment: negative aspiration for heme, no paresthesia on injection and incremental injection  Patient Tolerance:comfortable throughout block  Complications:no  Performed by: Guillermo Mcbride MD

## 2024-12-12 NOTE — PROGRESS NOTES
Name: Haydee Dickey ADMIT: 12/10/2024   : 1935  PCP: Juliana Gonsalez APRN ([place])    MRN: [id number] LOS: 0 days   AGE/SEX: 89 y.o. female  ROOM: [facility]     Subjective   Subjective   Ms. Dickey is sitting up in recliner,she continues to have pain with bearing activities, but otherwise offers no other complaints.  She is n.p.o. with plans for exploratory surgery of her right knee with orthopedic surgery today.     Objective   Objective   Vital Signs  Temp:  [97.4 °F (36.3 °C)-98.2 °F (36.8 °C)] 98.1 °F (36.7 °C)  Heart Rate:  [67-75] 75  Resp:  [16] 16  BP: (111-135)/(51-79) 121/79  SpO2:  [90 %-98 %] 90 %  on   ;   Device (Oxygen Therapy): room air  There is no height or weight on file to calculate BMI.  Physical Exam  Vitals and nursing note reviewed.   Constitutional:       General: She is not in acute distress.     Appearance: Normal appearance.   HENT:      Head: Normocephalic and atraumatic.      Mouth/Throat:      Mouth: Mucous membranes are moist.      Pharynx: No posterior oropharyngeal erythema.   Eyes:      General: No scleral icterus.  Neck:      Vascular: No JVD.   Cardiovascular:      Rate and Rhythm: Normal rate and regular rhythm.      Pulses: Normal pulses.      Heart sounds: Normal heart sounds. No murmur heard.  Pulmonary:      Effort: Pulmonary effort is normal. No respiratory distress.      Breath sounds: Normal breath sounds.   Abdominal:      General: Bowel sounds are normal. There is no distension.      Palpations: Abdomen is soft.      Tenderness: There is no abdominal tenderness.   Musculoskeletal:         General: Tenderness and signs of injury present. No swelling.      Cervical back: Neck supple.      Right lower leg: Tenderness present. Edema present.      Left lower leg: No tenderness. Edema present.      Comments: Bilateral lower extremity edema  with noted point tenderness of right knee      Skin:     General: Skin is warm and dry.      Capillary Refill: Capillary refill  "takes less than 2 seconds.      Coloration: Skin is not jaundiced.      Findings: Erythema present. No rash.   Neurological:      General: No focal deficit present.      Mental Status: She is alert and oriented to person, place, and time. Mental status is at baseline.   Psychiatric:         Mood and Affect: Mood normal.         Behavior: Behavior normal.       Results Review     I reviewed the patient's new clinical results.  Results from last 7 days   Lab Units 12/12/24  0639 12/11/24  0614 12/10/24  0857   WBC 10*3/mm3 4.02 3.63 4.61   HEMOGLOBIN g/dL 10.4* 10.0* 10.7*   PLATELETS 10*3/mm3 156 136* 157     Results from last 7 days   Lab Units 12/12/24  0639 12/11/24  0614 12/10/24  0857   SODIUM mmol/L 137 141 138   POTASSIUM mmol/L 4.5 4.1 4.1   CHLORIDE mmol/L 106 109* 106   CO2 mmol/L 22.6 23.9 26.2   BUN mg/dL 28* 26* 25*   CREATININE mg/dL 1.12* 1.15* 1.25*   GLUCOSE mg/dL 96 101* 106*   EGFR mL/min/1.73 47.7* 46.2* 41.8*       Results from last 7 days   Lab Units 12/12/24  0639 12/11/24  0614 12/10/24  0857   CALCIUM mg/dL 9.2 8.9 9.2       No results found for: \"HGBA1C\", \"POCGLU\"    No radiology results for the last day    I have personally reviewed all medications:  Scheduled Medications  acetaminophen, 1,000 mg, Oral, Q8H  anastrozole, 1 mg, Oral, Daily  ferrous sulfate, 325 mg, Oral, Daily With Breakfast  lisinopril, 10 mg, Oral, Daily  propranolol, 30 mg, Oral, BID  sodium chloride, 10 mL, Intravenous, Q12H    Infusions   Diet  NPO Diet NPO Type: Strict NPO    I have personally reviewed:  x Home antihypertensive   Laboratory   x   Microbiology   x   Radiology   x   EKG/Telemetry  x   Cardiology/Vascular     Pathology      Records       Assessment/Plan     Active Hospital Problems    Diagnosis  POA    **Right knee pain [M25.561]  Yes    S/P TKR (total knee replacement) using cement, left [Z96.652]  Not Applicable    Benign hypertensive kidney disease with chronic kidney disease [I12.9]  Yes    Stage 3a " chronic kidney disease [N18.31]  Yes    Benign essential hypertension [I10]  Yes      Resolved Hospital Problems   No resolved problems to display.       89 y.o. female admitted with     Right knee pain  Acute   S/p total knee replacement in 2014  S/P TKR (total knee replacement) using cement, left   Orthopedic surgery consulted plans for exploratory surgery later this afternoon with .   Pain is currently controlled   PT OT is following  Appreciate orthopedic surgery recommendations and assistance   Continue supportive care   Dissipate transferring to orthopedic unit after surgery today.  Benign essential hypertension  Chronic  BP stable and at goal currently   Continue to monitor   Home antihypertensives restarted at time of admission      Stage 3a chronic kidney disease  Chronic   Her creatinine is stabilizing  Continue to monitor  Avoid nephrotoxins, hypovolemia, hypotension  Daily weights  Monitor I's and O's      SCDs for DVT prophylaxis.  Full code.  Discussed with patient.  Anticipate discharge home when cleared by consultants.  Expected Discharge Date: 12/13/2024; Expected Discharge Time:       YNES Macias  [facility]  12/12/24  11:04 EST

## 2024-12-12 NOTE — ANESTHESIA PROCEDURE NOTES
Airway  Urgency: elective    Date/Time: 12/12/2024 4:45 PM  Airway not difficult    General Information and Staff    Patient location during procedure: OR  Anesthesiologist: Debra Casiano MD    Indications and Patient Condition  Indications for airway management: airway protection    Preoxygenated: yes  MILS maintained throughout  Mask difficulty assessment: 1 - vent by mask    Final Airway Details  Final airway type: endotracheal airway      Successful airway: ETT  Cuffed: yes   Successful intubation technique: direct laryngoscopy  Endotracheal tube insertion site: oral  Blade: Wanda  Blade size: 3  ETT size (mm): 7.0  Cormack-Lehane Classification: grade I - full view of glottis  Placement verified by: chest auscultation and capnometry   Measured from: teeth  Number of attempts at approach: 1  Assessment: lips, teeth, and gum same as pre-op and atraumatic intubation

## 2024-12-12 NOTE — TELEPHONE ENCOUNTER
Since patient is currently in the hospital with IV fluids she may or may not need that Gatorade, would recommend that her nurse called down to the operating room to check with Dr. Zavaleta

## 2024-12-12 NOTE — NURSING NOTE
Pt care was took over mid shift from LITA Roman pt was off floor for surgery when giving assignment from pt at 1530

## 2024-12-12 NOTE — THERAPY EVALUATION
"Patient Name: Haydee Dickey  : 1935    MRN: [id number]                              Today's Date: 2024       Admit Date: 12/10/2024    Visit Dx:     ICD-10-CM ICD-9-CM   1. Acute pain of right knee  M25.561 719.46   2. Chronic renal impairment, unspecified CKD stage  N18.9 585.9     Patient Active Problem List   Diagnosis    Acute left flank pain    B12 deficiency    Benign essential hypertension    Benign paroxysmal positional vertigo    H/O abdominal hysterectomy    H/O total knee replacement    Hip fracture    Torn rotator cuff    Osteoporosis    Essential tremor    Gait instability    Edema of lower extremity    Malignant neoplasm of upper-inner quadrant of left breast in female, estrogen receptor positive    Disorder of rotator cuff    Aromatase inhibitor use    Personal history of breast cancer    Vitamin D deficiency    Absolute anemia    Benign hypertensive kidney disease with chronic kidney disease    Hyperkalemia    Peripheral neuropathy    Stage 3a chronic kidney disease    High serum creatinine    OA (osteoarthritis) of knee    Status post left knee replacement    S/P TKR (total knee replacement) using cement, left    Right knee pain     Past Medical History:   Diagnosis Date    Allergic Percocet, iv iodine    Anemia     Arthritis     B12 deficiency     BPPV (benign paroxysmal positional vertigo)     Breast cancer     LEFT    Cataract   jaime,  jaime blepharoplasty    Colon polyp     COVID 2023    Rebound Covid following Paxlovid 23    CTS (carpal tunnel syndrome) ,     D’quervain x3    Deep vein thrombosis 1970    HX, LEG    Essential tremor     Folliculitis 2017    Fracture of hip     Right ORIF    Fracture, femur     Left, No surgery    Fracture, radius     Right, orif    Gait instability     GERD (gastroesophageal reflux disease)     History of blood transfusion     History of fall 2024    \"KNEE GAVE OUT\"    HL (hearing loss) " 2015    HEARING AIDS    Hypertension 2001    on Rx    Knee swelling 2001    Bilateral    Lower extremity neuropathy     bilateral    Neuropathy     Osteopenia 2019    Potassium (K) excess 10/21/2022    Seeing nephrologist -- Dr. Coats    Rotator cuff syndrome 2013    Fall induced    Sciatica     Small vessel disease     Torn rotator cuff 2013    right arm    Urinary tract infection 2018    approx 1 every 5 years    Wears hearing aid     BILATERAL    Wrist fracture, right 2015     Past Surgical History:   Procedure Laterality Date    ADENOIDECTOMY  1953    APPENDECTOMY  1961    BLEPHAROPLASTY Bilateral 10/19/2021    Procedure: BILATERAL UPPER LID BLEPHAROPLASTY;  Surgeon: Ruddy Moses MD;  Location: [facility] OR [facility];  Service: Ophthalmology;  Laterality: Bilateral;    BREAST BIOPSY      BREAST LUMPECTOMY Left 11/15/2021    Procedure: Left JEAN-PAUL-guided partial mastectomy;  Surgeon: Maliha Andres MD;  Location: [facility] OR;  Service: General;  Laterality: Left;    BROW LIFT Bilateral 10/19/2021    Procedure: BILATERAL TEMPORAL DIRECT BROWLIFT;  Surgeon: Ruddy Moses MD;  Location: [facility] OR [facility];  Service: Ophthalmology;  Laterality: Bilateral;    CARPAL TUNNEL RELEASE  1992    right wrist    CATARACT EXTRACTION, BILATERAL  07/01/2018    R 7/18 and L 9/18 WITH LENS IMPLANTS    COLONOSCOPY      DEQUERVAIN RELEASE Bilateral 2015    DILATATION AND CURETTAGE  1960s    EYE SURGERY  2018    Bilateral repairs    FRACTURE SURGERY  2014    R hip, R  wrist    HAND SURGERY Right     HIP FRACTURE SURGERY Right     HYSTERECTOMY  1975    Partial    JOINT REPLACEMENT  2001, 2014    TKA    TONSILLECTOMY AND ADENOIDECTOMY  1953    TOTAL KNEE ARTHROPLASTY Left 2001    TOTAL KNEE ARTHROPLASTY Right 2014    TRIGGER FINGER RELEASE Right     TRIGGER POINT INJECTION  2017, 2019    WRIST SURGERY  2015    FRACTURE RIGHT      General Information       Row Name 12/12/24 1528          OT Time and Intention     Subjective Information no complaints  -BC     Document Type evaluation  -BC     Mode of Treatment occupational therapy;individual therapy  -BC     Patient Effort good  -BC       Row Name 12/12/24 1528          General Information    Patient Profile Reviewed yes  -BC     Prior Level of Function independent:  (I) PLF w/ walker at baseline w ADLs, +cleaning, driving  -BC     Existing Precautions/Restrictions fall  pending ortho exploratory surgery, Pt is currently treating R LE at [facility]'ing stand pivot only  -BC       Row Name 12/12/24 1528          Living Environment    People in Home child(doug), adult  -BC       Row Name 12/12/24 1528          Home Main Entrance    Number of Stairs, Main Entrance three  -BC       Row Name 12/12/24 1528          Stairs Within Home, Primary    Stairs Comment, Within Home, Primary can stay on main level  -BC       Row Name 12/12/24 1528          Cognition    Orientation Status (Cognition) oriented x 4  -BC       Row Name 12/12/24 1528          Safety Issues/Impairments Affecting Functional Mobility    Impairments Affecting Function (Mobility) endurance/activity tolerance;pain;range of motion (ROM);strength  -BC               User Key  (r) = Recorded By, (t) = Taken By, (c) = Cosigned By      Initials Name Provider Type    BC Xavi Dickens OT Occupational Therapist                     Mobility/ADL's       Row Name 12/12/24 1530          Bed Mobility    Comment, (Bed Mobility) [facility] pre/post tx  -BC       Row Name 12/12/24 1530          Transfers    Transfers stand-sit transfer;sit-stand transfer;toilet transfer  -BC     Comment, (Transfers) Pt up in chairt at start of session, requesting assist w/ BSC placement, Sup A for stand at walker and pivot to/from BSC for toileting. Cont'd w seated ADLs.  -BC       Row Name 12/12/24 1530          Sit-Stand Transfer    Sit-Stand [place] (Transfers) supervision;verbal cues  -BC     Assistive Device (Sit-Stand Transfers) walker, front-wheeled   -BC       Row Name 12/12/24 1530          Stand-Sit Transfer    Stand-Sit [place] (Transfers) supervision;verbal cues  -BC     Assistive Device (Stand-Sit Transfers) walker, front-wheeled  -BC       Row Name 12/12/24 1530          Toilet Transfer    Type (Toilet Transfer) stand pivot/stand step  -BC     [place] Level (Toilet Transfer) supervision  -BC     Assistive Device (Toilet Transfer) commode, bedside without drop arms  -[facility] Name 12/12/24 1530          Functional Mobility    Patient was able to Ambulate no, other medical factors prevent ambulation  -[facility] Name 12/12/24 1530          Activities of Daily Living    BADL Assessment/Intervention lower body dressing;upper body dressing;grooming;toileting;feeding  -[facility] Name 12/12/24 1530          Lower Body Dressing Assessment/Training    [place] Level (Lower Body Dressing) don;doff;socks;moderate assist (50% patient effort)  -BC     Position (Lower Body Dressing) supported sitting  -BC     Comment, (Lower Body Dressing) decreased R knee ROM with increased pain, assist w/ RLE  -BC       Row Name 12/12/24 1530          Upper Body Dressing Assessment/Training    [place] Level (Upper Body Dressing) pajama/robe;set up;upper body dressing skills  -BC     Position (Upper Body Dressing) supported sitting  -BC       Row Name 12/12/24 1530          Grooming Assessment/Training    [place] Level (Grooming) grooming skills;wash face, hands;set up  -BC     Position (Grooming) supported sitting  -BC       Row Name 12/12/24 1530          Toileting Assessment/Training    [place] Level (Toileting) toileting skills;adjust/manage clothing;perform perineal hygiene;standby assist  -BC     Position (Toileting) supported standing;supported sitting  -BC       Row Name 12/12/24 1530          Self-Feeding Assessment/Training    [place] Level (Feeding) feeding skills;liquids to mouth;independent  -BC               User Key  (r) =  Recorded By, (t) = Taken By, (c) = Cosigned By      Initials Name Provider Type    Xavi Mo OT Occupational Therapist                   Obj/Interventions       Row Name 12/12/24 1532          Sensory Assessment (Somatosensory)    Sensory Assessment (Somatosensory) other (see comments)  -BC     Sensory Assessment diminished BLE sensation w/ peripheral neuropathy.  -BC       Row Name 12/12/24 1532          Vision Assessment/Intervention    Visual Impairment/Limitations WFL  -BC       Row Name 12/12/24 1532          Range of Motion Comprehensive    Comment, General Range of Motion RLE lacking full knee flex/ext with pain present  -BC       Row Name 12/12/24 1532          Strength Comprehensive (MMT)    Comment, General Manual Muscle Testing (MMT) Assessment RLE did not MMT due to pain, 3- due to lacking knee ROM w/ flex/ext, 3+ hip/foot. BUE WFLS  -BC       Row Name 12/12/24 1532          Balance    Balance Assessment sitting static balance  -BC     Static Sitting Balance independent  -BC     Position, Sitting Balance supported;unsupported;sitting in chair  -BC     Static Standing Balance supervision  -BC     Position/Device Used, Standing Balance walker, front-wheeled  -BC     Balance Interventions standing  -BC     Comment, Balance no LOB, supervision A provided for standing w/ pivot/turns and reaching w toileting needs  -BC               User Key  (r) = Recorded By, (t) = Taken By, (c) = Cosigned By      Initials Name Provider Type    Xavi Mo OT Occupational Therapist                   Goals/Plan       Row Name 12/12/24 1523          Bed Mobility Goal 1 (OT)    Activity/Assistive Device (Bed Mobility Goal 1, OT) bed mobility activities, all  -BC     [place] Level/Cues Needed (Bed Mobility Goal 1, OT) modified independence  -BC     Time Frame (Bed Mobility Goal 1, OT) short term goal (STG);2 weeks  -BC     Progress/Outcomes (Bed Mobility Goal 1, OT) new goal  -BC       Row Name 12/12/24  1523          Transfer Goal 1 (OT)    Activity/Assistive Device (Transfer Goal 1, OT) sit-to-stand/stand-to-sit;bed-to-chair/chair-to-bed;toilet  -BC     [place] Level/Cues Needed (Transfer Goal 1, OT) modified independence  -BC     Time Frame (Transfer Goal 1, OT) short term goal (STG);2 weeks  -BC     Progress/Outcome (Transfer Goal 1, OT) new goal  -BC       Row Name 12/12/24 1523          Dressing Goal 1 (OT)    Activity/Device (Dressing Goal 1, OT) upper body dressing;lower body dressing  -BC     [place]/Cues Needed (Dressing Goal 1, OT) supervision required  -BC     Time Frame (Dressing Goal 1, OT) short term goal (STG);2 weeks  -BC     Progress/Outcome (Dressing Goal 1, OT) new goal  -BC       Row Name 12/12/24 1523          Toileting Goal 1 (OT)    Activity/Device (Toileting Goal 1, OT) toileting skills, all;adjust/manage clothing;perform perineal hygiene  -BC     [place] Level/Cues Needed (Toileting Goal 1, OT) independent  -BC     Time Frame (Toileting Goal 1, OT) short term goal (STG);2 weeks  -BC     Progress/Outcome (Toileting Goal 1, OT) new goal  -BC       Row Name 12/12/24 1523          Grooming Goal 1 (OT)    Activity/Device (Grooming Goal 1, OT) grooming skills, all  -BC     [place] (Grooming Goal 1, OT) supervision required  -BC     Time Frame (Grooming Goal 1, OT) 2 weeks;short term goal (STG)  -BC     Strategies/Barriers (Grooming Goal 1, OT) standing sinkside for ADL IND  -BC     Progress/Outcome (Grooming Goal 1, OT) new goal  -BC       Row Name 12/12/24 1523          Problem Specific Goal 1 (OT)    Problem Specific Goal 1 (OT) Pt will be (I) w/ BUE strengthening program/exer for increased BUE strength for fxnl tasks  -BC     Time Frame (Problem Specific Goal 1, OT) short term goal (STG);2 weeks  -BC     Progress/Outcome (Problem Specific Goal 1, OT) new goal  -BC       Row Name 12/12/24 1523          Therapy Assessment/Plan (OT)    Planned Therapy Interventions (OT)  "activity tolerance training;BADL retraining;functional balance retraining;occupation/activity based interventions;patient/caregiver education/training;strengthening exercise;transfer/mobility retraining;IADL retraining  -BC               User Key  (r) = Recorded By, (t) = Taken By, (c) = Cosigned By      Initials Name Provider Type    BC Xavi Dickens OT Occupational Therapist                   Clinical Impression       Row Name 12/12/24 1517          Pain Assessment    Posttreatment Pain Rating 6/10  -BC     Pain Location knee  -BC     Pain Side/Orientation right  -BC     Pre/Posttreatment Pain Comment R knee pain, did not rate on scale. Pt conversational and does not appear in acute distress w/ pivot to BSC but incresed discomfort/pain unable to complete AROM.  -BC       Row Name 12/12/24 1510          Plan of Care Review    Plan of Care Reviewed With patient  -BC     Progress no change  -BC     Outcome Evaluation Pt is an 89 y/o F admitted to [facility] 12/10 with R knee pain, unable to ambulate. PMH: S/p TKA 2014, HTN, CKD.                         OT F/u in PM with no ortho notes In yet regarding status, per chart Pt isnt able to WB/ambulate through R knee. OT checking in with pt to determine if Ortho has seen her yet today and she is up in chair. She reports she is going for exploratory surgery at some point, was supposed to be today but was canceled and will be re-scheduled. Pt verb'd she is unable to walk on her R knee but has been performing stand and pivot to/from BSC with her walker. \"Are you going to be the one to help bring that bedside commode over\" OT continued w/ evaluation with no increased s/s distress of pain w/ mvmts to transition up to [facility] as she reports she has been performing this all day. Observed supervision A w/ walker and stand pivot w TTWBing through R LE. Pending ortho recs and due to pain deferred further mobility at this time. OT plans to f/u post op and for recommendations on Wb'ing " "status. Pt was able to maintain static standing w/o increased pain to pull clothing down/up for toileting, and complete seated ADLs. Pt at this time is not safe to DC home, and pending performance/pain and Wb'ing status after surgery, may benefit from continued rehab at [facility] level. At this time POC rec 3x/week and recommend to f/u after surgery for re-assessment.  -BC       Row Name 12/12/24 1517          Therapy Assessment/Plan (OT)    Patient/Family Therapy Goal Statement (OT) pain controlled and resume ADL (I)  -BC     Rehab Potential (OT) good  -BC     Criteria for Skilled Therapeutic Interventions Met (OT) yes;meets criteria  -BC     Therapy Frequency (OT) 3 times/wk  -BC     Predicted Duration of Therapy Intervention (OT) 2 weeks  -BC       Row Name 12/12/24 1517          Therapy Plan Review/Discharge Plan (OT)    Anticipated Discharge Disposition (OT) skilled nursing facility  -BC       Row Name 12/12/24 1517          Vital Signs    O2 Delivery Pre Treatment room air  -BC     Pre Patient Position Sitting  -BC     Intra Patient Position Standing  -BC     Post Patient Position Sitting  -BC       Row Name 12/12/24 1517          Positioning and Restraints    Pre-Treatment Position sitting in chair/recliner  call light pushed prior to OT entering room to use BR  -BC     Post Treatment Position chair  -BC     In Chair reclined;call light within reach;sitting;encouraged to call for assist  chair alarm not active \"I have my mind, I requested them to please no use that on me\"  -BC               User Key  (r) = Recorded By, (t) = Taken By, (c) = Cosigned By      Initials Name Provider Type    Xavi Mo, BLANCA Occupational Therapist                   Outcome Measures       Row Name 12/12/24 0674          How much help from another is currently needed...    Putting on and taking off regular lower body clothing? 2  -BC     Bathing (including washing, rinsing, and drying) 2  -BC     Toileting (which includes using " toilet bed pan or urinal) 3  -BC     Putting on and taking off regular upper body clothing 3  -BC     Taking care of personal grooming (such as brushing teeth) 3  -BC     Eating meals 4  -BC     AM-PAC 6 Clicks Score (OT) 17  -BC       Row Name 12/12/24 0800          How much help from another person do you currently need...    Turning from your back to your side while in flat bed without using bedrails? 4  -RONNIE     Moving from lying on back to sitting on the side of a flat bed without bedrails? 4  -RONNIE     Moving to and from a bed to a chair (including a wheelchair)? 3  -RONNIE     Standing up from a chair using your arms (e.g., wheelchair, bedside chair)? 3  -RONNIE     Climbing 3-5 steps with a railing? 2  -RONNIE     To walk in hospital room? 2  -RONNIE     AM-PAC 6 Clicks Score (PT) 18  -RONNIE     Highest Level of Mobility Goal 6 --> Walk 10 steps or more  -       Row Name 12/12/24 1526          Functional Assessment    Outcome Measure Options AM-PAC 6 Clicks Daily Activity (OT)  -BC               User Key  (r) = Recorded By, (t) = Taken By, (c) = Cosigned By      Initials Name Provider Type    RONNIE Abel Smith, RN Registered Nurse    BC Xavi Dickens OT Occupational Therapist                    Occupational Therapy Education       Title: PT OT SLP Therapies (In Progress)       Topic: Occupational Therapy (In Progress)       Point: ADL training (Done)       Description:   Instruct learner(s) on proper safety adaptation and remediation techniques during self care or transfers.   Instruct in proper use of assistive devices.                  Learning Progress Summary            Patient Acceptance, E,TB, VU by BC at 12/12/2024 1526    Comment: role of OT, POC, ADL participation and sequence/practice, goal setting and f/u after surgery, pt v/u                      Point: Home exercise program (Not Started)       Description:   Instruct learner(s) on appropriate technique for monitoring, assisting and/or progressing therapeutic  "exercises/activities.                  Learner Progress:  Not documented in this visit.              Point: Precautions (Not Started)       Description:   Instruct learner(s) on prescribed precautions during self-care and functional transfers.                  Learner Progress:  Not documented in this visit.              Point: Body mechanics (Done)       Description:   Instruct learner(s) on proper positioning and spine alignment during self-care, functional mobility activities and/or exercises.                  Learning Progress Summary            Patient Acceptance, E,TB, VU by BC at 12/12/2024 1526    Comment: role of OT, POC, ADL participation and sequence/practice, goal setting and f/u after surgery, pt v/u                                      User Key       Initials Effective Dates Name Provider Type Discipline    BC 07/29/24 -  Xavi Dickens OT Occupational Therapist OT                  OT Recommendation and Plan  Planned Therapy Interventions (OT): activity tolerance training, BADL retraining, functional balance retraining, occupation/activity based interventions, patient/caregiver education/training, strengthening exercise, transfer/mobility retraining, IADL retraining  Therapy Frequency (OT): 3 times/wk  Plan of Care Review  Plan of Care Reviewed With: patient  Progress: no change  Outcome Evaluation: Pt is an 89 y/o F admitted to [facility] 12/10 with R knee pain, unable to ambulate. PMH: S/p TKA 2014, HTN, CKD.                         OT F/u in PM with no ortho notes In yet regarding status, per chart Pt isnt able to WB/ambulate through R knee. OT checking in with pt to determine if Ortho has seen her yet today and she is up in chair. She reports she is going for exploratory surgery at some point, was supposed to be today but was canceled and will be re-scheduled. Pt verb'd she is unable to walk on her R knee but has been performing stand and pivot to/from [facility] with her walker. \"Are you going to be the one " "to help bring that bedside commode over\" OT continued w/ evaluation with no increased s/s distress of pain w/ mvmts to transition up to [facility] as she reports she has been performing this all day. Observed supervision A w/ walker and stand pivot w TTWBing through R LE. Pending ortho recs and due to pain deferred further mobility at this time. OT plans to f/u post op and for recommendations on Wb'ing status. Pt was able to maintain static standing w/o increased pain to pull clothing down/up for toileting, and complete seated ADLs. Pt at this time is not safe to DC home, and pending performance/pain and Wb'ing status after surgery, may benefit from continued rehab at [facility] level. At this time POC rec 3x/week and recommend to f/u after surgery for re-assessment.     Time Calculation:   Evaluation Complexity (OT)  Review Occupational Profile/Medical/Therapy History Complexity: brief/low complexity  Assessment, Occupational Performance/Identification of Deficit Complexity: 3-5 performance deficits  Clinical Decision Making Complexity (OT): problem focused assessment/low complexity  Overall Complexity of Evaluation (OT): low complexity     Time Calculation- OT       Row Name 12/12/24 1527             Time Calculation- OT    OT Start Time 1436  -BC      OT Stop Time 1459  -BC      OT Time Calculation (min) 23 min  -BC      Total Timed Code Minutes- OT 8 minute(s)  -BC      OT Received On 12/12/24  -BC      OT - Next Appointment 12/16/24  -BC      OT Goal Re-Cert Due Date 12/26/24  -BC         Timed Charges    88005 - OT Self Care/Mgmt Minutes 8  -BC         Total Minutes    Timed Charges Total Minutes 8  -BC       Total Minutes 8  -BC                User Key  (r) = Recorded By, (t) = Taken By, (c) = Cosigned By      Initials Name Provider Type    Xavi Mo, OT Occupational Therapist                  Therapy Charges for Today       Code Description Service Date Service Provider Modifiers Qty    76025050880 HC OT SELF " CARE/MGMT/TRAIN EA 15 MIN 12/12/2024 Xavi Dickens OT GO 1    64950260174 HC OT EVAL LOW COMPLEXITY 2 12/12/2024 Xavi Dickens OT GO 1                 Xavi Dickens OT  12/12/2024

## 2024-12-12 NOTE — TELEPHONE ENCOUNTER
Hub staff attempted to follow warm transfer process and was unsuccessful     Caller: Haydee Dickey    Relationship to patient: Self    Best call back number: [phone]    Patient is needing: PATIENT IS CURRENTLY IN [facility] TO HAVE SURGERY THIS AFTERNOON. SHE STATES DR. CRUZ TOLD HER TO DRINK GATORADE THIS MORNING, BUT SHE STATES THE NURSE WON'T LET HER HAVE GATORADE WITHOUT AN ORDER. SHE IS ASKING FOR A CALL BACK ASAP PLEASE.

## 2024-12-12 NOTE — ANESTHESIA PREPROCEDURE EVALUATION
Anesthesia Evaluation     no history of anesthetic complications:   NPO Solid Status: > 8 hours  NPO Liquid Status: > 2 hours           Airway   Mallampati: II  no difficulty expected  Dental      Comment: Missing several teeth    Pulmonary - negative pulmonary ROS and normal exam   (-) COPD, asthma, sleep apnea, not a smoker    PE comment: nonlabored  Cardiovascular - normal exam  Exercise tolerance: good (4-7 METS)    Rhythm: regular  Rate: normal    (+) hypertension, DVT  (-) valvular problems/murmurs, past MI, CAD, dysrhythmias, angina      Neuro/Psych  (+) tremors, numbness  (-) seizures, TIA, CVADizziness: Benign paroxysmal positional vertigo.  GI/Hepatic/Renal/Endo    (+) GERD, renal disease (stage 3a CKD)-  (-) liver disease, diabetes, no thyroid disorder    Musculoskeletal     (+) arthralgias  Abdominal    Substance History      OB/GYN          Other   arthritis, blood dyscrasia anemia,   history of cancer (breast cancer hx)    ROS/Med Hx Other: Aromatase inhibitor use                  Anesthesia Plan    ASA 3     general   total IV anesthesia  (AC block for post-op pain PSR)  intravenous induction     Anesthetic plan, risks, benefits, and alternatives have been provided, discussed and informed consent has been obtained with: patient.    CODE STATUS:    Code Status (Patient has no pulse and is not breathing): CPR (Attempt to Resuscitate)  Medical Interventions (Patient has pulse or is breathing): Full Support

## 2024-12-13 LAB
HCT VFR BLD AUTO: 30.7 % (ref 34–46.6)
HGB BLD-MCNC: 10.6 G/DL (ref 12–15.9)

## 2024-12-13 PROCEDURE — 85014 HEMATOCRIT: CPT | Performed by: NURSE PRACTITIONER

## 2024-12-13 PROCEDURE — 85018 HEMOGLOBIN: CPT | Performed by: NURSE PRACTITIONER

## 2024-12-13 PROCEDURE — 0QUD0JZ SUPPLEMENT RIGHT PATELLA WITH SYNTHETIC SUBSTITUTE, OPEN APPROACH: ICD-10-PCS | Performed by: ORTHOPAEDIC SURGERY

## 2024-12-13 PROCEDURE — 94761 N-INVAS EAR/PLS OXIMETRY MLT: CPT

## 2024-12-13 PROCEDURE — 97164 PT RE-EVAL EST PLAN CARE: CPT

## 2024-12-13 PROCEDURE — 0SUV09Z SUPPLEMENT RIGHT KNEE JOINT, TIBIAL SURFACE WITH LINER, OPEN APPROACH: ICD-10-PCS | Performed by: ORTHOPAEDIC SURGERY

## 2024-12-13 PROCEDURE — 25010000002 CEFAZOLIN PER 500 MG: Performed by: NURSE PRACTITIONER

## 2024-12-13 PROCEDURE — 0SPC09Z REMOVAL OF LINER FROM RIGHT KNEE JOINT, OPEN APPROACH: ICD-10-PCS | Performed by: ORTHOPAEDIC SURGERY

## 2024-12-13 PROCEDURE — 94799 UNLISTED PULMONARY SVC/PX: CPT

## 2024-12-13 PROCEDURE — 94660 CPAP INITIATION&MGMT: CPT

## 2024-12-13 PROCEDURE — 97530 THERAPEUTIC ACTIVITIES: CPT

## 2024-12-13 RX ORDER — ASPIRIN 81 MG/1
81 TABLET ORAL EVERY 12 HOURS SCHEDULED
Status: DISCONTINUED | OUTPATIENT
Start: 2024-12-13 | End: 2024-12-17 | Stop reason: HOSPADM

## 2024-12-13 RX ORDER — ONDANSETRON 2 MG/ML
4 INJECTION INTRAMUSCULAR; INTRAVENOUS EVERY 6 HOURS PRN
Status: DISCONTINUED | OUTPATIENT
Start: 2024-12-13 | End: 2024-12-17 | Stop reason: HOSPADM

## 2024-12-13 RX ORDER — MELOXICAM 15 MG/1
15 TABLET ORAL ONCE
Status: DISCONTINUED | OUTPATIENT
Start: 2024-12-13 | End: 2024-12-13

## 2024-12-13 RX ORDER — PREGABALIN 75 MG/1
150 CAPSULE ORAL ONCE
Status: COMPLETED | OUTPATIENT
Start: 2024-12-13 | End: 2024-12-13

## 2024-12-13 RX ORDER — CHLORHEXIDINE GLUCONATE 500 MG/1
CLOTH TOPICAL 2 TIMES DAILY
Status: DISCONTINUED | OUTPATIENT
Start: 2024-12-13 | End: 2024-12-17 | Stop reason: HOSPADM

## 2024-12-13 RX ORDER — PREGABALIN 75 MG/1
150 CAPSULE ORAL ONCE
Status: DISCONTINUED | OUTPATIENT
Start: 2024-12-13 | End: 2024-12-13

## 2024-12-13 RX ORDER — CHLORHEXIDINE GLUCONATE 500 MG/1
CLOTH TOPICAL 2 TIMES DAILY
Status: DISCONTINUED | OUTPATIENT
Start: 2024-12-13 | End: 2024-12-13

## 2024-12-13 RX ORDER — ONDANSETRON 4 MG/1
4 TABLET, ORALLY DISINTEGRATING ORAL EVERY 6 HOURS PRN
Status: DISCONTINUED | OUTPATIENT
Start: 2024-12-13 | End: 2024-12-17 | Stop reason: HOSPADM

## 2024-12-13 RX ORDER — HYDROCODONE BITARTRATE AND ACETAMINOPHEN 7.5; 325 MG/1; MG/1
2 TABLET ORAL EVERY 4 HOURS PRN
Status: DISCONTINUED | OUTPATIENT
Start: 2024-12-13 | End: 2024-12-17 | Stop reason: HOSPADM

## 2024-12-13 RX ORDER — MELOXICAM 7.5 MG/1
15 TABLET ORAL ONCE
Status: DISCONTINUED | OUTPATIENT
Start: 2024-12-13 | End: 2024-12-13

## 2024-12-13 RX ORDER — POLYETHYLENE GLYCOL 3350 17 G/17G
17 POWDER, FOR SOLUTION ORAL 2 TIMES DAILY
Status: DISCONTINUED | OUTPATIENT
Start: 2024-12-13 | End: 2024-12-17 | Stop reason: HOSPADM

## 2024-12-13 RX ORDER — MELOXICAM 15 MG/1
15 TABLET ORAL DAILY
Status: DISCONTINUED | OUTPATIENT
Start: 2024-12-13 | End: 2024-12-14

## 2024-12-13 RX ADMIN — ASPIRIN 81 MG: 81 TABLET, COATED ORAL at 22:19

## 2024-12-13 RX ADMIN — Medication 10 ML: at 22:21

## 2024-12-13 RX ADMIN — POLYETHYLENE GLYCOL 3350 17 G: 17 POWDER, FOR SOLUTION ORAL at 22:18

## 2024-12-13 RX ADMIN — ANASTROZOLE 1 MG: 1 TABLET, COATED ORAL at 08:19

## 2024-12-13 RX ADMIN — ACETAMINOPHEN 1000 MG: 500 TABLET, FILM COATED ORAL at 08:19

## 2024-12-13 RX ADMIN — SODIUM CHLORIDE 2000 MG: 900 INJECTION INTRAVENOUS at 08:20

## 2024-12-13 RX ADMIN — LISINOPRIL 10 MG: 10 TABLET ORAL at 08:19

## 2024-12-13 RX ADMIN — PREGABALIN 150 MG: 75 CAPSULE ORAL at 08:19

## 2024-12-13 RX ADMIN — SODIUM CHLORIDE 2000 MG: 900 INJECTION INTRAVENOUS at 01:36

## 2024-12-13 RX ADMIN — PROPRANOLOL HYDROCHLORIDE 30 MG: 20 TABLET ORAL at 08:19

## 2024-12-13 RX ADMIN — FERROUS SULFATE TAB 325 MG (65 MG ELEMENTAL FE) 325 MG: 325 (65 FE) TAB at 08:19

## 2024-12-13 RX ADMIN — PROPRANOLOL HYDROCHLORIDE 30 MG: 20 TABLET ORAL at 22:19

## 2024-12-13 RX ADMIN — CHLORHEXIDINE GLUCONATE: 500 CLOTH TOPICAL at 22:20

## 2024-12-13 RX ADMIN — ASPIRIN 81 MG: 81 TABLET, COATED ORAL at 08:26

## 2024-12-13 RX ADMIN — Medication 10 ML: at 08:24

## 2024-12-13 NOTE — THERAPY RE-EVALUATION
Patient Name: Haydee Dickey  : 1935    MRN: [id number]                              Today's Date: 2024       Admit Date: 12/10/2024    Visit Dx:     ICD-10-CM ICD-9-CM   1. Acute pain of right knee  M25.561 719.46   2. Chronic renal impairment, unspecified CKD stage  N18.9 585.9   3. Status post left knee replacement  Z96.652 V43.65   4. Central apnea  R06.81 786.03   5. Central sleep apnea in conditions classified elsewhere  G47.37 327.27     Patient Active Problem List   Diagnosis    Acute left flank pain    B12 deficiency    Benign essential hypertension    Benign paroxysmal positional vertigo    H/O abdominal hysterectomy    H/O total knee replacement    Hip fracture    Torn rotator cuff    Osteoporosis    Essential tremor    Gait instability    Edema of lower extremity    Malignant neoplasm of upper-inner quadrant of left breast in female, estrogen receptor positive    Disorder of rotator cuff    Aromatase inhibitor use    Personal history of breast cancer    Vitamin D deficiency    Absolute anemia    Benign hypertensive kidney disease with chronic kidney disease    Hyperkalemia    Peripheral neuropathy    Stage 3a chronic kidney disease    High serum creatinine    OA (osteoarthritis) of knee    Status post left knee replacement    S/P TKR (total knee replacement) using cement, left    Right knee pain     Past Medical History:   Diagnosis Date    Allergic Percocet, iv iodine    Anemia     Arthritis     B12 deficiency     BPPV (benign paroxysmal positional vertigo)     Breast cancer     LEFT    Cataract   jaime,  jaime blepharoplasty    Colon polyp     COVID 2023    Rebound Covid following Paxlovid 23    CTS (carpal tunnel syndrome) ,     D’quervain x3    Deep vein thrombosis 1970    HX, LEG    Essential tremor     Folliculitis 2017    Fracture of hip     Right ORIF    Fracture, femur     Left, No surgery    Fracture, radius     Right, orif    Gait  "instability     GERD (gastroesophageal reflux disease) 1990    History of blood transfusion 2014    History of fall 06/17/2024    \"KNEE GAVE OUT\"    HL (hearing loss) 2015    HEARING AIDS    Hypertension 2001    on Rx    Knee swelling 2001    Bilateral    Lower extremity neuropathy     bilateral    Neuropathy     Osteopenia 2019    Potassium (K) excess 10/21/2022    Seeing nephrologist -- Dr. Coats    Rotator cuff syndrome 2013    Fall induced    Sciatica     Small vessel disease     Torn rotator cuff 2013    right arm    Urinary tract infection 2018    approx 1 every 5 years    Wears hearing aid     BILATERAL    Wrist fracture, right 2015     Past Surgical History:   Procedure Laterality Date    ADENOIDECTOMY  1953    APPENDECTOMY  1961    BLEPHAROPLASTY Bilateral 10/19/2021    Procedure: BILATERAL UPPER LID BLEPHAROPLASTY;  Surgeon: Ruddy Moses MD;  [place] [facility] [place] [facility] [place]  Service: Ophthalmology;  Laterality: Bilateral;    BREAST BIOPSY      BREAST LUMPECTOMY Left 11/15/2021    Procedure: Left JEAN-PAUL-guided partial mastectomy;  Surgeon: Maliha Andres MD;  Location: [facility] MAIN OR;  Service: General;  Laterality: Left;    BROW LIFT Bilateral 10/19/2021    Procedure: BILATERAL TEMPORAL DIRECT BROWLIFT;  Surgeon: Ruddy Moses MD;  [place] [facility] [place] [facility] [place]  Service: Ophthalmology;  Laterality: Bilateral;    CARPAL TUNNEL RELEASE  1992    right wrist    CATARACT EXTRACTION, BILATERAL  07/01/2018    R 7/18 and L 9/18 WITH LENS IMPLANTS    COLONOSCOPY      DEQUERVAIN RELEASE Bilateral 2015    DILATATION AND CURETTAGE  1960s    EYE SURGERY  2018    Bilateral repairs    FRACTURE SURGERY  2014    R hip, R  wrist    HAND SURGERY Right     HIP FRACTURE SURGERY Right     HYSTERECTOMY  1975    Partial    JOINT REPLACEMENT  2001, 2014    TKA    KNEE POLY INSERT EXCHANGE Right 12/12/2024    Procedure: Right Knee Poly insert exchange with exploration of foreign body;  Surgeon: Edmund Zavaleta MD;  " Location: [facility];  Service: Orthopedics;  Laterality: Right;    TONSILLECTOMY AND ADENOIDECTOMY  1953    TOTAL KNEE ARTHROPLASTY Left 2001    TOTAL KNEE ARTHROPLASTY Right 2014    TRIGGER FINGER RELEASE Right     TRIGGER POINT INJECTION  2017, 2019    WRIST SURGERY  2015    FRACTURE RIGHT      General Information       Row Name 12/13/24 1412          Physical Therapy Time and Intention    Document Type re-evaluation  -EJ     Mode of Treatment physical therapy  -EJ       Row Name 12/13/24 1412          General Information    Existing Precautions/Restrictions fall  -EJ               User Key  (r) = Recorded By, (t) = Taken By, (c) = Cosigned By      Initials Name Provider Type    EJ Jennifer Rodriguez, PT Physical Therapist                   Mobility       Row Name 12/13/24 1412          Bed Mobility    Bed Mobility supine-sit;sit-supine  -EJ     Supine-Sit [place] (Bed Mobility) standby assist  -EJ     Sit-Supine [place] (Bed Mobility) standby assist  -EJ     Assistive Device (Bed Mobility) head of bed elevated  -EJ       Row Name 12/13/24 1412          Sit-Stand Transfer    Sit-Stand [place] (Transfers) verbal cues;contact guard  -EJ     Assistive Device (Sit-Stand Transfers) walker, front-wheeled  -EJ       Row Name 12/13/24 1412          Gait/Stairs (Locomotion)    [place] Level (Gait) verbal cues;contact guard;minimum assist (75% patient effort)  -EJ     Assistive Device (Gait) walker, front-wheeled  -EJ     Distance in Feet (Gait) 40  -EJ     Deviations/Abnormal Patterns (Gait) caridad decreased;stride length decreased  -EJ     Bilateral Gait Deviations forward flexed posture;heel strike decreased  -EJ     Comment, (Gait/Stairs) mild unsteadiness, decreased heel strike R foot, cues for sequence  -EJ               User Key  (r) = Recorded By, (t) = Taken By, (c) = Cosigned By      Initials Name Provider Type    Jennifer Ordaz, PT Physical Therapist                    Obj/Interventions       Row Name 12/13/24 1413          Range of Motion Comprehensive    General Range of Motion no range of motion deficits identified  -EJ     Comment, General Range of Motion x  R Knee  -EJ       Row Name 12/13/24 1413          Strength Comprehensive (MMT)    Comment, General Manual Muscle Testing (MMT) Assessment post op weakness  -EJ       Row Name 12/13/24 1413          Motor Skills    Therapeutic Exercise --  R TKR protocol x 5 reps  -EJ               User Key  (r) = Recorded By, (t) = Taken By, (c) = Cosigned By      Initials Name Provider Type    Jennifer Ordaz, PT Physical Therapist                   Goals/Plan       Row Name 12/13/24 1417          Transfer Goal 1 (PT)    Activity/Assistive Device (Transfer Goal 1, PT) transfers, all;walker, rolling  -EJ     [place] Level/Cues Needed (Transfer Goal 1, PT) standby assist  -EJ     Time Frame (Transfer Goal 1, PT) 1 week  -EJ       Row Name 12/13/24 1417          Gait Training Goal 1 (PT)    Activity/Assistive Device (Gait Training Goal 1, PT) gait (walking locomotion);walker, rolling  -EJ     [place] Level (Gait Training Goal 1, PT) standby assist  -EJ     Distance (Gait Training Goal 1, PT) 150  -EJ     Time Frame (Gait Training Goal 1, PT) 1 week  -EJ               User Key  (r) = Recorded By, (t) = Taken By, (c) = Cosigned By      Initials Name Provider Type    Jennifer Ordaz, PT Physical Therapist                   Clinical Impression       Row Name 12/13/24 1413          Pain    Pretreatment Pain Rating 4/10  -EJ     Posttreatment Pain Rating 4/10  -EJ     Pain Location knee  -EJ     Pain Side/Orientation right  -EJ     Pain Management Interventions exercise or physical activity utilized  -EJ       Row Name 12/13/24 1413          Plan of Care Review    Plan of Care Reviewed With patient  -EJ     Outcome Evaluation Pt seen for PT re-eval this afternoon folling R knee poly exchange. Pt is WBAT to RLE. SHe presents w  expected post op pain, weakness, and decreased ROM. Pt doing well at this time. She is able to perform bed mobility w SBA. She stood w CGA and Rwx. Pt able to ambulate approx 40 ft w CGA/min A. She requires cues for sequence. She demos mild unsteadiness w decreased R heel strike. Pt also needs cues for safety awareness at times. She returned to bed at end of session. She tolerated all knee exercises well. REcommend SNU at [place] pending progress as pt is a fall risk and would not want her mobilizing alone at home at this time. She will continue to benefit from skilled PT to maximize safety, strength, and independence w mobility.  -       Row Name 12/13/24 1413          Therapy Assessment/Plan (PT)    Therapy Frequency (PT) 6 times/wk  -       Row Name 12/13/24 1413          Positioning and Restraints    Pre-Treatment Position in bed  -EJ     Post Treatment Position bed  -EJ     In Bed notified nsg;supine;call light within reach;encouraged to call for assist  pt refuses bed alarm  -EJ               User Key  (r) = Recorded By, (t) = Taken By, (c) = Cosigned By      Initials Name Provider Type    Jennifer Ordaz, PT Physical Therapist                   Outcome Measures       Row Name 12/13/24 1418 12/13/24 0800       How much help from another person do you currently need...    Turning from your back to your side while in flat bed without using bedrails? 4  -EJ 4  -BC    Moving from lying on back to sitting on the side of a flat bed without bedrails? 4  -EJ 4  -BC    Moving to and from a bed to a chair (including a wheelchair)? 3  -EJ 3  -BC    Standing up from a chair using your arms (e.g., wheelchair, bedside chair)? 3  -EJ 3  -BC    Climbing 3-5 steps with a railing? 2  -EJ 2  -BC    To walk in hospital room? 3  -EJ 2  -BC    AM-PAC 6 Clicks Score (PT) 19  -EJ 18  -BC    Highest Level of Mobility Goal 6 --> Walk 10 steps or more  -EJ 6 --> Walk 10 steps or more  -BC              User Key  (r) = Recorded By,  (t) = Taken By, (c) = Cosigned By      Initials Name Provider Type    Jennifer Ordaz, PT Physical Therapist    Marie Calderon RN Registered Nurse                                 Physical Therapy Education       Title: PT OT SLP Therapies (In Progress)       Topic: Physical Therapy (In Progress)       Point: Mobility training (In Progress)       Learning Progress Summary            Patient Acceptance, E, NR by  at 12/11/2024 1551                      Point: Home exercise program (In Progress)       Learning Progress Summary            Patient Acceptance, E, NR by  at 12/11/2024 1551                      Point: Body mechanics (In Progress)       Learning Progress Summary            Patient Acceptance, E, NR by  at 12/11/2024 1551                      Point: Precautions (In Progress)       Learning Progress Summary            Patient Acceptance, E, NR by  at 12/11/2024 1551                                      User Key       Initials Effective Dates Name Provider Type Discipline     06/16/21 -  Arianna Johnson, PT Physical Therapist PT                  PT Recommendation and Plan     Outcome Evaluation: Pt seen for PT re-eval this afternoon folling R knee poly exchange. Pt is WBAT to RLE. SHe presents w expected post op pain, weakness, and decreased ROM. Pt doing well at this time. She is able to perform bed mobility w SBA. She stood w CGA and Rwx. Pt able to ambulate approx 40 ft w CGA/min A. She requires cues for sequence. She demos mild unsteadiness w decreased R heel strike. Pt also needs cues for safety awareness at times. She returned to bed at end of session. She tolerated all knee exercises well. REcommend SNU at [place] pending progress as pt is a fall risk and would not want her mobilizing alone at home at this time. She will continue to benefit from skilled PT to maximize safety, strength, and independence w mobility.     Time Calculation:         PT Charges       Row Name 12/13/24 5946              Time Calculation    Start Time 1330  -EJ      Stop Time 1355  -EJ      Time Calculation (min) 25 min  -EJ      PT Received On 12/13/24  -EJ      PT - Next Appointment 12/14/24  -EJ      PT Goal Re-Cert Due Date 12/20/24  -EJ         Time Calculation- PT    Total Timed Code Minutes- PT 18 minute(s)  -EJ                User Key  (r) = Recorded By, (t) = Taken By, (c) = Cosigned By      Initials Name Provider Type     Jennifer Rodriguez, PT Physical Therapist                  Therapy Charges for Today       Code Description Service Date Service Provider Modifiers Qty    [id number]  PT RE-EVAL ESTABLISHED PLAN 2 12/13/2024 Jennifer Rodriguez, PT GP 1    [id number]  PT THERAPEUTIC ACT EA 15 MIN 12/13/2024 Jennifer Rodriguez, PT GP 1            PT G-Codes  Outcome Measure Options: AM-PAC 6 Clicks Daily Activity (OT)  AM-PAC 6 Clicks Score (PT): 19  AM-PAC 6 Clicks Score (OT): 17  PT Discharge Summary  Anticipated Discharge Disposition (PT): skilled nursing facility    Jennifer Rodriguez, PT  12/13/2024

## 2024-12-13 NOTE — CASE MANAGEMENT/SOCIAL WORK
Post-Acute Authorization Submission      Post Acute Pre-Cert Documentation  Request Submitted by Facility - Type:: Hospital  Post-Acute Authorization Type Submitted:: SNF  Date Post Acute Pre-Cert Inititated per Facility: 12/13/24  Date Post Acute Pre-Cert Completed: 12/13/24  Accepting Facility: [facility] Discharge Date Requested: 12/16/24  All Clinicals Submitted?: Yes  Had Accepting Facility at Time of Submission: Yes  Response Received from Insurance?: Approval  Response Communicated to:: , Accepting Facility Liaison, Accepting Facility Auth Department  Authorization Number:: APPROVED [id number]  Post Acute Pre-Cert Initiated Comment: VALID TO ADMIT [facility] 12/23/24.              Jl Hernandez, PCT

## 2024-12-13 NOTE — PROGRESS NOTES
Orthopedic Total Knee Progress Note      Patient: Haydee Dickey  Date of Admission: 12/10/2024  YOB: 1935  Medical Record Number: [id number]    POD # : 1 Day Post-Op Procedure(s) (LRB):  Right Knee Poly insert exchange with exploration of foreign body (Right)    Systemic or Specific Complaints: No Complaints    Pain Relief: some relief    Physical Exam:  89 y.o.  female  Vitals:  Temp:  [97.1 °F (36.2 °C)-98.4 °F (36.9 °C)] 97.1 °F (36.2 °C)  Heart Rate:  [47-89] 89  Resp:  [8-20] 18  BP: (106-160)/(52-92) 130/60  alert and oriented  Chest: Clear to auscultation  CV: Regular Rate and Rhythm  Abd: Soft, NT, with BS +  Ext: NV intact. ROM appropriate. Calf is soft and nontender. Negative Homans sign  Skin: Incision clean dry and intact w/out signs or  symptoms of infection.    Activity: Mobilizing Per P.T.   Weight Bearing: As Tolerated    Data Review     Admission on 12/10/2024   Component Date Value Ref Range Status    Glucose 12/10/2024 106 (H)  65 - 99 mg/dL Final    BUN 12/10/2024 25 (H)  8 - 23 mg/dL Final    Creatinine 12/10/2024 1.25 (H)  0.57 - 1.00 mg/dL Final    Sodium 12/10/2024 138  136 - 145 mmol/L Final    Potassium 12/10/2024 4.1  3.5 - 5.2 mmol/L Final    Chloride 12/10/2024 106  98 - 107 mmol/L Final    CO2 12/10/2024 26.2  22.0 - 29.0 mmol/L Final    Calcium 12/10/2024 9.2  8.6 - 10.5 mg/dL Final    BUN/Creatinine Ratio 12/10/2024 20.0  7.0 - 25.0 Final    Anion Gap 12/10/2024 5.8  5.0 - 15.0 mmol/L Final    eGFR 12/10/2024 41.8 (L)  >60.0 mL/min/1.73 Final    WBC 12/10/2024 4.61  3.40 - 10.80 10*3/mm3 Final    RBC 12/10/2024 3.40 (L)  3.77 - 5.28 10*6/mm3 Final    Hemoglobin 12/10/2024 10.7 (L)  12.0 - 15.9 g/dL Final    Hematocrit 12/10/2024 32.2 (L)  34.0 - 46.6 % Final    MCV 12/10/2024 94.7  79.0 - 97.0 fL Final    MCH 12/10/2024 31.5  26.6 - 33.0 pg Final    MCHC 12/10/2024 33.2  31.5 - 35.7 g/dL Final    RDW 12/10/2024 11.7 (L)  12.3 - 15.4 % Final    RDW-SD 12/10/2024 39.4   37.0 - 54.0 fl Final    MPV 12/10/2024 9.4  6.0 - 12.0 fL Final    Platelets 12/10/2024 157  140 - 450 10*3/mm3 Final    Neutrophil % 12/10/2024 59.9  42.7 - 76.0 % Final    Lymphocyte % 12/10/2024 26.2  19.6 - 45.3 % Final    Monocyte % 12/10/2024 10.8  5.0 - 12.0 % Final    Eosinophil % 12/10/2024 2.4  0.3 - 6.2 % Final    Basophil % 12/10/2024 0.7  0.0 - 1.5 % Final    Immature Grans % 12/10/2024 0.0  0.0 - 0.5 % Final    Neutrophils, Absolute 12/10/2024 2.76  1.70 - 7.00 10*3/mm3 Final    Lymphocytes, Absolute 12/10/2024 1.21  0.70 - 3.10 10*3/mm3 Final    Monocytes, Absolute 12/10/2024 0.50  0.10 - 0.90 10*3/mm3 Final    Eosinophils, Absolute 12/10/2024 0.11  0.00 - 0.40 10*3/mm3 Final    Basophils, Absolute 12/10/2024 0.03  0.00 - 0.20 10*3/mm3 Final    Immature Grans, Absolute 12/10/2024 0.00  0.00 - 0.05 10*3/mm3 Final    nRBC 12/10/2024 0.0  0.0 - 0.2 /100 WBC Final    C-Reactive Protein 12/10/2024 <0.30  0.00 - 0.50 mg/dL Final    Sed Rate 12/10/2024 <1  0 - 30 mm/hr Final    Right Common Femoral Spont 12/10/2024 Y   Final    Right Common Femoral Competent 12/10/2024 Y   Final    Right Common Femoral Phasic 12/10/2024 Y   Final    Right Common Femoral Compress 12/10/2024 C   Final    Right Common Femoral Augment 12/10/2024 Y   Final    Right Saphenofemoral Junction Comp* 12/10/2024 C   Final    Right Profunda Femoral Compress 12/10/2024 C   Final    Right Proximal Femoral Compress 12/10/2024 C   Final    Right Mid Femoral Spont 12/10/2024 Y   Final    Right Mid Femoral Competent 12/10/2024 Y   Final    Right Mid Femoral Phasic 12/10/2024 Y   Final    Right Mid Femoral Compress 12/10/2024 C   Final    Right Mid Femoral Augment 12/10/2024 Y   Final    Right Distal Femoral Compress 12/10/2024 C   Final    Right Popliteal Spont 12/10/2024 Y   Final    Right Popliteal Competent 12/10/2024 Y   Final    Right Popliteal Phasic 12/10/2024 Y   Final    Right Popliteal Compress 12/10/2024 C   Final    Right  Popliteal Augment 12/10/2024 Y   Final    Right Posterior Tibial Compress 12/10/2024 C   Final    Right Peroneal Compress 12/10/2024 C   Final    Right Gastronemius Compress 12/10/2024 C   Final    Right Greater Saph AK Compress 12/10/2024 C   Final    Right Greater Saph BK Compress 12/10/2024 C   Final    Right Lesser Saph Compress 12/10/2024 C   Final    Left Common Femoral Spont 12/10/2024 Y   Final    Left Common Femoral Competent 12/10/2024 Y   Final    Left Common Femoral Phasic 12/10/2024 Y   Final    Left Common Femoral Compress 12/10/2024 C   Final    Left Common Femoral Augment 12/10/2024 Y   Final    Uric Acid 12/10/2024 5.3  2.4 - 5.7 mg/dL Final    Glucose 12/11/2024 101 (H)  65 - 99 mg/dL Final    BUN 12/11/2024 26 (H)  8 - 23 mg/dL Final    Creatinine 12/11/2024 1.15 (H)  0.57 - 1.00 mg/dL Final    Sodium 12/11/2024 141  136 - 145 mmol/L Final    Potassium 12/11/2024 4.1  3.5 - 5.2 mmol/L Final    Chloride 12/11/2024 109 (H)  98 - 107 mmol/L Final    CO2 12/11/2024 23.9  22.0 - 29.0 mmol/L Final    Calcium 12/11/2024 8.9  8.6 - 10.5 mg/dL Final    BUN/Creatinine Ratio 12/11/2024 22.6  7.0 - 25.0 Final    Anion Gap 12/11/2024 8.1  5.0 - 15.0 mmol/L Final    eGFR 12/11/2024 46.2 (L)  >60.0 mL/min/1.73 Final    WBC 12/11/2024 3.63  3.40 - 10.80 10*3/mm3 Final    RBC 12/11/2024 3.14 (L)  3.77 - 5.28 10*6/mm3 Final    Hemoglobin 12/11/2024 10.0 (L)  12.0 - 15.9 g/dL Final    Hematocrit 12/11/2024 29.2 (L)  34.0 - 46.6 % Final    MCV 12/11/2024 93.0  79.0 - 97.0 fL Final    MCH 12/11/2024 31.8  26.6 - 33.0 pg Final    MCHC 12/11/2024 34.2  31.5 - 35.7 g/dL Final    RDW 12/11/2024 11.5 (L)  12.3 - 15.4 % Final    RDW-SD 12/11/2024 38.6  37.0 - 54.0 fl Final    MPV 12/11/2024 9.4  6.0 - 12.0 fL Final    Platelets 12/11/2024 136 (L)  140 - 450 10*3/mm3 Final    Neutrophil % 12/11/2024 56.9  42.7 - 76.0 % Final    Lymphocyte % 12/11/2024 28.1  19.6 - 45.3 % Final    Monocyte % 12/11/2024 11.6  5.0 - 12.0 %  Final    Eosinophil % 12/11/2024 2.5  0.3 - 6.2 % Final    Basophil % 12/11/2024 0.6  0.0 - 1.5 % Final    Immature Grans % 12/11/2024 0.3  0.0 - 0.5 % Final    Neutrophils, Absolute 12/11/2024 2.07  1.70 - 7.00 10*3/mm3 Final    Lymphocytes, Absolute 12/11/2024 1.02  0.70 - 3.10 10*3/mm3 Final    Monocytes, Absolute 12/11/2024 0.42  0.10 - 0.90 10*3/mm3 Final    Eosinophils, Absolute 12/11/2024 0.09  0.00 - 0.40 10*3/mm3 Final    Basophils, Absolute 12/11/2024 0.02  0.00 - 0.20 10*3/mm3 Final    Immature Grans, Absolute 12/11/2024 0.01  0.00 - 0.05 10*3/mm3 Final    nRBC 12/11/2024 0.0  0.0 - 0.2 /100 WBC Final    WBC 12/12/2024 4.02  3.40 - 10.80 10*3/mm3 Final    RBC 12/12/2024 3.31 (L)  3.77 - 5.28 10*6/mm3 Final    Hemoglobin 12/12/2024 10.4 (L)  12.0 - 15.9 g/dL Final    Hematocrit 12/12/2024 30.9 (L)  34.0 - 46.6 % Final    MCV 12/12/2024 93.4  79.0 - 97.0 fL Final    MCH 12/12/2024 31.4  26.6 - 33.0 pg Final    MCHC 12/12/2024 33.7  31.5 - 35.7 g/dL Final    RDW 12/12/2024 11.4 (L)  12.3 - 15.4 % Final    RDW-SD 12/12/2024 38.3  37.0 - 54.0 fl Final    MPV 12/12/2024 9.8  6.0 - 12.0 fL Final    Platelets 12/12/2024 156  140 - 450 10*3/mm3 Final    Glucose 12/12/2024 96  65 - 99 mg/dL Final    BUN 12/12/2024 28 (H)  8 - 23 mg/dL Final    Creatinine 12/12/2024 1.12 (H)  0.57 - 1.00 mg/dL Final    Sodium 12/12/2024 137  136 - 145 mmol/L Final    Potassium 12/12/2024 4.5  3.5 - 5.2 mmol/L Final    Slight hemolysis detected by analyzer. Result may be falsely elevated.    Chloride 12/12/2024 106  98 - 107 mmol/L Final    CO2 12/12/2024 22.6  22.0 - 29.0 mmol/L Final    Calcium 12/12/2024 9.2  8.6 - 10.5 mg/dL Final    BUN/Creatinine Ratio 12/12/2024 25.0  7.0 - 25.0 Final    Anion Gap 12/12/2024 8.4  5.0 - 15.0 mmol/L Final    eGFR 12/12/2024 47.7 (L)  >60.0 mL/min/1.73 Final    Site 12/12/2024 Right Brachial   Final    Sheldon's Test 12/12/2024 N/A   Final    pH, Arterial 12/12/2024 7.309 (L)  7.350 - 7.450 pH  units Final    pCO2, Arterial 12/12/2024 46.5 (H)  35.0 - 45.0 mm Hg Final    pO2, Arterial 12/12/2024 142.5 (H)  80.0 - 100.0 mm Hg Final    HCO3, Arterial 12/12/2024 23.4  22.0 - 28.0 mmol/L Final    Base Excess, Arterial 12/12/2024 -3.1 (L)  0.0 - 2.0 mmol/L Final    Serial Number: [id number]:  [id number]    O2 Saturation, Arterial 12/12/2024 98.9 (H)  92.0 - 98.5 % Final    CO2 Content 12/12/2024 24.8  23 - 27 mmol/L Final    Barometric Pressure for Blood Gas 12/12/2024 757.8000  mmHg Final    Modality 12/12/2024 Cannula   Final    Flow Rate 12/12/2024 3.0000  lpm Final    Set Mech Resp Rate 12/12/2024 16   Final    Rate 12/12/2024 16  Breaths/minute Final    Hemodilution 12/12/2024 No   Final    Device Comment 12/12/2024 sat 94%   Final    Hemoglobin 12/13/2024 10.6 (L)  12.0 - 15.9 g/dL Final    Hematocrit 12/13/2024 30.7 (L)  34.0 - 46.6 % Final       No results found.    Medications  acetaminophen, 1,000 mg, Oral, Q8H  anastrozole, 1 mg, Oral, Daily  aspirin, 81 mg, Oral, Q12H  Chlorhexidine Gluconate Cloth, , Apply externally, BID  ethyl alcohol, 2 Swab, Nasal, Once  ferrous sulfate, 325 mg, Oral, Daily With Breakfast  lisinopril, 10 mg, Oral, Daily  meloxicam, 15 mg, Oral, Daily  polyethylene glycol, 17 g, Oral, BID  propranolol, 30 mg, Oral, BID  sodium chloride, 10 mL, Intravenous, Q12H        senna-docusate sodium **AND** polyethylene glycol **AND** bisacodyl **AND** bisacodyl    calcium carbonate    HYDROcodone-acetaminophen    HYDROcodone-acetaminophen    ondansetron ODT **OR** ondansetron    Potassium Replacement - Follow Nurse / BPA Driven Protocol    [COMPLETED] Insert Peripheral IV **AND** sodium chloride    sodium chloride    sodium chloride    Assessment:  Doing well POD  # 1 Day Post-Op following total knee replacement  Problems Addressed this Visit          Musculoskeletal and Injuries    Status post left knee replacement    Relevant Medications    Chlorhexidine Gluconate Cloth 2 % pads     ethyl alcohol 62 % 2 each    pregabalin (LYRICA) capsule 150 mg (Completed)    * (Principal) Right knee pain - Primary     Other Visit Diagnoses       Chronic renal impairment, unspecified CKD stage        Central apnea        Relevant Orders    Polysomnography 4 or More Parameters    Central sleep apnea in conditions classified elsewhere        Relevant Orders    Polysomnography 4 or More Parameters          Diagnoses         Codes Comments    Acute pain of right knee    -  Primary ICD-10-CM: M25.561  ICD-9-CM: 719.46     Chronic renal impairment, unspecified CKD stage     ICD-10-CM: N18.9  ICD-9-CM: 585.9     Status post left knee replacement     ICD-10-CM: Z96.652  ICD-9-CM: V43.65     Central apnea     ICD-10-CM: R06.81  ICD-9-CM: 786.03     Central sleep apnea in conditions classified elsewhere     ICD-10-CM: G47.37  ICD-9-CM: 327.27             Plan:   Continue efforts to mobilize  Continue Pain Control Measures  Continue incisional Care  DVT prophylaxis    Discharge Plan:Home from ortho standpoint when medically ready  Asa 81 mg bid x 14 days then qd x 30 days postop for dvt prophylaxis  Will need home PT for TKA protocol  Rto 2 weeks - dr edmund welch, call [phone] for appt    Edmund Welch MD    Date: 12/13/2024  Time: 09:30 EST

## 2024-12-13 NOTE — CONSULTS
Pulmonary Consultation     Patient Name: Haydee Dickey  Age/Sex: 89 y.o. female  : 1935  MRN: [id number]    Date of Admission: 12/10/2024  Date of Encounter Visit: 24  Encounter Provider: Alejandra Hodge MD  Referring Provider: Young Rodriguez*  Place of Service: [facility]  Patient Care Team:  Juliana GonsalezJulienne)YNES as PCP - General (Family Medicine)  Edmund Zavaleta MD as Surgeon (Orthopedic Surgery)  Debbie Batista MD as Surgeon (Hand Surgery)  Lemuel Mosqueda MD (Ophthalmology)  Coy Pinzon MD as Consulting Physician (Hematology and Oncology)  Judy García MD as Consulting Physician (Radiation Oncology)  Maliha Andres MD as Referring Physician (Breast Surgery)  Trupti Ibanez MD as Consulting Physician (Cardiology)  Eugene Coats MD as Consulting Physician (Nephrology)      Subjective:     Consulted for: Postop hypoxemia    Chief Complaint: Same    History of Present Illness:  Haydee Dickey is a 89 y.o. female with no prior diagnosis of any sleep apnea she is unaware of any snoring, she denies any underlying pulmonary disease or any issue with hypoxemia or issue with the prior procedures.  She had her knee surgery which was uneventful, she got a dose of Dilaudid around 2 to 3 hours ago but the patient was still significantly affected and was having prolonged apnea that I witnessed myself with oxygen dropping down in the 40s.  When awakened, the patient resumed breathing almost instantly and her oxygen recovered within few seconds afterwards.  Patient was having those episodes back-to-back, she was drifting to sleep in the middle of the conversation.  There was no snoring preceding those episodes which was raising the concern for central apnea especially that there was no visible respiratory effort during the apnea episodes.  The patient denies any history of asthma, denies any history of chronic lung disease  Denies any history of coughing  "or chronic coughing or wheezing or needing any inhalers as an outpatient  No prior sleep studies  Preoperatively she had no nausea or vomiting no fever or chills this was an elective procedure  Patient had multiple previous surgeries and does not recall any problem with her oxygen postoperatively    Pulmonary Functions Testing Results:    No results found for: \"FEV1\", \"FVC\", \"CKP4FTN\", \"TLC\", \"DLCO\"    Review of Systems:   Review of Systems  This was an elective procedure she had negative system review except as mentioned above    Past Medical History:  Past Medical History:   Diagnosis Date    Allergic Percocet, iv iodine    Anemia     Arthritis     B12 deficiency     BPPV (benign paroxysmal positional vertigo)     Breast cancer     LEFT    Cataract 2018  jaime, 2021 jaime blepharoplasty    Colon polyp 2009    COVID 03/24/2023    Rebound Covid following Paxlovid 4/1/23    CTS (carpal tunnel syndrome) 1997, 2014    D’quervain x3    Deep vein thrombosis 1970    HX, LEG    Essential tremor     Folliculitis 11/01/2017    Fracture of hip 2014    Right ORIF    Fracture, femur 1995    Left, No surgery    Fracture, radius 1995    Right, orif    Gait instability     GERD (gastroesophageal reflux disease) 1990    History of blood transfusion 2014    History of fall 06/17/2024    \"KNEE GAVE OUT\"    HL (hearing loss) 2015    HEARING AIDS    Hypertension 2001    on Rx    Knee swelling 2001    Bilateral    Lower extremity neuropathy     bilateral    Neuropathy     Osteopenia 2019    Potassium (K) excess 10/21/2022    Seeing nephrologist -- Dr. Coats    Rotator cuff syndrome 2013    Fall induced    Sciatica     Small vessel disease     Torn rotator cuff 2013    right arm    Urinary tract infection 2018    approx 1 every 5 years    Wears hearing aid     BILATERAL    Wrist fracture, right 2015       Past Surgical History:   Procedure Laterality Date    ADENOIDECTOMY  1953    APPENDECTOMY  1961    BLEPHAROPLASTY Bilateral 10/19/2021 "    Procedure: BILATERAL UPPER LID BLEPHAROPLASTY;  Surgeon: Ruddy Moses MD;  [place] [facility] [place] [facility] [place]  Service: Ophthalmology;  Laterality: Bilateral;    BREAST BIOPSY      BREAST LUMPECTOMY Left 11/15/2021    Procedure: Left JEAN-PAUL-guided partial mastectomy;  Surgeon: Maliha Andres MD;  Location: [facility] MAIN OR;  Service: General;  Laterality: Left;    BROW LIFT Bilateral 10/19/2021    Procedure: BILATERAL TEMPORAL DIRECT BROWLIFT;  Surgeon: Ruddy Moses MD;  [place] [facility] [place] [facility] [place]  Service: Ophthalmology;  Laterality: Bilateral;    CARPAL TUNNEL RELEASE  1992    right wrist    CATARACT EXTRACTION, BILATERAL  07/01/2018    R 7/18 and L 9/18 WITH LENS IMPLANTS    COLONOSCOPY      DEQUERVAIN RELEASE Bilateral 2015    DILATATION AND CURETTAGE  1960s    EYE SURGERY  2018    Bilateral repairs    FRACTURE SURGERY  2014    R hip, R  wrist    HAND SURGERY Right     HIP FRACTURE SURGERY Right     HYSTERECTOMY  1975    Partial    JOINT REPLACEMENT  2001, 2014    TKA    TONSILLECTOMY AND ADENOIDECTOMY  1953    TOTAL KNEE ARTHROPLASTY Left 2001    TOTAL KNEE ARTHROPLASTY Right 2014    TRIGGER FINGER RELEASE Right     TRIGGER POINT INJECTION  2017, 2019    WRIST SURGERY  2015    FRACTURE RIGHT       Home Medications:   Medications Prior to Admission   Medication Sig Dispense Refill Last Dose/Taking    anastrozole (ARIMIDEX) 1 MG tablet Take 1 tablet by mouth Daily. 90 tablet 3 Taking    cyanocobalamin (VITAMIN B-12) 1000 MCG tablet Take 100 mcg by mouth Daily.   Taking    ferrous sulfate 325 (65 FE) MG tablet Take 1 tablet by mouth Daily With Breakfast.   Taking    folic acid (FOLVITE) 400 MCG tablet Take 1 tablet by mouth Daily.   Taking    hydroCHLOROthiazide 25 MG tablet Take 1 tablet by mouth Daily.   Taking    lisinopril (PRINIVIL,ZESTRIL) 10 MG tablet Take 1 tablet by mouth Daily.   Taking    propranolol (INDERAL) 20 MG tablet Take 1.5 tablets by mouth 2 (Two) Times a Day.   Taking    Sodium  "Fluoride 5000 PPM 1.1 % paste Take 1 Application by mouth Daily.   Taking       Inpatient Medications:  Scheduled Meds:[Transfer Hold] acetaminophen, 1,000 mg, Oral, Q8H  [Transfer Hold] anastrozole, 1 mg, Oral, Daily  [Transfer Hold] ferrous sulfate, 325 mg, Oral, Daily With Breakfast  lisinopril, 10 mg, Oral, Daily  propranolol, 30 mg, Oral, BID  [Transfer Hold] sodium chloride, 10 mL, Intravenous, Q12H      Continuous Infusions:lactated ringers, 9 mL/hr, Last Rate: 9 mL/hr (12/12/24 7215)      PRN Meds:.  atropine    [Transfer Hold] senna-docusate sodium **AND** [Transfer Hold] polyethylene glycol **AND** [Transfer Hold] bisacodyl **AND** [Transfer Hold] bisacodyl    [Transfer Hold] calcium carbonate    diphenhydrAMINE    ePHEDrine    fentanyl    flumazenil    hydrALAZINE    HYDROcodone-acetaminophen    HYDROmorphone    ipratropium-albuterol    labetalol    naloxone    [Transfer Hold] ondansetron    ondansetron    Potassium Replacement - Follow Nurse / BPA Driven Protocol    [COMPLETED] Insert Peripheral IV **AND** [Transfer Hold] sodium chloride    [Transfer Hold] sodium chloride    [Transfer Hold] sodium chloride    Allergies:  Allergies   Allergen Reactions    Iodine Itching     IV ONLY    Percocet [Oxycodone-Acetaminophen] Itching    Iodinated Contrast Media Rash     Patient reports rash occurred 30 yrs ago with contrast   (\"IV\")       Past Social History:  Social History     Socioeconomic History    Marital status:     Number of children: 1   Tobacco Use    Smoking status: Never     Passive exposure: Never    Smokeless tobacco: Never   Vaping Use    Vaping status: Never Used   Substance and Sexual Activity    Alcohol use: No    Drug use: No    Sexual activity: Never       Past Family History:  Family History   Problem Relation Age of Onset    Diabetes Mother     Hypertension Mother     Stroke Mother     Hearing loss Mother         AIDS    Heart disease Mother         CHF, PACER    Thyroid disease " Mother         THYROIDECTOMY    Hypertension Father     Stroke Father     Alcohol abuse Father         DAYS OFF     Heart disease Father         CHF    Hyperlipidemia Father     Kidney disease Father         KIDNEY STONE REMOVED    Heart attack Father     Cancer Sister         LUNG    COPD Sister     Lung cancer Sister     Diabetes Sister     Cancer Sister         KIDNEY    Kidney cancer Sister     Diabetes Sister         INSULIN    Hypertension Sister     Heart disease Sister         ON AUTOPSY    Mental illness Sister         HOSPITALIZED    Depression Sister     Hyperlipidemia Sister     Cancer Brother         LUNG    Lung cancer Brother     Early death Brother         2 MO, ? WHOOPING COUGH    Mental illness Brother         Schizophrenia/hosp    Depression Brother     Early death Brother     Asthma Daughter         ADULT ONSET    Diabetes Maternal Grandmother         NIDDM    Diabetes Maternal Aunt         NIDDM    Thyroid disease Maternal Aunt         THYROIDECTOMY    Diabetes Maternal Aunt         NIDDM    Hearing loss Maternal Aunt     Malig Hyperthermia Neg Hx     Breast cancer Neg Hx            Objective:   Temp:  [97.7 °F (36.5 °C)-98.2 °F (36.8 °C)] 98.1 °F (36.7 °C)  Heart Rate:  [47-75] 60  Resp:  [8-16] 12  BP: (109-160)/(52-92) 123/57  SpO2:  [86 %-100 %] 95 %  on  Flow (L/min) (Oxygen Therapy):  [2-10] 10 Device (Oxygen Therapy): NPPV/NIV     Intake/Output Summary (Last 24 hours) at 12/12/2024 2319  Last data filed at 12/12/2024 1125  Gross per 24 hour   Intake 0 ml   Output --   Net 0 ml     There is no height or weight on file to calculate BMI.  There were no vitals filed for this visit.  Weight change:     Physical Exam:   Physical Exam   General:    No acute distress, drowsy and asleep but arousable, she drifts to sleep in the middle of the conversation, oriented x 4.                   Head:    Normocephalic, atraumatic. External ears and nose are normal   Eyes:          Conjunctivae and  sclerae normal, no icterus, PERRLA, no  discharge   Throat:   No oral lesions, no thrush, oral mucosa moist.  Mallampati 3 airway with significant redundant membranous soft palate and normal size uvula   Neck:   Supple, trachea midline. No JVD, no cervical or supraclavicular lymphadenopathy    Lungs:     Normal chest on inspection, CTAB, no wheezes. No rhonchi. No crackles. Respirations regular, even and unlabored.  Patient does go into central apnea when left unstimulated for more than few seconds    Heart:    Regular rhythm and normal rate.  No murmurs, gallops, or rubs noted.   Abdomen:     Soft, nontender, nondistended, positive bowel sounds. No hepatosplenomegaly    Extremities:   No clubbing, cyanosis, or edema.  Status post left knee replacement surgery with surgical dressing on board, the dressing was not removed   Pulses:   Pulses palpable and equal bilaterally.    Skin:   No bleeding or rash. No bumps, good turgor pressure    Neuro:   Nonfocal.  Moves all extremities well. Strength 5/5 and symmetrical, no sensory deficit    Psychiatric:   Normal mood and affect.     Lab Review:   Results from last 7 days   Lab Units 12/12/24  0639 12/11/24  0614 12/10/24  0857   SODIUM mmol/L 137 141 138   POTASSIUM mmol/L 4.5 4.1 4.1   CHLORIDE mmol/L 106 109* 106   CO2 mmol/L 22.6 23.9 26.2   BUN mg/dL 28* 26* 25*   CREATININE mg/dL 1.12* 1.15* 1.25*   GLUCOSE mg/dL 96 101* 106*   CALCIUM mg/dL 9.2 8.9 9.2         Results from last 7 days   Lab Units 12/12/24  0639 12/11/24  0614 12/10/24  0857   WBC 10*3/mm3 4.02 3.63 4.61   HEMOGLOBIN g/dL 10.4* 10.0* 10.7*   HEMATOCRIT % 30.9* 29.2* 32.2*   PLATELETS 10*3/mm3 156 136* 157   MCV fL 93.4 93.0 94.7   MCH pg 31.4 31.8 31.5   MCHC g/dL 33.7 34.2 33.2   RDW % 11.4* 11.5* 11.7*   RDW-SD fl 38.3 38.6 39.4   MPV fL 9.8 9.4 9.4   NEUTROPHIL % %  --  56.9 59.9   LYMPHOCYTE % %  --  28.1 26.2   MONOCYTES % %  --  11.6 10.8   EOSINOPHIL % %  --  2.5 2.4   BASOPHIL % %  --  0.6 0.7  "  IMM GRAN % %  --  0.3 0.0   NEUTROS ABS 10*3/mm3  --  2.07 2.76   LYMPHS ABS 10*3/mm3  --  1.02 1.21   MONOS ABS 10*3/mm3  --  0.42 0.50   EOS ABS 10*3/mm3  --  0.09 0.11   BASOS ABS 10*3/mm3  --  0.02 0.03   IMMATURE GRANS (ABS) 10*3/mm3  --  0.01 0.00   NRBC /100 WBC  --  0.0 0.0                   Invalid input(s): \"LDLCALC\"          Results from last 7 days   Lab Units 12/12/24 2026   PH, ARTERIAL pH units 7.309*   PCO2, ARTERIAL mm Hg 46.5*   PO2 ART mm Hg 142.5*   HCO3 ART mmol/L 23.4         Results from last 7 days   Lab Units 12/10/24  0857   SED RATE mm/hr <1   CRP mg/dL <0.30                     Results from last 7 days   Lab Units 12/10/24  0857   URIC ACID mg/dL 5.3         Imaging:  Imaging Results (Most Recent)       Procedure Component Value Units Date/Time    XR Knee 1 or 2 View Right [[id number]] Collected: 12/12/24 1936     Updated: 12/12/24 1940    Narrative:      XR KNEE 1 OR 2 VW RIGHT-     INDICATIONS: Postoperative evaluation.     TECHNIQUE: Frontal and lateral views of the right knee     COMPARISON: 12/10/2024     FINDINGS:      Intact appearing knee arthroplasty hardware is seen with adjacent  surgical soft tissue gas. Yariel and screw hardware of the femur is partly  included. No acute fracture is identified.          Impression:         Postsurgical changes.           This report was finalized on 12/12/2024 7:37 PM by Dr. dEu Geller M.D on Workstation: [id number]       XR Knee 1 or 2 View Right [[id number]] Collected: 12/10/24 0832     Updated: 12/10/24 0850    Narrative:      XR RIGHT KNEE, 2 VIEWS     HISTORY: Right knee pain     COMPARISON: 11/24/2014     FINDINGS: There is a right knee arthroplasty that appears stable.  Intramedullary yariel and screws visualized in the distal right femur.  There is no fracture or joint effusion. Some mild increase heterotopic  ossification is seen medial to the knee.       Impression:      No acute finding           This report was finalized on " 12/10/2024 8:47 AM by Dr. Guillermo Conway M.D on Workstation: [id number]               I personally viewed and interpreted the patient's imaging studies.    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  She is definitely under the effect of the sedatives and the anesthesia and she denies any history of snoring and did not have any witnessed snoring at the time of bedside assessment.  Her apnea looks more central and likely drug-induced  At this point I would rather avoid reversing her narcotic was not cane and triggered severe pain specially if we can control those events with the BiPAP at least until the effect of the narcotic is out of her system.  Patient will be placed on the BiPAP with a backup rate of 12 with the AVAPS setting and target tidal volume of 500 cc, we will admit to a telemetry bed in the CCU unit and consider further measures if she is remaining BiPAP dependent but expect her BiPAP requirements to resolve as she recovers from the sedation and the anesthesia.  Consider outpatient evaluation for sleep apnea at a later stage  Will defer the other medical management to the internal medicine team on the case.  Discussed with patient, discussed with the nursing staff at the bedside      Thank you for allowing me to participate in the care of Haydee Dickey. Feel free to contact me directly with any further questions or concerns.    Alejandra Hodge MD  [facility]   12/12/24  23:19 EST    Dictated utilizing Dragon dictation

## 2024-12-13 NOTE — NURSING NOTE
Patient continue to drop saturation to 41% with good pleth when asleep.  Orders received to move patient to telemetry unit.

## 2024-12-13 NOTE — CASE MANAGEMENT/SOCIAL WORK
Discharge Planning Assessment  [facility]     Patient Name: Haydee Dickey  MRN: [id number]  Today's Date: 12/13/2024    Admit Date: 12/10/2024    Plan: SNF vs home with home health   Discharge Needs Assessment       Row Name 12/13/24 1022       Living Environment    People in Home alone    Current Living Arrangements home    Potentially Unsafe Housing Conditions none    In the past 12 months has the electric, gas, oil, or water company threatened to shut off services in your home? No    Primary Care Provided by self    Provides Primary Care For no one    Family Caregiver if Needed child(doug), adult    Family Caregiver Names Kerri Giordano [phone]    Quality of Family Relationships helpful;involved;supportive    Able to Return to Prior Arrangements other (see comments)  Wants SNF       Resource/Environmental Concerns    Resource/Environmental Concerns none    Transportation Concerns none       Transportation Needs    In the past 12 months, has lack of transportation kept you from medical appointments or from getting medications? no    In the past 12 months, has lack of transportation kept you from meetings, work, or from getting things needed for daily living? No       Food Insecurity    Within the past 12 months, you worried that your food would run out before you got the money to buy more. Never true    Within the past 12 months, the food you bought just didn't last and you didn't have money to get more. Never true       Transition Planning    Patient/Family Anticipates Transition to home;home with family    Patient/Family Anticipated Services at Transition none    Transportation Anticipated family or friend will provide       Discharge Needs Assessment    Current Outpatient/Agency/Support Group skilled nursing facility    Equipment Currently Used at Home walker, rolling;bp cuff    Concerns to be Addressed no discharge needs identified;denies needs/concerns at this time    Do you want help finding or  keeping work or a job? I do not need or want help    Do you want help with school or training? For example, starting or completing job training or getting a high school diploma, GED or equivalent No    Anticipated Changes Related to Illness none    Equipment Needed After Discharge none    Outpatient/Agency/Support Group Needs skilled nursing facility    Discharge Facility/Level of Care Needs nursing facility, skilled                   Discharge Plan       Row Name 12/13/24 1025       Plan    Plan SNF vs home with home health    Plan Comments Met with pt and daughter at bedside. Permission obtained to speak to pt in front of daughterKerri. Introduced self and explained role of . Discussed the possibility of SNF vs home. Pt is fearful of falling and agreed that SNF would be best for short term rehab. Pt is requesting referral to [facility]. Referral placed in [facility], informed Michaelle/A of referral. Explained that CCP would follow to assess for dc needs.                  Continued Care and Services - Admitted Since 12/10/2024       Destination       Service Provider Request Status Services Address Phone Fax Patient Preferred    [facility] Pending - Request Sent -- [address] [address] [phone] --                  Expected Discharge Date and Time       Expected Discharge Date Expected Discharge Time    Dec 13, 2024            Demographic Summary    No documentation.                  Functional Status    No documentation.                  Psychosocial    No documentation.                  Abuse/Neglect    No documentation.                  Legal    No documentation.                  Substance Abuse    No documentation.                  Patient Forms    No documentation.                     Vi García, RN     hypotension/hyperkalemia

## 2024-12-13 NOTE — DISCHARGE PLACEMENT REQUEST
"Haydee Hayes (89 y.o. Female)       Date of Birth   1935    Social Security Number       Address   [address] [place] [address]    Home Phone   [phone]    MRN   [id number]       Evangelical   Spiritism    Marital Status                               Admission Date   12/10/24    Admission Type   Emergency    Admitting Provider   Edmund Zavaleta MD    Attending Provider   South Young MD    Department, Room/Bed   [facility], N330/1       Discharge Date       Discharge Disposition       Discharge Destination                                 Attending Provider: South Young MD    Allergies: Iodine, Percocet [Oxycodone-acetaminophen], Iodinated Contrast Media    Isolation: None   Infection: None   Code Status: CPR    Ht: 158.9 cm (62.56\")   Wt: 86.6 kg (191 lb)    Admission Cmt: None   Principal Problem: Right knee pain [M25.561]                   Active Insurance as of 12/10/2024       Primary Coverage       Payor Plan Insurance Group Employer/Plan Group    ANTHEM MEDICARE REPLACEMENT ANTHEM MED ADV HMO [id number]       Payor Plan Address Payor Plan Phone Number Payor Plan Fax Number Effective Dates    [address] [phone]  1/1/2024 - None Entered    [facility] [address]         Subscriber Name Subscriber Birth Date Member ID       HAYDEE HAYES 1935 [id number]                     Emergency Contacts        (Rel.) Home Phone Work Phone Mobile Phone    Kerri Giordano (Daughter) [phone] -- --    Crystal Hugo (Other) -- -- [phone]                "

## 2024-12-13 NOTE — ANESTHESIA POSTPROCEDURE EVALUATION
Patient: Haydee Dickey    Procedure Summary       Date: 12/12/24 Room / Location: [facility] OR [address] [place] [address] MAIN OR    Anesthesia Start: 1634 Anesthesia Stop: 1807    Procedure: Right Knee Poly insert exchange with exploration of foreign body (Right: Knee) Diagnosis:     Surgeons: Edmund Zavaleta MD Provider: Debra Casiano MD    Anesthesia Type: general ASA Status: 3            Anesthesia Type: general    Vitals  Vitals Value Taken Time   /54 12/12/24 1901   Temp 36.7 °C (98.1 °F) 12/12/24 1810   Pulse 52 12/12/24 1904   Resp 15 12/12/24 1810   SpO2 99 % 12/12/24 1904   Vitals shown include unfiled device data.        Post Anesthesia Care and Evaluation    Level of consciousness: awake and alert  Pain management: adequate    Airway patency: patent  Anesthetic complications: No anesthetic complications  PONV Status: none  Cardiovascular status: acceptable  Respiratory status: acceptable  Hydration status: acceptable    Comments: /54   Pulse 51   Temp 36.7 °C (98.1 °F) (Oral)   Resp 15   SpO2 99%

## 2024-12-13 NOTE — OP NOTE
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    ASSISTANT: Mars Alexander    A surgical assistant was integral in ensuring a successful outcome with this procedure.  The assistant was utilized to assist in positioning the patient, draping the patient, was used throughout the case to provide with retraction of tissues, suctioning of blood and body fluids for visualization, positioning of the extremity to allow for proper exposure so that I could perform the procedure.  Without the use of a surgical assistant during this procedure I feel that the outcome may have been compromised or would have been suboptimal or at risk for complications.    IMPLANTS:  aguayo and nephew legion 3-4 poly 9mm CR    Estimated Blood Loss: 100cc  Specimens : none  Complications: none    DESCRIPTION OF PROCEDURE: The patient was taken to the operating room and placed in the supine position. A sequential compression device was carefully placed on the non-operative leg. Preoperative antibiotics were administered. Surgical time out was performed. After adequate induction of anesthesia, the leg was prepped and draped in the usual sterile fashion, exsanguinated with an Esmarch bandage and the tourniquet inflated to 250 mmHg.An incision through the previous midline scar was performed followed by a medial parapatellar arthrotomy. The patella was partially subluxed laterally.  I then carefully examined The undersurface of the patella.  There was end-stage patellar compartment osteoarthritis with exposed bone.  The patella appeared to track centrally. The were 2 calcified  loose bodies - 1 medial gutter, 1 lateral gutter these were removed, there was a small groove in the poly on the medial side. This was removed the posterior aspect of the knee was carefully examined and there is no evidence of further loose body or damage.  The femoral component was examined and showed no damage.  The locking mechanism was copiously irrigated and dried and then a 9 mm cruciate retaining 3 4 polyethylene insert was placed and it locked in nicely.    We prepared the patella in standard total knee fashion.  We measured the patellar thickness with a caliper then used to towel clips and removed 8 mm of bone with an oscillating saw.  We then measured the cut surface chose the appropriate size patellar component as above drilled for the 3 fixation lugs and then placed a trial component.  The patella tracked centrally without any mechanical symptoms.  The trial component was then removed, the knee was copiously irrigated with pulse lavage and injected with anesthetic cocktail.  The cut surface was then dried with clean laparotomy sponges and the component were then cemented in place, held with a compression clamp, and excess cement removed.  The knee was held still until the cement had completely hardened.  At this point it was again copiously irrigated.  The patella tracked centrally without any shift or mechanical symptoms through the transition zone.  The tourniquet was released and there was excellent hemostasis.  The knee was then closed in standard fashion and sterile dressings were applied.    At the end of the case the sponge and needle counts were reported to me as being correct there were no known complications.  The patient was in extubated and transferred to the PACU for recovery from anesthesia.      Edmund Zavaleta MD  12/13/2024

## 2024-12-13 NOTE — PROGRESS NOTES
LOS: 1 day   Patient Care Team:  Juliana Gonsalez (Julienne)YNES as PCP - General (Family Medicine)  Edmund Zavaleta MD as Surgeon (Orthopedic Surgery)  Debbie Batista MD as Surgeon (Hand Surgery)  Lemuel Mosqueda MD (Ophthalmology)  Coy Pinzon MD as Consulting Physician (Hematology and Oncology)  Judy García MD as Consulting Physician (Radiation Oncology)  Maliha Andres MD as Referring Physician (Breast Surgery)  Trupti Ibanez MD as Consulting Physician (Cardiology)  Eugene Coats MD as Consulting Physician (Nephrology)    Subjective     I am picking up pulmonary coverage from Dr. Hodge reviewed his and other records seeing patient for postop hypoxemia.  This 89-year-old no history of lung disease had uneventful knee surgery got a couple of doses Dilaudid and was having prolonged apneas with severe hypoxia.  Dr. 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  Doing pretty well this morning she was on 2 L O2 satting 100% I took her oxygen off she stayed 96% and above she is awake.  No distress no history of snoring or apneas  Review of Systems:          Objective     Vital Signs  Vital Sign Min/Max for last 24 hours  Temp  Min: 97.1 °F (36.2 °C)  Max: 98.4 °F (36.9 °C)   BP  Min: 106/52  Max: 160/54   Pulse  Min: 47  Max: 89   Resp  Min: 8  Max: 20   SpO2  Min: 86 %  Max: 100 %   Flow (L/min) (Oxygen Therapy)  Min: 2  Max: 10   No data recorded        Ventilator/Non-Invasive Ventilation Settings (From admission, onward)       Start     Ordered    12/12/24 5746  NIPPV-IPPV / BIPAP / CPAP  At Bedtime & As Needed-RT        Comments: To be used whenever asleep, may discontinue while patient is wide-awake   Question Answer Comment   Indication Sleep Apnea    Type AVAPS/PC/PS    Backup  Rate 12    Target VT (mL) 500    EPAP/PEEP (cm H2O) 5    Min Pressure (cm H2O) 10    Max Pressure (cm H2O) 25    Titrate Oxygen for SpO2 90% or Greater        12/12/24 8220                                 There is no height or weight on file to calculate BMI.  No intake/output data recorded.  No intake/output data recorded.        Physical Exam:  General Appearance: Well-developed older white female resting comfortably in bed in no apparent distress  Eyes: Conjunctiva are clear and anicteric  ENT: Mucous membranes are moist no erythema no exudates she has a Mallampati 2 airway  Neck: No adenopathy no thyromegaly no jugular venous distention and trachea midline  Lungs: Clear nonlabored symmetric expansion  Cardiac: Regular rate rhythm no murmur  Abdomen: Soft no hepatosplenomegaly or masses  : Not examined  Musculoskeletal: Postop changes in that right knee  Skin: Warm and dry  Neuro: She is very hard of hearing daughter says she still little bit loopy but she is awake she is following commands you really have to talk right at her and yell for her to actually hear and understand some of her confusion I think is just because she does not hear completely when she hears things she responds appropriately  Extremities/P Vascular: No clubbing no cyanosis palpable radial and dorsalis pedis pulses  MSE: Pleasant and appropriate       Labs:  Results from last 7 days   Lab Units 12/12/24  0639 12/11/24  0614 12/10/24  0857   GLUCOSE mg/dL 96 101* 106*   SODIUM mmol/L 137 141 138   POTASSIUM mmol/L 4.5 4.1 4.1   CO2 mmol/L 22.6 23.9 26.2   CHLORIDE mmol/L 106 109* 106   ANION GAP mmol/L 8.4 8.1 5.8   CREATININE mg/dL 1.12* 1.15* 1.25*   BUN mg/dL 28* 26* 25*   BUN / CREAT RATIO  25.0 22.6 20.0   CALCIUM mg/dL 9.2 8.9 9.2     Estimated Creatinine Clearance: 36.6 mL/min (A) (by C-G formula based on SCr of 1.12 mg/dL (H)).      Results from last 7 days   Lab Units 12/13/24  0424 12/12/24  0639 12/11/24  0614 12/10/24  0857   WBC  10*3/mm3  --  4.02 3.63 4.61   RBC 10*6/mm3  --  3.31* 3.14* 3.40*   HEMOGLOBIN g/dL 10.6* 10.4* 10.0* 10.7*   HEMATOCRIT % 30.7* 30.9* 29.2* 32.2*   MCV fL  --  93.4 93.0 94.7   MCH pg  --  31.4 31.8 31.5   MCHC g/dL  --  33.7 34.2 33.2   RDW %  --  11.4* 11.5* 11.7*   RDW-SD fl  --  38.3 38.6 39.4   MPV fL  --  9.8 9.4 9.4   PLATELETS 10*3/mm3  --  156 136* 157   NEUTROPHIL % %  --   --  56.9 59.9   LYMPHOCYTE % %  --   --  28.1 26.2   MONOCYTES % %  --   --  11.6 10.8   EOSINOPHIL % %  --   --  2.5 2.4   BASOPHIL % %  --   --  0.6 0.7   IMM GRAN % %  --   --  0.3 0.0   NEUTROS ABS 10*3/mm3  --   --  2.07 2.76   LYMPHS ABS 10*3/mm3  --   --  1.02 1.21   MONOS ABS 10*3/mm3  --   --  0.42 0.50   EOS ABS 10*3/mm3  --   --  0.09 0.11   BASOS ABS 10*3/mm3  --   --  0.02 0.03   IMMATURE GRANS (ABS) 10*3/mm3  --   --  0.01 0.00   NRBC /100 WBC  --   --  0.0 0.0     Results from last 7 days   Lab Units 12/12/24 2026   PH, ARTERIAL pH units 7.309*   PO2 ART mm Hg 142.5*   PCO2, ARTERIAL mm Hg 46.5*   HCO3 ART mmol/L 23.4                         Microbiology Results (last 10 days)       ** No results found for the last 240 hours. **                acetaminophen, 1,000 mg, Oral, Q8H  anastrozole, 1 mg, Oral, Daily  aspirin, 81 mg, Oral, Q12H  ceFAZolin, 2,000 mg, Intravenous, Q8H  Chlorhexidine Gluconate Cloth, , Apply externally, BID  ethyl alcohol, 2 Swab, Nasal, Once  ferrous sulfate, 325 mg, Oral, Daily With Breakfast  lisinopril, 10 mg, Oral, Daily  meloxicam, 15 mg, Oral, Daily  polyethylene glycol, 17 g, Oral, BID  propranolol, 30 mg, Oral, BID  sodium chloride, 10 mL, Intravenous, Q12H      lactated ringers, 9 mL/hr, Last Rate: 9 mL/hr (12/12/24 1625)        Diagnostics:  XR Knee 1 or 2 View Right    Result Date: 12/12/2024  XR KNEE 1 OR 2 VW RIGHT-  INDICATIONS: Postoperative evaluation.  TECHNIQUE: Frontal and lateral views of the right knee  COMPARISON: 12/10/2024  FINDINGS:   Intact appearing knee arthroplasty  hardware is seen with adjacent surgical soft tissue gas. Yariel and screw hardware of the femur is partly included. No acute fracture is identified.        Postsurgical changes.    This report was finalized on 12/12/2024 7:37 PM by Dr. Edu Geller M.D on Workstation: [id number]      Peripheral Block    Result Date: 12/12/2024  Guillermo Mcbride MD     12/12/2024  3:58 PM Peripheral Block Patient reassessed immediately prior to procedure Patient location during procedure: pre-op Start time: 12/12/2024 3:54 PM Stop time: 12/12/2024 3:57 PM Reason for block: at surgeon's request and post-op pain management Performed by Anesthesiologist: Guillermo Mcbride MD Preanesthetic Checklist Completed: patient identified, IV checked, site marked, risks and benefits discussed, surgical consent, monitors and equipment checked, pre-op evaluation and timeout performed Prep: Pt Position: supine Sterile barriers:cap, gloves, mask, alcohol skin prep and washed/disinfected hands Prep: ChloraPrep Patient monitoring: blood pressure monitoring, continuous pulse oximetry and EKG Procedure Sedation: yes Guidance:ultrasound guided ULTRASOUND INTERPRETATION.  Using ultrasound guidance a 21 G gauge needle was placed in close proximity to the femoral nerve, at which point, under ultrasound guidance anesthetic was injected in the area of the nerve and spread of the anesthesia was seen on ultrasound in close proximity thereto.  There were no abnormalities seen on ultrasound; a digital image was taken; and the patient tolerated the procedure with no complications. Images:still images obtained, printed/placed on chart Laterality:right Block Type:adductor canal block (Femoral Nerve at Adductor Canal) Injection Technique:single-shot Needle Type:short-bevel Needle Gauge:21 G Loss of resistance: normal. Medications Used: dexamethasone (DECADRON) injection - Injection  4 mg - 12/12/2024 3:57:00 PM ropivacaine (NAROPIN) 0.5 % injection -  Injection  20 mL - 12/12/2024 3:57:00 PM Medications Comment:Ultrasound Interpretation: Ultrasound guidance utilized for visualization of needle approach to femoral artery/nerve at adductor canal level and verification of local anesthetic disbursement to surrounding tissues. Photo printed and placed on chart for reference. Post Assessment Injection Assessment: negative aspiration for heme, no paresthesia on injection and incremental injection Patient Tolerance:comfortable throughout block Complications:no Performed by: Guillermo Mcbride MD     Duplex Venous Lower Extremity - Right CAR    Result Date: 12/10/2024    Normal right lower extremity venous duplex scan.     XR Knee 1 or 2 View Right    Result Date: 12/10/2024  XR RIGHT KNEE, 2 VIEWS  HISTORY: Right knee pain  COMPARISON: 11/24/2014  FINDINGS: There is a right knee arthroplasty that appears stable. Intramedullary mitchell and screws visualized in the distal right femur. There is no fracture or joint effusion. Some mild increase heterotopic ossification is seen medial to the knee.      No acute finding    This report was finalized on 12/10/2024 8:47 AM by Dr. Guillermo Conway M.D on Workstation: [id number]              Active Hospital Problems    Diagnosis  POA    **Right knee pain [M25.561]  Yes    S/P TKR (total knee replacement) using cement, left [Z96.652]  Not Applicable    OA (osteoarthritis) of knee [M17.9]  Yes    Benign hypertensive kidney disease with chronic kidney disease [I12.9]  Yes    Stage 3a chronic kidney disease [N18.31]  Yes    Benign essential hypertension [I10]  Yes      Resolved Hospital Problems   No resolved problems to display.         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  Course very judicious use of sedative medications particularly narcotics.  Patient is not having any respiratory difficulties now but she is wide-awake saturating in the upper 90s on room air.  I spoke with she and her daughter and the nurse I would like to see the patient take a nap at least today and not have any desaturations or significant apneas before she is discharged.  I would recommend trying to avoid narcotics is much as possible and if needed just the most minimal dose necessary.  Patient is looking at another knee surgery on the other side in about a month or so she probably should have an in lab sleep study prior to that to make sure there is no other underlying issues with these apneas.  Also be sure that anesthesia and surgery are aware of these events at the time of the next surgery so precautions can be taken.  Chronic kidney disease stage III AA  Essential hypertension    Plan for disposition:    Vignesh Barakat Jr, MD  12/13/24  08:36 EST    Time:

## 2024-12-13 NOTE — PROGRESS NOTES
Name: Haydee Dickey ADMIT: 12/10/2024   : 1935  PCP: Juliana Gonsalez APRN ([place])    MRN: [id number] LOS: 1 days   AGE/SEX: 89 y.o. female  ROOM: [facility]     Subjective   Subjective   Patient is lying on the bed and states her knee pain is much better controlled today.  Denies nausea, vomiting, abdominal pain, chest pain.  Per staff she is starting to hallucinate.     Objective   Objective   Vital Signs  Temp:  [97.1 °F (36.2 °C)-98.4 °F (36.9 °C)] 98 °F (36.7 °C)  Heart Rate:  [47-89] 77  Resp:  [8-20] 18  BP: (106-160)/(52-92) 135/78  SpO2:  [86 %-100 %] 95 %  on  Flow (L/min) (Oxygen Therapy):  [2-10] 2;   Device (Oxygen Therapy): nasal cannula  There is no height or weight on file to calculate BMI.  Physical Exam  Vitals and nursing note reviewed.   Constitutional:       General: She is not in acute distress.     Appearance: Normal appearance.   HENT:      Head: Normocephalic and atraumatic.      Mouth/Throat:      Mouth: Mucous membranes are moist.      Pharynx: No posterior oropharyngeal erythema.   Eyes:      General: No scleral icterus.  Neck:      Vascular: No JVD.   Cardiovascular:      Rate and Rhythm: Normal rate and regular rhythm.      Pulses: Normal pulses.      Heart sounds: Normal heart sounds. No murmur heard.  Pulmonary:      Effort: Pulmonary effort is normal. No respiratory distress.      Breath sounds: Normal breath sounds.   Abdominal:      General: Bowel sounds are normal. There is no distension.      Palpations: Abdomen is soft.      Tenderness: There is no abdominal tenderness.   Musculoskeletal:         General: Tenderness and signs of injury present. No swelling.      Cervical back: Neck supple.      Right lower leg: Tenderness present. Edema present.      Left lower leg: No tenderness. Edema present.      Comments: Bilateral lower extremity edema  with noted point tenderness of right knee      Skin:     General: Skin is warm and dry.      Capillary Refill: Capillary refill  "takes less than 2 seconds.      Coloration: Skin is not jaundiced.      Findings: Erythema present. No rash.   Neurological:      General: No focal deficit present.      Mental Status: She is alert and oriented to person, place, and time. Mental status is at baseline.   Psychiatric:         Mood and Affect: Mood normal.         Behavior: Behavior normal.       Results Review     I reviewed the patient's new clinical results.  Results from last 7 days   Lab Units 12/13/24  0424 12/12/24  0639 12/11/24  0614 12/10/24  0857   WBC 10*3/mm3  --  4.02 3.63 4.61   HEMOGLOBIN g/dL 10.6* 10.4* 10.0* 10.7*   PLATELETS 10*3/mm3  --  156 136* 157     Results from last 7 days   Lab Units 12/12/24  0639 12/11/24  0614 12/10/24  0857   SODIUM mmol/L 137 141 138   POTASSIUM mmol/L 4.5 4.1 4.1   CHLORIDE mmol/L 106 109* 106   CO2 mmol/L 22.6 23.9 26.2   BUN mg/dL 28* 26* 25*   CREATININE mg/dL 1.12* 1.15* 1.25*   GLUCOSE mg/dL 96 101* 106*   EGFR mL/min/1.73 47.7* 46.2* 41.8*       Results from last 7 days   Lab Units 12/12/24  0639 12/11/24  0614 12/10/24  0857   CALCIUM mg/dL 9.2 8.9 9.2       No results found for: \"HGBA1C\", \"POCGLU\"    XR Knee 1 or 2 View Right    Result Date: 12/12/2024   Postsurgical changes.    This report was finalized on 12/12/2024 7:37 PM by Dr. Edu Geller M.D on Workstation: [id number]       I have personally reviewed all medications:  Scheduled Medications  acetaminophen, 1,000 mg, Oral, Q8H  anastrozole, 1 mg, Oral, Daily  aspirin, 81 mg, Oral, Q12H  Chlorhexidine Gluconate Cloth, , Apply externally, BID  ethyl alcohol, 2 Swab, Nasal, Once  ferrous sulfate, 325 mg, Oral, Daily With Breakfast  lisinopril, 10 mg, Oral, Daily  meloxicam, 15 mg, Oral, Daily  polyethylene glycol, 17 g, Oral, BID  propranolol, 30 mg, Oral, BID  sodium chloride, 10 mL, Intravenous, Q12H    Infusions  lactated ringers, 9 mL/hr, Last Rate: 9 mL/hr (12/12/24 1625)    Diet  Diet: Regular/House; Fluid Consistency: Thin (IDDSI " 0)    I have personally reviewed:  x Home antihypertensive   Laboratory   x   Microbiology   x   Radiology   x   EKG/Telemetry  x   Cardiology/Vascular     Pathology      Records       Assessment/Plan     Active Hospital Problems    Diagnosis  POA   • **Right knee pain [M25.561]  Yes   • S/P TKR (total knee replacement) using cement, left [Z96.652]  Not Applicable   • OA (osteoarthritis) of knee [M17.9]  Yes   • Benign hypertensive kidney disease with chronic kidney disease [I12.9]  Yes   • Stage 3a chronic kidney disease [N18.31]  Yes   • Benign essential hypertension [I10]  Yes      Resolved Hospital Problems   No resolved problems to display.       89 y.o. female admitted with     1.Status post knee replacement left, continue with analgesics and participating with physical therapy.  Decision regarding home versus rehab will be made based on how she does climbing a few stairs.  If she goes home patient is to be on aspirin 81 mg p.o. twice daily for 14 days and then daily.  2.  Hallucinations, likely due to hospitalization itself and pain medications.  The psychiatry consult to be obtained regarding the same.  3.  Hypertension, on lisinopril and propranolol at home which will be continued.  4.  CKD stage IIIa, creatinine is close to baseline.  5.  On SCDs for DVT prophylaxis.  6.  CODE STATUS is full code.      Discussed with patient.  Anticipate discharge home when cleared by consultants.  Expected Discharge Date: 12/16/2024; Expected Discharge Time:       [place] Young MD  [facility]  12/13/24  15:06 EST

## 2024-12-13 NOTE — PLAN OF CARE
Goal Outcome Evaluation:  Plan of Care Reviewed With: patient           Outcome Evaluation: Pt seen for PT re-eval this afternoon folling R knee poly exchange. Pt is WBAT to RLE. SHe presents w expected post op pain, weakness, and decreased ROM. Pt doing well at this time. She is able to perform bed mobility w SBA. She stood w CGA and Rwx. Pt able to ambulate approx 40 ft w CGA/min A. She requires cues for sequence. She demos mild unsteadiness w decreased R heel strike. Pt also needs cues for safety awareness at times. She returned to bed at end of session. She tolerated all knee exercises well. REcommend SNU at [facility] pending progress as pt is a fall risk and would not want her mobilizing alone at home at this time. She will continue to benefit from skilled PT to maximize safety, strength, and independence w mobility.    Anticipated Discharge Disposition (PT): skilled nursing facility

## 2024-12-14 LAB
ANION GAP SERPL CALCULATED.3IONS-SCNC: 7.8 MMOL/L (ref 5–15)
BASOPHILS # BLD AUTO: 0.03 10*3/MM3 (ref 0–0.2)
BASOPHILS NFR BLD AUTO: 0.6 % (ref 0–1.5)
BUN SERPL-MCNC: 40 MG/DL (ref 8–23)
BUN/CREAT SERPL: 27.8 (ref 7–25)
CALCIUM SPEC-SCNC: 8.5 MG/DL (ref 8.6–10.5)
CHLORIDE SERPL-SCNC: 106 MMOL/L (ref 98–107)
CO2 SERPL-SCNC: 23.2 MMOL/L (ref 22–29)
CREAT SERPL-MCNC: 1.44 MG/DL (ref 0.57–1)
DEPRECATED RDW RBC AUTO: 38.7 FL (ref 37–54)
EGFRCR SERPLBLD CKD-EPI 2021: 35.3 ML/MIN/1.73
EOSINOPHIL # BLD AUTO: 0.03 10*3/MM3 (ref 0–0.4)
EOSINOPHIL NFR BLD AUTO: 0.6 % (ref 0.3–6.2)
ERYTHROCYTE [DISTWIDTH] IN BLOOD BY AUTOMATED COUNT: 11.5 % (ref 12.3–15.4)
GLUCOSE SERPL-MCNC: 99 MG/DL (ref 65–99)
HCT VFR BLD AUTO: 28.1 % (ref 34–46.6)
HGB BLD-MCNC: 9.5 G/DL (ref 12–15.9)
IMM GRANULOCYTES # BLD AUTO: 0.01 10*3/MM3 (ref 0–0.05)
IMM GRANULOCYTES NFR BLD AUTO: 0.2 % (ref 0–0.5)
LYMPHOCYTES # BLD AUTO: 1.19 10*3/MM3 (ref 0.7–3.1)
LYMPHOCYTES NFR BLD AUTO: 23.6 % (ref 19.6–45.3)
MCH RBC QN AUTO: 31.7 PG (ref 26.6–33)
MCHC RBC AUTO-ENTMCNC: 33.8 G/DL (ref 31.5–35.7)
MCV RBC AUTO: 93.7 FL (ref 79–97)
MONOCYTES # BLD AUTO: 0.63 10*3/MM3 (ref 0.1–0.9)
MONOCYTES NFR BLD AUTO: 12.5 % (ref 5–12)
NEUTROPHILS NFR BLD AUTO: 3.16 10*3/MM3 (ref 1.7–7)
NEUTROPHILS NFR BLD AUTO: 62.5 % (ref 42.7–76)
NRBC BLD AUTO-RTO: 0 /100 WBC (ref 0–0.2)
PLATELET # BLD AUTO: 128 10*3/MM3 (ref 140–450)
PMV BLD AUTO: 9.8 FL (ref 6–12)
POTASSIUM SERPL-SCNC: 4.7 MMOL/L (ref 3.5–5.2)
RBC # BLD AUTO: 3 10*6/MM3 (ref 3.77–5.28)
SODIUM SERPL-SCNC: 137 MMOL/L (ref 136–145)
WBC NRBC COR # BLD AUTO: 5.05 10*3/MM3 (ref 3.4–10.8)

## 2024-12-14 PROCEDURE — 85025 COMPLETE CBC W/AUTO DIFF WBC: CPT | Performed by: INTERNAL MEDICINE

## 2024-12-14 PROCEDURE — 80048 BASIC METABOLIC PNL TOTAL CA: CPT | Performed by: INTERNAL MEDICINE

## 2024-12-14 RX ADMIN — POLYETHYLENE GLYCOL 3350 17 G: 17 POWDER, FOR SOLUTION ORAL at 22:21

## 2024-12-14 RX ADMIN — ASPIRIN 81 MG: 81 TABLET, COATED ORAL at 21:29

## 2024-12-14 RX ADMIN — ASPIRIN 81 MG: 81 TABLET, COATED ORAL at 09:53

## 2024-12-14 RX ADMIN — PROPRANOLOL HYDROCHLORIDE 30 MG: 20 TABLET ORAL at 09:53

## 2024-12-14 RX ADMIN — FERROUS SULFATE TAB 325 MG (65 MG ELEMENTAL FE) 325 MG: 325 (65 FE) TAB at 09:53

## 2024-12-14 RX ADMIN — CHLORHEXIDINE GLUCONATE: 500 CLOTH TOPICAL at 22:20

## 2024-12-14 RX ADMIN — Medication 10 ML: at 22:21

## 2024-12-14 RX ADMIN — PROPRANOLOL HYDROCHLORIDE 30 MG: 20 TABLET ORAL at 21:30

## 2024-12-14 RX ADMIN — ACETAMINOPHEN 1000 MG: 500 TABLET, FILM COATED ORAL at 21:34

## 2024-12-14 RX ADMIN — Medication 10 ML: at 09:55

## 2024-12-14 RX ADMIN — LISINOPRIL 10 MG: 10 TABLET ORAL at 09:54

## 2024-12-14 RX ADMIN — ANASTROZOLE 1 MG: 1 TABLET, COATED ORAL at 09:54

## 2024-12-14 NOTE — PROGRESS NOTES
LOS: 2 days   Patient Care Team:  Juliana Gonsalez (Julienne)YNES as PCP - General (Family Medicine)  Edmund Zavaleta MD as Surgeon (Orthopedic Surgery)  Debbie Batista MD as Surgeon (Hand Surgery)  Lemuel Mosqueda MD (Ophthalmology)  Coy Pinzon MD as Consulting Physician (Hematology and Oncology)  Judy García MD as Consulting Physician (Radiation Oncology)  Maliha Andres MD as Referring Physician (Breast Surgery)  Trupti Ibanez MD as Consulting Physician (Cardiology)  Eugene Coats MD as Consulting Physician (Nephrology)    Subjective       This 89-year-old no history of lung disease had uneventful knee surgery got a couple of doses Dilaudid and was having prolonged apneas with severe hypoxia.  Dr. 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  Doing well she is on room air saturating in the mid upper 90s looks like they put her on a couple of liters O2 during the night but I see no records of even borderline low oxygen saturation some not sure the reason.  Did not use any narcotics in fact think she used any pain medications yesterday she says she is not having any significant pain.  She reports she still having some visual hallucinations had some early this morning.  Review of Systems:          Objective     Vital Signs  Vital Sign Min/Max for last 24 hours  Temp  Min: 98 °F (36.7 °C)  Max: 98.6 °F (37 °C)   BP  Min: 110/68  Max: 122/74   Pulse  Min: 62  Max: 71   Resp  Min: 16  Max: 18   SpO2  Min: 96 %  Max: 97 %   Flow (L/min) (Oxygen Therapy)  Min: 2  Max: 2   Weight  Min: 86.6 kg (190 lb 14.7 oz)  Max: 86.6 kg (190 lb 14.7 oz)        Ventilator/Non-Invasive Ventilation Settings (From admission, onward)       Start     Ordered    12/12/24 9957  NIPPV-IPPV / BIPAP / CPAP  At  Bedtime & As Needed-RT        Comments: To be used whenever asleep, may discontinue while patient is wide-awake   Question Answer Comment   Indication Sleep Apnea    Type AVAPS/PC/PS    Backup Rate 12    Target VT (mL) 500    EPAP/PEEP (cm H2O) 5    Min Pressure (cm H2O) 10    Max Pressure (cm H2O) 25    Titrate Oxygen for SpO2 90% or Greater        12/12/24 2325                                 Body mass index is 34.3 kg/m².  I/O last 3 completed shifts:  In: 960 [P.O.:960]  Out: -   I/O this shift:  In: 480 [P.O.:480]  Out: -         Physical Exam:  General Appearance: Well-developed older white female resting comfortably in bed in no apparent distress  Eyes: Conjunctiva are clear and anicteric  ENT: Mucous membranes are moist no erythema no exudates she has a Mallampati 2 airway  Neck: No adenopathy no thyromegaly no jugular venous distention and trachea midline  Lungs: Clear nonlabored symmetric expansion  Cardiac: Regular rate rhythm no murmur  Abdomen: Soft no hepatosplenomegaly or masses  : Not examined  Musculoskeletal: Postop changes in that right knee  Skin: Warm and dry  Neuro: She is very hard of hearing following commands  Extremities/P Vascular: No clubbing no cyanosis palpable radial and dorsalis pedis pulses  MSE: Pleasant and appropriate       Labs:  Results from last 7 days   Lab Units 12/14/24  0620 12/12/24  0639 12/11/24  0614 12/10/24  0857   GLUCOSE mg/dL 99 96 101* 106*   SODIUM mmol/L 137 137 141 138   POTASSIUM mmol/L 4.7 4.5 4.1 4.1   CO2 mmol/L 23.2 22.6 23.9 26.2   CHLORIDE mmol/L 106 106 109* 106   ANION GAP mmol/L 7.8 8.4 8.1 5.8   CREATININE mg/dL 1.44* 1.12* 1.15* 1.25*   BUN mg/dL 40* 28* 26* 25*   BUN / CREAT RATIO  27.8* 25.0 22.6 20.0   CALCIUM mg/dL 8.5* 9.2 8.9 9.2     Estimated Creatinine Clearance: 28.5 mL/min (A) (by C-G formula based on SCr of 1.44 mg/dL (H)).      Results from last 7 days   Lab Units 12/14/24  0620 12/13/24  0424 12/12/24  0639 12/11/24  0614  12/10/24  0857   WBC 10*3/mm3 5.05  --  4.02 3.63 4.61   RBC 10*6/mm3 3.00*  --  3.31* 3.14* 3.40*   HEMOGLOBIN g/dL 9.5* 10.6* 10.4* 10.0* 10.7*   HEMATOCRIT % 28.1* 30.7* 30.9* 29.2* 32.2*   MCV fL 93.7  --  93.4 93.0 94.7   MCH pg 31.7  --  31.4 31.8 31.5   MCHC g/dL 33.8  --  33.7 34.2 33.2   RDW % 11.5*  --  11.4* 11.5* 11.7*   RDW-SD fl 38.7  --  38.3 38.6 39.4   MPV fL 9.8  --  9.8 9.4 9.4   PLATELETS 10*3/mm3 128*  --  156 136* 157   NEUTROPHIL % % 62.5  --   --  56.9 59.9   LYMPHOCYTE % % 23.6  --   --  28.1 26.2   MONOCYTES % % 12.5*  --   --  11.6 10.8   EOSINOPHIL % % 0.6  --   --  2.5 2.4   BASOPHIL % % 0.6  --   --  0.6 0.7   IMM GRAN % % 0.2  --   --  0.3 0.0   NEUTROS ABS 10*3/mm3 3.16  --   --  2.07 2.76   LYMPHS ABS 10*3/mm3 1.19  --   --  1.02 1.21   MONOS ABS 10*3/mm3 0.63  --   --  0.42 0.50   EOS ABS 10*3/mm3 0.03  --   --  0.09 0.11   BASOS ABS 10*3/mm3 0.03  --   --  0.02 0.03   IMMATURE GRANS (ABS) 10*3/mm3 0.01  --   --  0.01 0.00   NRBC /100 WBC 0.0  --   --  0.0 0.0     Results from last 7 days   Lab Units 12/12/24 2026   PH, ARTERIAL pH units 7.309*   PO2 ART mm Hg 142.5*   PCO2, ARTERIAL mm Hg 46.5*   HCO3 ART mmol/L 23.4                         Microbiology Results (last 10 days)       ** No results found for the last 240 hours. **                acetaminophen, 1,000 mg, Oral, Q8H  anastrozole, 1 mg, Oral, Daily  aspirin, 81 mg, Oral, Q12H  Chlorhexidine Gluconate Cloth, , Apply externally, BID  ethyl alcohol, 2 Swab, Nasal, Once  ferrous sulfate, 325 mg, Oral, Daily With Breakfast  lisinopril, 10 mg, Oral, Daily  polyethylene glycol, 17 g, Oral, BID  propranolol, 30 mg, Oral, BID  sodium chloride, 10 mL, Intravenous, Q12H             Diagnostics:  XR Knee 1 or 2 View Right    Result Date: 12/12/2024  XR KNEE 1 OR 2 VW RIGHT-  INDICATIONS: Postoperative evaluation.  TECHNIQUE: Frontal and lateral views of the right knee  COMPARISON: 12/10/2024  FINDINGS:   Intact appearing knee  arthroplasty hardware is seen with adjacent surgical soft tissue gas. Yariel and screw hardware of the femur is partly included. No acute fracture is identified.        Postsurgical changes.    This report was finalized on 12/12/2024 7:37 PM by Dr. Edu Geller M.D on Workstation: [id number]      Peripheral Block    Result Date: 12/12/2024  Guillermo Mcbride MD     12/12/2024  3:58 PM Peripheral Block Patient reassessed immediately prior to procedure Patient location during procedure: pre-op Start time: 12/12/2024 3:54 PM Stop time: 12/12/2024 3:57 PM Reason for block: at surgeon's request and post-op pain management Performed by Anesthesiologist: Guillermo Mcbride MD Preanesthetic Checklist Completed: patient identified, IV checked, site marked, risks and benefits discussed, surgical consent, monitors and equipment checked, pre-op evaluation and timeout performed Prep: Pt Position: supine Sterile barriers:cap, gloves, mask, alcohol skin prep and washed/disinfected hands Prep: ChloraPrep Patient monitoring: blood pressure monitoring, continuous pulse oximetry and EKG Procedure Sedation: yes Guidance:ultrasound guided ULTRASOUND INTERPRETATION.  Using ultrasound guidance a 21 G gauge needle was placed in close proximity to the femoral nerve, at which point, under ultrasound guidance anesthetic was injected in the area of the nerve and spread of the anesthesia was seen on ultrasound in close proximity thereto.  There were no abnormalities seen on ultrasound; a digital image was taken; and the patient tolerated the procedure with no complications. Images:still images obtained, printed/placed on chart Laterality:right Block Type:adductor canal block (Femoral Nerve at Adductor Canal) Injection Technique:single-shot Needle Type:short-bevel Needle Gauge:21 G Loss of resistance: normal. Medications Used: dexamethasone (DECADRON) injection - Injection  4 mg - 12/12/2024 3:57:00 PM ropivacaine (NAROPIN) 0.5 %  injection - Injection  20 mL - 12/12/2024 3:57:00 PM Medications Comment:Ultrasound Interpretation: Ultrasound guidance utilized for visualization of needle approach to femoral artery/nerve at adductor canal level and verification of local anesthetic disbursement to surrounding tissues. Photo printed and placed on chart for reference. Post Assessment Injection Assessment: negative aspiration for heme, no paresthesia on injection and incremental injection Patient Tolerance:comfortable throughout block Complications:no Performed by: Guillermo Mcbride MD     Duplex Venous Lower Extremity - Right CAR    Result Date: 12/10/2024    Normal right lower extremity venous duplex scan.     XR Knee 1 or 2 View Right    Result Date: 12/10/2024  XR RIGHT KNEE, 2 VIEWS  HISTORY: Right knee pain  COMPARISON: 11/24/2014  FINDINGS: There is a right knee arthroplasty that appears stable. Intramedullary mitchell and screws visualized in the distal right femur. There is no fracture or joint effusion. Some mild increase heterotopic ossification is seen medial to the knee.      No acute finding    This report was finalized on 12/10/2024 8:47 AM by Dr. Guillermo Conway M.D on Workstation: [id number]              Active Hospital Problems    Diagnosis  POA    **Right knee pain [M25.561]  Yes    S/P TKR (total knee replacement) using cement, left [Z96.652]  Not Applicable    OA (osteoarthritis) of knee [M17.9]  Yes    Benign hypertensive kidney disease with chronic kidney disease [I12.9]  Yes    Stage 3a chronic kidney disease [N18.31]  Yes    Benign essential hypertension [I10]  Yes      Resolved Hospital Problems   No resolved problems to display.         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  Course very judicious use of sedative medications particularly narcotics.  Patient is not having any respiratory difficulties now but she is wide-awake saturating in the upper 90s on room air.  I spoke with she and her daughter and the nurse I would like to see the patient take a nap at least today and not have any desaturations or significant apneas before she is discharged.  I would recommend trying to avoid narcotics is much as possible and if needed just the most minimal dose necessary.  Patient is looking at another knee surgery on the other side in about a month or so she probably should have an in lab sleep study prior to that to make sure there is no other underlying issues with these apneas.  Also be sure that anesthesia and surgery are aware of these events at the time of the next surgery so precautions can be taken.  Chronic kidney disease stage III AA  Essential hypertension    Plan for disposition: Pulmonary standpoint she can go at any time sleep study ordered follow-up with Dr. Hodge, will see as needed    Vignesh Barakat Jr, MD  12/14/24  13:28 EST    Time:

## 2024-12-14 NOTE — PROGRESS NOTES
"DAILY PROGRESS NOTE  [facility]    Patient Identification:  Name: Haydee Dickey  Age: 89 y.o.  Sex: female  :  1935  MRN: [id number]         Primary Care Physician: Juliana Gonsalez ([place]), YNES    Subjective:  Interval History: She complains of pain.  She is also having some hallucinations.    Objective:    Scheduled Meds:acetaminophen, 1,000 mg, Oral, Q8H  anastrozole, 1 mg, Oral, Daily  aspirin, 81 mg, Oral, Q12H  Chlorhexidine Gluconate Cloth, , Apply externally, BID  ethyl alcohol, 2 Swab, Nasal, Once  ferrous sulfate, 325 mg, Oral, Daily With Breakfast  lisinopril, 10 mg, Oral, Daily  polyethylene glycol, 17 g, Oral, BID  propranolol, 30 mg, Oral, BID  sodium chloride, 10 mL, Intravenous, Q12H      Continuous Infusions:     Vital signs in last 24 hours:  Temp:  [98 °F (36.7 °C)-98.6 °F (37 °C)] 98.6 °F (37 °C)  Heart Rate:  [62-71] 62  Resp:  [16-18] 16  BP: (110-122)/(68-81) 110/68    Intake/Output:    Intake/Output Summary (Last 24 hours) at 2024 1224  Last data filed at 2024 1100  Gross per 24 hour   Intake 1440 ml   Output --   Net 1440 ml       Exam:  /68 (BP Location: Left arm, Patient Position: Lying)   Pulse 62   Temp 98.6 °F (37 °C) (Oral)   Resp 16   Ht 158.9 cm (62.56\")   Wt 86.6 kg (190 lb 14.7 oz)   SpO2 97%   BMI 34.30 kg/m²     General Appearance:    Alert, cooperative, no distress   Head:    Normocephalic, without obvious abnormality, atraumatic   Eyes:       Throat:   Lips, tongue, gums normal   Neck:   Supple, symmetrical, trachea midline, no JVD   Lungs:     Clear to auscultation bilaterally, respirations unlabored   Chest Wall:    No tenderness or deformity    Heart:    Regular rate and rhythm, S1 and S2 normal, no murmur,no  rub or gallop   Abdomen:     Soft, nontender, bowel sounds active, no masses, no organomegaly    Extremities:   Extremities normal, right knee with surgical changes, no cyanosis or edema   Pulses:      Skin:   Skin is " warm and dry,  no rashes or palpable lesions   Neurologic:   no focal deficits noted      Lab Results (last 72 hours)       Procedure Component Value Units Date/Time    Basic Metabolic Panel [[id number]]  (Abnormal) Collected: 12/14/24 0620    Specimen: Blood Updated: 12/14/24 0656     Glucose 99 mg/dL      BUN 40 mg/dL      Creatinine 1.44 mg/dL      Sodium 137 mmol/L      Potassium 4.7 mmol/L      Chloride 106 mmol/L      CO2 23.2 mmol/L      Calcium 8.5 mg/dL      BUN/Creatinine Ratio 27.8     Anion Gap 7.8 mmol/L      eGFR 35.3 mL/min/1.73     Narrative:      GFR Categories in Chronic Kidney Disease (CKD)      GFR Category          GFR (mL/min/1.73)    Interpretation  G1                     90 or greater         Normal or high (1)  G2                      60-89                Mild decrease (1)  G3a                   45-59                Mild to moderate decrease  G3b                   30-44                Moderate to severe decrease  G4                    15-29                Severe decrease  G5                    14 or less           Kidney failure          (1)In the absence of evidence of kidney disease, neither GFR category G1 or G2 fulfill the criteria for CKD.    eGFR calculation 2021 CKD-EPI creatinine equation, which does not include race as a factor    CBC & Differential [[id number]]  (Abnormal) Collected: 12/14/24 0620    Specimen: Blood Updated: 12/14/24 0632    Narrative:      The following orders were created for panel order CBC & Differential.  Procedure                               Abnormality         Status                     ---------                               -----------         ------                     CBC Auto Differential[[id number]]        Abnormal            Final result                 Please view results for these tests on the individual orders.    CBC Auto Differential [[id number]]  (Abnormal) Collected: 12/14/24 0620    Specimen: Blood Updated: 12/14/24 0632     WBC 5.05 10*3/mm3       RBC 3.00 10*6/mm3      Hemoglobin 9.5 g/dL      Hematocrit 28.1 %      MCV 93.7 fL      MCH 31.7 pg      MCHC 33.8 g/dL      RDW 11.5 %      RDW-SD 38.7 fl      MPV 9.8 fL      Platelets 128 10*3/mm3      Neutrophil % 62.5 %      Lymphocyte % 23.6 %      Monocyte % 12.5 %      Eosinophil % 0.6 %      Basophil % 0.6 %      Immature Grans % 0.2 %      Neutrophils, Absolute 3.16 10*3/mm3      Lymphocytes, Absolute 1.19 10*3/mm3      Monocytes, Absolute 0.63 10*3/mm3      Eosinophils, Absolute 0.03 10*3/mm3      Basophils, Absolute 0.03 10*3/mm3      Immature Grans, Absolute 0.01 10*3/mm3      nRBC 0.0 /100 WBC     Hemoglobin & Hematocrit, Blood [[id number]]  (Abnormal) Collected: 12/13/24 0424    Specimen: Blood Updated: 12/13/24 0535     Hemoglobin 10.6 g/dL      Hematocrit 30.7 %     Blood Gas, Arterial - [[id number]]  (Abnormal) Collected: 12/12/24 2026    Specimen: Arterial Blood Updated: 12/12/24 2032     Site Right Brachial     Sheldon's Test N/A     pH, Arterial 7.309 pH units      pCO2, Arterial 46.5 mm Hg      pO2, Arterial 142.5 mm Hg      HCO3, Arterial 23.4 mmol/L      Base Excess, Arterial -3.1 mmol/L      Comment: Serial Number: [id number]:  [id number]        O2 Saturation, Arterial 98.9 %      CO2 Content 24.8 mmol/L      Barometric Pressure for Blood Gas 757.8000 mmHg      Modality Cannula     Flow Rate 3.0000 lpm      Set [facility] Resp Rate 16     Rate 16 Breaths/minute      Hemodilution No     Device Comment sat 94%    Basic Metabolic Panel [[id number]]  (Abnormal) Collected: 12/12/24 0639    Specimen: Blood Updated: 12/12/24 0721     Glucose 96 mg/dL      BUN 28 mg/dL      Creatinine 1.12 mg/dL      Sodium 137 mmol/L      Potassium 4.5 mmol/L      Comment: Slight hemolysis detected by analyzer. Result may be falsely elevated.        Chloride 106 mmol/L      CO2 22.6 mmol/L      Calcium 9.2 mg/dL      BUN/Creatinine Ratio 25.0     Anion Gap 8.4 mmol/L      eGFR 47.7 mL/min/1.73     Narrative:      GFR  "Categories in Chronic Kidney Disease (CKD)      GFR Category          GFR (mL/min/1.73)    Interpretation  G1                     90 or greater         Normal or high (1)  G2                      60-89                Mild decrease (1)  G3a                   45-59                Mild to moderate decrease  G3b                   30-44                Moderate to severe decrease  G4                    15-29                Severe decrease  G5                    14 or less           Kidney failure          (1)In the absence of evidence of kidney disease, neither GFR category G1 or G2 fulfill the criteria for CKD.    eGFR calculation 2021 CKD-EPI creatinine equation, which does not include race as a factor    CBC (No Diff) [[id number]]  (Abnormal) Collected: 12/12/24 0639    Specimen: Blood Updated: 12/12/24 0659     WBC 4.02 10*3/mm3      RBC 3.31 10*6/mm3      Hemoglobin 10.4 g/dL      Hematocrit 30.9 %      MCV 93.4 fL      MCH 31.4 pg      MCHC 33.7 g/dL      RDW 11.4 %      RDW-SD 38.3 fl      MPV 9.8 fL      Platelets 156 10*3/mm3           Data Review:  Results from last 7 days   Lab Units 12/14/24  0620 12/12/24  0639 12/11/24  0614   SODIUM mmol/L 137 137 141   POTASSIUM mmol/L 4.7 4.5 4.1   CHLORIDE mmol/L 106 106 109*   CO2 mmol/L 23.2 22.6 23.9   BUN mg/dL 40* 28* 26*   CREATININE mg/dL 1.44* 1.12* 1.15*   GLUCOSE mg/dL 99 96 101*   CALCIUM mg/dL 8.5* 9.2 8.9     Results from last 7 days   Lab Units 12/14/24  0620 12/13/24  0424 12/12/24  0639 12/11/24  0614   WBC 10*3/mm3 5.05  --  4.02 3.63   HEMOGLOBIN g/dL 9.5* 10.6* 10.4* 10.0*   HEMATOCRIT % 28.1* 30.7* 30.9* 29.2*   PLATELETS 10*3/mm3 128*  --  156 136*             Lab Results   Lab Value Date/Time    TROPONINT <0.010 04/13/2018 1415               Invalid input(s): \"PROT\", \"LABALBU\"          No results found for: \"POCGLU\"        Past Medical History:   Diagnosis Date    Allergic Percocet, iv iodine    Anemia     Arthritis     B12 deficiency     BPPV " "(benign paroxysmal positional vertigo)     Breast cancer     LEFT    Cataract 2018  jaime, 2021 jaime blepharoplasty    Colon polyp 2009    COVID 03/24/2023    Rebound Covid following Paxlovid 4/1/23    CTS (carpal tunnel syndrome) 1997, 2014    D’quervain x3    Deep vein thrombosis 1970    HX, LEG    Essential tremor     Folliculitis 11/01/2017    Fracture of hip 2014    Right ORIF    Fracture, femur 1995    Left, No surgery    Fracture, radius 1995    Right, orif    Gait instability     GERD (gastroesophageal reflux disease) 1990    History of blood transfusion 2014    History of fall 06/17/2024    \"KNEE GAVE OUT\"    HL (hearing loss) 2015    HEARING AIDS    Hypertension 2001    on Rx    Knee swelling 2001    Bilateral    Lower extremity neuropathy     bilateral    Neuropathy     Osteopenia 2019    Potassium (K) excess 10/21/2022    Seeing nephrologist -- Dr. 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       Plan:  Continue with current medications.  Pain control.  Stop care.  Await psych consult.  DC planning.  Will need skilled nursing unit for rehab.    Josue Quinteros MD  12/14/2024  12:24 EST    "

## 2024-12-14 NOTE — PLAN OF CARE
Goal Outcome Evaluation:  Plan of Care Reviewed With: patient      Vss on RA, voiding per bsc, pain tolerable, still reporting hallucinations, psych to see tomorrow, plans to d/c to SNF, educated on bp meds and monitoring.  Progress: improving

## 2024-12-14 NOTE — PLAN OF CARE
Goal Outcome Evaluation:            POD2. VSS. Aox4. Assist x1 to BRP. Marilou dressing patent, c/d/I. Discharge plan pending.

## 2024-12-14 NOTE — PROGRESS NOTES
"DAILY PROGRESS NOTE  [facility]    Patient Identification:  Name: Haydee Dickey  Age: 89 y.o.  Sex: female  :  1935  MRN: [id number]         Primary Care Physician: Juliana Gonsalez ([place]), YNES    Subjective:  Interval History: She complains of some knee pain and is having some hallucinations.    Objective:    Scheduled Meds:acetaminophen, 1,000 mg, Oral, Q8H  anastrozole, 1 mg, Oral, Daily  aspirin, 81 mg, Oral, Q12H  Chlorhexidine Gluconate Cloth, , Apply externally, BID  ethyl alcohol, 2 Swab, Nasal, Once  ferrous sulfate, 325 mg, Oral, Daily With Breakfast  lisinopril, 10 mg, Oral, Daily  polyethylene glycol, 17 g, Oral, BID  propranolol, 30 mg, Oral, BID  sodium chloride, 10 mL, Intravenous, Q12H      Continuous Infusions:     Vital signs in last 24 hours:  Temp:  [98 °F (36.7 °C)-98.6 °F (37 °C)] 98.6 °F (37 °C)  Heart Rate:  [62-71] 62  Resp:  [16-18] 16  BP: (110-122)/(68-81) 110/68    Intake/Output:    Intake/Output Summary (Last 24 hours) at 2024 1231  Last data filed at 2024 1100  Gross per 24 hour   Intake 1440 ml   Output --   Net 1440 ml       Exam:  /68 (BP Location: Left arm, Patient Position: Lying)   Pulse 62   Temp 98.6 °F (37 °C) (Oral)   Resp 16   Ht 158.9 cm (62.56\")   Wt 86.6 kg (190 lb 14.7 oz)   SpO2 97%   BMI 34.30 kg/m²     General Appearance:    Alert, cooperative, no distress   Head:    Normocephalic, without obvious abnormality, atraumatic   Eyes:       Throat:   Lips, tongue, gums normal   Neck:   Supple, symmetrical, trachea midline, no JVD   Lungs:     Clear to auscultation bilaterally, respirations unlabored   Chest Wall:    No tenderness or deformity    Heart:    Regular rate and rhythm, S1 and S2 normal, no murmur,no  rub or gallop   Abdomen:     Soft, nontender, bowel sounds active, no masses, no organomegaly    Extremities:   Extremities normal, right knee with surgical changes, no cyanosis or edema   Pulses:      Skin:   Skin is " warm and dry,  no rashes or palpable lesions   Neurologic:   no focal deficits noted      Lab Results (last 72 hours)       Procedure Component Value Units Date/Time    Basic Metabolic Panel [[id number]]  (Abnormal) Collected: 12/14/24 0620    Specimen: Blood Updated: 12/14/24 0656     Glucose 99 mg/dL      BUN 40 mg/dL      Creatinine 1.44 mg/dL      Sodium 137 mmol/L      Potassium 4.7 mmol/L      Chloride 106 mmol/L      CO2 23.2 mmol/L      Calcium 8.5 mg/dL      BUN/Creatinine Ratio 27.8     Anion Gap 7.8 mmol/L      eGFR 35.3 mL/min/1.73     Narrative:      GFR Categories in Chronic Kidney Disease (CKD)      GFR Category          GFR (mL/min/1.73)    Interpretation  G1                     90 or greater         Normal or high (1)  G2                      60-89                Mild decrease (1)  G3a                   45-59                Mild to moderate decrease  G3b                   30-44                Moderate to severe decrease  G4                    15-29                Severe decrease  G5                    14 or less           Kidney failure          (1)In the absence of evidence of kidney disease, neither GFR category G1 or G2 fulfill the criteria for CKD.    eGFR calculation 2021 CKD-EPI creatinine equation, which does not include race as a factor    CBC & Differential [[id number]]  (Abnormal) Collected: 12/14/24 0620    Specimen: Blood Updated: 12/14/24 0632    Narrative:      The following orders were created for panel order CBC & Differential.  Procedure                               Abnormality         Status                     ---------                               -----------         ------                     CBC Auto Differential[[id number]]        Abnormal            Final result                 Please view results for these tests on the individual orders.    CBC Auto Differential [[id number]]  (Abnormal) Collected: 12/14/24 0620    Specimen: Blood Updated: 12/14/24 0632     WBC 5.05 10*3/mm3       RBC 3.00 10*6/mm3      Hemoglobin 9.5 g/dL      Hematocrit 28.1 %      MCV 93.7 fL      MCH 31.7 pg      MCHC 33.8 g/dL      RDW 11.5 %      RDW-SD 38.7 fl      MPV 9.8 fL      Platelets 128 10*3/mm3      Neutrophil % 62.5 %      Lymphocyte % 23.6 %      Monocyte % 12.5 %      Eosinophil % 0.6 %      Basophil % 0.6 %      Immature Grans % 0.2 %      Neutrophils, Absolute 3.16 10*3/mm3      Lymphocytes, Absolute 1.19 10*3/mm3      Monocytes, Absolute 0.63 10*3/mm3      Eosinophils, Absolute 0.03 10*3/mm3      Basophils, Absolute 0.03 10*3/mm3      Immature Grans, Absolute 0.01 10*3/mm3      nRBC 0.0 /100 WBC     Hemoglobin & Hematocrit, Blood [[id number]]  (Abnormal) Collected: 12/13/24 0424    Specimen: Blood Updated: 12/13/24 0535     Hemoglobin 10.6 g/dL      Hematocrit 30.7 %     Blood Gas, Arterial - [[id number]]  (Abnormal) Collected: 12/12/24 2026    Specimen: Arterial Blood Updated: 12/12/24 2032     Site Right Brachial     Sheldon's Test N/A     pH, Arterial 7.309 pH units      pCO2, Arterial 46.5 mm Hg      pO2, Arterial 142.5 mm Hg      HCO3, Arterial 23.4 mmol/L      Base Excess, Arterial -3.1 mmol/L      Comment: Serial Number: [id number]:  [id number]        O2 Saturation, Arterial 98.9 %      CO2 Content 24.8 mmol/L      Barometric Pressure for Blood Gas 757.8000 mmHg      Modality Cannula     Flow Rate 3.0000 lpm      Set [facility] Resp Rate 16     Rate 16 Breaths/minute      Hemodilution No     Device Comment sat 94%    Basic Metabolic Panel [[id number]]  (Abnormal) Collected: 12/12/24 0639    Specimen: Blood Updated: 12/12/24 0721     Glucose 96 mg/dL      BUN 28 mg/dL      Creatinine 1.12 mg/dL      Sodium 137 mmol/L      Potassium 4.5 mmol/L      Comment: Slight hemolysis detected by analyzer. Result may be falsely elevated.        Chloride 106 mmol/L      CO2 22.6 mmol/L      Calcium 9.2 mg/dL      BUN/Creatinine Ratio 25.0     Anion Gap 8.4 mmol/L      eGFR 47.7 mL/min/1.73     Narrative:      GFR  "Categories in Chronic Kidney Disease (CKD)      GFR Category          GFR (mL/min/1.73)    Interpretation  G1                     90 or greater         Normal or high (1)  G2                      60-89                Mild decrease (1)  G3a                   45-59                Mild to moderate decrease  G3b                   30-44                Moderate to severe decrease  G4                    15-29                Severe decrease  G5                    14 or less           Kidney failure          (1)In the absence of evidence of kidney disease, neither GFR category G1 or G2 fulfill the criteria for CKD.    eGFR calculation 2021 CKD-EPI creatinine equation, which does not include race as a factor    CBC (No Diff) [[id number]]  (Abnormal) Collected: 12/12/24 0639    Specimen: Blood Updated: 12/12/24 0659     WBC 4.02 10*3/mm3      RBC 3.31 10*6/mm3      Hemoglobin 10.4 g/dL      Hematocrit 30.9 %      MCV 93.4 fL      MCH 31.4 pg      MCHC 33.7 g/dL      RDW 11.4 %      RDW-SD 38.3 fl      MPV 9.8 fL      Platelets 156 10*3/mm3           Data Review:  Results from last 7 days   Lab Units 12/14/24  0620 12/12/24  0639 12/11/24  0614   SODIUM mmol/L 137 137 141   POTASSIUM mmol/L 4.7 4.5 4.1   CHLORIDE mmol/L 106 106 109*   CO2 mmol/L 23.2 22.6 23.9   BUN mg/dL 40* 28* 26*   CREATININE mg/dL 1.44* 1.12* 1.15*   GLUCOSE mg/dL 99 96 101*   CALCIUM mg/dL 8.5* 9.2 8.9     Results from last 7 days   Lab Units 12/14/24  0620 12/13/24  0424 12/12/24  0639 12/11/24  0614   WBC 10*3/mm3 5.05  --  4.02 3.63   HEMOGLOBIN g/dL 9.5* 10.6* 10.4* 10.0*   HEMATOCRIT % 28.1* 30.7* 30.9* 29.2*   PLATELETS 10*3/mm3 128*  --  156 136*             Lab Results   Lab Value Date/Time    TROPONINT <0.010 04/13/2018 1415               Invalid input(s): \"PROT\", \"LABALBU\"          No results found for: \"POCGLU\"        Past Medical History:   Diagnosis Date    Allergic Percocet, iv iodine    Anemia     Arthritis     B12 deficiency     BPPV " "(benign paroxysmal positional vertigo)     Breast cancer     LEFT    Cataract 2018  jaime, 2021 jaime blepharoplasty    Colon polyp 2009    COVID 03/24/2023    Rebound Covid following Paxlovid 4/1/23    CTS (carpal tunnel syndrome) 1997, 2014    D’quervain x3    Deep vein thrombosis 1970    HX, LEG    Essential tremor     Folliculitis 11/01/2017    Fracture of hip 2014    Right ORIF    Fracture, femur 1995    Left, No surgery    Fracture, radius 1995    Right, orif    Gait instability     GERD (gastroesophageal reflux disease) 1990    History of blood transfusion 2014    History of fall 06/17/2024    \"KNEE GAVE OUT\"    HL (hearing loss) 2015    HEARING AIDS    Hypertension 2001    on Rx    Knee swelling 2001    Bilateral    Lower extremity neuropathy     bilateral    Neuropathy     Osteopenia 2019    Potassium (K) excess 10/21/2022    Seeing nephrologist -- Dr. 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       Plan:  Will DC [facility].  Await psychiatry consult.  AMBER planning.  Will need skilled nursing unit for rehab.    Josue Quinteros MD  12/14/2024  12:31 EST    "

## 2024-12-14 NOTE — CONSULTS
Requesting Provider:  Dr. Young  Reason for Consult: Hallucinations    Date of admission: December 10, 2024   Date of assessment: December 14, 2024    Chief Complaint: None given    History of presenting illness: Patient is an 89 y.o. female with no known past psychiatric history seen on consultation for the above-stated reason.  The patient had presented to the ED complaining of acute knee pain on top of chronic knee pain.  She received surgical intervention via orthopedics on December 12, 2024.  Since that time, she has been experiencing hallucinations.    The patient has no history of inpatient treatment, outpatient treatment or past suicide attempts.  There is no history of past psychosis.  Patient indicates that she has never been on any psychiatric medications.  She reports that she has been having visual loose nations past few days since her surgery.  She describes seeing moving patterns and colors on various surfaces.  She indicates that she knows that they are not real.  She does not feel they are bothersome.  She denies any current suicidal ideations or homicidal ideations.    Past psychiatric history: See HPI    Past medical history:  Diagnoses: Vertigo, hyperkalemia, chronic kidney disease, osteoarthritis, hypertension.  Medications:     Current Facility-Administered Medications   Medication Dose Route Frequency Provider Last Rate Last Admin    acetaminophen (TYLENOL) tablet 1,000 mg  1,000 mg Oral Q8H Mars Alexander APRN   1,000 mg at 12/13/24 0819    anastrozole (ARIMIDEX) tablet 1 mg  1 mg Oral Daily Mars Alexander APRN   1 mg at 12/14/24 0954    aspirin EC tablet 81 mg  81 mg Oral Q12H Mars Alexander APRN   81 mg at 12/14/24 0953    sennosides-docusate (PERICOLACE) 8.6-50 MG per tablet 2 tablet  2 tablet Oral BID PRN Mars Alexander APRN        And    polyethylene glycol (MIRALAX) packet 17 g  17 g Oral Daily PRN Mars Alexander APRN        And    bisacodyl (DULCOLAX) EC tablet 5 mg  5 mg Oral Daily  PRN Mars Alexander APRN        And    bisacodyl (DULCOLAX) suppository 10 mg  10 mg Rectal Daily PRN Mars Alexander APRN        calcium carbonate (TUMS) chewable tablet 500 mg (200 mg elemental)  2 tablet Oral BID PRN Mars Alexander APRN        Chlorhexidine Gluconate Cloth 2 % pads   Apply externally BID Edmund Zavaleta MD   Given at 12/13/24 2220    ethyl alcohol 62 % 2 each  2 Swab Nasal Once Edmund Zavaleta MD        ferrous sulfate tablet 325 mg  325 mg Oral Daily With Breakfast Mars Alexander APRN   325 mg at 12/14/24 0953    HYDROcodone-acetaminophen (NORCO) 7.5-325 MG per tablet 1 tablet  1 tablet Oral Q4H PRN Mars Alexander APRN        HYDROcodone-acetaminophen (NORCO) 7.5-325 MG per tablet 2 tablet  2 tablet Oral Q4H PRN Mars Alexander APRN        lisinopril (PRINIVIL,ZESTRIL) tablet 10 mg  10 mg Oral Daily Mars Alexander APRN   10 mg at 12/14/24 0954    ondansetron ODT (ZOFRAN-ODT) disintegrating tablet 4 mg  4 mg Oral Q6H PRN Mars Alexander APRN        Or    ondansetron (ZOFRAN) injection 4 mg  4 mg Intravenous Q6H PRN Mars Alexander APRN        polyethylene glycol (MIRALAX) packet 17 g  17 g Oral BID Mars Aleaxnder APRN   17 g at 12/13/24 2218    Potassium Replacement - Follow Nurse / BPA Driven Protocol   Not Applicable PRN Mars Alexander APRN        propranolol (INDERAL) tablet 30 mg  30 mg Oral BID Mars Alexander APRN   30 mg at 12/14/24 0953    sodium chloride 0.9 % flush 10 mL  10 mL Intravenous PRN Mars Alexander APRN        sodium chloride 0.9 % flush 10 mL  10 mL Intravenous Q12H Mars Alexander APRN   10 mL at 12/14/24 0955    sodium chloride 0.9 % flush 10 mL  10 mL Intravenous PRN Mars Alexander APRN        sodium chloride 0.9 % infusion 40 mL  40 mL Intravenous PRN Mars Alexander APRN          Medication allergies:    Allergies   Allergen Reactions    Iodine Itching     IV ONLY    Percocet [Oxycodone-Acetaminophen] Itching    Iodinated Contrast Media Rash     Patient reports rash occurred 30  "yrs ago with contrast   (\"IV\")        Social history: Noncontributory    Family history: Noncontributory    Substance abuse history: None    Vital Signs  /68 (BP Location: Left arm, Patient Position: Lying)   Pulse 62   Temp 98.6 °F (37 °C) (Oral)   Resp 16   Ht 158.9 cm (62.56\")   Wt 86.6 kg (190 lb 14.7 oz)   SpO2 97%   BMI 34.30 kg/m²       Mental Status Exam: The patient is found sitting in a chair at bedside.  She is awake and alert.  She is dressed in hospital attire.  She is pleasant, appropriate and cooperative to the review process.  She is oriented times 4.  Speech is fluent with a normal tone and volume.  She is somewhat talkative.  Mood is good.  Affect congruent.  She denies any acute suicidal ideation or homicidal ideations.  She reports positive visual hallucinations.  Thought processes are mildly circumstantial.  Judgment and insight are fair.  Memory is intact.    Assessment:   Psychosis, not otherwise specified    Treatment Plan:     The patient is likely having psychosis related to anesthesia or pain medication.  Her hallucinations are not bothersome and therefore I will not make any medication alterations at this time.  Her hallucinations should dissipate in the coming days.    Thank you for this consultation.  Please contact [facility] for any additional requests.  "

## 2024-12-14 NOTE — PLAN OF CARE
Goal Outcome Evaluation:  Plan of Care Reviewed With: patient           Outcome Evaluation: Received patient sitting up in bed, awake, A&Ox4, She's been eating & drinking fine, on NC at 2Lpm while asleep, Able to ambulate inside the room & going to the toilet with 1SBA, voided freely, with IMANI dressing, with minimal to almost no complaint of pain, she took only Tylenol tab for pain. & miralax powder for bowel regimen. No episode of hallucination during the shift. She's pleasant and cooperative. Plan for d/c to SNF vs home with HH.

## 2024-12-14 NOTE — PLAN OF CARE
Goal Outcome Evaluation:  Plan of Care Reviewed With: patient        Progress: no change  Outcome Evaluation: Patient A &O. Makes needs known. Up with asssit x 1. Pain 2/10, refuses scheduled Tylenol. Marilou intact. Plans for SNF at d/c possibly next week. No s/s of distress noted.

## 2024-12-15 LAB
ANION GAP SERPL CALCULATED.3IONS-SCNC: 9 MMOL/L (ref 5–15)
BASOPHILS # BLD AUTO: 0.02 10*3/MM3 (ref 0–0.2)
BASOPHILS NFR BLD AUTO: 0.4 % (ref 0–1.5)
BUN SERPL-MCNC: 41 MG/DL (ref 8–23)
BUN/CREAT SERPL: 32.5 (ref 7–25)
CALCIUM SPEC-SCNC: 8.7 MG/DL (ref 8.6–10.5)
CHLORIDE SERPL-SCNC: 106 MMOL/L (ref 98–107)
CO2 SERPL-SCNC: 22 MMOL/L (ref 22–29)
CREAT SERPL-MCNC: 1.26 MG/DL (ref 0.57–1)
DEPRECATED RDW RBC AUTO: 39.6 FL (ref 37–54)
EGFRCR SERPLBLD CKD-EPI 2021: 41.4 ML/MIN/1.73
EOSINOPHIL # BLD AUTO: 0.05 10*3/MM3 (ref 0–0.4)
EOSINOPHIL NFR BLD AUTO: 0.9 % (ref 0.3–6.2)
ERYTHROCYTE [DISTWIDTH] IN BLOOD BY AUTOMATED COUNT: 11.6 % (ref 12.3–15.4)
GLUCOSE SERPL-MCNC: 106 MG/DL (ref 65–99)
HCT VFR BLD AUTO: 27.2 % (ref 34–46.6)
HGB BLD-MCNC: 9.2 G/DL (ref 12–15.9)
IMM GRANULOCYTES # BLD AUTO: 0.01 10*3/MM3 (ref 0–0.05)
IMM GRANULOCYTES NFR BLD AUTO: 0.2 % (ref 0–0.5)
LYMPHOCYTES # BLD AUTO: 1.21 10*3/MM3 (ref 0.7–3.1)
LYMPHOCYTES NFR BLD AUTO: 22.2 % (ref 19.6–45.3)
MCH RBC QN AUTO: 31.8 PG (ref 26.6–33)
MCHC RBC AUTO-ENTMCNC: 33.8 G/DL (ref 31.5–35.7)
MCV RBC AUTO: 94.1 FL (ref 79–97)
MONOCYTES # BLD AUTO: 0.73 10*3/MM3 (ref 0.1–0.9)
MONOCYTES NFR BLD AUTO: 13.4 % (ref 5–12)
NEUTROPHILS NFR BLD AUTO: 3.42 10*3/MM3 (ref 1.7–7)
NEUTROPHILS NFR BLD AUTO: 62.9 % (ref 42.7–76)
PLATELET # BLD AUTO: 140 10*3/MM3 (ref 140–450)
PMV BLD AUTO: 10.1 FL (ref 6–12)
POTASSIUM SERPL-SCNC: 4.6 MMOL/L (ref 3.5–5.2)
RBC # BLD AUTO: 2.89 10*6/MM3 (ref 3.77–5.28)
SODIUM SERPL-SCNC: 137 MMOL/L (ref 136–145)
WBC NRBC COR # BLD AUTO: 5.44 10*3/MM3 (ref 3.4–10.8)

## 2024-12-15 PROCEDURE — 85025 COMPLETE CBC W/AUTO DIFF WBC: CPT | Performed by: INTERNAL MEDICINE

## 2024-12-15 PROCEDURE — 80048 BASIC METABOLIC PNL TOTAL CA: CPT | Performed by: INTERNAL MEDICINE

## 2024-12-15 PROCEDURE — 97530 THERAPEUTIC ACTIVITIES: CPT

## 2024-12-15 RX ADMIN — FERROUS SULFATE TAB 325 MG (65 MG ELEMENTAL FE) 325 MG: 325 (65 FE) TAB at 08:42

## 2024-12-15 RX ADMIN — POLYETHYLENE GLYCOL 3350 17 G: 17 POWDER, FOR SOLUTION ORAL at 08:42

## 2024-12-15 RX ADMIN — ASPIRIN 81 MG: 81 TABLET, COATED ORAL at 08:43

## 2024-12-15 RX ADMIN — ASPIRIN 81 MG: 81 TABLET, COATED ORAL at 21:06

## 2024-12-15 RX ADMIN — LISINOPRIL 10 MG: 10 TABLET ORAL at 08:43

## 2024-12-15 RX ADMIN — POLYETHYLENE GLYCOL 3350 17 G: 17 POWDER, FOR SOLUTION ORAL at 21:05

## 2024-12-15 RX ADMIN — Medication 10 ML: at 21:08

## 2024-12-15 RX ADMIN — Medication 10 ML: at 08:46

## 2024-12-15 RX ADMIN — PROPRANOLOL HYDROCHLORIDE 30 MG: 20 TABLET ORAL at 08:43

## 2024-12-15 RX ADMIN — ANASTROZOLE 1 MG: 1 TABLET, COATED ORAL at 08:43

## 2024-12-15 RX ADMIN — ACETAMINOPHEN 1000 MG: 500 TABLET, FILM COATED ORAL at 23:14

## 2024-12-15 RX ADMIN — PROPRANOLOL HYDROCHLORIDE 30 MG: 20 TABLET ORAL at 21:06

## 2024-12-15 NOTE — THERAPY TREATMENT NOTE
Patient Name: Haydee Dickey  : 1935    MRN: [id number]                              Today's Date: 12/15/2024       Admit Date: 12/10/2024    Visit Dx:     ICD-10-CM ICD-9-CM   1. Acute pain of right knee  M25.561 719.46   2. Chronic renal impairment, unspecified CKD stage  N18.9 585.9   3. Status post left knee replacement  Z96.652 V43.65   4. Central apnea  R06.81 786.03   5. Central sleep apnea in conditions classified elsewhere  G47.37 327.27     Patient Active Problem List   Diagnosis    Acute left flank pain    B12 deficiency    Benign essential hypertension    Benign paroxysmal positional vertigo    H/O abdominal hysterectomy    H/O total knee replacement    Hip fracture    Torn rotator cuff    Osteoporosis    Essential tremor    Gait instability    Edema of lower extremity    Malignant neoplasm of upper-inner quadrant of left breast in female, estrogen receptor positive    Disorder of rotator cuff    Aromatase inhibitor use    Personal history of breast cancer    Vitamin D deficiency    Absolute anemia    Benign hypertensive kidney disease with chronic kidney disease    Hyperkalemia    Peripheral neuropathy    Stage 3a chronic kidney disease    High serum creatinine    OA (osteoarthritis) of knee    Status post left knee replacement    S/P TKR (total knee replacement) using cement, left    Right knee pain     Past Medical History:   Diagnosis Date    Allergic Percocet, iv iodine    Anemia     Arthritis     B12 deficiency     BPPV (benign paroxysmal positional vertigo)     Breast cancer     LEFT    Cataract   jaime,  jaime blepharoplasty    Colon polyp     COVID 2023    Rebound Covid following Paxlovid 23    CTS (carpal tunnel syndrome) ,     D’quervain x3    Deep vein thrombosis 1970    HX, LEG    Essential tremor     Folliculitis 2017    Fracture of hip     Right ORIF    Fracture, femur     Left, No surgery    Fracture, radius     Right, orif    Gait  "instability     GERD (gastroesophageal reflux disease) 1990    History of blood transfusion 2014    History of fall 06/17/2024    \"KNEE GAVE OUT\"    HL (hearing loss) 2015    HEARING AIDS    Hypertension 2001    on Rx    Knee swelling 2001    Bilateral    Lower extremity neuropathy     bilateral    Neuropathy     Osteopenia 2019    Potassium (K) excess 10/21/2022    Seeing nephrologist -- Dr. Coats    Rotator cuff syndrome 2013    Fall induced    Sciatica     Small vessel disease     Torn rotator cuff 2013    right arm    Urinary tract infection 2018    approx 1 every 5 years    Wears hearing aid     BILATERAL    Wrist fracture, right 2015     Past Surgical History:   Procedure Laterality Date    ADENOIDECTOMY  1953    APPENDECTOMY  1961    BLEPHAROPLASTY Bilateral 10/19/2021    Procedure: BILATERAL UPPER LID BLEPHAROPLASTY;  Surgeon: Ruddy Moses MD;  Location: [facility] OR [facility] [place];  Service: Ophthalmology;  Laterality: Bilateral;    BREAST BIOPSY      BREAST LUMPECTOMY Left 11/15/2021    Procedure: Left JEAN-PAUL-guided partial mastectomy;  Surgeon: Maliha Andres MD;  Location: [facility] MAIN OR;  Service: General;  Laterality: Left;    BROW LIFT Bilateral 10/19/2021    Procedure: BILATERAL TEMPORAL DIRECT BROWLIFT;  Surgeon: Ruddy Moses MD;  Location: [facility] OR [facility] [place];  Service: Ophthalmology;  Laterality: Bilateral;    CARPAL TUNNEL RELEASE  1992    right wrist    CATARACT EXTRACTION, BILATERAL  07/01/2018    R 7/18 and L 9/18 WITH LENS IMPLANTS    COLONOSCOPY      DEQUERVAIN RELEASE Bilateral 2015    DILATATION AND CURETTAGE  1960s    EYE SURGERY  2018    Bilateral repairs    FRACTURE SURGERY  2014    R hip, R  wrist    HAND SURGERY Right     HIP FRACTURE SURGERY Right     HYSTERECTOMY  1975    Partial    JOINT REPLACEMENT  2001, 2014    TKA    KNEE POLY INSERT EXCHANGE Right 12/12/2024    Procedure: Right Knee Poly insert exchange with exploration of foreign body;  Surgeon: Edmund Zavaleta MD;  " Location: [facility] OR;  Service: Orthopedics;  Laterality: Right;    TONSILLECTOMY AND ADENOIDECTOMY  1953    TOTAL KNEE ARTHROPLASTY Left 2001    TOTAL KNEE ARTHROPLASTY Right 2014    TRIGGER FINGER RELEASE Right     TRIGGER POINT INJECTION  2017, 2019    WRIST SURGERY  2015    FRACTURE RIGHT      General Information       Row Name 12/15/24 1050          Physical Therapy Time and Intention    Document Type therapy note (daily note)  -CS     Mode of Treatment individual therapy;physical therapy  -CS       Row Name 12/15/24 1050          General Information    Patient Profile Reviewed yes  -CS     Existing Precautions/Restrictions fall  -CS               User Key  (r) = Recorded By, (t) = Taken By, (c) = Cosigned By      Initials Name Provider Type    CS Serafin Conway PT Physical Therapist                   Mobility       Row Name 12/15/24 1050          Bed Mobility    Comment, (Bed Mobility) UIC pre tx  -CS       Row Name 12/15/24 1050          Sit-Stand Transfer    Sit-Stand [place] (Transfers) verbal cues;contact guard  -CS     Assistive Device (Sit-Stand Transfers) walker, front-wheeled  -CS       Row Name 12/15/24 1050          Gait/Stairs (Locomotion)    [place] Level (Gait) verbal cues;contact guard;minimum assist (75% patient effort)  -CS     Assistive Device (Gait) walker, front-wheeled  -CS     Patient was able to Ambulate yes  -CS     Distance in Feet (Gait) 75  -CS     Deviations/Abnormal Patterns (Gait) caridad decreased;stride length decreased  -CS     Bilateral Gait Deviations forward flexed posture;heel strike decreased  -CS     [place] Level (Stairs) minimum assist (75% patient effort);contact guard;verbal cues  -CS     Handrail Location (Stairs) both sides  -CS     Number of Steps (Stairs) 4  -CS     Ascending Technique (Stairs) step-to-step  -CS     Descending Technique (Stairs) step-to-step  -CS     Stairs, Safety Issues balance decreased during turns  -CS     Comment,  (Gait/Stairs) Decreased clearance w/ RLE; able to navigate 4 steps but required Flaca and CGA throughout for safety  -CS               User Key  (r) = Recorded By, (t) = Taken By, (c) = Cosigned By      Initials Name Provider Type    CS Serafin Conway, PT Physical Therapist                   Obj/Interventions    No documentation.                  Goals/Plan    No documentation.                  Clinical Impression       Row Name 12/15/24 1054          Pain    Pain Side/Orientation right  -CS     Pre/Posttreatment Pain Comment Pt states her pain was bad last night but unable to state numerical value today as she only reports last night  -CS       Row Name 12/15/24 1054          Plan of Care Review    Plan of Care Reviewed With patient  -CS     Progress improving  -CS     Outcome Evaluation Pt agreeable to tx session, pt UI upon arrival. Pt able to ambulate about 75ft in total w/CGA and Flaca for safety due to decreased RLE clearance. Pt navigates 4 steps but requires a rest break prior to initiating due to fatigue. Pt performs but requires Flaca due to safety concerns regarding her decreased RLE clearance. Pt returns to room and t/f to comm\[facility]\"", reports she does not want PT to assist and requires nursing aide to assist w/ hygiene. Nursing aide notified and left pt w/ call button at commode.  Continue to recommend rehab due to her functional status as well as need for safety.  -CS       Row Name 12/15/24 1054          Therapy Assessment/Plan (PT)    Rehab Potential (PT) good  -CS     Criteria for Skilled Interventions Met (PT) yes  -CS     Therapy Frequency (PT) 6 times/wk  -CS       Row Name 12/15/24 1054          Positioning and Restraints    Pre-Treatment Position sitting in chair/recliner  -CS     Post Treatment Position bathroom  -CS     Bathroom notified nsg;sitting;call light within reach;encouraged to call for assist  -CS               User Key  (r) = Recorded By, (t) = Taken By, (c) = Cosigned By      Initials  Name Provider Type    Serafin Mckeon, PT Physical Therapist                   Outcome Measures       Row Name 12/15/24 1056 12/15/24 0843       How much help from another person do you currently need...    Turning from your back to your side while in flat bed without using bedrails? 4  -CS 4  -ACE    Moving from lying on back to sitting on the side of a flat bed without bedrails? 4  -CS 4  -ACE    Moving to and from a bed to a chair (including a wheelchair)? 3  -CS 3  -ACE    Standing up from a chair using your arms (e.g., wheelchair, bedside chair)? 3  -CS 3  -ACE    Climbing 3-5 steps with a railing? 3  -CS 2  -ACE    To walk in hospital room? 3  -CS 3  -ACE    AM-PAC 6 Clicks Score (PT) 20  -CS 19  -ACE    Highest Level of Mobility Goal 6 --> Walk 10 steps or more  -CS 6 --> Walk 10 steps or more  -ACE              User Key  (r) = Recorded By, (t) = Taken By, (c) = Cosigned By      Initials Name Provider Type    Megan Camarillo, RN Registered Nurse    Serafin Mckeon, PT Physical Therapist                                 Physical Therapy Education       Title: PT OT SLP Therapies (In Progress)       Topic: Physical Therapy (Done)       Point: Mobility training (Done)       Learning Progress Summary            Patient Acceptance, E,D, DU by  at 12/15/2024 1056    Acceptance, E, NR by  at 12/11/2024 1551                      Point: Home exercise program (Done)       Learning Progress Summary            Patient Acceptance, E,D, DU by  at 12/15/2024 1056    Acceptance, E, NR by  at 12/11/2024 1551                      Point: Body mechanics (Done)       Learning Progress Summary            Patient Acceptance, E,D, DU by  at 12/15/2024 1056    Acceptance, E, NR by  at 12/11/2024 1551                      Point: Precautions (Done)       Learning Progress Summary            Patient Acceptance, E,D, DU by  at 12/15/2024 1056    Acceptance, E, NR by  at 12/11/2024 1551                                       User Key       Initials Effective Dates Name Provider Type Discipline     06/16/21 -  Arianna Johnson, PT Physical Therapist PT     09/06/24 -  Serafin Conway PT Physical Therapist PT                  PT Recommendation and Plan     Progress: improving  Outcome Evaluation: Pt agreeable to tx session, pt UIC upon arrival. Pt able to ambulate about 75ft in total w/CGA and Flaca for safety due to decreased RLE clearance. Pt navigates 4 steps but requires a rest break prior to initiating due to fatigue. Pt performs but requires Flaca due to safety concerns regarding her decreased RLE clearance. Pt returns to room and t/f to commode, reports she does not want PT to assist and requires nursing aide to assist w/ hygiene. Nursing aide notified and left pt w/ call button at commode.  Continue to recommend rehab due to her functional status as well as need for safety.     Time Calculation:         PT Charges       Row Name 12/15/24 1057             Time Calculation    Start Time 1027  -CS      Stop Time 1050  -CS      Time Calculation (min) 23 min  -CS      PT Received On 12/15/24  -CS      PT - Next Appointment 12/16/24  -CS      PT Goal Re-Cert Due Date 12/20/24  -CS         Timed Charges    69390 - PT Therapeutic Activity Minutes 23  -CS         Total Minutes    Timed Charges Total Minutes 23  -CS       Total Minutes 23  -CS                User Key  (r) = Recorded By, (t) = Taken By, (c) = Cosigned By      Initials Name Provider Type    CS Serafin Conway PT Physical Therapist                  Therapy Charges for Today       Code Description Service Date Service Provider Modifiers Qty    [id number]  PT THERAPEUTIC ACT EA 15 MIN 12/15/2024 Serafin Conway, PT GP 2            PT G-Codes  Outcome Measure Options: AM-PAC 6 Clicks Daily Activity (OT)  AM-PAC 6 Clicks Score (PT): 20  AM-PAC 6 Clicks Score (OT): 17  PT Discharge Summary  Anticipated Discharge Disposition (PT): skilled nursing facility    Serafin Conway  PT  12/15/2024

## 2024-12-15 NOTE — PROGRESS NOTES
"DAILY PROGRESS NOTE  [facility]    Patient Identification:  Name: Haydee Dickey  Age: 89 y.o.  Sex: female  :  1935  MRN: [id number]         Primary Care Physician: Juliana Gonsalez ([place]), YNES    Subjective:  Interval History: She complains of some knee pain and is having some hallucinations but they are better.    Objective:    Scheduled Meds:acetaminophen, 1,000 mg, Oral, Q8H  anastrozole, 1 mg, Oral, Daily  aspirin, 81 mg, Oral, Q12H  Chlorhexidine Gluconate Cloth, , Apply externally, BID  ethyl alcohol, 2 Swab, Nasal, Once  ferrous sulfate, 325 mg, Oral, Daily With Breakfast  lisinopril, 10 mg, Oral, Daily  polyethylene glycol, 17 g, Oral, BID  propranolol, 30 mg, Oral, BID  sodium chloride, 10 mL, Intravenous, Q12H      Continuous Infusions:     Vital signs in last 24 hours:  Temp:  [97.4 °F (36.3 °C)-98.6 °F (37 °C)] 97.4 °F (36.3 °C)  Heart Rate:  [60-76] 69  Resp:  [16-18] 16  BP: (107-132)/(59-74) 107/62    Intake/Output:    Intake/Output Summary (Last 24 hours) at 12/15/2024 1531  Last data filed at 12/15/2024 1334  Gross per 24 hour   Intake 710 ml   Output --   Net 710 ml       Exam:  /62 (BP Location: Right arm, Patient Position: Lying)   Pulse 69   Temp 97.4 °F (36.3 °C) (Oral)   Resp 16   Ht 158.9 cm (62.56\")   Wt 86.6 kg (190 lb 14.7 oz)   SpO2 97%   BMI 34.30 kg/m²     General Appearance:    Alert, cooperative, no distress   Head:    Normocephalic, without obvious abnormality, atraumatic   Eyes:       Throat:   Lips, tongue, gums normal   Neck:   Supple, symmetrical, trachea midline, no JVD   Lungs:     Clear to auscultation bilaterally, respirations unlabored   Chest Wall:    No tenderness or deformity    Heart:    Regular rate and rhythm, S1 and S2 normal, no murmur,no  rub or gallop   Abdomen:     Soft, nontender, bowel sounds active, no masses, no organomegaly    Extremities:   Extremities normal, right knee with surgical changes, no cyanosis or edema "   Pulses:      Skin:   Skin is warm and dry,  no rashes or palpable lesions   Neurologic:   no focal deficits noted      Lab Results (last 72 hours)       Procedure Component Value Units Date/Time    Basic Metabolic Panel [[id number]]  (Abnormal) Collected: 12/14/24 0620    Specimen: Blood Updated: 12/14/24 0656     Glucose 99 mg/dL      BUN 40 mg/dL      Creatinine 1.44 mg/dL      Sodium 137 mmol/L      Potassium 4.7 mmol/L      Chloride 106 mmol/L      CO2 23.2 mmol/L      Calcium 8.5 mg/dL      BUN/Creatinine Ratio 27.8     Anion Gap 7.8 mmol/L      eGFR 35.3 mL/min/1.73     Narrative:      GFR Categories in Chronic Kidney Disease (CKD)      GFR Category          GFR (mL/min/1.73)    Interpretation  G1                     90 or greater         Normal or high (1)  G2                      60-89                Mild decrease (1)  G3a                   45-59                Mild to moderate decrease  G3b                   30-44                Moderate to severe decrease  G4                    15-29                Severe decrease  G5                    14 or less           Kidney failure          (1)In the absence of evidence of kidney disease, neither GFR category G1 or G2 fulfill the criteria for CKD.    eGFR calculation 2021 CKD-EPI creatinine equation, which does not include race as a factor    CBC & Differential [[id number]]  (Abnormal) Collected: 12/14/24 0620    Specimen: Blood Updated: 12/14/24 0632    Narrative:      The following orders were created for panel order CBC & Differential.  Procedure                               Abnormality         Status                     ---------                               -----------         ------                     CBC Auto Differential[[id number]]        Abnormal            Final result                 Please view results for these tests on the individual orders.    CBC Auto Differential [[id number]]  (Abnormal) Collected: 12/14/24 0620    Specimen: Blood Updated:  12/14/24 0632     WBC 5.05 10*3/mm3      RBC 3.00 10*6/mm3      Hemoglobin 9.5 g/dL      Hematocrit 28.1 %      MCV 93.7 fL      MCH 31.7 pg      MCHC 33.8 g/dL      RDW 11.5 %      RDW-SD 38.7 fl      MPV 9.8 fL      Platelets 128 10*3/mm3      Neutrophil % 62.5 %      Lymphocyte % 23.6 %      Monocyte % 12.5 %      Eosinophil % 0.6 %      Basophil % 0.6 %      Immature Grans % 0.2 %      Neutrophils, Absolute 3.16 10*3/mm3      Lymphocytes, Absolute 1.19 10*3/mm3      Monocytes, Absolute 0.63 10*3/mm3      Eosinophils, Absolute 0.03 10*3/mm3      Basophils, Absolute 0.03 10*3/mm3      Immature Grans, Absolute 0.01 10*3/mm3      nRBC 0.0 /100 WBC     Hemoglobin & Hematocrit, Blood [[id number]]  (Abnormal) Collected: 12/13/24 0424    Specimen: Blood Updated: 12/13/24 0535     Hemoglobin 10.6 g/dL      Hematocrit 30.7 %     Blood Gas, Arterial - [[id number]]  (Abnormal) Collected: 12/12/24 2026    Specimen: Arterial Blood Updated: 12/12/24 2032     Site Right Brachial     Sheldon's Test N/A     pH, Arterial 7.309 pH units      pCO2, Arterial 46.5 mm Hg      pO2, Arterial 142.5 mm Hg      HCO3, Arterial 23.4 mmol/L      Base Excess, Arterial -3.1 mmol/L      Comment: Serial Number: [id number]:  [id number]        O2 Saturation, Arterial 98.9 %      CO2 Content 24.8 mmol/L      Barometric Pressure for Blood Gas 757.8000 mmHg      Modality Cannula     Flow Rate 3.0000 lpm      Set [facility] Resp Rate 16     Rate 16 Breaths/minute      Hemodilution No     Device Comment sat 94%    Basic Metabolic Panel [[id number]]  (Abnormal) Collected: 12/12/24 0639    Specimen: Blood Updated: 12/12/24 0721     Glucose 96 mg/dL      BUN 28 mg/dL      Creatinine 1.12 mg/dL      Sodium 137 mmol/L      Potassium 4.5 mmol/L      Comment: Slight hemolysis detected by analyzer. Result may be falsely elevated.        Chloride 106 mmol/L      CO2 22.6 mmol/L      Calcium 9.2 mg/dL      BUN/Creatinine Ratio 25.0     Anion Gap 8.4 mmol/L      eGFR 47.7  "mL/min/1.73     Narrative:      GFR Categories in Chronic Kidney Disease (CKD)      GFR Category          GFR (mL/min/1.73)    Interpretation  G1                     90 or greater         Normal or high (1)  G2                      60-89                Mild decrease (1)  G3a                   45-59                Mild to moderate decrease  G3b                   30-44                Moderate to severe decrease  G4                    15-29                Severe decrease  G5                    14 or less           Kidney failure          (1)In the absence of evidence of kidney disease, neither GFR category G1 or G2 fulfill the criteria for CKD.    eGFR calculation 2021 CKD-EPI creatinine equation, which does not include race as a factor    CBC (No Diff) [[id number]]  (Abnormal) Collected: 12/12/24 0639    Specimen: Blood Updated: 12/12/24 0659     WBC 4.02 10*3/mm3      RBC 3.31 10*6/mm3      Hemoglobin 10.4 g/dL      Hematocrit 30.9 %      MCV 93.4 fL      MCH 31.4 pg      MCHC 33.7 g/dL      RDW 11.4 %      RDW-SD 38.3 fl      MPV 9.8 fL      Platelets 156 10*3/mm3           Data Review:  Results from last 7 days   Lab Units 12/15/24  0356 12/14/24  0620 12/12/24  0639   SODIUM mmol/L 137 137 137   POTASSIUM mmol/L 4.6 4.7 4.5   CHLORIDE mmol/L 106 106 106   CO2 mmol/L 22.0 23.2 22.6   BUN mg/dL 41* 40* 28*   CREATININE mg/dL 1.26* 1.44* 1.12*   GLUCOSE mg/dL 106* 99 96   CALCIUM mg/dL 8.7 8.5* 9.2     Results from last 7 days   Lab Units 12/15/24  0356 12/14/24 0620 12/13/24  0424 12/12/24  0639   WBC 10*3/mm3 5.44 5.05  --  4.02   HEMOGLOBIN g/dL 9.2* 9.5* 10.6* 10.4*   HEMATOCRIT % 27.2* 28.1* 30.7* 30.9*   PLATELETS 10*3/mm3 140 128*  --  156             Lab Results   Lab Value Date/Time    TROPONINT <0.010 04/13/2018 1415               Invalid input(s): \"PROT\", \"LABALBU\"          No results found for: \"POCGLU\"        Past Medical History:   Diagnosis Date    Allergic Percocet, iv iodine    Anemia     Arthritis  " "   B12 deficiency     BPPV (benign paroxysmal positional vertigo)     Breast cancer     LEFT    Cataract 2018  jaime, 2021 jaime blepharoplasty    Colon polyp 2009    COVID 03/24/2023    Rebound Covid following Paxlovid 4/1/23    CTS (carpal tunnel syndrome) 1997, 2014    D’quervain x3    Deep vein thrombosis 1970    HX, LEG    Essential tremor     Folliculitis 11/01/2017    Fracture of hip 2014    Right ORIF    Fracture, femur 1995    Left, No surgery    Fracture, radius 1995    Right, orif    Gait instability     GERD (gastroesophageal reflux disease) 1990    History of blood transfusion 2014    History of fall 06/17/2024    \"KNEE GAVE OUT\"    HL (hearing loss) 2015    HEARING AIDS    Hypertension 2001    on Rx    Knee swelling 2001    Bilateral    Lower extremity neuropathy     bilateral    Neuropathy     Osteopenia 2019    Potassium (K) excess 10/21/2022    Seeing nephrologist -- Dr. 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       Plan:  Will DC [facility].  Psych consult noted.  DC planning.  Will need skilled nursing unit for rehab.    Josue Quinteros MD  12/15/2024  15:31 EST    "

## 2024-12-15 NOTE — PLAN OF CARE
Goal Outcome Evaluation:  Plan of Care Reviewed With: patient           Outcome Evaluation: Received pt on POD3. A&Ox4. VSS. pleasant, calm & cooperative. with ella dressing on her Rt knee covered with elastic bandage c/d/i. Able to ambulate with 1SBA. voided freely. still (-)bm encouraged increase OFI and miralax given. Scheduled tylenol for pain given. She's on regular diet and room air while awake, but during sleeping hours she needs O2 at 2Lpm. sleep study ordered by pulmonology. Psych eval done and symptoms of visual hallucinations experienced by pt. post op was probably d/t anesth effect & pain med. and it should dissipate for some time. no new orders made. Plan for  discharge to skilled nsg facility unit for rehab.

## 2024-12-15 NOTE — PLAN OF CARE
Goal Outcome Evaluation:  Plan of Care Reviewed With: patient        Progress: improving  Outcome Evaluation: Pt agreeable to tx session, pt UIC upon arrival. Pt able to ambulate about 75ft in total w/CGA and Flaca for safety due to decreased RLE clearance. Pt navigates 4 steps but requires a rest break prior to initiating due to fatigue. Pt performs but requires Flaca due to safety concerns regarding her decreased RLE clearance. Pt returns to room and t/f to commode, reports she does not want PT to assist and requires nursing aide to assist w/ hygiene. Nursing aide notified and left pt w/ call button at commode.  Continue to recommend rehab due to her functional status as well as need for safety.    Anticipated Discharge Disposition (PT): skilled nursing facility

## 2024-12-15 NOTE — PROGRESS NOTES
Patient is seen, evaluated, and chart reviewed. Discussed with staff.      Staff reports that patient has been cooperative and compliant with medications.  No behavioral issues.    On examination, patient is found resting in bed.  Patient slept well last night.  Speech is fluent but somewhat rambling.  She is oriented x 4.  Mood is good.  Affect congruent.  No SI/HI/AVH.  Thought processes are circumstantial.  Judgment and insight are fair.    It appears the patient's hallucinations have resolved.  She is at her baseline and stable from a psychiatric perspective.

## 2024-12-15 NOTE — PLAN OF CARE
Goal Outcome Evaluation:         POD3 R knee poly exchange. VSS. Aox4. Assist x1 to BRP. Marilou patent and c/d/I. No c/o pain this shift. Demanding at times. Plan to d/c to SNF when ready.

## 2024-12-16 LAB
ANION GAP SERPL CALCULATED.3IONS-SCNC: 7.2 MMOL/L (ref 5–15)
BASOPHILS # BLD AUTO: 0.02 10*3/MM3 (ref 0–0.2)
BASOPHILS NFR BLD AUTO: 0.4 % (ref 0–1.5)
BUN SERPL-MCNC: 38 MG/DL (ref 8–23)
BUN/CREAT SERPL: 32.5 (ref 7–25)
CALCIUM SPEC-SCNC: 8.7 MG/DL (ref 8.6–10.5)
CHLORIDE SERPL-SCNC: 106 MMOL/L (ref 98–107)
CO2 SERPL-SCNC: 23.8 MMOL/L (ref 22–29)
CREAT SERPL-MCNC: 1.17 MG/DL (ref 0.57–1)
DEPRECATED RDW RBC AUTO: 41.6 FL (ref 37–54)
EGFRCR SERPLBLD CKD-EPI 2021: 45.3 ML/MIN/1.73
EOSINOPHIL # BLD AUTO: 0.13 10*3/MM3 (ref 0–0.4)
EOSINOPHIL NFR BLD AUTO: 2.6 % (ref 0.3–6.2)
ERYTHROCYTE [DISTWIDTH] IN BLOOD BY AUTOMATED COUNT: 11.7 % (ref 12.3–15.4)
GLUCOSE SERPL-MCNC: 102 MG/DL (ref 65–99)
HCT VFR BLD AUTO: 27.9 % (ref 34–46.6)
HGB BLD-MCNC: 8.8 G/DL (ref 12–15.9)
IMM GRANULOCYTES # BLD AUTO: 0.01 10*3/MM3 (ref 0–0.05)
IMM GRANULOCYTES NFR BLD AUTO: 0.2 % (ref 0–0.5)
LYMPHOCYTES # BLD AUTO: 1.18 10*3/MM3 (ref 0.7–3.1)
LYMPHOCYTES NFR BLD AUTO: 23.8 % (ref 19.6–45.3)
MCH RBC QN AUTO: 30.6 PG (ref 26.6–33)
MCHC RBC AUTO-ENTMCNC: 31.5 G/DL (ref 31.5–35.7)
MCV RBC AUTO: 96.9 FL (ref 79–97)
MONOCYTES # BLD AUTO: 0.76 10*3/MM3 (ref 0.1–0.9)
MONOCYTES NFR BLD AUTO: 15.3 % (ref 5–12)
NEUTROPHILS NFR BLD AUTO: 2.86 10*3/MM3 (ref 1.7–7)
NEUTROPHILS NFR BLD AUTO: 57.7 % (ref 42.7–76)
NRBC BLD AUTO-RTO: 0 /100 WBC (ref 0–0.2)
PLATELET # BLD AUTO: 131 10*3/MM3 (ref 140–450)
PMV BLD AUTO: 10.5 FL (ref 6–12)
POTASSIUM SERPL-SCNC: 4.4 MMOL/L (ref 3.5–5.2)
RBC # BLD AUTO: 2.88 10*6/MM3 (ref 3.77–5.28)
SODIUM SERPL-SCNC: 137 MMOL/L (ref 136–145)
WBC NRBC COR # BLD AUTO: 4.96 10*3/MM3 (ref 3.4–10.8)

## 2024-12-16 PROCEDURE — 80048 BASIC METABOLIC PNL TOTAL CA: CPT | Performed by: INTERNAL MEDICINE

## 2024-12-16 PROCEDURE — 97116 GAIT TRAINING THERAPY: CPT

## 2024-12-16 PROCEDURE — 97530 THERAPEUTIC ACTIVITIES: CPT

## 2024-12-16 PROCEDURE — 85025 COMPLETE CBC W/AUTO DIFF WBC: CPT | Performed by: INTERNAL MEDICINE

## 2024-12-16 RX ORDER — ACETAMINOPHEN 325 MG/1
650 TABLET ORAL EVERY 6 HOURS PRN
Status: DISCONTINUED | OUTPATIENT
Start: 2024-12-16 | End: 2024-12-17 | Stop reason: HOSPADM

## 2024-12-16 RX ADMIN — Medication 10 ML: at 20:57

## 2024-12-16 RX ADMIN — LISINOPRIL 10 MG: 10 TABLET ORAL at 09:18

## 2024-12-16 RX ADMIN — BISACODYL 5 MG: 5 TABLET, COATED ORAL at 09:16

## 2024-12-16 RX ADMIN — ACETAMINOPHEN 650 MG: 325 TABLET ORAL at 20:54

## 2024-12-16 RX ADMIN — ANASTROZOLE 1 MG: 1 TABLET, COATED ORAL at 09:18

## 2024-12-16 RX ADMIN — Medication 10 ML: at 09:50

## 2024-12-16 RX ADMIN — ASPIRIN 81 MG: 81 TABLET, COATED ORAL at 09:18

## 2024-12-16 RX ADMIN — ASPIRIN 81 MG: 81 TABLET, COATED ORAL at 20:54

## 2024-12-16 RX ADMIN — PROPRANOLOL HYDROCHLORIDE 30 MG: 20 TABLET ORAL at 20:55

## 2024-12-16 RX ADMIN — FERROUS SULFATE TAB 325 MG (65 MG ELEMENTAL FE) 325 MG: 325 (65 FE) TAB at 09:18

## 2024-12-16 RX ADMIN — POLYETHYLENE GLYCOL 3350 17 G: 17 POWDER, FOR SOLUTION ORAL at 20:55

## 2024-12-16 RX ADMIN — PROPRANOLOL HYDROCHLORIDE 30 MG: 20 TABLET ORAL at 09:18

## 2024-12-16 NOTE — PLAN OF CARE
Goal Outcome Evaluation:  Plan of Care Reviewed With: patient   Vss, nvi, dressing c/d/I, voiding per brp, ambulating assist x1, bm this shift, plans to d/c to [facility] tomorrow, daughter will transport, no c/o pain, educated on incentive spirometer use and bp meds and monitoring.     Progress: improving

## 2024-12-16 NOTE — PROGRESS NOTES
The patient is pleasant cooperative and denies any psychotic symptoms or suicidal ideations.  She reports no further hallucinations.  From a psychiatric standpoint, the patient appears to be at baseline and I will sign off.

## 2024-12-16 NOTE — THERAPY TREATMENT NOTE
Patient Name: Haydee Dickey  : 1935    MRN: [id number]                              Today's Date: 2024       Admit Date: 12/10/2024    Visit Dx:     ICD-10-CM ICD-9-CM   1. Acute pain of right knee  M25.561 719.46   2. Chronic renal impairment, unspecified CKD stage  N18.9 585.9   3. Status post left knee replacement  Z96.652 V43.65   4. Central apnea  R06.81 786.03   5. Central sleep apnea in conditions classified elsewhere  G47.37 327.27     Patient Active Problem List   Diagnosis    Acute left flank pain    B12 deficiency    Benign essential hypertension    Benign paroxysmal positional vertigo    H/O abdominal hysterectomy    H/O total knee replacement    Hip fracture    Torn rotator cuff    Osteoporosis    Essential tremor    Gait instability    Edema of lower extremity    Malignant neoplasm of upper-inner quadrant of left breast in female, estrogen receptor positive    Disorder of rotator cuff    Aromatase inhibitor use    Personal history of breast cancer    Vitamin D deficiency    Absolute anemia    Benign hypertensive kidney disease with chronic kidney disease    Hyperkalemia    Peripheral neuropathy    Stage 3a chronic kidney disease    High serum creatinine    OA (osteoarthritis) of knee    Status post left knee replacement    S/P TKR (total knee replacement) using cement, left    Right knee pain     Past Medical History:   Diagnosis Date    Allergic Percocet, iv iodine    Anemia     Arthritis     B12 deficiency     BPPV (benign paroxysmal positional vertigo)     Breast cancer     LEFT    Cataract   jaime,  jaime blepharoplasty    Colon polyp     COVID 2023    Rebound Covid following Paxlovid 23    CTS (carpal tunnel syndrome) ,     D’quervain x3    Deep vein thrombosis 1970    HX, LEG    Essential tremor     Folliculitis 2017    Fracture of hip     Right ORIF    Fracture, femur     Left, No surgery    Fracture, radius     Right, orif    Gait  "instability     GERD (gastroesophageal reflux disease) 1990    History of blood transfusion 2014    History of fall 06/17/2024    \"KNEE GAVE OUT\"    HL (hearing loss) 2015    HEARING AIDS    Hypertension 2001    on Rx    Knee swelling 2001    Bilateral    Lower extremity neuropathy     bilateral    Neuropathy     Osteopenia 2019    Potassium (K) excess 10/21/2022    Seeing nephrologist -- Dr. Coats    Rotator cuff syndrome 2013    Fall induced    Sciatica     Small vessel disease     Torn rotator cuff 2013    right arm    Urinary tract infection 2018    approx 1 every 5 years    Wears hearing aid     BILATERAL    Wrist fracture, right 2015     Past Surgical History:   Procedure Laterality Date    ADENOIDECTOMY  1953    APPENDECTOMY  1961    BLEPHAROPLASTY Bilateral 10/19/2021    Procedure: BILATERAL UPPER LID BLEPHAROPLASTY;  Surgeon: Ruddy Moses MD;  Location: [facility] OR [facility];  Service: Ophthalmology;  Laterality: Bilateral;    BREAST BIOPSY      BREAST LUMPECTOMY Left 11/15/2021    Procedure: Left JEAN-PAUL-guided partial mastectomy;  Surgeon: Maliha Andres MD;  Location: [facility] MAIN OR;  Service: General;  Laterality: Left;    BROW LIFT Bilateral 10/19/2021    Procedure: BILATERAL TEMPORAL DIRECT BROWLIFT;  Surgeon: Ruddy Moses MD;  Location: [facility] OR [facility];  Service: Ophthalmology;  Laterality: Bilateral;    CARPAL TUNNEL RELEASE  1992    right wrist    CATARACT EXTRACTION, BILATERAL  07/01/2018    R 7/18 and L 9/18 WITH LENS IMPLANTS    COLONOSCOPY      DEQUERVAIN RELEASE Bilateral 2015    DILATATION AND CURETTAGE  1960s    EYE SURGERY  2018    Bilateral repairs    FRACTURE SURGERY  2014    R hip, R  wrist    HAND SURGERY Right     HIP FRACTURE SURGERY Right     HYSTERECTOMY  1975    Partial    JOINT REPLACEMENT  2001, 2014    TKA    KNEE POLY INSERT EXCHANGE Right 12/12/2024    Procedure: Right Knee Poly insert exchange with exploration of foreign body;  Surgeon: Edmund Zavaleta MD;  " Location: [facility] OR;  Service: Orthopedics;  Laterality: Right;    TONSILLECTOMY AND ADENOIDECTOMY  1953    TOTAL KNEE ARTHROPLASTY Left 2001    TOTAL KNEE ARTHROPLASTY Right 2014    TRIGGER FINGER RELEASE Right     TRIGGER POINT INJECTION  2017, 2019    WRIST SURGERY  2015    FRACTURE RIGHT      General Information       Row Name 12/16/24 1427          Physical Therapy Time and Intention    Document Type therapy note (daily note)  -CS     Mode of Treatment individual therapy;physical therapy  -CS       Row Name 12/16/24 1427          General Information    Patient Profile Reviewed yes  -CS     Existing Precautions/Restrictions fall  -CS       Row Name 12/16/24 1427          Cognition    Orientation Status (Cognition) oriented x 4  -CS       Row Name 12/16/24 1427          Safety Issues/Impairments Affecting Functional Mobility    Impairments Affecting Function (Mobility) endurance/activity tolerance;pain;strength;range of motion (ROM)  -CS               User Key  (r) = Recorded By, (t) = Taken By, (c) = Cosigned By      Initials Name Provider Type    CS Ludy Garland, PT Physical Therapist                   Mobility       Row Name 12/16/24 1427          Bed Mobility    Bed Mobility supine-sit;sit-supine  -CS     Supine-Sit [place] (Bed Mobility) standby assist  -CS     Sit-Supine [place] (Bed Mobility) standby assist  -CS     Assistive Device (Bed Mobility) head of bed elevated  -CS       Row Name 12/16/24 1427          Sit-Stand Transfer    Sit-Stand [place] (Transfers) contact guard  -CS     Assistive Device (Sit-Stand Transfers) walker, front-wheeled  -CS       Row Name 12/16/24 1427          Gait/Stairs (Locomotion)    [place] Level (Gait) contact guard  -CS     Assistive Device (Gait) walker, front-wheeled  -CS     Distance in Feet (Gait) 40  x2  -CS     Deviations/Abnormal Patterns (Gait) antalgic;caridad decreased;stride length decreased  -CS     Left Sided Gait Deviations foot  drop/toe drag  -CS     Comment, (Gait/Stairs) slow pace; no LOB; noted mild L foot drop  -CS       Row Name 12/16/24 1427          Mobility    Extremity Weight-bearing Status right lower extremity  -CS     Right Lower Extremity (Weight-bearing Status) weight-bearing as tolerated (WBAT)  -CS               User Key  (r) = Recorded By, (t) = Taken By, (c) = Cosigned By      Initials Name Provider Type    CS Ludy Garland, PT Physical Therapist                   Obj/Interventions       Row Name 12/16/24 1428          Motor Skills    Therapeutic Exercise other (see comments)  10 reps R TKA protocol  -       Row Name 12/16/24 1428          Balance    Balance Assessment sitting static balance;sitting dynamic balance;standing static balance;standing dynamic balance  -CS     Static Sitting Balance supervision  -     Dynamic Sitting Balance supervision  -CS     Position, Sitting Balance unsupported;sitting edge of bed  -CS     Static Standing Balance standby assist  -CS     Dynamic Standing Balance standby assist;contact guard  -CS     Position/Device Used, Standing Balance supported;walker, front-wheeled  -               User Key  (r) = Recorded By, (t) = Taken By, (c) = Cosigned By      Initials Name Provider Type    Ludy Michel, PT Physical Therapist                   Goals/Plan    No documentation.                  Clinical Impression       Row Name 12/16/24 1428          Pain    Pretreatment Pain Rating 0/10 - no pain  -       Row Name 12/16/24 1428          Plan of Care Review    Plan of Care Reviewed With patient  -CS     Progress improving  -     Outcome Evaluation Pt received in bed and agreeable to PT. Pt performed bed mobility, STS txf, and ambulated in the hallway c RW requiring SBA/CGA. Pt demo's a slow pace but overall steady gait. Noted mild L foot drop with cues provided for foot clearance. Pt reports she just returned to bed prior to therapy session and declined sitting UIC. Pt completed  TKA protocol and encouraged to ambulate with staff. Pt is interested in rehab at [facility] working on placement.  -CS       Row Name 12/16/24 1428          Therapy Assessment/Plan (PT)    Criteria for Skilled Interventions Met (PT) yes;meets criteria  -CS     Therapy Frequency (PT) 6 times/wk  -CS       Row Name 12/16/24 1428          Vital Signs    O2 Delivery Pre Treatment room air  -CS     O2 Delivery Intra Treatment room air  -CS     Post SpO2 (%) 98  -CS     O2 Delivery Post Treatment room air  -CS       Row Name 12/16/24 1428          Positioning and Restraints    Pre-Treatment Position in bed  -CS     Post Treatment Position bed  -CS     In Bed supine;call light within reach;encouraged to call for assist;exit alarm on  -CS               User Key  (r) = Recorded By, (t) = Taken By, (c) = Cosigned By      Initials Name Provider Type    Ludy Michel, PT Physical Therapist                   Outcome Measures       Row Name 12/16/24 1431 12/16/24 0918       How much help from another person do you currently need...    Turning from your back to your side while in flat bed without using bedrails? 4  -CS 4  -EE    Moving from lying on back to sitting on the side of a flat bed without bedrails? 4  -CS 4  -EE    Moving to and from a bed to a chair (including a wheelchair)? 3  -CS 3  -EE    Standing up from a chair using your arms (e.g., wheelchair, bedside chair)? 3  -CS 3  -EE    Climbing 3-5 steps with a railing? 3  -CS 2  -EE    To walk in hospital room? 3  -CS 3  -EE    AM-PAC 6 Clicks Score (PT) 20  -CS 19  -EE    Highest Level of Mobility Goal 6 --> Walk 10 steps or more  -CS 6 --> Walk 10 steps or more  -EE      Row Name 12/16/24 1431          Functional Assessment    Outcome Measure Options AM-PAC 6 Clicks Basic Mobility (PT)  -CS               User Key  (r) = Recorded By, (t) = Taken By, (c) = Cosigned By      Initials Name Provider Type    Silvia Degroot, RN Registered Nurse    Ludy Michel, PT  Physical Therapist                                 Physical Therapy Education       Title: PT OT SLP Therapies (In Progress)       Topic: Physical Therapy (Done)       Point: Mobility training (Done)       Learning Progress Summary            Patient Acceptance, E,TB, VU,DU,NR by  at 12/16/2024 1431    Acceptance, E,D, DU by [facility] at 12/15/2024 1056    Acceptance, E, NR by  at 12/11/2024 1551                      Point: Home exercise program (Done)       Learning Progress Summary            Patient Acceptance, E,TB, VU,DU,NR by  at 12/16/2024 1431    Acceptance, E,D, DU by [facility] at 12/15/2024 1056    Acceptance, E, NR by  at 12/11/2024 1551                      Point: Body mechanics (Done)       Learning Progress Summary            Patient Acceptance, E,TB, VU,DU,NR by  at 12/16/2024 1431    Acceptance, E,D, DU by [facility] at 12/15/2024 1056    Acceptance, E, NR by  at 12/11/2024 1551                      Point: Precautions (Done)       Learning Progress Summary            Patient Acceptance, E,TB, VU,DU,NR by  at 12/16/2024 1431    Acceptance, E,D, DU by [facility] at 12/15/2024 1056    Acceptance, E, NR by  at 12/11/2024 1551                                      User Key       Initials Effective Dates Name Provider Type Discipline     06/16/21 -  Arianna Johnson, PT Physical Therapist PT     09/22/22 -  Ludy Garland PT Physical Therapist PT    [facility] 09/06/24 -  Serafin Conway PT Physical Therapist PT                  PT Recommendation and Plan     Progress: improving  Outcome Evaluation: Pt received in bed and agreeable to PT. Pt performed bed mobility, STS txf, and ambulated in the hallway c RW requiring SBA/CGA. Pt demo's a slow pace but overall steady gait. Noted mild L foot drop with cues provided for foot clearance. Pt reports she just returned to bed prior to therapy session and declined sitting UIC. Pt completed TKA protocol and encouraged to ambulate with staff. Pt is interested in rehab at [facility]  working on placement.     Time Calculation:         PT Charges       Row Name 12/16/24 1431             Time Calculation    Start Time 1336  -CS      Stop Time 1413  -CS      Time Calculation (min) 37 min  -CS      PT Received On 12/16/24  -CS      PT - Next Appointment 12/17/24  -CS         Time Calculation- PT    Total Timed Code Minutes- PT 32 minute(s)  -CS         Timed Charges    50366 - PT Therapeutic Exercise Minutes 8  -CS      19222 - Gait Training Minutes  12  -CS      40723 - PT Therapeutic Activity Minutes 12  -CS         Total Minutes    Timed Charges Total Minutes 32  -CS       Total Minutes 32  -CS                User Key  (r) = Recorded By, (t) = Taken By, (c) = Cosigned By      Initials Name Provider Type    CS Ludy Garland, PT Physical Therapist                  Therapy Charges for Today       Code Description Service Date Service Provider Modifiers Qty    63179923960 HC GAIT TRAINING EA 15 MIN 12/16/2024 Ludy Garland, PT GP 1    48706123312 HC PT THERAPEUTIC ACT EA 15 MIN 12/16/2024 Ludy Garland, PT GP 1            PT G-Codes  Outcome Measure Options: AM-PAC 6 Clicks Basic Mobility (PT)  AM-PAC 6 Clicks Score (PT): 20  AM-PAC 6 Clicks Score (OT): 17  PT Discharge Summary  Anticipated Discharge Disposition (PT): skilled nursing facility    Ludy Garland PT  12/16/2024

## 2024-12-16 NOTE — PROGRESS NOTES
"DAILY PROGRESS NOTE  [facility]    Patient Identification:  Name: Haydee Dickey  Age: 89 y.o.  Sex: female  :  1935  MRN: [id number]         Primary Care Physician: Juliana Gonsalez ([place]), YNES    Subjective:  Interval History: She complains of some knee pain but she is doing better today.  Objective:    Scheduled Meds:acetaminophen, 1,000 mg, Oral, Q8H  anastrozole, 1 mg, Oral, Daily  aspirin, 81 mg, Oral, Q12H  Chlorhexidine Gluconate Cloth, , Apply externally, BID  ethyl alcohol, 2 Swab, Nasal, Once  ferrous sulfate, 325 mg, Oral, Daily With Breakfast  lisinopril, 10 mg, Oral, Daily  polyethylene glycol, 17 g, Oral, BID  propranolol, 30 mg, Oral, BID  sodium chloride, 10 mL, Intravenous, Q12H      Continuous Infusions:     Vital signs in last 24 hours:  Temp:  [97.6 °F (36.4 °C)-98.3 °F (36.8 °C)] 97.6 °F (36.4 °C)  Heart Rate:  [61-81] 71  Resp:  [16-17] 17  BP: ()/(52-71) 111/71    Intake/Output:    Intake/Output Summary (Last 24 hours) at 2024 1543  Last data filed at 2024 1300  Gross per 24 hour   Intake 1110 ml   Output --   Net 1110 ml       Exam:  /71 (BP Location: Left arm, Patient Position: Sitting)   Pulse 71   Temp 97.6 °F (36.4 °C) (Oral)   Resp 17   Ht 158.9 cm (62.56\")   Wt 86.6 kg (190 lb 14.7 oz)   SpO2 96%   BMI 34.30 kg/m²     General Appearance:    Alert, cooperative, no distress   Head:    Normocephalic, without obvious abnormality, atraumatic   Eyes:       Throat:   Lips, tongue, gums normal   Neck:   Supple, symmetrical, trachea midline, no JVD   Lungs:     Clear to auscultation bilaterally, respirations unlabored   Chest Wall:    No tenderness or deformity    Heart:    Regular rate and rhythm, S1 and S2 normal, no murmur,no  rub or gallop   Abdomen:     Soft, nontender, bowel sounds active, no masses, no organomegaly    Extremities:   Extremities normal, right knee with surgical changes, no cyanosis or edema   Pulses:      Skin:   Skin " is warm and dry,  no rashes or palpable lesions   Neurologic:   no focal deficits noted      Lab Results (last 72 hours)       Procedure Component Value Units Date/Time    Basic Metabolic Panel [[id number]]  (Abnormal) Collected: 12/14/24 0620    Specimen: Blood Updated: 12/14/24 0656     Glucose 99 mg/dL      BUN 40 mg/dL      Creatinine 1.44 mg/dL      Sodium 137 mmol/L      Potassium 4.7 mmol/L      Chloride 106 mmol/L      CO2 23.2 mmol/L      Calcium 8.5 mg/dL      BUN/Creatinine Ratio 27.8     Anion Gap 7.8 mmol/L      eGFR 35.3 mL/min/1.73     Narrative:      GFR Categories in Chronic Kidney Disease (CKD)      GFR Category          GFR (mL/min/1.73)    Interpretation  G1                     90 or greater         Normal or high (1)  G2                      60-89                Mild decrease (1)  G3a                   45-59                Mild to moderate decrease  G3b                   30-44                Moderate to severe decrease  G4                    15-29                Severe decrease  G5                    14 or less           Kidney failure          (1)In the absence of evidence of kidney disease, neither GFR category G1 or G2 fulfill the criteria for CKD.    eGFR calculation 2021 CKD-EPI creatinine equation, which does not include race as a factor    CBC & Differential [[id number]]  (Abnormal) Collected: 12/14/24 0620    Specimen: Blood Updated: 12/14/24 0632    Narrative:      The following orders were created for panel order CBC & Differential.  Procedure                               Abnormality         Status                     ---------                               -----------         ------                     CBC Auto Differential[[id number]]        Abnormal            Final result                 Please view results for these tests on the individual orders.    CBC Auto Differential [[id number]]  (Abnormal) Collected: 12/14/24 0620    Specimen: Blood Updated: 12/14/24 0632     WBC 5.05 10*3/mm3       RBC 3.00 10*6/mm3      Hemoglobin 9.5 g/dL      Hematocrit 28.1 %      MCV 93.7 fL      MCH 31.7 pg      MCHC 33.8 g/dL      RDW 11.5 %      RDW-SD 38.7 fl      MPV 9.8 fL      Platelets 128 10*3/mm3      Neutrophil % 62.5 %      Lymphocyte % 23.6 %      Monocyte % 12.5 %      Eosinophil % 0.6 %      Basophil % 0.6 %      Immature Grans % 0.2 %      Neutrophils, Absolute 3.16 10*3/mm3      Lymphocytes, Absolute 1.19 10*3/mm3      Monocytes, Absolute 0.63 10*3/mm3      Eosinophils, Absolute 0.03 10*3/mm3      Basophils, Absolute 0.03 10*3/mm3      Immature Grans, Absolute 0.01 10*3/mm3      nRBC 0.0 /100 WBC     Hemoglobin & Hematocrit, Blood [[id number]]  (Abnormal) Collected: 12/13/24 0424    Specimen: Blood Updated: 12/13/24 0535     Hemoglobin 10.6 g/dL      Hematocrit 30.7 %     Blood Gas, Arterial - [[id number]]  (Abnormal) Collected: 12/12/24 2026    Specimen: Arterial Blood Updated: 12/12/24 2032     Site Right Brachial     Sheldon's Test N/A     pH, Arterial 7.309 pH units      pCO2, Arterial 46.5 mm Hg      pO2, Arterial 142.5 mm Hg      HCO3, Arterial 23.4 mmol/L      Base Excess, Arterial -3.1 mmol/L      Comment: Serial Number: [id number]:  [id number]        O2 Saturation, Arterial 98.9 %      CO2 Content 24.8 mmol/L      Barometric Pressure for Blood Gas 757.8000 mmHg      Modality Cannula     Flow Rate 3.0000 lpm      Set [facility] Resp Rate 16     Rate 16 Breaths/minute      Hemodilution No     Device Comment sat 94%    Basic Metabolic Panel [[id number]]  (Abnormal) Collected: 12/12/24 0639    Specimen: Blood Updated: 12/12/24 0721     Glucose 96 mg/dL      BUN 28 mg/dL      Creatinine 1.12 mg/dL      Sodium 137 mmol/L      Potassium 4.5 mmol/L      Comment: Slight hemolysis detected by analyzer. Result may be falsely elevated.        Chloride 106 mmol/L      CO2 22.6 mmol/L      Calcium 9.2 mg/dL      BUN/Creatinine Ratio 25.0     Anion Gap 8.4 mmol/L      eGFR 47.7 mL/min/1.73     Narrative:      GFR  "Categories in Chronic Kidney Disease (CKD)      GFR Category          GFR (mL/min/1.73)    Interpretation  G1                     90 or greater         Normal or high (1)  G2                      60-89                Mild decrease (1)  G3a                   45-59                Mild to moderate decrease  G3b                   30-44                Moderate to severe decrease  G4                    15-29                Severe decrease  G5                    14 or less           Kidney failure          (1)In the absence of evidence of kidney disease, neither GFR category G1 or G2 fulfill the criteria for CKD.    eGFR calculation 2021 CKD-EPI creatinine equation, which does not include race as a factor    CBC (No Diff) [[id number]]  (Abnormal) Collected: 12/12/24 0639    Specimen: Blood Updated: 12/12/24 0659     WBC 4.02 10*3/mm3      RBC 3.31 10*6/mm3      Hemoglobin 10.4 g/dL      Hematocrit 30.9 %      MCV 93.4 fL      MCH 31.4 pg      MCHC 33.7 g/dL      RDW 11.4 %      RDW-SD 38.3 fl      MPV 9.8 fL      Platelets 156 10*3/mm3           Data Review:  Results from last 7 days   Lab Units 12/16/24  0542 12/15/24  0356 12/14/24  0620   SODIUM mmol/L 137 137 137   POTASSIUM mmol/L 4.4 4.6 4.7   CHLORIDE mmol/L 106 106 106   CO2 mmol/L 23.8 22.0 23.2   BUN mg/dL 38* 41* 40*   CREATININE mg/dL 1.17* 1.26* 1.44*   GLUCOSE mg/dL 102* 106* 99   CALCIUM mg/dL 8.7 8.7 8.5*     Results from last 7 days   Lab Units 12/16/24  0542 12/15/24  0356 12/14/24  0620   WBC 10*3/mm3 4.96 5.44 5.05   HEMOGLOBIN g/dL 8.8* 9.2* 9.5*   HEMATOCRIT % 27.9* 27.2* 28.1*   PLATELETS 10*3/mm3 131* 140 128*             Lab Results   Lab Value Date/Time    TROPONINT <0.010 04/13/2018 1415               Invalid input(s): \"PROT\", \"LABALBU\"          No results found for: \"POCGLU\"        Past Medical History:   Diagnosis Date    Allergic Percocet, iv iodine    Anemia     Arthritis     B12 deficiency     BPPV (benign paroxysmal positional vertigo)     " "Breast cancer     LEFT    Cataract 2018  jaime, 2021 jaime blepharoplasty    Colon polyp 2009    COVID 03/24/2023    Rebound Covid following Paxlovid 4/1/23    CTS (carpal tunnel syndrome) 1997, 2014    D’quervain x3    Deep vein thrombosis 1970    HX, LEG    Essential tremor     Folliculitis 11/01/2017    Fracture of hip 2014    Right ORIF    Fracture, femur 1995    Left, No surgery    Fracture, radius 1995    Right, orif    Gait instability     GERD (gastroesophageal reflux disease) 1990    History of blood transfusion 2014    History of fall 06/17/2024    \"KNEE GAVE OUT\"    HL (hearing loss) 2015    HEARING AIDS    Hypertension 2001    on Rx    Knee swelling 2001    Bilateral    Lower extremity neuropathy     bilateral    Neuropathy     Osteopenia 2019    Potassium (K) excess 10/21/2022    Seeing nephrologist -- Dr. 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       Plan:  Will make the Tylenol as needed.  Psych consult noted.  DC planning.  Will need skilled nursing unit for rehab.  Probably tomorrow.    Josue Quinteros MD  12/16/2024  15:43 EST    "

## 2024-12-16 NOTE — PLAN OF CARE
Goal Outcome Evaluation:  Plan of Care Reviewed With: patient        Progress: improving  Outcome Evaluation: Pt received in bed and agreeable to PT. Pt performed bed mobility, STS txf, and ambulated in the hallway c RW requiring SBA/CGA. Pt demo's a slow pace but overall steady gait. Noted mild L foot drop with cues provided for foot clearance. Pt reports she just returned to bed prior to therapy session and declined sitting [facility]IC. Pt completed TKA protocol and encouraged to ambulate with staff. Pt is interested in rehab at [facility] working on placement.    Anticipated Discharge Disposition (PT): skilled nursing facility

## 2024-12-16 NOTE — SIGNIFICANT NOTE
12/16/24 1448   OTHER   Discipline occupational therapist   Rehab Time/Intention   Session Not Performed patient unavailable for treatment  (attempted to see pt in PM, however working with physical therapy at that time, will continue to follow, potential d/c to SNF tomorrow.)   Recommendation   OT - Next Appointment 12/17/24

## 2024-12-16 NOTE — CASE MANAGEMENT/SOCIAL WORK
Continued Stay Note  [facility]     Patient Name: Haydee Dickey  MRN: [id number]  Today's Date: 12/16/2024    Admit Date: 12/10/2024    Plan: [facility]- pre-cert obtained and bed available tomorrow   Discharge Plan       Row Name 12/16/24 1447       Plan    Plan [facility]- pre-cert obtained and bed available tomorrow    Patient/Family in Agreement with Plan yes    Plan Comments Spoke with patient at bedside and she is agreeable to going to [facility]. Pre-cert obtained and family will transport. Packet in CCP office and pharmacy updated.                   Discharge Codes    No documentation.                 Expected Discharge Date and Time       Expected Discharge Date Expected Discharge Time    Dec 17, 2024               Machelle Mora RN

## 2024-12-16 NOTE — PLAN OF CARE
Goal Outcome Evaluation:  Plan of Care Reviewed With: patient        Progress: no change  Outcome Evaluation: vitals stable.  pt expressed pain.  meds given per orders.  will continue to monitor.

## 2024-12-17 VITALS
WEIGHT: 190.92 LBS | TEMPERATURE: 98.2 F | HEIGHT: 63 IN | DIASTOLIC BLOOD PRESSURE: 59 MMHG | SYSTOLIC BLOOD PRESSURE: 122 MMHG | HEART RATE: 65 BPM | RESPIRATION RATE: 18 BRPM | BODY MASS INDEX: 33.83 KG/M2 | OXYGEN SATURATION: 97 %

## 2024-12-17 LAB
ANION GAP SERPL CALCULATED.3IONS-SCNC: 6.6 MMOL/L (ref 5–15)
BASOPHILS # BLD AUTO: 0.03 10*3/MM3 (ref 0–0.2)
BASOPHILS NFR BLD AUTO: 0.7 % (ref 0–1.5)
BUN SERPL-MCNC: 34 MG/DL (ref 8–23)
BUN/CREAT SERPL: 34 (ref 7–25)
CALCIUM SPEC-SCNC: 8.7 MG/DL (ref 8.6–10.5)
CHLORIDE SERPL-SCNC: 109 MMOL/L (ref 98–107)
CO2 SERPL-SCNC: 23.4 MMOL/L (ref 22–29)
CREAT SERPL-MCNC: 1 MG/DL (ref 0.57–1)
DEPRECATED RDW RBC AUTO: 39.3 FL (ref 37–54)
EGFRCR SERPLBLD CKD-EPI 2021: 54.6 ML/MIN/1.73
EOSINOPHIL # BLD AUTO: 0.16 10*3/MM3 (ref 0–0.4)
EOSINOPHIL NFR BLD AUTO: 3.6 % (ref 0.3–6.2)
ERYTHROCYTE [DISTWIDTH] IN BLOOD BY AUTOMATED COUNT: 11.4 % (ref 12.3–15.4)
GLUCOSE SERPL-MCNC: 105 MG/DL (ref 65–99)
HCT VFR BLD AUTO: 26.8 % (ref 34–46.6)
HGB BLD-MCNC: 9 G/DL (ref 12–15.9)
IMM GRANULOCYTES # BLD AUTO: 0.02 10*3/MM3 (ref 0–0.05)
IMM GRANULOCYTES NFR BLD AUTO: 0.4 % (ref 0–0.5)
LYMPHOCYTES # BLD AUTO: 1.3 10*3/MM3 (ref 0.7–3.1)
LYMPHOCYTES NFR BLD AUTO: 29.2 % (ref 19.6–45.3)
MCH RBC QN AUTO: 31.8 PG (ref 26.6–33)
MCHC RBC AUTO-ENTMCNC: 33.6 G/DL (ref 31.5–35.7)
MCV RBC AUTO: 94.7 FL (ref 79–97)
MONOCYTES # BLD AUTO: 0.66 10*3/MM3 (ref 0.1–0.9)
MONOCYTES NFR BLD AUTO: 14.8 % (ref 5–12)
NEUTROPHILS NFR BLD AUTO: 2.28 10*3/MM3 (ref 1.7–7)
NEUTROPHILS NFR BLD AUTO: 51.3 % (ref 42.7–76)
NRBC BLD AUTO-RTO: 0 /100 WBC (ref 0–0.2)
PLATELET # BLD AUTO: 146 10*3/MM3 (ref 140–450)
PMV BLD AUTO: 10.1 FL (ref 6–12)
POTASSIUM SERPL-SCNC: 4.6 MMOL/L (ref 3.5–5.2)
RBC # BLD AUTO: 2.83 10*6/MM3 (ref 3.77–5.28)
SODIUM SERPL-SCNC: 139 MMOL/L (ref 136–145)
WBC NRBC COR # BLD AUTO: 4.45 10*3/MM3 (ref 3.4–10.8)

## 2024-12-17 PROCEDURE — 80048 BASIC METABOLIC PNL TOTAL CA: CPT | Performed by: INTERNAL MEDICINE

## 2024-12-17 PROCEDURE — 85025 COMPLETE CBC W/AUTO DIFF WBC: CPT | Performed by: INTERNAL MEDICINE

## 2024-12-17 RX ORDER — ACETAMINOPHEN 325 MG/1
650 TABLET ORAL EVERY 6 HOURS PRN
Start: 2024-12-17

## 2024-12-17 RX ORDER — ASPIRIN 81 MG/1
81 TABLET ORAL EVERY 12 HOURS SCHEDULED
Start: 2024-12-17

## 2024-12-17 RX ADMIN — ANASTROZOLE 1 MG: 1 TABLET, COATED ORAL at 08:52

## 2024-12-17 RX ADMIN — LISINOPRIL 10 MG: 10 TABLET ORAL at 08:52

## 2024-12-17 RX ADMIN — POLYETHYLENE GLYCOL 3350 17 G: 17 POWDER, FOR SOLUTION ORAL at 08:54

## 2024-12-17 RX ADMIN — Medication 10 ML: at 08:52

## 2024-12-17 RX ADMIN — PROPRANOLOL HYDROCHLORIDE 30 MG: 20 TABLET ORAL at 08:51

## 2024-12-17 RX ADMIN — FERROUS SULFATE TAB 325 MG (65 MG ELEMENTAL FE) 325 MG: 325 (65 FE) TAB at 08:51

## 2024-12-17 RX ADMIN — ASPIRIN 81 MG: 81 TABLET, COATED ORAL at 08:52

## 2024-12-17 NOTE — CASE MANAGEMENT/SOCIAL WORK
Continued Stay Note  [facility]     Patient Name: Haydee Dickey  MRN: [id number]  Today's Date: 12/17/2024    Admit Date: 12/10/2024    Plan: [facility]- pre-cert obtained and bed available tomorrow   Discharge Plan       Row Name 12/17/24 1019       Plan    Plan Comments Confirmed with Micha[facility] that [facility] can accept any time today. Family will transport right after lunch. Pharmacy updated and packet given to nurse. Secure chat sent to MD.                   Discharge Codes    No documentation.                 Expected Discharge Date and Time       Expected Discharge Date Expected Discharge Time    Dec 17, 2024               Machelle Mora RN

## 2024-12-17 NOTE — DISCHARGE SUMMARY
"                                                                   PHYSICIAN DISCHARGE SUMMARY                                                                        [facility]    Patient Identification:  Name: Haydee Dickey  Age: 89 y.o.  Sex: female  :  1935  MRN: [id number]  Primary Care Physician: Juliana Gonsalez APRN ([place])    Admit date: 12/10/2024  Discharge date and time:2024  Discharged Condition: good    Discharge Diagnoses:  Active Hospital Problems    Diagnosis  POA    **Right knee pain [M25.561]  Yes    S/P TKR (total knee replacement) using cement, left [Z96.652]  Not Applicable    OA (osteoarthritis) of knee [M17.9]  Yes    Benign hypertensive kidney disease with chronic kidney disease [I12.9]  Yes    Stage 3a chronic kidney disease [N18.31]  Yes    Benign essential hypertension [I10]  Yes      Resolved Hospital Problems   No resolved problems to display.          PMHX:   Past Medical History:   Diagnosis Date    Allergic Percocet, iv iodine    Anemia     Arthritis     B12 deficiency     BPPV (benign paroxysmal positional vertigo)     Breast cancer     LEFT    Cataract   jaime,  jaime blepharoplasty    Colon polyp 2009    COVID 2023    Rebound Covid following Paxlovid 23    CTS (carpal tunnel syndrome) , 2014    D’quervain x3    Deep vein thrombosis 1970    HX, LEG    Essential tremor     Folliculitis 2017    Fracture of hip     Right ORIF    Fracture, femur     Left, No surgery    Fracture, radius     Right, orif    Gait instability     GERD (gastroesophageal reflux disease)     History of blood transfusion     History of fall 2024    \"KNEE GAVE OUT\"    HL (hearing loss) 2015    HEARING AIDS    Hypertension 2001    on Rx    Knee swelling     Bilateral    Lower extremity neuropathy     bilateral    Neuropathy     Osteopenia 2019    Potassium (K) excess 10/21/2022    Seeing nephrologist -- Dr. Coats    Rotator cuff " syndrome 2013    Fall induced    Sciatica     Small vessel disease     Torn rotator cuff 2013    right arm    Urinary tract infection 2018    approx 1 every 5 years    Wears hearing aid     BILATERAL    Wrist fracture, right 2015     PSHX:   Past Surgical History:   Procedure Laterality Date    ADENOIDECTOMY  1953    APPENDECTOMY  1961    BLEPHAROPLASTY Bilateral 10/19/2021    Procedure: BILATERAL UPPER LID BLEPHAROPLASTY;  Surgeon: Ruddy Moses MD;  [place] [facility] [place] [facility] [place]: [place]mology;  Laterality: Bilateral;    BREAST BIOPSY      BREAST LUMPECTOMY Left 11/15/2021    Procedure: Left JEAN-PAUL-guided partial mastectomy;  Surgeon: Maliha Andres MD;  Location: [facility] OR;  Service: General;  Laterality: Left;    BROW LIFT Bilateral 10/19/2021    Procedure: BILATERAL TEMPORAL DIRECT BROWLIFT;  Surgeon: Ruddy Moses MD;  [place] [facility] [place] [facility] [place]  Service: Ophthalmology;  Laterality: Bilateral;    CARPAL TUNNEL RELEASE  1992    right wrist    CATARACT EXTRACTION, BILATERAL  07/01/2018    R 7/18 and L 9/18 WITH LENS IMPLANTS    COLONOSCOPY      DEQUERVAIN RELEASE Bilateral 2015    DILATATION AND CURETTAGE  1960s    EYE SURGERY  2018    Bilateral repairs    FRACTURE SURGERY  2014    R hip, R  wrist    HAND SURGERY Right     HIP FRACTURE SURGERY Right     HYSTERECTOMY  1975    Partial    JOINT REPLACEMENT  2001, 2014    TKA    KNEE POLY INSERT EXCHANGE Right 12/12/2024    Procedure: Right Knee Poly insert exchange with exploration of foreign body;  Surgeon: Edmund Zavaleta MD;  Location: [facility] OR;  Service: Orthopedics;  Laterality: Right;    TONSILLECTOMY AND ADENOIDECTOMY  1953    TOTAL KNEE ARTHROPLASTY Left 2001    TOTAL KNEE ARTHROPLASTY Right 2014    TRIGGER FINGER RELEASE Right     TRIGGER POINT INJECTION  2017, 2019    WRIST SURGERY  2015    FRACTURE RIGHT       Hospital Course: Haydee Dickey   is a 89 y.o. female with a history of hypertensive kidney disease, BPPV,  anemia, breast cancer, osteoarthritis of both knees with previous knee replacements.  That presents to [facility] complaining of unable to bear weight on the right knee.  She turned over in the night and woke up with severe pain to the right knee and found she could not straighten the right knee.  This morning she was unable to bear weight on that leg in order to walk.  She denies any falls or known injuries.  She does have ongoing problems with both of her knees and is due to have a procedure to replace cartilage on the left knee this January with Dr. Zavaleta.  She sometimes will have sciatica problems on the left.  Yesterday evening she did have sciatic issues and weakness of the left leg and states she might have twisted the other leg trying to maneuver around.  She denies any illness symptoms, fever, chills, sweats, shortness of breath or cough.  No redness or swelling.  At baseline she uses a walker.  The knee is currently not hurting unless she tries to bend or extend.  The patient was admitted to the hospital and orthopedic surgery was consulted and patient had surgery on the right knee for repair of loose body of previous total knee replacement.  Patient was still pretty weak and the plan is to go to skilled nursing facility for rehab.  She will follow-up with her primary care after released from rehab.    Consults:     Consults       Date and Time Order Name Status Description    12/13/2024  3:03 PM Inpatient Psychiatrist Consult Completed     12/10/2024  2:01 PM Inpatient Orthopedic Surgery Consult      12/10/2024  9:51 AM [facility] (on-call MD unless specified) Details      12/10/2024  9:15 AM Ortho (on-call MD unless specified)            Results from last 7 days   Lab Units 12/17/24  0349   WBC 10*3/mm3 4.45   HEMOGLOBIN g/dL 9.0*   HEMATOCRIT % 26.8*   PLATELETS 10*3/mm3 146     Results from last 7 days   Lab Units 12/17/24  0349   SODIUM mmol/L 139   POTASSIUM mmol/L 4.6   CHLORIDE mmol/L 109*  "  CO2 mmol/L 23.4   BUN mg/dL 34*   CREATININE mg/dL 1.00   GLUCOSE mg/dL 105*   CALCIUM mg/dL 8.7     Significant Diagnostic Studies:   WBC   Date Value Ref Range Status   12/17/2024 4.45 3.40 - 10.80 10*3/mm3 Final     Hemoglobin   Date Value Ref Range Status   12/17/2024 9.0 (L) 12.0 - 15.9 g/dL Final     Hematocrit   Date Value Ref Range Status   12/17/2024 26.8 (L) 34.0 - 46.6 % Final     Platelets   Date Value Ref Range Status   12/17/2024 146 140 - 450 10*3/mm3 Final     Sodium   Date Value Ref Range Status   12/17/2024 139 136 - 145 mmol/L Final     Potassium   Date Value Ref Range Status   12/17/2024 4.6 3.5 - 5.2 mmol/L Final     Chloride   Date Value Ref Range Status   12/17/2024 109 (H) 98 - 107 mmol/L Final     CO2   Date Value Ref Range Status   12/17/2024 23.4 22.0 - 29.0 mmol/L Final     BUN   Date Value Ref Range Status   12/17/2024 34 (H) 8 - 23 mg/dL Final     Creatinine   Date Value Ref Range Status   12/17/2024 1.00 0.57 - 1.00 mg/dL Final     Glucose   Date Value Ref Range Status   12/17/2024 105 (H) 65 - 99 mg/dL Final     Calcium   Date Value Ref Range Status   12/17/2024 8.7 8.6 - 10.5 mg/dL Final     No results found for: \"AST\", \"ALT\", \"ALKPHOS\"  No results found for: \"APTT\", \"INR\"  No results found for: \"COLORU\", \"CLARITYU\", \"SPECGRAV\", \"PHUR\", \"PROTEINUR\", \"GLUCOSEU\", \"KETONESU\", \"BLOODU\", \"NITRITE\", \"LEUKOCYTESUR\", \"BILIRUBINUR\", \"UROBILINOGEN\", \"RBCUA\", \"WBCUA\", \"BACTERIA\", \"UACOMMENT\"  No results found for: \"TROPONINT\", \"TROPONINI\", \"BNP\"  No components found for: \"HGBA1C;2\"  No components found for: \"TSH;2\"  Imaging Results (All)       Procedure Component Value Units Date/Time    XR Knee 1 or 2 View Right [[id number]] Collected: 12/12/24 1936     Updated: 12/12/24 1940    Narrative:      XR KNEE 1 OR 2 VW RIGHT-     INDICATIONS: Postoperative evaluation.     TECHNIQUE: Frontal and lateral views of the right knee     COMPARISON: 12/10/2024     FINDINGS:      Intact appearing knee " arthroplasty hardware is seen with adjacent  surgical soft tissue gas. Yariel and screw hardware of the femur is partly  included. No acute fracture is identified.          Impression:         Postsurgical changes.           This report was finalized on 12/12/2024 7:37 PM by Dr. Edu Geller M.D on Workstation: [id number]       XR Knee 1 or 2 View Right [[id number]] Collected: 12/10/24 0832     Updated: 12/10/24 0850    Narrative:      XR RIGHT KNEE, 2 VIEWS     HISTORY: Right knee pain     COMPARISON: 11/24/2014     FINDINGS: There is a right knee arthroplasty that appears stable.  Intramedullary yariel and screws visualized in the distal right femur.  There is no fracture or joint effusion. Some mild increase heterotopic  ossification is seen medial to the knee.       Impression:      No acute finding           This report was finalized on 12/10/2024 8:47 AM by Dr. Guillermo oCnway M.D on Workstation: [id number]             Lab Results (last 7 days)       Procedure Component Value Units Date/Time    Basic Metabolic Panel [[id number]]  (Abnormal) Collected: 12/17/24 0349    Specimen: Blood Updated: 12/17/24 0441     Glucose 105 mg/dL      BUN 34 mg/dL      Creatinine 1.00 mg/dL      Sodium 139 mmol/L      Potassium 4.6 mmol/L      Chloride 109 mmol/L      CO2 23.4 mmol/L      Calcium 8.7 mg/dL      BUN/Creatinine Ratio 34.0     Anion Gap 6.6 mmol/L      eGFR 54.6 mL/min/1.73     Narrative:      GFR Categories in Chronic Kidney Disease (CKD)      GFR Category          GFR (mL/min/1.73)    Interpretation  G1                     90 or greater         Normal or high (1)  G2                      60-89                Mild decrease (1)  G3a                   45-59                Mild to moderate decrease  G3b                   30-44                Moderate to severe decrease  G4                    15-29                Severe decrease  G5                    14 or less           Kidney failure          (1)In the absence of  evidence of kidney disease, neither GFR category G1 or G2 fulfill the criteria for CKD.    eGFR calculation 2021 CKD-EPI creatinine equation, which does not include race as a factor    CBC & Differential [[id number]]  (Abnormal) Collected: 12/17/24 0349    Specimen: Blood Updated: 12/17/24 0422    Narrative:      The following orders were created for panel order CBC & Differential.  Procedure                               Abnormality         Status                     ---------                               -----------         ------                     CBC Auto Differential[[id number]]        Abnormal            Final result                 Please view results for these tests on the individual orders.    CBC Auto Differential [[id number]]  (Abnormal) Collected: 12/17/24 0349    Specimen: Blood Updated: 12/17/24 0422     WBC 4.45 10*3/mm3      RBC 2.83 10*6/mm3      Hemoglobin 9.0 g/dL      Hematocrit 26.8 %      MCV 94.7 fL      MCH 31.8 pg      MCHC 33.6 g/dL      RDW 11.4 %      RDW-SD 39.3 fl      MPV 10.1 fL      Platelets 146 10*3/mm3      Neutrophil % 51.3 %      Lymphocyte % 29.2 %      Monocyte % 14.8 %      Eosinophil % 3.6 %      Basophil % 0.7 %      Immature Grans % 0.4 %      Neutrophils, Absolute 2.28 10*3/mm3      Lymphocytes, Absolute 1.30 10*3/mm3      Monocytes, Absolute 0.66 10*3/mm3      Eosinophils, Absolute 0.16 10*3/mm3      Basophils, Absolute 0.03 10*3/mm3      Immature Grans, Absolute 0.02 10*3/mm3      nRBC 0.0 /100 WBC     Basic Metabolic Panel [[id number]]  (Abnormal) Collected: 12/16/24 0542    Specimen: Blood Updated: 12/16/24 0643     Glucose 102 mg/dL      BUN 38 mg/dL      Creatinine 1.17 mg/dL      Sodium 137 mmol/L      Potassium 4.4 mmol/L      Chloride 106 mmol/L      CO2 23.8 mmol/L      Calcium 8.7 mg/dL      BUN/Creatinine Ratio 32.5     Anion Gap 7.2 mmol/L      eGFR 45.3 mL/min/1.73     Narrative:      GFR Categories in Chronic Kidney Disease (CKD)      GFR Category           GFR (mL/min/1.73)    Interpretation  G1                     90 or greater         Normal or high (1)  G2                      60-89                Mild decrease (1)  G3a                   45-59                Mild to moderate decrease  G3b                   30-44                Moderate to severe decrease  G4                    15-29                Severe decrease  G5                    14 or less           Kidney failure          (1)In the absence of evidence of kidney disease, neither GFR category G1 or G2 fulfill the criteria for CKD.    eGFR calculation 2021 CKD-EPI creatinine equation, which does not include race as a factor    CBC & Differential [[id number]]  (Abnormal) Collected: 12/16/24 0542    Specimen: Blood Updated: 12/16/24 0629    Narrative:      The following orders were created for panel order CBC & Differential.  Procedure                               Abnormality         Status                     ---------                               -----------         ------                     CBC Auto Differential[[id number]]        Abnormal            Final result                 Please view results for these tests on the individual orders.    CBC Auto Differential [[id number]]  (Abnormal) Collected: 12/16/24 0542    Specimen: Blood Updated: 12/16/24 0629     WBC 4.96 10*3/mm3      RBC 2.88 10*6/mm3      Hemoglobin 8.8 g/dL      Hematocrit 27.9 %      MCV 96.9 fL      MCH 30.6 pg      MCHC 31.5 g/dL      RDW 11.7 %      RDW-SD 41.6 fl      MPV 10.5 fL      Platelets 131 10*3/mm3      Neutrophil % 57.7 %      Lymphocyte % 23.8 %      Monocyte % 15.3 %      Eosinophil % 2.6 %      Basophil % 0.4 %      Immature Grans % 0.2 %      Neutrophils, Absolute 2.86 10*3/mm3      Lymphocytes, Absolute 1.18 10*3/mm3      Monocytes, Absolute 0.76 10*3/mm3      Eosinophils, Absolute 0.13 10*3/mm3      Basophils, Absolute 0.02 10*3/mm3      Immature Grans, Absolute 0.01 10*3/mm3      nRBC 0.0 /100 WBC     Basic Metabolic  Panel [[id number]]  (Abnormal) Collected: 12/15/24 0356    Specimen: Blood Updated: 12/15/24 0513     Glucose 106 mg/dL      BUN 41 mg/dL      Creatinine 1.26 mg/dL      Sodium 137 mmol/L      Potassium 4.6 mmol/L      Chloride 106 mmol/L      CO2 22.0 mmol/L      Calcium 8.7 mg/dL      BUN/Creatinine Ratio 32.5     Anion Gap 9.0 mmol/L      eGFR 41.4 mL/min/1.73     Narrative:      GFR Categories in Chronic Kidney Disease (CKD)      GFR Category          GFR (mL/min/1.73)    Interpretation  G1                     90 or greater         Normal or high (1)  G2                      60-89                Mild decrease (1)  G3a                   45-59                Mild to moderate decrease  G3b                   30-44                Moderate to severe decrease  G4                    15-29                Severe decrease  G5                    14 or less           Kidney failure          (1)In the absence of evidence of kidney disease, neither GFR category G1 or G2 fulfill the criteria for CKD.    eGFR calculation 2021 CKD-EPI creatinine equation, which does not include race as a factor    CBC & Differential [[id number]]  (Abnormal) Collected: 12/15/24 0356    Specimen: Blood Updated: 12/15/24 0451    Narrative:      The following orders were created for panel order CBC & Differential.  Procedure                               Abnormality         Status                     ---------                               -----------         ------                     CBC Auto Differential[[id number]]        Abnormal            Final result                 Please view results for these tests on the individual orders.    CBC Auto Differential [[id number]]  (Abnormal) Collected: 12/15/24 0356    Specimen: Blood Updated: 12/15/24 0451     WBC 5.44 10*3/mm3      RBC 2.89 10*6/mm3      Hemoglobin 9.2 g/dL      Hematocrit 27.2 %      MCV 94.1 fL      MCH 31.8 pg      MCHC 33.8 g/dL      RDW 11.6 %      RDW-SD 39.6 fl      MPV 10.1 fL       Platelets 140 10*3/mm3      Neutrophil % 62.9 %      Lymphocyte % 22.2 %      Monocyte % 13.4 %      Eosinophil % 0.9 %      Basophil % 0.4 %      Immature Grans % 0.2 %      Neutrophils, Absolute 3.42 10*3/mm3      Lymphocytes, Absolute 1.21 10*3/mm3      Monocytes, Absolute 0.73 10*3/mm3      Eosinophils, Absolute 0.05 10*3/mm3      Basophils, Absolute 0.02 10*3/mm3      Immature Grans, Absolute 0.01 10*3/mm3     Basic Metabolic Panel [[id number]]  (Abnormal) Collected: 12/14/24 0620    Specimen: Blood Updated: 12/14/24 0656     Glucose 99 mg/dL      BUN 40 mg/dL      Creatinine 1.44 mg/dL      Sodium 137 mmol/L      Potassium 4.7 mmol/L      Chloride 106 mmol/L      CO2 23.2 mmol/L      Calcium 8.5 mg/dL      BUN/Creatinine Ratio 27.8     Anion Gap 7.8 mmol/L      eGFR 35.3 mL/min/1.73     Narrative:      GFR Categories in Chronic Kidney Disease (CKD)      GFR Category          GFR (mL/min/1.73)    Interpretation  G1                     90 or greater         Normal or high (1)  G2                      60-89                Mild decrease (1)  G3a                   45-59                Mild to moderate decrease  G3b                   30-44                Moderate to severe decrease  G4                    15-29                Severe decrease  G5                    14 or less           Kidney failure          (1)In the absence of evidence of kidney disease, neither GFR category G1 or G2 fulfill the criteria for CKD.    eGFR calculation 2021 CKD-EPI creatinine equation, which does not include race as a factor    CBC & Differential [[id number]]  (Abnormal) Collected: 12/14/24 0620    Specimen: Blood Updated: 12/14/24 0632    Narrative:      The following orders were created for panel order CBC & Differential.  Procedure                               Abnormality         Status                     ---------                               -----------         ------                     CBC Auto Differential[[id number]]         Abnormal            Final result                 Please view results for these tests on the individual orders.    CBC Auto Differential [[id number]]  (Abnormal) Collected: 12/14/24 0620    Specimen: Blood Updated: 12/14/24 0632     WBC 5.05 10*3/mm3      RBC 3.00 10*6/mm3      Hemoglobin 9.5 g/dL      Hematocrit 28.1 %      MCV 93.7 fL      MCH 31.7 pg      MCHC 33.8 g/dL      RDW 11.5 %      RDW-SD 38.7 fl      MPV 9.8 fL      Platelets 128 10*3/mm3      Neutrophil % 62.5 %      Lymphocyte % 23.6 %      Monocyte % 12.5 %      Eosinophil % 0.6 %      Basophil % 0.6 %      Immature Grans % 0.2 %      Neutrophils, Absolute 3.16 10*3/mm3      Lymphocytes, Absolute 1.19 10*3/mm3      Monocytes, Absolute 0.63 10*3/mm3      Eosinophils, Absolute 0.03 10*3/mm3      Basophils, Absolute 0.03 10*3/mm3      Immature Grans, Absolute 0.01 10*3/mm3      nRBC 0.0 /100 WBC     Hemoglobin & Hematocrit, Blood [[id number]]  (Abnormal) Collected: 12/13/24 0424    Specimen: Blood Updated: 12/13/24 0535     Hemoglobin 10.6 g/dL      Hematocrit 30.7 %     Blood Gas, Arterial - [[id number]]  (Abnormal) Collected: 12/12/24 2026    Specimen: Arterial Blood Updated: 12/12/24 2032     Site Right Brachial     Sheldon's Test N/A     pH, Arterial 7.309 pH units      pCO2, Arterial 46.5 mm Hg      pO2, Arterial 142.5 mm Hg      HCO3, Arterial 23.4 mmol/L      Base Excess, Arterial -3.1 mmol/L      Comment: Serial Number: [id number]:  [id number]        O2 Saturation, Arterial 98.9 %      CO2 Content 24.8 mmol/L      Barometric Pressure for Blood Gas 757.8000 mmHg      Modality Cannula     Flow Rate 3.0000 lpm      Set [facility] Resp Rate 16     Rate 16 Breaths/minute      Hemodilution No     Device Comment sat 94%    Basic Metabolic Panel [[id number]]  (Abnormal) Collected: 12/12/24 0639    Specimen: Blood Updated: 12/12/24 0721     Glucose 96 mg/dL      BUN 28 mg/dL      Creatinine 1.12 mg/dL      Sodium 137 mmol/L      Potassium 4.5 mmol/L      Comment:  Slight hemolysis detected by analyzer. Result may be falsely elevated.        Chloride 106 mmol/L      CO2 22.6 mmol/L      Calcium 9.2 mg/dL      BUN/Creatinine Ratio 25.0     Anion Gap 8.4 mmol/L      eGFR 47.7 mL/min/1.73     Narrative:      GFR Categories in Chronic Kidney Disease (CKD)      GFR Category          GFR (mL/min/1.73)    Interpretation  G1                     90 or greater         Normal or high (1)  G2                      60-89                Mild decrease (1)  G3a                   45-59                Mild to moderate decrease  G3b                   30-44                Moderate to severe decrease  G4                    15-29                Severe decrease  G5                    14 or less           Kidney failure          (1)In the absence of evidence of kidney disease, neither GFR category G1 or G2 fulfill the criteria for CKD.    eGFR calculation 2021 CKD-EPI creatinine equation, which does not include race as a factor    CBC (No Diff) [[id number]]  (Abnormal) Collected: 12/12/24 0639    Specimen: Blood Updated: 12/12/24 0659     WBC 4.02 10*3/mm3      RBC 3.31 10*6/mm3      Hemoglobin 10.4 g/dL      Hematocrit 30.9 %      MCV 93.4 fL      MCH 31.4 pg      MCHC 33.7 g/dL      RDW 11.4 %      RDW-SD 38.3 fl      MPV 9.8 fL      Platelets 156 10*3/mm3     Basic Metabolic Panel [[id number]]  (Abnormal) Collected: 12/11/24 0614    Specimen: Blood Updated: 12/11/24 0658     Glucose 101 mg/dL      BUN 26 mg/dL      Creatinine 1.15 mg/dL      Sodium 141 mmol/L      Potassium 4.1 mmol/L      Chloride 109 mmol/L      CO2 23.9 mmol/L      Calcium 8.9 mg/dL      BUN/Creatinine Ratio 22.6     Anion Gap 8.1 mmol/L      eGFR 46.2 mL/min/1.73     Narrative:      GFR Categories in Chronic Kidney Disease (CKD)      GFR Category          GFR (mL/min/1.73)    Interpretation  G1                     90 or greater         Normal or high (1)  G2                      60-89                Mild decrease (1)  G3a          "          45-59                Mild to moderate decrease  G3b                   30-44                Moderate to severe decrease  G4                    15-29                Severe decrease  G5                    14 or less           Kidney failure          (1)In the absence of evidence of kidney disease, neither GFR category G1 or G2 fulfill the criteria for CKD.    eGFR calculation 2021 CKD-EPI creatinine equation, which does not include race as a factor    CBC Auto Differential [[id number]]  (Abnormal) Collected: 12/11/24 0614    Specimen: Blood Updated: 12/11/24 0640     WBC 3.63 10*3/mm3      RBC 3.14 10*6/mm3      Hemoglobin 10.0 g/dL      Hematocrit 29.2 %      MCV 93.0 fL      MCH 31.8 pg      MCHC 34.2 g/dL      RDW 11.5 %      RDW-SD 38.6 fl      MPV 9.4 fL      Platelets 136 10*3/mm3      Neutrophil % 56.9 %      Lymphocyte % 28.1 %      Monocyte % 11.6 %      Eosinophil % 2.5 %      Basophil % 0.6 %      Immature Grans % 0.3 %      Neutrophils, Absolute 2.07 10*3/mm3      Lymphocytes, Absolute 1.02 10*3/mm3      Monocytes, Absolute 0.42 10*3/mm3      Eosinophils, Absolute 0.09 10*3/mm3      Basophils, Absolute 0.02 10*3/mm3      Immature Grans, Absolute 0.01 10*3/mm3      nRBC 0.0 /100 WBC     Sedimentation Rate [[id number]]  (Normal) Collected: 12/10/24 0857    Specimen: Blood Updated: 12/10/24 1632     Sed Rate <1 mm/hr     C-reactive Protein [[id number]]  (Normal) Collected: 12/10/24 0857    Specimen: Blood Updated: 12/10/24 1524     C-Reactive Protein <0.30 mg/dL     Uric Acid [[id number]]  (Normal) Collected: 12/10/24 0857    Specimen: Blood Updated: 12/10/24 1524     Uric Acid 5.3 mg/dL           /59 (BP Location: Left arm, Patient Position: Lying)   Pulse 65   Temp 98.2 °F (36.8 °C) (Oral)   Resp 18   Ht 158.9 cm (62.56\")   Wt 86.6 kg (190 lb 14.7 oz)   SpO2 97%   BMI 34.30 kg/m²     Discharge Exam:  General Appearance:    Alert, cooperative, no distress                          Head:    " Normocephalic, without obvious abnormality, atraumatic                          Eyes:                            Throat:   Lips, tongue, gums normal                          Neck:   Supple, symmetrical, trachea midline, no JVD                        Lungs:     Clear to auscultation bilaterally, respirations unlabored                Chest Wall:    No tenderness or deformity                        Heart:    Regular rate and rhythm, S1 and S2 normal, no murmur,no  Rub  or gallop                  Abdomen:     Soft, non-tender, bowel sounds active, no masses, no                                                        organomegaly                  Extremities:   Extremities normal, atraumatic, no cyanosis or edema                             Skin:   Skin is warm and dry,  no rashes or palpable lesions                  Neurologic:   no focal deficits noted     Disposition:  Skilled nursing facility    Activity as tolerated    Diet as tolerated  Diet Order   Procedures    Diet: Regular/House; Fluid Consistency: Thin (IDDSI 0)       Patient Instructions:      Discharge Medications        New Medications        Instructions Start Date   acetaminophen 325 MG tablet  Commonly known as: TYLENOL   650 mg, Oral, Every 6 Hours PRN      aspirin 81 MG EC tablet   81 mg, Oral, Every 12 Hours Scheduled             Continue These Medications        Instructions Start Date   anastrozole 1 MG tablet  Commonly known as: ARIMIDEX   1 mg, Oral, Daily      cyanocobalamin 1000 MCG tablet  Commonly known as: VITAMIN B-12   100 mcg, Daily      ferrous sulfate 325 (65 FE) MG tablet   325 mg, Daily With Breakfast      folic acid 400 MCG tablet  Commonly known as: FOLVITE   400 mcg, Daily      lisinopril 10 MG tablet  Commonly known as: PRINIVIL,ZESTRIL   10 mg, Daily      propranolol 20 MG tablet  Commonly known as: INDERAL   30 mg, 2 Times Daily             Stop These Medications      hydroCHLOROthiazide 25 MG tablet     Sodium Fluoride 5000 PPM  1.1 % paste  Generic drug: Sodium Fluoride            Future Appointments   Date Time Provider Department Center   12/30/2024  8:30 AM  [facility] PAT 2  [facility] PAT [facility]   1/9/2025  4:10 PM Desi Pearl APRN [facility]   1/28/2025 11:20 AM Edmund Zavaleta MD [facility]   2/21/2025  7:30 PM  [facility] SLEEP ROOMS  [facility] SLEEP [facility]   3/21/2025 10:40 AM Mars Alexander APRN [facility]   4/1/2025  8:00 AM Lalo Comer MD [facility]   11/7/2025 11:20 AM LAB CHAIR 3 [facility]  LAB [facility]   11/7/2025 11:40 AM Coy Pinzon MD [facility]      Contact information for follow-up providers       Alejandra Hodge MD Follow up.    Specialties: Pulmonary Disease, Sleep Medicine, Intensive Care  Why: Follow-up after sleep study completed in lab study  Contact information:  [address]  [facility] [id number] [phone]               Juliana Gonsalez APRN ([place]) Follow up.    Specialty: Family Medicine  Why: After released from rehab  Contact information:  [address]  [facility] [id number] [phone]                       Contact information for after-discharge care       Destination       [facility] .    Service: Skilled Nursing  Contact information:  [address] [facility] [address] [phone]                                 Discharge Order (From admission, onward)       Start     Ordered    12/17/24 1123  Discharge patient  Once        Expected Discharge Date: 12/17/24   Discharge Disposition: Skilled Nursing Facility (DC - External)   Physician of Record for Attribution - Please select from Treatment Team: LADONNA SCHNEIDER [[id number]]   Review needed by CMO to determine Physician of Record: No      Question Answer Comment   Physician of Record for Attribution - Please select from Treatment Team LADONNA SCHNEIDER    Review needed by CMO to determine Physician of Record No        12/17/24 1124                    Total time spent  discharging patient including evaluation,post hospitalization follow up,  medication and post hospitalization instructions and education total time exceeds 30 minutes.    Signed:  Josue Quinteros MD  12/17/2024  11:24 EST

## 2024-12-17 NOTE — PLAN OF CARE
Goal Outcome Evaluation:           Progress: improving  Outcome Evaluation: Patient discharging to [facility]. Family to transport. Report given to [place].

## 2024-12-17 NOTE — CASE MANAGEMENT/SOCIAL WORK
Case Management Discharge Note      Final Note: dc to skilled bed at [facility] via family transport    Provided Post Acute Provider List?: Yes  Post Acute Provider List: Nursing Home  Provided Post Acute Provider Quality & Resource List?: Yes  Post Acute Provider Quality and Resource List: Home Health  Delivered To: Patient  Method of Delivery: In person    Selected Continued Care - Discharged on 12/17/2024 Admission date: 12/10/2024 - Discharge disposition: Skilled Nursing Facility (DC - External)      Destination Coordination complete.      Service Provider Services Address Phone Fax Patient Preferred    [facility] [address] [facility] [address] [phone] --              Durable Medical Equipment    No services have been selected for the patient.                Dialysis/Infusion    No services have been selected for the patient.                Home Medical Care    No services have been selected for the patient.                Therapy    No services have been selected for the patient.                Community Resources    No services have been selected for the patient.                Community & DME    No services have been selected for the patient.                    Transportation Services  Private: Car    Final Discharge Disposition Code: 03 - skilled nursing facility (SNF)

## 2024-12-23 ENCOUNTER — TELEPHONE (OUTPATIENT)
Dept: ORTHOPEDIC SURGERY | Facility: CLINIC | Age: 89
End: 2024-12-23
Payer: MEDICARE

## 2024-12-23 NOTE — TELEPHONE ENCOUNTER
You will need to put her on [place]'s schedule then. She does not need to go 4 weeks post op without being seen.

## 2024-12-23 NOTE — TELEPHONE ENCOUNTER
Called patient, but had to leave message. Will reschedule to next week, pending symptoms. Patient to return call.

## 2024-12-23 NOTE — TELEPHONE ENCOUNTER
Okay to reschedule patient for the following week once she is off isolation.  From my standpoint as long as she was asymptomatic with out fever for 48 hours she would be cleared.

## 2024-12-23 NOTE — TELEPHONE ENCOUNTER
"Patient left voicemail stating she tested positive for Covid this past week while in rehab/nursing facility. Patient is scheduled for 2 week P/O on 12/26/24 w/ Mars Alexander. Per patient, \"isolation period\" at rehab facility ends 12/29/24.     SX date 12/12/24  "

## 2024-12-26 ENCOUNTER — OFFICE VISIT (OUTPATIENT)
Dept: ORTHOPEDIC SURGERY | Facility: CLINIC | Age: 89
End: 2024-12-26
Payer: MEDICARE

## 2024-12-26 VITALS — TEMPERATURE: 97.8 F | BODY MASS INDEX: 35.37 KG/M2 | WEIGHT: 199.6 LBS | HEIGHT: 63 IN

## 2024-12-26 DIAGNOSIS — Z96.651 S/P REVISION OF TOTAL KNEE, RIGHT: Primary | ICD-10-CM

## 2024-12-26 PROCEDURE — 99024 POSTOP FOLLOW-UP VISIT: CPT | Performed by: NURSE PRACTITIONER

## 2024-12-26 NOTE — PROGRESS NOTES
Haydee Dickey : 1935 MRN: [id number] DATE: 2024    DIAGNOSIS: 2 week follow up lysis of adhesions with polyethylene liner exchange    SUBJECTIVE:Patient returns today for 2 week follow up of lysis of adhesions with polyethylene liner exchange. Patient reports doing well with no unusual complaints. Appears to be progressing appropriately.  States that she is being discharged from rehab tomorrow and will need home health physical therapy.  Reports her pain level is tolerable.  Patient denies any signs or symptoms of infection, and they are without any other significant complaints today.    OBJECTIVE:   Exam:. The incision is healing appropriately. No sign of infection. Range of motion is progressing as expected. The calf is soft and nontender with a negative Homans sign.  Motor and sensory intact, neurovascular intact.    ASSESSMENT: 2 week status post right knee replacement.    PLAN: 1) Marilou and fusion dressings removed and steri strips applied   2) Order given for PT   3) Instructed to Rest, Ice, use Compression, and Elevate   4) aspirin 81 mg orally every day for 1 month   5) Follow up in 6 weeks with repeat Xrays of right knee (3views)    YNES James  2024

## 2024-12-27 ENCOUNTER — HOME HEALTH ADMISSION (OUTPATIENT)
Dept: HOME HEALTH SERVICES | Facility: HOME HEALTHCARE | Age: 89
End: 2024-12-27
Payer: COMMERCIAL

## 2024-12-29 ENCOUNTER — HOME CARE VISIT (OUTPATIENT)
Dept: HOME HEALTH SERVICES | Facility: HOME HEALTHCARE | Age: 89
End: 2024-12-29
Payer: COMMERCIAL

## 2024-12-29 VITALS
OXYGEN SATURATION: 96 % | TEMPERATURE: 97 F | DIASTOLIC BLOOD PRESSURE: 70 MMHG | RESPIRATION RATE: 18 BRPM | HEART RATE: 79 BPM | SYSTOLIC BLOOD PRESSURE: 140 MMHG

## 2024-12-29 PROCEDURE — G0151 HHCP-SERV OF PT,EA 15 MIN: HCPCS

## 2024-12-29 NOTE — HOME HEALTH
"SOC Note:     Home Health ordered for: disciplines PT 2w1 with dc next visit as bryan be switching insurances [id number] and will start outpatient PT at [facility] [id number]    Reason for Hosp/Primary Dx/Co-morbidities: at [facility] [id number] with emergency Right TKR revision with polyexchange [id number] by Dr Zavaleta after waking up [id number] not being able to put weight on her right leg without severe pain and could not straighten knee; then went to [facility] for subacute rehab from [id number] to [id number]; COVID dx while at [place] [id number]; had follow up office visit atr Dr Zavaleta's [id number]; PMHx: B TKR both were having issues and will be scheduled for left knee revision with polychange 4/25 as well; hx of sciatica, Bilateral foot drop/slapping feet-using rolling walker past year full time, hypertensive kidney disease, BPPV, anemia, breast cancer    Focus of Care: Skilled PT is needed for focus of care aftercare R TKR revision with need for gait training with rolling walker, stair training to safely get out of home, HEP upgrade instruction, and patient family education as needed for medication knowledge, post-op care knowledge, pain edema control and home safety, in order to return to independent household mobility and progress to community mobility for outpatient PT visits next week.    Patient's goal(s): \"I need to figure out best way to do my steps out of home as coming out the door is large step and then 2 steps with railing but the railing needs to be fixed.\"    Current Functional status/mobility/DME: supervised gait/transfers with rolling walker, assist on steps    HB status/Living Arrangements: limited by pain/weakness bilateral knees with assist on steps to leave home    Skin Integrity/wound status: right knee steristrips intact and healing well with epithelialization    Code Status: full    Fall Risk/Safety concerns: high due to recovery from R TKR revision     Educated on Emergency Plan, steps to take prior to going to the ER and when to " Call [facility] Health [facility]: yes and understood     Medication issues/Concerns: all meds in home with 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 caregiver concerns, social isolation, transportation, food insecurity, environment, income etc.)/Need for MSW: none

## 2024-12-30 ENCOUNTER — HOME CARE VISIT (OUTPATIENT)
Dept: HOME HEALTH SERVICES | Facility: HOME HEALTHCARE | Age: 89
End: 2024-12-30
Payer: COMMERCIAL

## 2024-12-30 VITALS
TEMPERATURE: 97 F | HEART RATE: 88 BPM | RESPIRATION RATE: 18 BRPM | OXYGEN SATURATION: 95 % | DIASTOLIC BLOOD PRESSURE: 70 MMHG | SYSTOLIC BLOOD PRESSURE: 128 MMHG

## 2024-12-30 PROCEDURE — G0151 HHCP-SERV OF PT,EA 15 MIN: HCPCS

## 2024-12-30 NOTE — HOME HEALTH
"Subjective: \"I think I can handle the steps now.\"    Falls reported: none    Medication changes: none"

## 2024-12-30 NOTE — Clinical Note
This is to notify your office of home PT/agency discharge effective [id number] with patient to start outpatient PT at [facility] [id number]. Thanks for the referral!

## 2025-01-28 ENCOUNTER — OFFICE VISIT (OUTPATIENT)
Dept: ORTHOPEDIC SURGERY | Facility: CLINIC | Age: 89
End: 2025-01-28
Payer: COMMERCIAL

## 2025-01-28 VITALS — TEMPERATURE: 98.6 F | HEIGHT: 63 IN | WEIGHT: 186.6 LBS | BODY MASS INDEX: 33.06 KG/M2

## 2025-01-28 DIAGNOSIS — R52 PAIN: Primary | ICD-10-CM

## 2025-01-28 PROCEDURE — 99024 POSTOP FOLLOW-UP VISIT: CPT | Performed by: ORTHOPAEDIC SURGERY

## 2025-01-28 RX ORDER — AMOXICILLIN 500 MG/1
CAPSULE ORAL
COMMUNITY
Start: 2025-01-13

## 2025-01-28 RX ORDER — SODIUM FLUORIDE 6 MG/ML
PASTE, DENTIFRICE DENTAL
COMMUNITY
Start: 2025-01-13

## 2025-01-28 RX ORDER — HYDROCHLOROTHIAZIDE 25 MG/1
1 TABLET ORAL DAILY
COMMUNITY
Start: 2024-12-30

## 2025-01-28 NOTE — PROGRESS NOTES
Haydee Dickey : 1935 MRN: [id number] DATE: 2025    DIAGNOSIS: 8 week follow up right total knee  patellar revision    SUBJECTIVE:Patient returns today for 8 week follow up of right total knee replacement. Patient reports doing well with no unusual complaints. Appears to be progressing appropriately.    OBJECTIVE:   Exam:. The incision is well healed. No sign of infection. Range of motion is measured at 0 to 120. The calf is soft and nontender with a negative Homans sign. Strength is progressing and the patient is ambulating appropriately but does demonstarate jaime foot drop with gait    DIAGNOSTIC STUDIES  Xrays: 3 views of the right knee (AP, lateral, and sunrise) were ordered and reviewed for evaluation of recent knee replacement. They demonstrate a well positioned, well aligned knee replacement without complicating factors noted. In comparison with previous films there has been no change.    ASSESSMENT: 8 week status post right knee replacement patellar revision. Possible neurologic issues which be leading to her instability    PLAN: 1) Continue with PT exercises as prescribed   2) Follow up in 3 months -  at this point I would like to hold off left knee intervention -  needs to have the neuro issue explored more thoroughly as it likely represents the cause of her leg instability and falls. Continue with walker and PT in the meantime.    Edmund Zavaleta MD  2025

## 2025-01-29 ENCOUNTER — TELEPHONE (OUTPATIENT)
Dept: ORTHOPEDIC SURGERY | Facility: CLINIC | Age: 89
End: 2025-01-29

## 2025-01-29 NOTE — TELEPHONE ENCOUNTER
Per 1/28/25 visit note from Dr. Zavaleta, patient's surgery and associated appointments have been cancelled.

## 2025-01-31 RX ORDER — ANASTROZOLE 1 MG/1
1 TABLET ORAL DAILY
Qty: 90 TABLET | Refills: 0 | Status: SHIPPED | OUTPATIENT
Start: 2025-01-31

## 2025-02-04 ENCOUNTER — PATIENT MESSAGE (OUTPATIENT)
Dept: ORTHOPEDIC SURGERY | Facility: CLINIC | Age: 89
End: 2025-02-04
Payer: COMMERCIAL

## 2025-02-04 RX ORDER — CEPHALEXIN 500 MG/1
CAPSULE ORAL
Qty: 4 CAPSULE | Refills: 5 | Status: SHIPPED | OUTPATIENT
Start: 2025-02-04

## 2025-02-21 ENCOUNTER — HOSPITAL ENCOUNTER (OUTPATIENT)
Dept: SLEEP MEDICINE | Facility: HOSPITAL | Age: 89
End: 2025-02-21
Payer: COMMERCIAL

## 2025-02-21 VITALS — WEIGHT: 186.51 LBS | HEIGHT: 63 IN | BODY MASS INDEX: 33.05 KG/M2

## 2025-02-21 DIAGNOSIS — G47.37 CENTRAL SLEEP APNEA IN CONDITIONS CLASSIFIED ELSEWHERE: ICD-10-CM

## 2025-02-21 DIAGNOSIS — R06.81 CENTRAL APNEA: ICD-10-CM

## 2025-02-21 PROCEDURE — 95810 POLYSOM 6/> YRS 4/> PARAM: CPT

## 2025-02-26 DIAGNOSIS — G47.33 OBSTRUCTIVE SLEEP APNEA, ADULT: Primary | ICD-10-CM

## 2025-02-27 ENCOUNTER — TELEPHONE (OUTPATIENT)
Dept: SLEEP MEDICINE | Facility: HOSPITAL | Age: 89
End: 2025-02-27
Payer: COMMERCIAL

## 2025-02-27 NOTE — TELEPHONE ENCOUNTER
I faxed over cpap amd overnight oximetry order to [facility]. Rebecca is going to get office notes on backside

## 2025-03-10 ENCOUNTER — LAB (OUTPATIENT)
Dept: LAB | Facility: HOSPITAL | Age: 89
End: 2025-03-10
Payer: MEDICARE

## 2025-03-10 ENCOUNTER — TRANSCRIBE ORDERS (OUTPATIENT)
Dept: LAB | Facility: HOSPITAL | Age: 89
End: 2025-03-10
Payer: MEDICARE

## 2025-03-10 DIAGNOSIS — N18.2 CHRONIC KIDNEY DISEASE, STAGE II (MILD): ICD-10-CM

## 2025-03-10 DIAGNOSIS — D50.8 IRON DEFICIENCY ANEMIA SECONDARY TO INADEQUATE DIETARY IRON INTAKE: Primary | ICD-10-CM

## 2025-03-10 DIAGNOSIS — D50.8 IRON DEFICIENCY ANEMIA SECONDARY TO INADEQUATE DIETARY IRON INTAKE: ICD-10-CM

## 2025-03-10 LAB
ALBUMIN SERPL-MCNC: 3.9 G/DL (ref 3.5–5.2)
ANION GAP SERPL CALCULATED.3IONS-SCNC: 9.8 MMOL/L (ref 5–15)
BASOPHILS # BLD AUTO: 0.03 10*3/MM3 (ref 0–0.2)
BASOPHILS NFR BLD AUTO: 0.7 % (ref 0–1.5)
BUN SERPL-MCNC: 35 MG/DL (ref 8–23)
BUN/CREAT SERPL: 25 (ref 7–25)
CALCIUM SPEC-SCNC: 10 MG/DL (ref 8.6–10.5)
CHLORIDE SERPL-SCNC: 108 MMOL/L (ref 98–107)
CO2 SERPL-SCNC: 25.2 MMOL/L (ref 22–29)
CREAT SERPL-MCNC: 1.4 MG/DL (ref 0.57–1)
DEPRECATED RDW RBC AUTO: 41.6 FL (ref 37–54)
EGFRCR SERPLBLD CKD-EPI 2021: 36.5 ML/MIN/1.73
EOSINOPHIL # BLD AUTO: 0.18 10*3/MM3 (ref 0–0.4)
EOSINOPHIL NFR BLD AUTO: 3.9 % (ref 0.3–6.2)
ERYTHROCYTE [DISTWIDTH] IN BLOOD BY AUTOMATED COUNT: 12.2 % (ref 12.3–15.4)
FERRITIN SERPL-MCNC: 422 NG/ML (ref 13–150)
GLUCOSE SERPL-MCNC: 110 MG/DL (ref 65–99)
HCT VFR BLD AUTO: 33.9 % (ref 34–46.6)
HGB BLD-MCNC: 11 G/DL (ref 12–15.9)
IMM GRANULOCYTES # BLD AUTO: 0.01 10*3/MM3 (ref 0–0.05)
IMM GRANULOCYTES NFR BLD AUTO: 0.2 % (ref 0–0.5)
IRON 24H UR-MRATE: 50 MCG/DL (ref 37–145)
IRON SATN MFR SERPL: 17 % (ref 20–50)
LYMPHOCYTES # BLD AUTO: 1.09 10*3/MM3 (ref 0.7–3.1)
LYMPHOCYTES NFR BLD AUTO: 23.9 % (ref 19.6–45.3)
MCH RBC QN AUTO: 30.2 PG (ref 26.6–33)
MCHC RBC AUTO-ENTMCNC: 32.4 G/DL (ref 31.5–35.7)
MCV RBC AUTO: 93.1 FL (ref 79–97)
MONOCYTES # BLD AUTO: 0.55 10*3/MM3 (ref 0.1–0.9)
MONOCYTES NFR BLD AUTO: 12.1 % (ref 5–12)
NEUTROPHILS NFR BLD AUTO: 2.7 10*3/MM3 (ref 1.7–7)
NEUTROPHILS NFR BLD AUTO: 59.2 % (ref 42.7–76)
NRBC BLD AUTO-RTO: 0 /100 WBC (ref 0–0.2)
PHOSPHATE SERPL-MCNC: 4.5 MG/DL (ref 2.5–4.5)
PLATELET # BLD AUTO: 182 10*3/MM3 (ref 140–450)
PMV BLD AUTO: 10 FL (ref 6–12)
POTASSIUM SERPL-SCNC: 4.2 MMOL/L (ref 3.5–5.2)
RBC # BLD AUTO: 3.64 10*6/MM3 (ref 3.77–5.28)
SODIUM SERPL-SCNC: 143 MMOL/L (ref 136–145)
TIBC SERPL-MCNC: 289 MCG/DL (ref 298–536)
TRANSFERRIN SERPL-MCNC: 194 MG/DL (ref 200–360)
WBC NRBC COR # BLD AUTO: 4.56 10*3/MM3 (ref 3.4–10.8)

## 2025-03-10 PROCEDURE — 85025 COMPLETE CBC W/AUTO DIFF WBC: CPT | Performed by: INTERNAL MEDICINE

## 2025-03-10 PROCEDURE — 83540 ASSAY OF IRON: CPT

## 2025-03-10 PROCEDURE — 84466 ASSAY OF TRANSFERRIN: CPT

## 2025-03-10 PROCEDURE — 82728 ASSAY OF FERRITIN: CPT | Performed by: INTERNAL MEDICINE

## 2025-03-10 PROCEDURE — 36415 COLL VENOUS BLD VENIPUNCTURE: CPT | Performed by: INTERNAL MEDICINE

## 2025-03-10 PROCEDURE — 80069 RENAL FUNCTION PANEL: CPT

## 2025-03-21 ENCOUNTER — TELEPHONE (OUTPATIENT)
Dept: SLEEP MEDICINE | Facility: HOSPITAL | Age: 89
End: 2025-03-21
Payer: MEDICARE

## 2025-03-31 PROBLEM — R79.89 HIGH SERUM CREATININE: Chronic | Status: RESOLVED | Noted: 2022-10-19 | Resolved: 2025-03-31

## 2025-03-31 NOTE — ASSESSMENT & PLAN NOTE
- secondary to HTN  -Follows with nephrology (Dr. Coats)  - watches K in diet  - can take renal multivitamin if she wishes

## 2025-03-31 NOTE — ASSESSMENT & PLAN NOTE
- due for Dexa. Previosly on Fosamax for almost 5 years per patient but it was discontinued in 2022.

## 2025-03-31 NOTE — PROGRESS NOTES
Lalo Comer M.D.  Internal Medicine  [facility]  [address], [address]  [place] [address]  [phone]      Chief Complaint  Establish Care and discuss  (Patient would like to discuss covid vaccine, cologuard, dexa scan, increasing B-12 and multivitamin. )    SUBJECTIVE    History of Present Illness    Haydee Dickey is a 89 y.o. female who presents to the office today as a new patient to establish care.     History of Present Illness  She has osteoporosis and was previously on Fosamax for 5 years. She isn't taking any medication for osteoporosis now and is due for a DEXA scan. Her last DEXA scan was in 2021.    She was diagnosed with left breast cancer in 2021 and is being treated by Dr. Pinzon with anastrozole.    She has sleep apnea that was discovered after knee surgery because her oxygen levels dropped. She uses a special shirt with a tennis ball to keep herself from sleeping on her back and i planning on doing a home sleep study.    She has essential tremor and takes propranolol 30 mg twice a day, which is working well for her.  She could not tolerate higher doses of propranolol.    She has severe neuropathy in her legs and feet.she has foot drop worse on the left. She is doing physical therapy for her right knee and foot drop, uses a brace on her left side, and a walker. She hasn't fallen since she started using the brace last week. She has noticed more numbness and tingling in her feet over the past 3 months.    She previously had hyperkalemia and sees Dr. Renee for her chronic kidney disease. Her potassium levels are now well-controlled. She used to take benazepril but now takes hydrochlorothiazide and lisinopril.    She has high blood pressure and checks it at home twice a day. Her readings range from 99/62 (without symptoms) to 154/78 (when she takes her medication late). Her current medications are managing her blood pressure well.    She has anemia, which is monitored by  "her nephrologist through ferritin levels.  She is not on supplemental iron.  She is thinking about taking a multivitamin because of her dietary restrictions.    She is due for a Cologuard test and has a history of polyps.    She has a vitamin B12 deficiency and used to take injectable B12 but now takes 1000 mcg of oral B12 daily. She is considering increasing the dosage to 2000 mcg daily because of neuropathy.  She reports her B12 levels have fluctuated between 200 and 1500, with 1200 recorded 6 months ago.    She had COVID-19 in December 2024 and is considering getting a COVID-19 vaccine booster. She has had all previous COVID-19 vaccines and follows mask-wearing guidelines.    She had her left knee replaced in 2001. In June 2024, she fell while getting into a car because her left knee gave way, resulting in a head injury. The diagnosis suggested the polymer in her left knee was failing. Surgery is planned to replace the polymer, but the part is unavailable. In December 2024, she had severe pain and couldn't straighten her right leg due to spurs in the polymer, leading to knee surgery. After the surgery, she got COVID-19 and was in isolation over [place]. She has a follow-up with Dr. Zavaleta scheduled after today's visit.    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 Non-smoker, rarely drinks alcohol.        Review of Systems    Allergies   Allergen Reactions    Iodine Itching     IV ONLY    Percocet [Oxycodone-Acetaminophen] Itching    Iodinated Contrast Media Rash     Patient reports rash occurred 30 yrs ago with contrast   (\"IV\")        Outpatient Medications Marked as Taking for the 4/1/25 encounter (Office Visit) with Lalo Comer MD   Medication Sig Dispense Refill    anastrozole (ARIMIDEX) 1 MG tablet TAKE 1 TABLET BY MOUTH DAILY 90 tablet 0    cyanocobalamin (VITAMIN B-12) 1000 MCG tablet Take 1 tablet by mouth Daily. Indications: Inadequate Vitamin B12      ferrous sulfate 325 (65 " "FE) MG tablet Take 1 tablet by mouth Daily With Breakfast. Indications: Anemia From Inadequate Iron in the Body      folic acid (FOLVITE) 400 MCG tablet Take 1 tablet by mouth Daily. Indications: Anemia From Inadequate Folic Acid      hydroCHLOROthiazide 25 MG tablet Take 1 tablet by mouth Daily.      lisinopril (PRINIVIL,ZESTRIL) 10 MG tablet Take 1 tablet by mouth Daily. Indications: High Blood Pressure      propranolol (INDERAL) 20 MG tablet Take 1.5 tablets by mouth 2 (Two) Times a Day. Indications: Fine to Coarse Slow Tremor Affecting Head, Hands & Voice      Sodium Fluoride 5000 PPM 1.1 % paste           Past Medical History:   Diagnosis Date    Abnormal ECG 4/30/2024    Allergic Percocet, iv iodine    Anemia     Arthritis     B12 deficiency     BPPV (benign paroxysmal positional vertigo)     Brain concussion 1959    Breast cancer     LEFT    Cataract 2018  jaime, 2021 jaime blepharoplasty    Colon polyp 2009    COVID 03/24/2023    Rebound Covid following Paxlovid 4/1/23    CTS (carpal tunnel syndrome) 1997, 2014    D’quervain x3    Deep vein thrombosis 1970    HX, LEG    Essential tremor     Folliculitis 11/01/2017    Fracture of hip 2014    Right ORIF    Fracture, femur 1995    Left, No surgery    Fracture, radius 1995    Right, orif    Gait instability     GERD (gastroesophageal reflux disease) 1990    History of blood transfusion 2014    History of fall 06/17/2024    \"KNEE GAVE OUT\"    HL (hearing loss) 2015    HEARING AIDS    Hypertension 2001    on Rx    Knee swelling 2001    Bilateral    Lower extremity neuropathy     bilateral    Neuropathy     Osteopenia 2019    Potassium (K) excess 10/21/2022    Seeing nephrologist -- Dr. Coats    Renal insufficiency 2022    Rotator cuff syndrome 2013    Fall induced    Sciatica     Small vessel disease     Torn rotator cuff 2013    right arm    Tremor 2005    BET on RX    Urinary tract infection 2018    approx 1 every 5 years    Wears hearing aid     BILATERAL    " Wrist fracture, right 2015     Past Surgical History:   Procedure Laterality Date    ADENOIDECTOMY  1953    APPENDECTOMY  1961    BLEPHAROPLASTY Bilateral 10/19/2021    Procedure: BILATERAL UPPER LID BLEPHAROPLASTY;  Surgeon: Ruddy Moses MD;  Location: [facility] [place];  Service: Ophthalmology;  Laterality: Bilateral;    BREAST BIOPSY      BREAST LUMPECTOMY Left 11/15/2021    Procedure: Left JEAN-PAUL-guided partial mastectomy;  Surgeon: Maliha Andres MD;  Location: [facility] MAIN OR;  Service: General;  Laterality: Left;    BROW LIFT Bilateral 10/19/2021    Procedure: BILATERAL TEMPORAL DIRECT BROWLIFT;  Surgeon: Ruddy Moses MD;  Location: [facility] [place];  Service: Ophthalmology;  Laterality: Bilateral;    CARPAL TUNNEL RELEASE  1992    right wrist    CATARACT EXTRACTION, BILATERAL  07/01/2018    R 7/18 and L 9/18 WITH LENS IMPLANTS    COLONOSCOPY      DEQUERVAIN RELEASE Bilateral 2015    DILATATION AND CURETTAGE  1960s    EYE SURGERY  2018    Bilateral repairs    FRACTURE SURGERY  2014    R hip, R  wrist    HAND SURGERY Right     HIP FRACTURE SURGERY Right     HYSTERECTOMY  1975    Partial    JOINT REPLACEMENT  2001, 2014    TKA    KNEE POLY INSERT EXCHANGE Right 12/12/2024    Procedure: Right Knee Poly insert exchange with exploration of foreign body;  Surgeon: Edmund Zavaleta MD;  Location: [facility] OR;  Service: Orthopedics;  Laterality: Right;    TONSILLECTOMY AND ADENOIDECTOMY  1953    TOTAL ABDOMINAL HYSTERECTOMY WITH SALPINGO OOPHORECTOMY  AIDEN BUT NOT BSO    TOTAL KNEE ARTHROPLASTY Left 2001    TOTAL KNEE ARTHROPLASTY Right 2014    TRIGGER FINGER RELEASE Right     TRIGGER POINT INJECTION  2017, 2019    WRIST SURGERY  2015    FRACTURE RIGHT     Family History   Problem Relation Age of Onset    Diabetes Mother     Hypertension Mother     Stroke Mother     Hearing loss Mother         Hearing Aids    Heart disease Mother         CHF, PACER    Thyroid disease Mother         THYROIDECTOMY     "Hypothyroidism Mother     Hypertension Father     Stroke Father     Alcohol abuse Father         DAYS OFF     Heart disease Father         CHF    Hyperlipidemia Father     Kidney disease Father         KIDNEY STONE REMOVED    Heart attack Father     Cancer Sister         LUNG    COPD Sister     Lung cancer Sister     Diabetes Sister     Cancer Sister         KIDNEY    Kidney cancer Sister     Diabetes Sister         INSULIN    Hypertension Sister     Heart disease Sister         ON AUTOPSY    Mental illness Sister         HOSPITALIZED    Depression Sister     Hyperlipidemia Sister     Cancer Brother         LUNG    Lung cancer Brother     Early death Brother         2 MO, ? WHOOPING COUGH    Mental illness Brother         Schizophrenia/hosp    Depression Brother     Early death Brother     Asthma Daughter         ADULT ONSET    Diabetes Maternal Grandmother         NIDDM    Diabetes Maternal Aunt         NIDDM    Thyroid disease Maternal Aunt         THYROIDECTOMY    Diabetes Maternal Aunt         NIDDM    Hearing loss Maternal Aunt     Malig Hyperthermia Neg Hx     Breast cancer Neg Hx     reports that she has never smoked. She has never been exposed to tobacco smoke. She has never used smokeless tobacco. She reports that she does not drink alcohol and does not use drugs.    OBJECTIVE    Vital Signs:   /80   Pulse 72   Ht 158.8 cm (62.52\")   Wt 84.1 kg (185 lb 4.8 oz)   SpO2 98%   BMI 33.33 kg/m²     Physical Exam  Constitutional:       Appearance: Normal appearance.   Cardiovascular:      Rate and Rhythm: Normal rate and regular rhythm.      Heart sounds: Normal heart sounds. No murmur heard.  Pulmonary:      Effort: Pulmonary effort is normal.      Breath sounds: Normal breath sounds.   Abdominal:      General: Abdomen is flat. There is no distension.      Palpations: Abdomen is soft.      Tenderness: There is no abdominal tenderness.   Musculoskeletal:      Right lower leg: No edema.      " Comments: Trace edema on left.  She has AFO on the left.   Skin:     General: Skin is warm and dry.   Neurological:      Mental Status: She is alert.   Psychiatric:         Mood and Affect: Mood normal.         Behavior: Behavior normal.         Thought Content: Thought content normal.          Physical Exam      The following data was reviewed by: Lalo Comer MD on 04/01/2025:  CMP          12/16/2024    05:42 12/17/2024    03:49 3/10/2025    08:59   CMP   Glucose 102  105  110    BUN 38  34  35    Creatinine 1.17  1.00  1.40    EGFR 45.3  54.6  36.5    Sodium 137  139  143    Potassium 4.4  4.6  4.2    Chloride 106  109  108    Calcium 8.7  8.7  10.0    Albumin   3.9    BUN/Creatinine Ratio 32.5  34.0  25.0    Anion Gap 7.2  6.6  9.8      CBC w/diff          12/16/2024    05:42 12/17/2024    03:49 3/10/2025    08:59   CBC w/Diff   WBC 4.96  4.45  4.56    RBC 2.88  2.83  3.64    Hemoglobin 8.8  9.0  11.0    Hematocrit 27.9  26.8  33.9    MCV 96.9  94.7  93.1    MCH 30.6  31.8  30.2    MCHC 31.5  33.6  32.4    RDW 11.7  11.4  12.2    Platelets 131  146  182    Neutrophil Rel % 57.7  51.3  59.2    Immature Granulocyte Rel % 0.2  0.4  0.2    Lymphocyte Rel % 23.8  29.2  23.9    Monocyte Rel % 15.3  14.8  12.1    Eosinophil Rel % 2.6  3.6  3.9    Basophil Rel % 0.4  0.7  0.7          A1C Last 3 Results          9/9/2024    11:31   HGBA1C Last 3 Results   Hemoglobin A1C 5.90      Data reviewed : Nephrology note.              ASSESSMENT & PLAN        Benign essential hypertension  - nephrology following. On HCTZ 25 mg and lisinopril 10 mg. Leg edema with CCB. On propranolol for tremor  - Monitors at home twice a day. Well controlled       Stage 3a chronic kidney disease  - secondary to HTN  -Follows with nephrology (Dr. Coats)  - watches K in diet  - can take renal multivitamin if she wishes       Personal history of breast cancer  - left sided s/p lumpectomy. Sees Dr. Pinzon. On Arimidex.       Anemia, unspecified  type  -CANDIS. Blood work 3 weeks ago showed improvement in hemoglobin and ferritin adequate.        Osteoporosis without current pathological fracture, unspecified osteoporosis type  - due for Dexa. Previosly on Fosamax for almost 5 years per patient but it was discontinued in 2022.        WILBER (obstructive sleep apnea)  -fairly new diagnosis. Seeing Dr. Barakat       Essential tremor  - She takes propranolol to help with tremors       B12 deficiency  - on replacement. Adequate in fall.        Peripheral polyneuropathy  - attributed to B12 deficiency       Osteoarthritis of both knees, unspecified osteoarthritis type  - s/p right total knee patellar revision in December 2024. Follows with Dr. Zavaleta.       Postmenopause    Orders:    DEXA Bone Density Axial; Future    Need for vaccination    Orders:    COVID-19 (Pfizer) 12yrs+ (COMIRNATY)    Left foot drop  - EMG inconclusive due to Severe axonal sensorimotor peripheral polyneuropathy . Uses AFO. Has sciatica as well.       Colon cancer screening  - Past age for routine screening          Assessment & Plan        Health Maintenance Due   Topic Date Due    DXA SCAN  11/23/2023    COVID-19 Vaccine (7 - 2024-25 season) 09/01/2024        Follow Up  No follow-ups on file.    Patient/family had no further questions at this time and verbalized understanding of the plan discussed today.     Patient or patient representative verbalized consent for the use of Ambient Listening during the visit with  Lalo Comer MD for chart documentation. 4/1/2025  08:46 EDT

## 2025-03-31 NOTE — ASSESSMENT & PLAN NOTE
- nephrology following. On HCTZ 25 mg and lisinopril 10 mg. Leg edema with CCB. On propranolol for tremor  - Monitors at home twice a day. Well controlled

## 2025-04-01 ENCOUNTER — OFFICE VISIT (OUTPATIENT)
Dept: INTERNAL MEDICINE | Facility: CLINIC | Age: 89
End: 2025-04-01
Payer: MEDICARE

## 2025-04-01 VITALS
HEART RATE: 72 BPM | SYSTOLIC BLOOD PRESSURE: 140 MMHG | OXYGEN SATURATION: 98 % | WEIGHT: 185.3 LBS | HEIGHT: 63 IN | BODY MASS INDEX: 32.83 KG/M2 | DIASTOLIC BLOOD PRESSURE: 80 MMHG

## 2025-04-01 DIAGNOSIS — G62.9 PERIPHERAL POLYNEUROPATHY: ICD-10-CM

## 2025-04-01 DIAGNOSIS — I10 BENIGN ESSENTIAL HYPERTENSION: Primary | ICD-10-CM

## 2025-04-01 DIAGNOSIS — Z78.0 POSTMENOPAUSE: ICD-10-CM

## 2025-04-01 DIAGNOSIS — G25.0 ESSENTIAL TREMOR: ICD-10-CM

## 2025-04-01 DIAGNOSIS — M17.0 OSTEOARTHRITIS OF BOTH KNEES, UNSPECIFIED OSTEOARTHRITIS TYPE: ICD-10-CM

## 2025-04-01 DIAGNOSIS — M81.0 OSTEOPOROSIS WITHOUT CURRENT PATHOLOGICAL FRACTURE, UNSPECIFIED OSTEOPOROSIS TYPE: ICD-10-CM

## 2025-04-01 DIAGNOSIS — N18.31 STAGE 3A CHRONIC KIDNEY DISEASE: ICD-10-CM

## 2025-04-01 DIAGNOSIS — Z23 NEED FOR VACCINATION: ICD-10-CM

## 2025-04-01 DIAGNOSIS — Z12.11 COLON CANCER SCREENING: ICD-10-CM

## 2025-04-01 DIAGNOSIS — M21.372 LEFT FOOT DROP: ICD-10-CM

## 2025-04-01 DIAGNOSIS — G47.33 OSA (OBSTRUCTIVE SLEEP APNEA): ICD-10-CM

## 2025-04-01 DIAGNOSIS — D64.9 ANEMIA, UNSPECIFIED TYPE: ICD-10-CM

## 2025-04-01 DIAGNOSIS — E53.8 B12 DEFICIENCY: ICD-10-CM

## 2025-04-01 DIAGNOSIS — Z85.3 PERSONAL HISTORY OF BREAST CANCER: ICD-10-CM

## 2025-04-01 NOTE — LETTER
[facility]  Vaccine Consent Form    Patient Name:  Haydee Dickey  Patient :  1935     Vaccine(s) Ordered    COVID-19 (Pfizer) 12yrs+ (COMIRNATY)        Screening Checklist  The following questions should be completed prior to vaccination. If you answer “yes” to any question, it does not necessarily mean you should not be vaccinated. It just means we may need to clarify or ask more questions. If a question is unclear, please ask your healthcare provider to explain it.    Yes No   Any fever or moderate to severe illness today (mild illness and/or antibiotic treatment are not contraindications)?     Do you have a history of a serious reaction to any previous vaccinations, such as anaphylaxis, encephalopathy within 7 days, Guillain-[place] syndrome within 6 weeks, seizure?     Have you received any live vaccine(s) (e.g MMR, HAILEY) or any other vaccines in the last month (to ensure duplicate doses aren't given)?     Do you have an anaphylactic allergy to latex (DTaP, DTaP-IPV, Hep A, Hep B, MenB, RV, Td, Tdap), baker’s yeast (Hep B, HPV), polysorbates (RSV, nirsevimab, PCV 20, Rotavirrus, Tdap, Shingrix), or gelatin (HAILEY, MMR)?     Do you have an anaphylactic allergy to neomycin (Rabies, HAILEY, MMR, IPV, Hep A), polymyxin B (IPV), or streptomycin (IPV)?      Any cancer, leukemia, AIDS, or other immune system disorder? (HAILEY, MMR, RV)     Do you have a parent, brother, or sister with an immune system problem (if immune competence of vaccine recipient clinically verified, can proceed)? (MMR, HAILEY)     Any recent steroid treatments for >2 weeks, chemotherapy, or radiation treatment? (HAILEY, MMR)     Have you received antibody-containing blood transfusions or IVIG in the past 11 months (recommended interval is dependent on product)? (MMR, HAILEY)     Have you taken antiviral drugs (acyclovir, famciclovir, valacyclovir for HAILEY) in the last 24 or 48 hours, respectively?      Are you pregnant or planning to become pregnant  "within 1 month? (HAILEY, MMR, HPV, IPV, MenB, Abrexvy; For Hep B- refer to Engerix-B; For RSV - Abrysvo is indicated for 32-36 weeks of pregnancy from September to January)     For infants, have you ever been told your child has had intussusception or a medical emergency involving obstruction of the intestine (Rotavirus)? If not for an infant, can skip this question.         *Ordering Physicians/APC should be consulted if \"yes\" is checked by the patient or guardian above.  I have received, read, and understand the Vaccine Information Statement (VIS) for each vaccine ordered.  I have considered my or my child's health status as well as the health status of my close contacts.  I have taken the opportunity to discuss my vaccine questions with my or my child's health care provider.   I have requested that the ordered vaccine(s) be given to me or my child.  I understand the benefits and risks of the vaccines.  I understand that I should remain in the clinic for 15 minutes after receiving the vaccine(s).  _________________________________________________________  Signature of Patient or Parent/Legal Guardian ____________________  Date     "

## 2025-04-02 ENCOUNTER — PATIENT ROUNDING (BHMG ONLY) (OUTPATIENT)
Dept: INTERNAL MEDICINE | Facility: CLINIC | Age: 89
End: 2025-04-02
Payer: MEDICARE

## 2025-04-07 ENCOUNTER — TELEPHONE (OUTPATIENT)
Dept: INTERNAL MEDICINE | Facility: CLINIC | Age: 89
End: 2025-04-07
Payer: MEDICARE

## 2025-04-07 DIAGNOSIS — Z78.0 POSTMENOPAUSE: ICD-10-CM

## 2025-04-07 NOTE — TELEPHONE ENCOUNTER
Caller: Haydee Dickey    Relationship: Self    Best call back number: [phone]    What orders are you requesting (i.e. lab or imaging):  BONE DENSITY SCAN     Where will you receive your lab/imaging services: [facility]     Additional notes: PATIENT STATES THAT AN ORDER IS NEEDED BEFORE SHE CAN SCHEDULE.

## 2025-04-18 ENCOUNTER — TELEPHONE (OUTPATIENT)
Dept: INTERNAL MEDICINE | Facility: CLINIC | Age: 89
End: 2025-04-18
Payer: MEDICARE

## 2025-04-18 RX ORDER — ALENDRONATE SODIUM 70 MG/1
70 TABLET ORAL
Qty: 12 TABLET | Refills: 0 | Status: SHIPPED | OUTPATIENT
Start: 2025-04-18 | End: 2025-07-17

## 2025-04-18 NOTE — TELEPHONE ENCOUNTER
I called Haydee Dickey at [phone] at 16:49 EDT     They are interested in medication.     Was on Fosamax. Took daily which she states worked well for ehr    Discussed with patient the pathophysiology of osteoporosis as well as the mechanism of action of bisphosphonate therapy.  Advised her common side effects including Potential for allergic reaction, rash, GI side effects such as dyspepsia or abdominal pain, joint pains, rare risk of osteonecrosis of the jaw and to alert her dentists that she is on therapy, potential for fracture of other bones.    We discussed Prolia.  I reviewed the side effects which include rare atypical fracture, risk of rebound fracture if stopped and rare jaw osteonecrosis.  The benefit is a decrease in the risk for fracture.      She would like to resume Fosamax    I recommend to get 1200 mg of calcium and 1000 IUs of vitamin D through diet and supplements and to participate in a weight based exercise to prevent loss of bone mineral density. Bone mineral will be monitored every two years.

## 2025-05-12 RX ORDER — ANASTROZOLE 1 MG/1
1 TABLET ORAL DAILY
Qty: 90 TABLET | Refills: 3 | Status: SHIPPED | OUTPATIENT
Start: 2025-05-12

## 2025-05-15 ENCOUNTER — OFFICE VISIT (OUTPATIENT)
Dept: ORTHOPEDIC SURGERY | Facility: CLINIC | Age: 89
End: 2025-05-15
Payer: MEDICARE

## 2025-05-15 VITALS — TEMPERATURE: 98 F | BODY MASS INDEX: 35.46 KG/M2 | HEIGHT: 62 IN | WEIGHT: 192.7 LBS

## 2025-05-15 DIAGNOSIS — Z96.653 STATUS POST BILATERAL KNEE REPLACEMENTS: Primary | ICD-10-CM

## 2025-05-15 RX ORDER — CALCIUM CARBONATE 1250 MG/5ML
SUSPENSION ORAL
COMMUNITY

## 2025-05-15 RX ORDER — CHOLECALCIFEROL (VITAMIN D3) 25 MCG
1000 TABLET ORAL DAILY
COMMUNITY

## 2025-05-21 ENCOUNTER — PATIENT ROUNDING (BHMG ONLY) (OUTPATIENT)
Age: 89
End: 2025-05-21
Payer: MEDICARE

## 2025-05-21 NOTE — ED NOTES
Thank you for letting us care for you in your recent visit to our [facility] urgent care center. We would love to hear about your experience with us. Was this the first time you have visited our location?         We’re always looking for ways to make our patients’ experiences even better. Do you have any recommendations on ways we may improve?         I appreciate you taking the time to respond. Please be on the lookout for a survey about your recent visit from [facility] [employer] via text or email. We would greatly appreciate if you could fill that out and turn it back in. We want your voice to be heard and we value your feedback.    Thank you for choosing [facility] for your healthcare needs.         If you have concerns or would like to speak to me in person regarding your visit please feel free to give me a call. [phone]         Hope you get well soon and thank you.         Miya Peterson  Practice Manager

## 2025-06-13 ENCOUNTER — PATIENT ROUNDING (BHMG ONLY) (OUTPATIENT)
Age: 89
End: 2025-06-13
Payer: MEDICARE

## 2025-06-13 NOTE — ED NOTES
Thank you for letting us care for you in your recent visit to our [facility] urgent care center. We would love to hear about your experience with us. Was this the first time you have visited our location?         We’re always looking for ways to make our patients’ experiences even better. Do you have any recommendations on ways we may improve?         I appreciate you taking the time to respond. Please be on the lookout for a survey about your recent visit from [facility] [employer] [facility] via text or email. We would greatly appreciate if you could fill that out and turn it back in. We want your voice to be heard and we value your feedback.    Thank you for choosing [facility] for your healthcare needs.         If you have concerns or would like to speak to me in person regarding your visit please feel free to give me a call. [phone]         Hope you get well soon and thank you.         Miya Peterson  Practice Manager

## 2025-07-02 RX ORDER — ALENDRONATE SODIUM 70 MG/1
70 TABLET ORAL
Qty: 12 TABLET | Refills: 0 | Status: SHIPPED | OUTPATIENT
Start: 2025-07-02 | End: 2025-09-30

## 2025-07-10 ENCOUNTER — OFFICE VISIT (OUTPATIENT)
Dept: INTERNAL MEDICINE | Facility: CLINIC | Age: 89
End: 2025-07-10
Payer: MEDICARE

## 2025-07-10 VITALS
WEIGHT: 187 LBS | SYSTOLIC BLOOD PRESSURE: 127 MMHG | TEMPERATURE: 99 F | DIASTOLIC BLOOD PRESSURE: 71 MMHG | HEIGHT: 62 IN | BODY MASS INDEX: 34.41 KG/M2 | RESPIRATION RATE: 16 BRPM | HEART RATE: 75 BPM | OXYGEN SATURATION: 94 %

## 2025-07-10 DIAGNOSIS — R10.30 LOWER ABDOMINAL PAIN: Primary | ICD-10-CM

## 2025-07-10 PROCEDURE — 1126F AMNT PAIN NOTED NONE PRSNT: CPT | Performed by: NURSE PRACTITIONER

## 2025-07-10 PROCEDURE — 1160F RVW MEDS BY RX/DR IN RCRD: CPT | Performed by: NURSE PRACTITIONER

## 2025-07-10 PROCEDURE — 1159F MED LIST DOCD IN RCRD: CPT | Performed by: NURSE PRACTITIONER

## 2025-07-10 PROCEDURE — 99213 OFFICE O/P EST LOW 20 MIN: CPT | Performed by: NURSE PRACTITIONER

## 2025-07-10 NOTE — PROGRESS NOTES
Subjective   Haydee Dickey is a 89 y.o. female. Patient is here today for   Chief Complaint   Patient presents with    Abdominal Pain    Nausea            Vitals:    07/10/25 1331   BP: 127/71   Pulse: 75   Resp: 16   Temp: 99 °F (37.2 °C)   SpO2: 94%     Body mass index is 34.19 kg/m².  The following portions of the patient's history were reviewed and updated as appropriate: allergies, current medications, past family history, past medical history, past social history, past surgical history and problem list.    History of Present Illness  The patient is an 89-year-old female who presents for abdominal pain.    She began experiencing abdominal pain approximately 10 to 12 days ago, which intensifies at night. She also reports difficulty in stretching her leg due to pain. A few days ago, she experienced sharp, shooting pains across her abdomen when she got up to use the bathroom. She has had nausea twice, including a brief episode this morning, but no vomiting. She has not taken any medication for the pain. She reports no fever, chills, cough, or congestion. She also reports no blood in her stool, although she notes that her stool is black due to iron supplementation. She has not had any injury to her abdomen from the fall. She has some urinary incontinence, which she attributes to consuming large amounts of diet coke. She is allergic to contrast and had itching with iodine in the 1960s. She has been given Benadryl with contrast since then and has had no problems. She has Benadryl 25 mg at home. 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     On 06/06/2025, she scraped her arm during a fall and sought treatment at urgent care on 06/12/2025 and 06/16/2025, where she was prescribed antibiotics.        Past Medical History:   Diagnosis Date    Abnormal ECG 4/30/2024    Allergic Percocet, iv iodine    Anemia     Arthritis     B12 deficiency     BPPV (benign paroxysmal  "positional vertigo)     Brain concussion 1959    Breast cancer     LEFT    Cataract 2018  jaime, 2021 jaime blepharoplasty    Colon polyp 2009    COVID 03/24/2023    Rebound Covid following Paxlovid 4/1/23    CTS (carpal tunnel syndrome) 1997, 2014    D’quervain x3    Deep vein thrombosis 1970    HX, LEG    Essential tremor     Folliculitis 11/01/2017    Fracture of hip 2014    Right ORIF    Fracture, femur 1995    Left, No surgery    Fracture, radius 1995    Right, orif    Gait instability     GERD (gastroesophageal reflux disease) 1990    History of blood transfusion 2014    History of fall 06/17/2024    \"KNEE GAVE OUT\"    HL (hearing loss) 2015    HEARING AIDS    Hypertension 2001    on Rx    Knee swelling 2001    Bilateral    Lower extremity neuropathy     bilateral    Neuropathy     Osteopenia 2019    Potassium (K) excess 10/21/2022    Seeing nephrologist -- Dr. Coats    Renal insufficiency 2022    Rotator cuff syndrome 2013    Fall induced    Sciatica     Small vessel disease     Torn rotator cuff 2013    right arm    Tremor 2005    BET on RX    Urinary tract infection 2018    approx 1 every 5 years    Wears hearing aid     BILATERAL    Wrist fracture, right 2015      Allergies   Allergen Reactions    Iodine Itching     IV ONLY    Percocet [Oxycodone-Acetaminophen] Itching    Iodinated Contrast Media Rash     Patient reports rash occurred 30 yrs ago with contrast   (\"IV\")      Social History     Socioeconomic History    Marital status:     Number of children: 1   Tobacco Use    Smoking status: Never     Passive exposure: Never    Smokeless tobacco: Never   Vaping Use    Vaping status: Never Used   Substance and Sexual Activity    Alcohol use: No    Drug use: No    Sexual activity: Never        Current Outpatient Medications:     alendronate (FOSAMAX) 70 MG tablet, TAKE 1 TABLET BY MOUTH EVERY 7 (SEVEN) DAYS FOR 90 DAYS., Disp: 12 tablet, Rfl: 0    anastrozole (ARIMIDEX) 1 MG tablet, TAKE 1 TABLET BY " MOUTH DAILY, Disp: 90 tablet, Rfl: 3    Calcium Carbonate 1250 MG/5ML, Take  by mouth., Disp: , Rfl:     Cholecalciferol 25 MCG (1000 UT) tablet, Take 1 tablet by mouth Daily., Disp: , Rfl:     cyanocobalamin (VITAMIN B-12) 1000 MCG tablet, Take 1 tablet by mouth Daily. Indications: Inadequate Vitamin B12, Disp: , Rfl:     ferrous sulfate 325 (65 FE) MG tablet, Take 1 tablet by mouth Daily With Breakfast. Indications: Anemia From Inadequate Iron in the Body, Disp: , Rfl:     folic acid (FOLVITE) 400 MCG tablet, Take 1 tablet by mouth Daily. Indications: Anemia From Inadequate Folic Acid, Disp: , Rfl:     hydroCHLOROthiazide 25 MG tablet, Take 1 tablet by mouth Daily., Disp: , Rfl:     lisinopril (PRINIVIL,ZESTRIL) 10 MG tablet, Take 1 tablet by mouth Daily. Indications: High Blood Pressure, Disp: , Rfl:     propranolol (INDERAL) 20 MG tablet, Take 1.5 tablets by mouth 2 (Two) Times a Day. Indications: Fine to Coarse Slow Tremor Affecting Head, Hands & Voice, Disp: , Rfl:     Sodium Fluoride 5000 PPM 1.1 % paste, , Disp: , Rfl:    Review of Systems   Constitutional:  Negative for fatigue and fever.   Respiratory: Negative.     Cardiovascular: Negative.    Gastrointestinal:  Positive for abdominal pain, constipation and nausea (has had 2 episodes). Negative for anal bleeding, blood in stool, diarrhea, rectal pain and vomiting.   Genitourinary:  Positive for frequency. Negative for difficulty urinating, dysuria and pelvic pain.   Musculoskeletal:  Positive for arthralgias.       Objective   Physical Exam  Vitals and nursing note reviewed.   Constitutional:       General: She is not in acute distress.  Cardiovascular:      Rate and Rhythm: Normal rate and regular rhythm.      Heart sounds: Normal heart sounds.   Pulmonary:      Effort: Pulmonary effort is normal.      Breath sounds: Normal breath sounds.   Abdominal:      General: Bowel sounds are increased.      Palpations: Abdomen is soft.      Tenderness: There is  abdominal tenderness in the right lower quadrant. There is no rebound.   Skin:     General: Skin is warm and dry.   Neurological:      Mental Status: She is alert and oriented to person, place, and time.   Psychiatric:         Attention and Perception: Attention normal.         Mood and Affect: Mood normal.         Assessment    Diagnoses and all orders for this visit:    1. Lower abdominal pain (Primary)  -     Cancel: CT Abdomen Pelvis With Contrast; Future  -     CBC & Differential  -     Comprehensive Metabolic Panel  -     Urinalysis With Culture If Indicated - Urine, Clean Catch  -     CT Abdomen Pelvis With Contrast; Future        Assessment & Plan  1. Abdominal pain.  - Reports sharp shooting pains across the abdomen, started 10-12 days ago.  - No associated nausea, vomiting, diarrhea, or constipation; awareness but no significant pain upon palpation.  - CT scan of the abdomen will be ordered for further evaluation; recommend premedicating with benadryl and can give verbal orders to CT . Offered CT today but she is unable to due to scheduling conflicts . Will schedule for tomorrow   - Laboratory tests and a urine sample will be collected today.            Return if symptoms worsen or fail to improve.    Patient or patient representative verbalized consent for the use of Ambient Listening during the visit with  YNES Martin for chart documentation. 7/10/2025  18:05 EDT

## 2025-07-11 ENCOUNTER — TELEPHONE (OUTPATIENT)
Dept: INTERNAL MEDICINE | Facility: CLINIC | Age: 89
End: 2025-07-11
Payer: MEDICARE

## 2025-07-11 RX ORDER — PREDNISONE 50 MG/1
TABLET ORAL
Qty: 3 TABLET | Refills: 0 | Status: SHIPPED | OUTPATIENT
Start: 2025-07-11

## 2025-07-11 NOTE — TELEPHONE ENCOUNTER
Please call Mrs Dickey and let her know 13hour protocol for premedication prior to CT scan is required .  She will take prednisone 50mg 13 hours, 7 hours, and 1 hour before she receives contrast dye . She will take benadryl 50mg po one hour before. She will need a ride since benadryl can cause sedation . I also called radiology triage to verify this protocol was correct

## 2025-07-14 ENCOUNTER — HOSPITAL ENCOUNTER (OUTPATIENT)
Facility: HOSPITAL | Age: 89
Discharge: HOME | End: 2025-07-14
Admitting: NURSE PRACTITIONER
Payer: MEDICARE

## 2025-07-14 DIAGNOSIS — R10.30 LOWER ABDOMINAL PAIN: ICD-10-CM

## 2025-07-14 PROCEDURE — 25510000001 IOPAMIDOL 61 % SOLUTION: Performed by: NURSE PRACTITIONER

## 2025-07-14 PROCEDURE — 74177 CT ABD & PELVIS W/CONTRAST: CPT

## 2025-07-14 RX ORDER — IOPAMIDOL 612 MG/ML
100 INJECTION, SOLUTION INTRAVASCULAR
Status: COMPLETED | OUTPATIENT
Start: 2025-07-14 | End: 2025-07-14

## 2025-07-14 RX ADMIN — IOPAMIDOL 85 ML: 612 INJECTION, SOLUTION INTRAVENOUS at 09:40

## 2025-07-15 LAB
ALBUMIN SERPL-MCNC: 4.3 G/DL (ref 3.5–5.2)
ALBUMIN/GLOB SERPL: 2 G/DL
ALP SERPL-CCNC: 40 U/L (ref 39–117)
ALT SERPL-CCNC: 10 U/L (ref 1–33)
APPEARANCE UR: CLEAR
AST SERPL-CCNC: 20 U/L (ref 1–32)
BACTERIA #/AREA URNS HPF: ABNORMAL /[HPF]
BACTERIA UR CULT: ABNORMAL
BACTERIA UR CULT: ABNORMAL
BASOPHILS # BLD AUTO: 0.04 10*3/MM3 (ref 0–0.2)
BASOPHILS NFR BLD AUTO: 0.9 % (ref 0–1.5)
BILIRUB SERPL-MCNC: 0.3 MG/DL (ref 0–1.2)
BILIRUB UR QL STRIP: NEGATIVE
BUN SERPL-MCNC: 25 MG/DL (ref 8–23)
BUN/CREAT SERPL: 21.9 (ref 7–25)
CALCIUM SERPL-MCNC: 10 MG/DL (ref 8.6–10.5)
CASTS URNS QL MICRO: ABNORMAL /LPF
CHLORIDE SERPL-SCNC: 106 MMOL/L (ref 98–107)
CO2 SERPL-SCNC: 27 MMOL/L (ref 22–29)
COLOR UR: YELLOW
CREAT SERPL-MCNC: 1.14 MG/DL (ref 0.57–1)
EGFRCR SERPLBLD CKD-EPI 2021: 46.7 ML/MIN/1.73
EOSINOPHIL # BLD AUTO: 0.09 10*3/MM3 (ref 0–0.4)
EOSINOPHIL NFR BLD AUTO: 1.9 % (ref 0.3–6.2)
EPI CELLS #/AREA URNS HPF: ABNORMAL /HPF (ref 0–10)
ERYTHROCYTE [DISTWIDTH] IN BLOOD BY AUTOMATED COUNT: 12 % (ref 12.3–15.4)
GLOBULIN SER CALC-MCNC: 2.2 GM/DL
GLUCOSE SERPL-MCNC: 110 MG/DL (ref 65–99)
GLUCOSE UR QL STRIP: NEGATIVE
HCT VFR BLD AUTO: 32.9 % (ref 34–46.6)
HGB BLD-MCNC: 10.7 G/DL (ref 12–15.9)
HGB UR QL STRIP: NEGATIVE
IMM GRANULOCYTES # BLD AUTO: 0.01 10*3/MM3 (ref 0–0.05)
IMM GRANULOCYTES NFR BLD AUTO: 0.2 % (ref 0–0.5)
KETONES UR QL STRIP: NEGATIVE
LEUKOCYTE ESTERASE UR QL STRIP: ABNORMAL
LYMPHOCYTES # BLD AUTO: 1.06 10*3/MM3 (ref 0.7–3.1)
LYMPHOCYTES NFR BLD AUTO: 22.6 % (ref 19.6–45.3)
MCH RBC QN AUTO: 30.9 PG (ref 26.6–33)
MCHC RBC AUTO-ENTMCNC: 32.5 G/DL (ref 31.5–35.7)
MCV RBC AUTO: 95.1 FL (ref 79–97)
MICRO URNS: ABNORMAL
MONOCYTES # BLD AUTO: 0.58 10*3/MM3 (ref 0.1–0.9)
MONOCYTES NFR BLD AUTO: 12.3 % (ref 5–12)
NEUTROPHILS # BLD AUTO: 2.92 10*3/MM3 (ref 1.7–7)
NEUTROPHILS NFR BLD AUTO: 62.1 % (ref 42.7–76)
NITRITE UR QL STRIP: NEGATIVE
NRBC BLD AUTO-RTO: 0 /100 WBC (ref 0–0.2)
OTHER ANTIBIOTIC SUSC ISLT: ABNORMAL
PH UR STRIP: 6 [PH] (ref 5–7.5)
PLATELET # BLD AUTO: 184 10*3/MM3 (ref 140–450)
POTASSIUM SERPL-SCNC: 5 MMOL/L (ref 3.5–5.2)
PROT SERPL-MCNC: 6.5 G/DL (ref 6–8.5)
PROT UR QL STRIP: NEGATIVE
RBC # BLD AUTO: 3.46 10*6/MM3 (ref 3.77–5.28)
RBC #/AREA URNS HPF: ABNORMAL /HPF (ref 0–2)
SODIUM SERPL-SCNC: 140 MMOL/L (ref 136–145)
SP GR UR STRIP: 1.02 (ref 1–1.03)
URINALYSIS REFLEX: ABNORMAL
UROBILINOGEN UR STRIP-MCNC: 0.2 MG/DL (ref 0.2–1)
WBC # BLD AUTO: 4.7 10*3/MM3 (ref 3.4–10.8)
WBC #/AREA URNS HPF: ABNORMAL /HPF (ref 0–5)

## 2025-07-15 RX ORDER — CEFUROXIME AXETIL 250 MG/1
250 TABLET ORAL 2 TIMES DAILY
Qty: 14 TABLET | Refills: 0 | Status: SHIPPED | OUTPATIENT
Start: 2025-07-15

## 2025-07-17 ENCOUNTER — RESULTS FOLLOW-UP (OUTPATIENT)
Dept: INTERNAL MEDICINE | Facility: CLINIC | Age: 89
End: 2025-07-17
Payer: MEDICARE

## 2025-07-17 NOTE — TELEPHONE ENCOUNTER
SPOKE WITH PATIENT AND NOTIFIED OF RESULTS.   SHE UNDERSTOOD AND SCHEDULED LAB APPOINTMENT FOR UA RECHECK.

## 2025-07-18 DIAGNOSIS — R82.90 ABNORMAL URINE: Primary | ICD-10-CM

## 2025-07-25 ENCOUNTER — OFFICE VISIT (OUTPATIENT)
Dept: INTERNAL MEDICINE | Facility: CLINIC | Age: 89
End: 2025-07-25
Payer: MEDICARE

## 2025-07-25 VITALS
DIASTOLIC BLOOD PRESSURE: 78 MMHG | SYSTOLIC BLOOD PRESSURE: 148 MMHG | WEIGHT: 181.4 LBS | HEART RATE: 72 BPM | OXYGEN SATURATION: 95 % | HEIGHT: 62 IN | BODY MASS INDEX: 33.38 KG/M2

## 2025-07-25 DIAGNOSIS — R10.9 RIGHT FLANK PAIN: Primary | ICD-10-CM

## 2025-07-25 DIAGNOSIS — R82.90 ABNORMAL URINE: ICD-10-CM

## 2025-07-25 DIAGNOSIS — K80.20 MULTIPLE GALLSTONES: ICD-10-CM

## 2025-07-25 DIAGNOSIS — K44.9 HIATAL HERNIA: ICD-10-CM

## 2025-07-25 DIAGNOSIS — Z71.89 ADVANCE CARE PLANNING: ICD-10-CM

## 2025-07-25 DIAGNOSIS — M81.0 OSTEOPOROSIS WITHOUT CURRENT PATHOLOGICAL FRACTURE, UNSPECIFIED OSTEOPOROSIS TYPE: ICD-10-CM

## 2025-07-25 PROCEDURE — 1126F AMNT PAIN NOTED NONE PRSNT: CPT | Performed by: STUDENT IN AN ORGANIZED HEALTH CARE EDUCATION/TRAINING PROGRAM

## 2025-07-25 PROCEDURE — 99213 OFFICE O/P EST LOW 20 MIN: CPT | Performed by: STUDENT IN AN ORGANIZED HEALTH CARE EDUCATION/TRAINING PROGRAM

## 2025-07-25 PROCEDURE — G2211 COMPLEX E/M VISIT ADD ON: HCPCS | Performed by: STUDENT IN AN ORGANIZED HEALTH CARE EDUCATION/TRAINING PROGRAM

## 2025-07-25 NOTE — ASSESSMENT & PLAN NOTE
- DEXA scan showed decreased bone mineral density, indicating osteopenia and increased risk of fracture.  - Resumed Fosamax.

## 2025-07-25 NOTE — PATIENT INSTRUCTIONS
Dermatologists    [facility]  [address] [place] [address] [phone]    [facility]  [address]  [place], [place] [phone]    Dr. Bethany Blakely  [address] [place] [address] [phone]    Dr. Shikha Turner  [address] [address] [phone]

## 2025-07-25 NOTE — PROGRESS NOTES
Lalo Comer M.D.  Internal Medicine  [facility]  [address], [address]  [place] [address]  [phone]      Chief Complaint  Discuss Test Results     SUBJECTIVE    History of Present Illness    Hyadee Dickey is a 89 y.o. female who presents to the office today as an established patient that last saw me on 4/1/2025.     History of Present Illness  The patient is an 89-year-old female who presents for a urine recheck. She was seen in our office by YNES Williamson, for abdominal pain. A CT scan was performed and showed no acute findings. She reports feeling better with no current abdominal pain or difficulty. She plans to return next Tuesday for a urine culture as requested by Valerie. She completed her antibiotic course this past Tuesday. She experiences soreness in her right kidney area upon waking, which resolves once she starts moving. She describes the pain as more muscular than bone-related. Her urine is clear and odorless, both in the morning and throughout the day. She does not experience burning or pain during urination, nor increased frequency. She occasionally experiences pain on the right side, but it does not radiate to her back or belly button. She had a few episodes of nausea lasting 15 to 20 seconds. She has a history of gallstones from 2015, but they have not caused any issues. 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 She uses oxygen at night due to her oxygen saturation dropping to the high 80s. Dr. Bonds recommended nighttime oxygen use. She sleeps on her right or left side, but finds sleeping on top of a tennis ball uncomfortable. She uses a T-shirt with a tennis ball on the back. She is considering pulmonary toileting for her atelectasis. She has a hiatal hernia, which she does not believe is causing her symptoms. She occasionally experiences a slight aftertaste in her mouth in the mornings, but it does not cause  "discomfort and resolves after brushing her teeth. She has no upper abdominal pain or postprandial pain. She has adjusted her eating schedule to earlier in the evening and avoids lying down immediately after eating. She has a small hernia with fat in it. She has an appointment with her nephrologist in September 2025. Her potassium level was 5 on her last study. She is mindful of her diet and takes 65 mg of iron daily. She is concerned about constipation from increasing her iron intake. She experienced lightheadedness when her neurologist increased her propranolol to 40 mg twice a day for her essential tremor, but this resolved after reducing the dose to 30 mg daily. Since her last appointment, she had a DEXA scan, which showed osteopenia. Her bone mineral density was decreased from the prior exam, indicating an increased risk of fracture. She has resumed Fosamax. She is going to [facility] for back pain and foot drop and Orthopedics for her knees. She was treated for bronchitis on May 20, 2025. She had a fall in June 2025 and was seen in urgent care for a right forearm abrasion, with a wound check a few days later in urgent care. Urine culture showed Klebsiella UTI, and she was prescribed Cefotan. She has had Mohs surgery on her face and has skin that peels off easily. Her nose is thinning and currently has a scab, which she attributes to using the cannula.    Review of Systems    Allergies   Allergen Reactions    Iodine Itching     IV ONLY    Percocet [Oxycodone-Acetaminophen] Itching    Iodinated Contrast Media Rash     Patient reports rash occurred 30 yrs ago with contrast   (\"IV\")        Outpatient Medications Marked as Taking for the 7/25/25 encounter (Office Visit) with Lalo Comer MD   Medication Sig Dispense Refill    alendronate (FOSAMAX) 70 MG tablet TAKE 1 TABLET BY MOUTH EVERY 7 (SEVEN) DAYS FOR 90 DAYS. 12 tablet 0    anastrozole (ARIMIDEX) 1 MG tablet TAKE 1 TABLET BY MOUTH DAILY 90 " "tablet 3    Cholecalciferol 25 MCG (1000 UT) tablet Take 1 tablet by mouth Daily.      cyanocobalamin (VITAMIN B-12) 1000 MCG tablet Take 1 tablet by mouth Daily. Indications: Inadequate Vitamin B12      ferrous sulfate 325 (65 FE) MG tablet Take 1 tablet by mouth Daily With Breakfast. Indications: Anemia From Inadequate Iron in the Body      folic acid (FOLVITE) 400 MCG tablet Take 1 tablet by mouth Daily. Indications: Anemia From Inadequate Folic Acid      hydroCHLOROthiazide 25 MG tablet Take 1 tablet by mouth Daily.      lisinopril (PRINIVIL,ZESTRIL) 10 MG tablet Take 1 tablet by mouth Daily. Indications: High Blood Pressure      propranolol (INDERAL) 20 MG tablet Take 1.5 tablets by mouth 2 (Two) Times a Day. Indications: Fine to Coarse Slow Tremor Affecting Head, Hands & Voice      Sodium Fluoride 5000 PPM 1.1 % paste           Past Medical History:   Diagnosis Date    Abnormal ECG 4/30/2024    Allergic Percocet, iv iodine    Anemia     Arthritis     B12 deficiency     BPPV (benign paroxysmal positional vertigo)     Brain concussion 1959    Breast cancer     LEFT    Cataract 2018  jaime, 2021 jaime blepharoplasty    Colon polyp 2009    COVID 03/24/2023    Rebound Covid following Paxlovid 4/1/23    CTS (carpal tunnel syndrome) 1997, 2014    D’quervain x3    Deep vein thrombosis 1970    HX, LEG    Essential tremor     Folliculitis 11/01/2017    Fracture of hip 2014    Right ORIF    Fracture, femur 1995    Left, No surgery    Fracture, radius 1995    Right, orif    Gait instability     GERD (gastroesophageal reflux disease) 1990    History of blood transfusion 2014    History of fall 06/17/2024    \"KNEE GAVE OUT\"    HL (hearing loss) 2015    HEARING AIDS    Hypertension 2001    on Rx    Knee swelling 2001    Bilateral    Lower extremity neuropathy     bilateral    Neuropathy     Osteopenia 2019    Potassium (K) excess 10/21/2022    Seeing nephrologist -- Dr. Coats    Renal insufficiency 2022    Rotator cuff " syndrome 2013    Fall induced    Sciatica     Small vessel disease     Torn rotator cuff 2013    right arm    Tremor 2005    BET on RX    Urinary tract infection 2018    approx 1 every 5 years    Wears hearing aid     BILATERAL    Wrist fracture, right 2015     Past Surgical History:   Procedure Laterality Date    ADENOIDECTOMY  1953    APPENDECTOMY  1961    BLEPHAROPLASTY Bilateral 10/19/2021    Procedure: BILATERAL UPPER LID BLEPHAROPLASTY;  Surgeon: Ruddy Moses MD;  Location: [facility] OR [facility];  Service: Ophthalmology;  Laterality: Bilateral;    BREAST BIOPSY      BREAST LUMPECTOMY Left 11/15/2021    Procedure: Left JEAN-PAUL-guided partial mastectomy;  Surgeon: Maliha Andres MD;  Location: [facility] OR;  Service: General;  Laterality: Left;    BROW LIFT Bilateral 10/19/2021    Procedure: BILATERAL TEMPORAL DIRECT BROWLIFT;  Surgeon: Ruddy Moses MD;  Location: [facility] OR [facility];  Service: Ophthalmology;  Laterality: Bilateral;    CARPAL TUNNEL RELEASE  1992    right wrist    CATARACT EXTRACTION, BILATERAL  07/01/2018    R 7/18 and L 9/18 WITH LENS IMPLANTS    COLONOSCOPY      DEQUERVAIN RELEASE Bilateral 2015    DILATATION AND CURETTAGE  1960s    EYE SURGERY  2018    Bilateral repairs    FRACTURE SURGERY  2014    R hip, R  wrist    HAND SURGERY Right     HIP FRACTURE SURGERY Right     HYSTERECTOMY  1975    Partial    JOINT REPLACEMENT  2001, 2014    TKA    KNEE POLY INSERT EXCHANGE Right 12/12/2024    Procedure: Right Knee Poly insert exchange with exploration of foreign body;  Surgeon: Edmund Zavaleta MD;  Location: [facility] OR;  Service: Orthopedics;  Laterality: Right;    TONSILLECTOMY AND ADENOIDECTOMY  1953    TOTAL ABDOMINAL HYSTERECTOMY WITH SALPINGO OOPHORECTOMY  AIDEN BUT NOT BSO    TOTAL KNEE ARTHROPLASTY Left 2001    TOTAL KNEE ARTHROPLASTY Right 2014    TRIGGER FINGER RELEASE Right     TRIGGER POINT INJECTION  2017, 2019    WRIST SURGERY  2015    FRACTURE RIGHT     Family History  "  Problem Relation Age of Onset    Diabetes Mother     Hypertension Mother     Stroke Mother     Hearing loss Mother         Hearing Aids    Heart disease Mother         CHF, PACER    Thyroid disease Mother         THYROIDECTOMY    Hypothyroidism Mother     Hypertension Father     Stroke Father     Alcohol abuse Father         DAYS OFF     Heart disease Father         CHF    Hyperlipidemia Father     Kidney disease Father         KIDNEY STONE REMOVED    Heart attack Father     Cancer Sister         LUNG    COPD Sister     Lung cancer Sister     Diabetes Sister     Cancer Sister         KIDNEY    Kidney cancer Sister     Diabetes Sister         INSULIN    Hypertension Sister     Heart disease Sister         ON AUTOPSY    Mental illness Sister         HOSPITALIZED    Depression Sister     Hyperlipidemia Sister     Cancer Brother         LUNG    Lung cancer Brother     Early death Brother         2 MO, ? WHOOPING COUGH    Mental illness Brother         Schizophrenia/hosp    Depression Brother     Early death Brother     Asthma Daughter         ADULT ONSET    Diabetes Maternal Grandmother         NIDDM    Diabetes Maternal Aunt         NIDDM    Thyroid disease Maternal Aunt         THYROIDECTOMY    Diabetes Maternal Aunt         NIDDM    Hearing loss Maternal Aunt     Malig Hyperthermia Neg Hx     Breast cancer Neg Hx     reports that she has never smoked. She has never been exposed to tobacco smoke. She has never used smokeless tobacco. She reports that she does not drink alcohol and does not use drugs.    OBJECTIVE    Vital Signs:   /78   Pulse 72   Ht 157.5 cm (62.01\")   Wt 82.3 kg (181 lb 6.4 oz)   SpO2 95%   BMI 33.17 kg/m²     Physical Exam  Constitutional:       Appearance: Normal appearance.   Cardiovascular:      Rate and Rhythm: Normal rate and regular rhythm.      Heart sounds: Normal heart sounds. No murmur heard.  Pulmonary:      Effort: Pulmonary effort is normal.      Breath sounds: " Normal breath sounds.   Abdominal:      General: Abdomen is flat. There is no distension.      Palpations: Abdomen is soft.      Tenderness: There is no abdominal tenderness.   Musculoskeletal:         General: No tenderness.      Right lower leg: No edema.      Left lower leg: No edema.      Comments: No tenderness along spine   Skin:     General: Skin is warm and dry.   Neurological:      Mental Status: She is alert.   Psychiatric:         Mood and Affect: Mood normal.         Behavior: Behavior normal.         Thought Content: Thought content normal.          Physical Exam      The following data was reviewed by: Lalo Comer MD on 07/25/2025:  CMP          12/17/2024    03:49 3/10/2025    08:59 7/10/2025    14:18   CMP   Glucose 105  110  110    BUN 34  35  25.0    Creatinine 1.00  1.40  1.14    EGFR 54.6  36.5  46.7    Sodium 139  143  140    Potassium 4.6  4.2  5.0    Chloride 109  108  106    Calcium 8.7  10.0  10.0    Total Protein   6.5    Albumin  3.9  4.3    Globulin   2.2    Total Bilirubin   0.3    Alkaline Phosphatase   40    AST (SGOT)   20    ALT (SGPT)   10    Albumin/Globulin Ratio   2.0    BUN/Creatinine Ratio 34.0  25.0  21.9    Anion Gap 6.6  9.8           A1C Last 3 Results          9/9/2024    11:31   HGBA1C Last 3 Results   Hemoglobin A1C 5.90      Data reviewed : Acute visit note         CT Abdomen Pelvis With Contrast (07/14/2025 09:39)     ASSESSMENT & PLAN        Osteoporosis without current pathological fracture, unspecified osteoporosis type  - DEXA scan showed decreased bone mineral density, indicating osteopenia and increased risk of fracture.  - Resumed Fosamax.       Abnormal urine  - Urine culture showed Klebsiella UTI.  - Prescribed Cefotan and completed antibiotic course this past Tuesday.  - No current symptoms of UTI; no further antibiotics needed unless symptoms recur.         Right flank pain  - Experiences back pain in the morning, which resolves after moving around.  - Pain  "likely muscular.  - Stretching exercises recommended to alleviate discomfort.  - CT scan revealed gallstones filling the gallbladder.  - Prefers not to undergo surgery unless it becomes an emergency.  - Has a hiatal hernia but reports no significant symptoms.  - Pepcid recommended to manage any occasional bad taste in the morning if it becomes bothersome.  - Atelectasis noted on CT scan. Discussed this is common incidental finding.       Hiatal hernia  Bad tast in the morning  Trying to eat earlier         Multiple gallstones  Asymptomatic. Would not want surgery       Advance care planning  \"Don't trap me in the middle\". Living will scanned in and up to date.          Assessment & Plan        There are no preventive care reminders to display for this patient.     Follow Up  No follow-ups on file.    Patient/family had no further questions at this time and verbalized understanding of the plan discussed today.     Patient or patient representative verbalized consent for the use of Ambient Listening during the visit with  Lalo Comer MD for chart documentation. 7/25/2025  12:00 EDT    "

## (undated) DEVICE — ELECTRD BLD EZ CLN MOD XLNG 2.75IN

## (undated) DEVICE — STPLR SKIN VISISTAT WD 35CT

## (undated) DEVICE — STERILE COTTON TIP 6IN 10PK: Brand: MEDLINE

## (undated) DEVICE — TRAP FLD MINIVAC MEGADYNE 100ML

## (undated) DEVICE — PENCL SMOKE/EVAC MEGADYNE TELESCP 10FT

## (undated) DEVICE — PK KN TOTL 40

## (undated) DEVICE — WIPE INST MEROCEL

## (undated) DEVICE — GLV SURG SENSICARE POLYISPRN W/ALOE PF LF 6.5 GRN STRL

## (undated) DEVICE — SKIN PREP TRAY 4 COMPARTM TRAY: Brand: MEDLINE INDUSTRIES, INC.

## (undated) DEVICE — PENCL E/S ULTRAVAC TELESCP NOSE HOLSTR 10FT

## (undated) DEVICE — ANTIBACTERIAL UNDYED BRAIDED (POLYGLACTIN 910), SYNTHETIC ABSORBABLE SUTURE: Brand: COATED VICRYL

## (undated) DEVICE — SUT MNCRYL PLS ANTIB UD 4/0 PS2 18IN

## (undated) DEVICE — GOWN,NON-REINFORCED,SIRUS,SET IN SLV,XL: Brand: MEDLINE

## (undated) DEVICE — YANKAUER,BULB TIP,W/O VENT,RIGID,STERILE: Brand: MEDLINE

## (undated) DEVICE — DRAPE,UNDERBUTTOCKS,PCH,STERILE: Brand: MEDLINE

## (undated) DEVICE — CONTAINER,SPECIMEN,O.R.STRL,4.5OZ: Brand: MEDLINE

## (undated) DEVICE — DUAL CUT SAGITTAL BLADE

## (undated) DEVICE — PATIENT RETURN ELECTRODE, SINGLE-USE, CONTACT QUALITY MONITORING, ADULT, WITH 9FT CORD, FOR PATIENTS WEIGING OVER 33LBS. (15KG): Brand: MEGADYNE

## (undated) DEVICE — SUT VIC 1 CT1 36IN J947H

## (undated) DEVICE — GLV SURG SENSICARE W/ALOE PF LF 7.5 STRL

## (undated) DEVICE — SUT VIC 5/0 P3 18IN J493G

## (undated) DEVICE — SUT GUT PLN FAST ABS 5/0 PC1 18IN 1915G

## (undated) DEVICE — APPL CHLORAPREP HI/LITE 26ML ORNG

## (undated) DEVICE — BNDG,ELSTC,MATRIX,STRL,6"X5YD,LF,HOOK&LP: Brand: MEDLINE

## (undated) DEVICE — UNDERGLV SURG BIOGEL INDICATOR LF PF 7.5

## (undated) DEVICE — NDL HYPO PRECISIONGLIDE REG 25G 1 1/2

## (undated) DEVICE — CEMENT MIXING SYSTEM WITH FEMORAL BREAKWAY NOZZLE: Brand: REVOLUTION

## (undated) DEVICE — UNDERCAST PADDING: Brand: DEROYAL

## (undated) DEVICE — SYS CLS SKIN PREMIERPRO EXOFINFUSION 22CM

## (undated) DEVICE — HDRST POSITIONING FM RND 2X9IN

## (undated) DEVICE — SUT PROLN 6/0 P3 18IN 8695G

## (undated) DEVICE — SYR LL TP 10ML STRL

## (undated) DEVICE — MAT FLR ABSORBENT LG 4FT 10 2.5FT

## (undated) DEVICE — PREP SOL POVIDONE/IODINE BT 4OZ

## (undated) DEVICE — HANDPIECE, SINGLE-USE, SAVI SCOUT®: Brand: SAVI SCOUT®

## (undated) DEVICE — GLV SURG BIOGEL SENSR LTX PF SZ7.5

## (undated) DEVICE — TRY SKINPREP DRYPREP

## (undated) DEVICE — 3M™ IOBAN™ 2 ANTIMICROBIAL INCISE DRAPE 6640EZ: Brand: IOBAN™ 2

## (undated) DEVICE — GAUZE,SPONGE,4"X4",16PLY,XRAY,STRL,LF: Brand: MEDLINE

## (undated) DEVICE — Device

## (undated) DEVICE — GLV SURG SENSICARE PI MIC PF SZ6.5 LF STRL

## (undated) DEVICE — ADHS SKIN SURG TISS VISC PREMIERPRO EXOFIN HI/VISC FAST/DRY

## (undated) DEVICE — LEGGINGS, PAIR, 31X48, STERILE: Brand: MEDLINE

## (undated) DEVICE — PK UNIV COMPL 40

## (undated) DEVICE — GLV SURG BIOGEL LTX PF 6 1/2

## (undated) DEVICE — GOWN,SIRUS,NON REINFRCD,LARGE,SET IN SL: Brand: MEDLINE

## (undated) DEVICE — PICO 7 10CM X 30CM: Brand: PICO™ 7

## (undated) DEVICE — GLV SURG SENSICARE W/ALOE PF LF 8 STRL

## (undated) DEVICE — GLV SURG BIOGEL M LTX PF 7 1/2

## (undated) DEVICE — INTENDED TO SUPPORT AND MAINTAIN THE POSITION OF AN ANESTHETIZED PATIENT DURING SURGERY: Brand: HERMANTOR XL PINK KNEE POSITIONING PAD

## (undated) DEVICE — DRSNG BURN ACTICOAT FLEX 7 1X24IN

## (undated) DEVICE — PK ENT 40

## (undated) DEVICE — 450 ML BOTTLE OF 0.05% CHLORHEXIDINE GLUCONATE IN 99.95% STERILE WATER FOR IRRIGATION, USP AND APPLICATOR.: Brand: IRRISEPT ANTIMICROBIAL WOUND LAVAGE

## (undated) DEVICE — NDL HYPO ECLPS SFTY 22G 1 1/2IN

## (undated) DEVICE — KT INK TISS MARGINMARKER STD 6COLOR

## (undated) DEVICE — APPL DURAPREP IODOPHOR APL 26ML

## (undated) DEVICE — CROUCH CORNEAL PROTECTOR: Brand: BAUSCH + LOMB

## (undated) DEVICE — ELECTRD NDL EZ CLN MOD 2.75IN